# Patient Record
Sex: MALE | Race: WHITE | Employment: OTHER | URBAN - METROPOLITAN AREA
[De-identification: names, ages, dates, MRNs, and addresses within clinical notes are randomized per-mention and may not be internally consistent; named-entity substitution may affect disease eponyms.]

---

## 2017-01-04 ENCOUNTER — TELEPHONE (OUTPATIENT)
Dept: FAMILY MEDICINE | Facility: OTHER | Age: 62
End: 2017-01-04

## 2017-01-04 DIAGNOSIS — I10 HYPERTENSION GOAL BP (BLOOD PRESSURE) < 140/90: Primary | ICD-10-CM

## 2017-01-04 RX ORDER — LOSARTAN POTASSIUM 25 MG/1
12.5 TABLET ORAL DAILY
Qty: 15 TABLET | Refills: 1 | Status: SHIPPED | OUTPATIENT
Start: 2017-01-04 | End: 2017-02-10

## 2017-01-04 NOTE — TELEPHONE ENCOUNTER
Changed to losartan.  Pt will need to come in for BP check and labs within 1-2 months to be sure he's doing well with the change.  Cough should resolve within one month if it's related to the lisinopril.

## 2017-01-04 NOTE — TELEPHONE ENCOUNTER
Pt has been taking lisinopril for about 30 years now and states that just recently he has been coughing a lot. He spoke with the pharmacist and they told him that that is a side effect from the lisinopril.  He wants to know if there is something else he can take that is in the same family as the lisinopril? Does he need to come in and be seen to discuss changing medication?

## 2017-01-04 NOTE — TELEPHONE ENCOUNTER
"Keegan Buchanan is a 61 year old male-     Patient states that he has had this \"annoying\" non-productive cough intermittently for many years.  Just recently it seems to be irritating him more than usual. He spoke with a pharmacist who mentioned that it could be a side effect of his Lisinopril and that he could try a different medication in the same class of drugs that does not have this side effect (he was told the name of the medication, but does not remember).  He denies chest pain, difficulty breathing, fevers, productive cough or congestion.  He feels, other than this cough, better than he has in a long time. He is wondering if he could possibly try a different medication for his Bp.  Will send to provider for recommendations.   Pharmacy-  [facility]  Margie Howell RN        "

## 2017-01-16 ENCOUNTER — OFFICE VISIT (OUTPATIENT)
Dept: FAMILY MEDICINE | Facility: OTHER | Age: 62
End: 2017-01-16
Payer: COMMERCIAL

## 2017-01-16 VITALS
WEIGHT: 194 LBS | RESPIRATION RATE: 16 BRPM | SYSTOLIC BLOOD PRESSURE: 138 MMHG | BODY MASS INDEX: 26.28 KG/M2 | HEART RATE: 88 BPM | DIASTOLIC BLOOD PRESSURE: 76 MMHG | OXYGEN SATURATION: 96 % | HEIGHT: 72 IN | TEMPERATURE: 98.5 F

## 2017-01-16 DIAGNOSIS — J01.90 ACUTE SINUSITIS WITH SYMPTOMS > 10 DAYS: Primary | ICD-10-CM

## 2017-01-16 DIAGNOSIS — R05.9 COUGH: ICD-10-CM

## 2017-01-16 PROCEDURE — 99213 OFFICE O/P EST LOW 20 MIN: CPT | Performed by: PHYSICIAN ASSISTANT

## 2017-01-16 RX ORDER — CODEINE PHOSPHATE AND GUAIFENESIN 10; 100 MG/5ML; MG/5ML
1-2 SOLUTION ORAL EVERY 6 HOURS PRN
Qty: 120 ML | Refills: 0 | Status: SHIPPED | OUTPATIENT
Start: 2017-01-16 | End: 2017-01-25

## 2017-01-16 RX ORDER — CEPHALEXIN 500 MG/1
500 CAPSULE ORAL 2 TIMES DAILY
Qty: 20 CAPSULE | Refills: 0 | Status: SHIPPED | OUTPATIENT
Start: 2017-01-16 | End: 2017-01-26

## 2017-01-16 ASSESSMENT — PAIN SCALES - GENERAL: PAINLEVEL: NO PAIN (0)

## 2017-01-16 NOTE — PATIENT INSTRUCTIONS
Sinusitis (Antibiotic Treatment)    The sinuses are air-filled spaces within the bones of the face. They connect to the inside of the nose. Sinusitis is an inflammation of the tissue lining the sinus cavity. Sinus inflammation can occur during a cold. It can also be due to allergies to pollens and other particles in the air. Sinusitis can cause symptoms of sinus congestion and fullness. A sinus infection causes fever, headache and facial pain. There is often green or yellow drainage from the nose or into the back of the throat (post-nasal drip). You have been given antibiotics to treat this condition.  Home care:    Take the full course of antibiotics as instructed. Do not stop taking them, even if you feel better.    Drink plenty of water, hot tea, and other liquids. This may help thin mucus. It also may promote sinus drainage.    Heat may help soothe painful areas of the face. Use a towel soaked in hot water. Or,  the shower and direct the hot spray onto your face. Using a vaporizer along with a menthol rub at night may also help.     An expectorant containing guaifenesin may help thin the mucus and promote drainage from the sinuses.    Over-the-counter decongestants may be used unless a similar medicine was prescribed. Nasal sprays work the fastest. Use one that contains phenylephrine or oxymetazoline. First blow the nose gently. Then use the spray. Do not use these medicines more often than directed on the label or symptoms may get worse. You may also use tablets containing pseudoephedrine. Avoid products that combine ingredients, because side effects may be increased. Read labels. You can also ask the pharmacist for help. (NOTE: Persons with high blood pressure should not use decongestants. They can raise blood pressure.)    Over-the-counter antihistamines may help if allergies contributed to your sinusitis.      Do not use nasal rinses or irrigation during an acute sinus infection, unless told to by  your health care provider. Rinsing may spread the infection to other sinuses.    Use acetaminophen or ibuprofen to control pain, unless another pain medicine was prescribed. (If you have chronic liver or kidney disease or ever had a stomach ulcer, talk with your doctor before using these medicines. Aspirin should never be used in anyone under 18 years of age who is ill with a fever. It may cause severe liver damage.)    Don't smoke. This can worsen symptoms.  Follow-up care  Follow up with your healthcare provider or our staff if you are not improving within the next week.  When to seek medical advice  Call your healthcare provider if any of these occur:    Facial pain or headache becoming more severe    Stiff neck    Unusual drowsiness or confusion    Swelling of the forehead or eyelids    Vision problems, including blurred or double vision    Fever of 100.4 F (38 C) or higher, or as directed by your healthcare provider    Seizure    Breathing problems    Symptoms not resolving within 10 days    1627-1888 [employer]. [address] [place] [address], [place] [address]. All rights reserved. This information is not intended as a substitute for professional medical care. Always follow your healthcare professional's instructions.

## 2017-01-16 NOTE — MR AVS SNAPSHOT
After Visit Summary   1/16/2017    Keegan Buchanan    MRN: [id number]           Patient Information     Date Of Birth          1955        Visit Information        Provider Department      1/16/2017 9:00 AM Sharri Aguilera PA-C [facility]        Today's Diagnoses     Acute sinusitis with symptoms > 10 days    -  1     Cough           Care Instructions      Sinusitis (Antibiotic Treatment)    The sinuses are air-filled spaces within the bones of the face. They connect to the inside of the nose. Sinusitis is an inflammation of the tissue lining the sinus cavity. Sinus inflammation can occur during a cold. It can also be due to allergies to pollens and other particles in the air. Sinusitis can cause symptoms of sinus congestion and fullness. A sinus infection causes fever, headache and facial pain. There is often green or yellow drainage from the nose or into the back of the throat (post-nasal drip). You have been given antibiotics to treat this condition.  Home care:    Take the full course of antibiotics as instructed. Do not stop taking them, even if you feel better.    Drink plenty of water, hot tea, and other liquids. This may help thin mucus. It also may promote sinus drainage.    Heat may help soothe painful areas of the face. Use a towel soaked in hot water. Or,  the shower and direct the hot spray onto your face. Using a vaporizer along with a menthol rub at night may also help.     An expectorant containing guaifenesin may help thin the mucus and promote drainage from the sinuses.    Over-the-counter decongestants may be used unless a similar medicine was prescribed. Nasal sprays work the fastest. Use one that contains phenylephrine or oxymetazoline. First blow the nose gently. Then use the spray. Do not use these medicines more often than directed on the label or symptoms may get worse. You may also use tablets containing pseudoephedrine. Avoid products that  combine ingredients, because side effects may be increased. Read labels. You can also ask the pharmacist for help. (NOTE: Persons with high blood pressure should not use decongestants. They can raise blood pressure.)    Over-the-counter antihistamines may help if allergies contributed to your sinusitis.      Do not use nasal rinses or irrigation during an acute sinus infection, unless told to by your health care provider. Rinsing may spread the infection to other sinuses.    Use acetaminophen or ibuprofen to control pain, unless another pain medicine was prescribed. (If you have chronic liver or kidney disease or ever had a stomach ulcer, talk with your doctor before using these medicines. Aspirin should never be used in anyone under 18 years of age who is ill with a fever. It may cause severe liver damage.)    Don't smoke. This can worsen symptoms.  Follow-up care  Follow up with your healthcare provider or our staff if you are not improving within the next week.  When to seek medical advice  Call your healthcare provider if any of these occur:    Facial pain or headache becoming more severe    Stiff neck    Unusual drowsiness or confusion    Swelling of the forehead or eyelids    Vision problems, including blurred or double vision    Fever of 100.4 F (38 C) or higher, or as directed by your healthcare provider    Seizure    Breathing problems    Symptoms not resolving within 10 days    1114-5499 [employer]. [address] [place] [address]. All rights reserved. This information is not intended as a substitute for professional medical care. Always follow your healthcare professional's instructions.              Follow-ups after your visit        Follow-up notes from your care team     Return if symptoms worsen or fail to improve.      Your next 10 appointments already scheduled     Mar 10, 2017  8:30 AM   LAB with [facility])    [id number] [place] [id number] [phone]           Patient must bring picture ID.  Patient should be prepared to give a urine specimen  Please do not eat 10-12 hours before your appointment if you are coming in fasting for labs on lipids, cholesterol, or glucose (sugar).  Pregnant women should follow their Care Team instructions. Water with medications is okay. Do not drink coffee or other fluids.   If you have concerns about taking  your medications, please ask at office or if scheduling via LightUp, send a message by clicking on Secure Messaging, Message Your Care Team.              Who to contact     If you have questions or need follow up information about today's clinic visit or your schedule please contact [facility] directly at [phone].  Normal or non-critical lab and imaging results will be communicated to you by Raffstarhart, letter or phone within 4 business days after the clinic has received the results. If you do not hear from us within 7 days, please contact the clinic through [employer] or phone. If you have a critical or abnormal lab result, we will notify you by phone as soon as possible.  Submit refill requests through LightUp or call your pharmacy and they will forward the refill request to us. Please allow 3 business days for your refill to be completed.          Additional Information About Your Visit        LightUp Information     LightUp lets you send messages to your doctor, view your test results, renew your prescriptions, schedule appointments and more. To sign up, go to [email or web address] . Click on \"Log in\" on the left side of the screen, which will take you to the Welcome page. Then click on \"Sign up Now\" on the right side of the page.     You will be asked to enter the access code listed below, as well as some personal information. Please follow the directions to create your username and password.     Your access code is: [email or web address]  Expires: 4/16/2017  " 9:20 AM     Your access code will  in 90 days. If you need help or a new code, please call your [facility] clinic or [phone].        Care EveryWhere ID     This is your Care EveryWhere ID. This could be used by other organizations to access your [facility] medical records  [id number]        Your Vitals Were     Pulse Temperature Respirations Height BMI (Body Mass Index) Pulse Oximetry    88 98.5  F (36.9  C) (Oral) 16 6' (1.829 m) 26.31 kg/m2 96%       Blood Pressure from Last 3 Encounters:   17 138/76   16 132/72   16 128/74    Weight from Last 3 Encounters:   17 194 lb (87.998 kg)   16 196 lb 6.4 oz (89.086 kg)   10/10/16 189 lb 12.8 oz (86.093 kg)              Today, you had the following     No orders found for display         Today's Medication Changes          These changes are accurate as of: 17  9:20 AM.  If you have any questions, ask your nurse or doctor.               Start taking these medicines.        Dose/Directions    cephALEXin 500 MG capsule   Commonly known as:  KEFLEX   Used for:  Acute sinusitis with symptoms > 10 days   Started by:  Sharri Aguilera PA-C        Dose:  500 mg   Take 1 capsule (500 mg) by mouth 2 times daily for 10 days   Quantity:  20 capsule   Refills:  0       guaiFENesin-codeine 100-10 MG/5ML Soln solution   Commonly known as:  ROBITUSSIN AC   Used for:  Cough   Started by:  Sharri Aguilera PA-C        Dose:  1-2 tsp.   Take 5-10 mLs by mouth every 6 hours as needed for cough   Quantity:  120 mL   Refills:  0            Where to get your medicines      These medications were sent to [facility] - [address]  [address] [place] [address] [address]     Phone:  [phone]    - cephALEXin 500 MG capsule      Some of these will need a paper prescription and others can be bought over the counter.  Ask your nurse if you have questions.     Bring a paper prescription for each of these  medications    - guaiFENesin-codeine 100-10 MG/5ML Soln solution             Primary Care Provider Office Phone # Fax #    Fabio Stovall [phone]       [facility] [address] [place] [address]        Thank you!     Thank you for choosing [facility]  for your care. Our goal is always to provide you with excellent care. Hearing back from our patients is one way we can continue to improve our services. Please take a few minutes to complete the written survey that you may receive in the mail after your visit with us. Thank you!             Your Updated Medication List - Protect others around you: Learn how to safely use, store and throw away your medicines at www.disposemymeds.org.          This list is accurate as of: 1/16/17  9:20 AM.  Always use your most recent med list.                   Brand Name Dispense Instructions for use    aspirin 81 MG tablet     90 tablet    Take 1 tablet by mouth daily.       atorvastatin 80 MG tablet    LIPITOR    90 tablet    Take 1 tablet (80 mg) by mouth daily       cephALEXin 500 MG capsule    KEFLEX    20 capsule    Take 1 capsule (500 mg) by mouth 2 times daily for 10 days       fluticasone 50 MCG/ACT spray    FLONASE    16 g    Spray 1-2 sprays into both nostrils daily       guaiFENesin-codeine 100-10 MG/5ML Soln solution    ROBITUSSIN AC    120 mL    Take 5-10 mLs by mouth every 6 hours as needed for cough       losartan 25 MG tablet    COZAAR    15 tablet    Take 0.5 tablets (12.5 mg) by mouth daily       nitroglycerin 0.4 MG sublingual tablet    NITROSTAT    25 tablet    Place 1 tablet (0.4 mg) under the tongue every 5 minutes as needed for chest pain       omeprazole 20 MG CR capsule    priLOSEC    90 capsule    Take 1 capsule (20 mg) by mouth daily       tamsulosin 0.4 MG capsule    FLOMAX    180 capsule    Take 2 capsules (0.8 mg) by mouth daily       tolterodine 2 MG 24 hr capsule    DETROL LA    90 capsule     Take 1 capsule (2 mg) by mouth daily

## 2017-01-16 NOTE — PROGRESS NOTES
"  SUBJECTIVE:                                                    Keegan Buchanan is a 61 year old male who presents to clinic today for the following health issues:      Acute Illness   Acute illness concerns: sinus, cough  Onset: since Gretchen with sinus but cough has been longer and was changed from lisinopril to losartan but still has cough    Fever: no    Chills/Sweats: no    Headache (location?): YES    Sinus Pressure:YES    Conjunctivitis:  no    Ear Pain: no but has history of fluid in ears and would like to check ears    Rhinorrhea: YES    Congestion: YES    Sore Throat: no but has a \"tickling\" sensation     Cough: YES-non-productive, has \"coughing fits\"    Wheeze: YES- notices it at night    Decreased Appetite: no    Nausea: no    Vomiting: no    Diarrhea:  no    Dysuria/Freq.: no    Fatigue/Achiness: YES- some fatigue    Sick/Strep Exposure: YES- people at work     Therapies Tried and outcome: Flonase at night, mucinex, nyquil, dayquil, netti pot      -------------------------------------    Problem list and histories reviewed & adjusted, as indicated.  Additional history: as documented    BP Readings from Last 3 Encounters:   01/16/17 138/76   12/29/16 132/72   12/13/16 128/74    Wt Readings from Last 3 Encounters:   01/16/17 194 lb (87.998 kg)   11/22/16 196 lb 6.4 oz (89.086 kg)   10/10/16 189 lb 12.8 oz (86.093 kg)                  Problem list, Medication list, Allergies, and Medical/Social/Surgical histories reviewed in [facility] and updated as appropriate.    ROS:  Constitutional, HEENT, cardiovascular, pulmonary, gi and gu systems are negative, except as otherwise noted.    OBJECTIVE:                                                    /76 mmHg  Pulse 88  Temp(Src) 98.5  F (36.9  C) (Oral)  Resp 16  Ht 6' (1.829 m)  Wt 194 lb (87.998 kg)  BMI 26.31 kg/m2  SpO2 96%  Body mass index is 26.31 kg/(m^2).  GENERAL: alert and no distress  EYES: Eyes grossly normal to inspection, PERRL and " conjunctivae and sclerae normal  HENT: normal cephalic/atraumatic, both ears: clear effusion, rhinorrhea yellow, oral mucous membranes moist and sinuses: maxillary tenderness on left  NECK: no adenopathy, no asymmetry, masses, or scars and thyroid normal to palpation  RESP: lungs clear to auscultation - no rales, rhonchi or wheezes  CV: regular rates and rhythm, no murmur, click or rub, peripheral pulses strong and no peripheral edema  MS: no gross musculoskeletal defects noted, no edema  SKIN: no suspicious lesions or rashes    Diagnostic Test Results:  none      ASSESSMENT/PLAN:                                                        ICD-10-CM    1. Acute sinusitis with symptoms > 10 days J01.90 cephALEXin (KEFLEX) 500 MG capsule   2. Cough R05 guaiFENesin-codeine (ROBITUSSIN AC) 100-10 MG/5ML SOLN solution     I will treat for sinus infection and if worsening or not resolving would have patient follow up in clinic for a chest xray and recheck.  See Patient Instructions    Sharri Aguilera PA-C  [facility]

## 2017-01-16 NOTE — NURSING NOTE
Chief Complaint   Patient presents with     URI       Initial /76 mmHg  Pulse 88  Temp(Src) 98.5  F (36.9  C) (Oral)  Resp 16  Ht 6' (1.829 m)  Wt 194 lb (87.998 kg)  BMI 26.31 kg/m2  SpO2 96% Estimated body mass index is 26.31 kg/(m^2) as calculated from the following:    Height as of this encounter: 6' (1.829 m).    Weight as of this encounter: 194 lb (87.998 kg).  BP completed using cuff size: regular    Cathy Golden CMA ([facility])

## 2017-01-23 DIAGNOSIS — I10 HYPERTENSION GOAL BP (BLOOD PRESSURE) < 140/90: Primary | ICD-10-CM

## 2017-01-23 NOTE — TELEPHONE ENCOUNTER
Losartan 25 MG      Last Written Prescription Date: 01/04/2017  Last Fill Quantity: 15, # refills: 1  Last Office Visit with G, P or [facility] prescribing provider: 01/16/2017       POTASSIUM   Date Value Ref Range Status   10/10/2016 3.9 3.4 - 5.3 mmol/L Final     CREATININE   Date Value Ref Range Status   10/10/2016 0.85 0.66 - 1.25 mg/dL Final     BP Readings from Last 3 Encounters:   01/16/17 138/76   12/29/16 132/72   12/13/16 128/74

## 2017-01-24 RX ORDER — LOSARTAN POTASSIUM 25 MG/1
TABLET ORAL
Qty: 30 TABLET | Refills: 0 | OUTPATIENT
Start: 2017-01-24

## 2017-01-25 DIAGNOSIS — R05.9 COUGH: Primary | ICD-10-CM

## 2017-01-25 NOTE — TELEPHONE ENCOUNTER
Josh MULLEN      Last Written Prescription Date: 01/16/17  Last Fill Quantity: 120,  # refills: 0   Last Office Visit with [facility], [facility] or [facility] prescribing provider: 01/16/17  Giovana Vargas, [facility]  [phone]

## 2017-01-26 ENCOUNTER — TELEPHONE (OUTPATIENT)
Dept: FAMILY MEDICINE | Facility: OTHER | Age: 62
End: 2017-01-26

## 2017-01-26 RX ORDER — CODEINE PHOSPHATE AND GUAIFENESIN 10; 100 MG/5ML; MG/5ML
1-2 SOLUTION ORAL EVERY 6 HOURS PRN
Qty: 120 ML | Refills: 0 | Status: SHIPPED | OUTPATIENT
Start: 2017-01-26 | End: 2017-02-10

## 2017-01-26 NOTE — TELEPHONE ENCOUNTER
A little before deepak he develop a cough.   Was seen by  thought it was a sinus infection.  Now about 85% percent better.     Worst part is that he finished his antibiotic yesterday.  Now sinus is very congested.     Advised he be seen.  Appointment made for tomorrow.    Dorian Toscano RN

## 2017-01-26 NOTE — TELEPHONE ENCOUNTER
Contacted patient and relayed message below and he stated he would prefer to speak with an RN prior to scheduling any more appointments, please call.

## 2017-01-26 NOTE — TELEPHONE ENCOUNTER
Reason for call:  Symptom  Reason for call:  Patient reporting a symptom    Symptom or request: sinus infection, seens to be getting worse    Duration (how long have symptoms been present): was seen 1/16 for this    Have you been treated for this before? Yes    Additional comments: not feeling any better    Phone Number patient can be reached at:  Home number on file [phone] (home)    Best Time:  any    Can we leave a detailed message on this number:  YES    Call taken on 1/26/2017 at 11:03 AM by Rosalinda Samuel

## 2017-01-26 NOTE — TELEPHONE ENCOUNTER
Per last ov if he is not improving he will need to be seen.  May need a chest xray.  Please call patient and help him schedule an OV.    Dorian Toscano RN

## 2017-01-27 ENCOUNTER — OFFICE VISIT (OUTPATIENT)
Dept: FAMILY MEDICINE | Facility: OTHER | Age: 62
End: 2017-01-27
Payer: COMMERCIAL

## 2017-01-27 ENCOUNTER — RADIANT APPOINTMENT (OUTPATIENT)
Dept: GENERAL RADIOLOGY | Facility: OTHER | Age: 62
End: 2017-01-27
Attending: STUDENT IN AN ORGANIZED HEALTH CARE EDUCATION/TRAINING PROGRAM
Payer: COMMERCIAL

## 2017-01-27 VITALS
RESPIRATION RATE: 18 BRPM | WEIGHT: 195.2 LBS | BODY MASS INDEX: 26.47 KG/M2 | OXYGEN SATURATION: 96 % | DIASTOLIC BLOOD PRESSURE: 74 MMHG | SYSTOLIC BLOOD PRESSURE: 130 MMHG | TEMPERATURE: 98.2 F | HEART RATE: 85 BPM

## 2017-01-27 DIAGNOSIS — J20.9 ACUTE BRONCHITIS WITH SYMPTOMS > 10 DAYS: ICD-10-CM

## 2017-01-27 DIAGNOSIS — R05.9 COUGH: ICD-10-CM

## 2017-01-27 DIAGNOSIS — J01.90 ACUTE SINUSITIS WITH SYMPTOMS > 10 DAYS: Primary | ICD-10-CM

## 2017-01-27 PROCEDURE — 99213 OFFICE O/P EST LOW 20 MIN: CPT | Performed by: STUDENT IN AN ORGANIZED HEALTH CARE EDUCATION/TRAINING PROGRAM

## 2017-01-27 PROCEDURE — 71020 XR CHEST 2 VW: CPT

## 2017-01-27 RX ORDER — ALBUTEROL SULFATE 90 UG/1
2 AEROSOL, METERED RESPIRATORY (INHALATION) EVERY 6 HOURS PRN
Qty: 1 INHALER | Refills: 1 | Status: SHIPPED | OUTPATIENT
Start: 2017-01-27 | End: 2018-01-10

## 2017-01-27 RX ORDER — PREDNISONE 20 MG/1
40 TABLET ORAL DAILY
Qty: 10 TABLET | Refills: 0 | Status: SHIPPED | OUTPATIENT
Start: 2017-01-27 | End: 2017-02-01

## 2017-01-27 RX ORDER — DOXYCYCLINE 100 MG/1
100 CAPSULE ORAL 2 TIMES DAILY
Qty: 20 CAPSULE | Refills: 0 | Status: SHIPPED | OUTPATIENT
Start: 2017-01-27 | End: 2017-02-06

## 2017-01-27 ASSESSMENT — PAIN SCALES - GENERAL: PAINLEVEL: MODERATE PAIN (4)

## 2017-01-27 NOTE — PATIENT INSTRUCTIONS
Antibiotic:  Doxycycline - take 1 tablet daily twice daily for 10 days.      Oral steroid:  Prednisone 40 mg daily for 5 days.  Take in the morning.  This may make you irritable, have insomnia and increased appetite    Cough suppressant:  Robitussin AC - take 5-10 mL as needed at bedtime for cough.  This may make you drowsy.    Inhaler:  Albuterol inhaler - take 2 puffs as needed every 4-6 hours for shortness of breath    Mucinex extended release - 1200 mg twice daily for 7 days.    Return to clinic if your symptoms are not improving or worsening.    Jenelle Arenas, [facility]  [phone]

## 2017-01-27 NOTE — NURSING NOTE
Chief Complaint   Patient presents with     RECHECK     cough     Panel Management     mychart, COPD on problem list/HM?       Initial /74 mmHg  Pulse 85  Temp(Src) 98.2  F (36.8  C) (Temporal)  Resp 18  Ht   Wt 195 lb 3.2 oz (88.542 kg)  SpO2 96% Estimated body mass index is 26.47 kg/(m^2) as calculated from the following:    Height as of 1/16/17: 6' (1.829 m).    Weight as of this encounter: 195 lb 3.2 oz (88.542 kg).  BP completed using cuff size: mehul Montgomery, CMA

## 2017-01-27 NOTE — PROGRESS NOTES
"  SUBJECTIVE:                                                    Keegan Buchanan is a 61 year old male who presents to clinic today for the following health issues:    HPI    Acute Illness   Acute illness concerns: Cough  Onset: Since Gretchen     Fever: no    Chills/Sweats: no    Headache (location?): no    Sinus Pressure:YES- post-nasal drainage, facial pain and teeth pain    Conjunctivitis:  no    Ear Pain: YES- \"fluid in left ear\"    Rhinorrhea: YES    Congestion: YES    Sore Throat: no     Cough: YES-productive of clear sputum, productive of yellow sputum    Wheeze: YES - a lot a week     Decreased Appetite: no    Nausea: no    Vomiting: no    Diarrhea:  no    Dysuria/Freq.: no    Fatigue/Achiness: YES- achey    Sick/Strep Exposure: YES- wife had cold     Therapies Tried and outcome: Robitussin - helps to sleep, but still coughs, Dayquil - doesn't help    2012 - stents, after that started exercise program 5 x per week   Recently did spirometry for chronic cough with Dr. Stovall, diagnosis of mild COPD, has a 14 year history of smoking, no medications started  After antibiotics - Keflex - cough started to get worse, finished antibiotics 3 days ago  Using Flonase    Problem list and histories reviewed & adjusted, as indicated.  Additional history: as documented    Labs reviewed in [facility]  Problem list, Medication list, Allergies, and Medical/Social/Surgical histories reviewed in [facility] and updated as appropriate.    ROS:  Constitutional, HEENT, cardiovascular, pulmonary, gi and gu systems are negative, except as otherwise noted.    OBJECTIVE:                                                    /74 mmHg  Pulse 85  Temp(Src) 98.2  F (36.8  C) (Temporal)  Resp 18  Ht   Wt 195 lb 3.2 oz (88.542 kg)  SpO2 96%  Body mass index is 26.47 kg/(m^2).  GENERAL: healthy, alert and no distress  EYES: Eyes grossly normal to inspection, PERRL and conjunctivae and sclerae normal  HENT: maxillary sinuses tender to palpation, "  TMs with clear effusions, ear canals normal, nose and mouth without ulcers or lesions  NECK: no adenopathy, no asymmetry, masses, or scars and thyroid normal to palpation  RESP: lungs clear to auscultation - no rales, rhonchi or wheezes  CV: regular rate and rhythm, normal S1 S2, no S3 or S4, no murmur, click or rub  MS: no gross musculoskeletal defects noted  SKIN: no suspicious lesions or rashes  PSYCH: mentation appears normal, affect normal/bright    Diagnostic Test Results:  X-ray - pending     ASSESSMENT/PLAN:                                                      Keegan was seen today for recheck and panel management.    Diagnoses and all orders for this visit:    Acute sinusitis with symptoms > 10 days  -     doxycycline (VIBRAMYCIN) 100 MG capsule; Take 1 capsule (100 mg) by mouth 2 times daily for 10 days  -     predniSONE (DELTASONE) 20 MG tablet; Take 2 tablets (40 mg) by mouth daily for 5 days    Acute bronchitis with symptoms > 10 days  -     albuterol (PROAIR HFA/PROVENTIL HFA/VENTOLIN HFA) 108 (90 BASE) MCG/ACT Inhaler; Inhale 2 puffs into the lungs every 6 hours as needed for shortness of breath / dyspnea or wheezing  -     predniSONE (DELTASONE) 20 MG tablet; Take 2 tablets (40 mg) by mouth daily for 5 days    Cough  -     doxycycline (VIBRAMYCIN) 100 MG capsule; Take 1 capsule (100 mg) by mouth 2 times daily for 10 days  -     XR Chest 2 Views      Was treated with cephalexin for 10 days for sinusitis but symptoms have seemed to worsen.  Will try course of doxycycline along with prednisone and albuterol inhaler PRN.  RTC if symptoms persist or fail to improve.      See Patient Instructions  Patient Instructions   Antibiotic:  Doxycycline - take 1 tablet daily twice daily for 10 days.      Oral steroid:  Prednisone 40 mg daily for 5 days.  Take in the morning.  This may make you irritable, have insomnia and increased appetite    Cough suppressant:  Robitussin AC - take 5-10 mL as needed at bedtime for  cough.  This may make you drowsy.    Inhaler:  Albuterol inhaler - take 2 puffs as needed every 4-6 hours for shortness of breath    Mucinex extended release - 1200 mg twice daily for 7 days.    Return to clinic if your symptoms are not improving or worsening.    Jenelle Arenas, YANDEL  [phone]            DAMIAN Mohan [facility]

## 2017-01-27 NOTE — MR AVS SNAPSHOT
After Visit Summary   1/27/2017    Keegan Buchanan    MRN: [id number]           Patient Information     Date Of Birth          1955        Visit Information        Provider Department      1/27/2017 9:00 AM Hetal Arenas APRN [facility]        Today's Diagnoses     Cough    -  1     Acute sinusitis with symptoms > 10 days         Acute bronchitis with symptoms > 10 days           Care Instructions    Antibiotic:  Doxycycline - take 1 tablet daily twice daily for 10 days.      Oral steroid:  Prednisone 40 mg daily for 5 days.  Take in the morning.  This may make you irritable, have insomnia and increased appetite    Cough suppressant:  Robitussin AC - take 5-10 mL as needed at bedtime for cough.  This may make you drowsy.    Inhaler:  Albuterol inhaler - take 2 puffs as needed every 4-6 hours for shortness of breath    Mucinex extended release - 1200 mg twice daily for 7 days.    Return to clinic if your symptoms are not improving or worsening.    Jenelle Arenas CNP  [phone]            Follow-ups after your visit        Your next 10 appointments already scheduled     Mar 10, 2017  8:30 AM   LAB with [facility] LAB [facility] ([facility])    [address] [facility] [address] [phone]           Patient must bring picture ID.  Patient should be prepared to give a urine specimen  Please do not eat 10-12 hours before your appointment if you are coming in fasting for labs on lipids, cholesterol, or glucose (sugar).  Pregnant women should follow their Care Team instructions. Water with medications is okay. Do not drink coffee or other fluids.   If you have concerns about taking  your medications, please ask at office or if scheduling via [facility], send a message by clicking on Secure Messaging, Message Your Care Team.              Who to contact     If you have questions or need follow up information about [facility]  "visit or your schedule please contact [facility] [place] directly at [phone].  Normal or non-critical lab and imaging results will be communicated to you by MyChart, letter or phone within 4 business days after the clinic has received the results. If you do not hear from us within 7 days, please contact the clinic through [facility] or phone. If you have a critical or abnormal lab result, we will notify you by phone as soon as possible.  Submit refill requests through [facility] or call your pharmacy and they will forward the refill request to us. Please allow 3 business days for your refill to be completed.          Additional Information About Your Visit        MyChart Information     [facility] lets you send messages to your doctor, view your test results, renew your prescriptions, schedule appointments and more. To sign up, go to [email or web address] . Click on \"Log in\" on the left side of the screen, which will take you to the Welcome page. Then click on \"Sign up Now\" on the right side of the page.     You will be asked to enter the access code listed below, as well as some personal information. Please follow the directions to create your username and password.     Your access code is: [email or web address]  Expires: 2017  9:20 AM     Your access code will  in 90 days. If you need help or a new code, please call your [place] clinic or [phone].        Care EveryWhere ID     This is your Care EveryWhere ID. This could be used by other organizations to access your [facility] medical records  [id number]        Your Vitals Were     Pulse Temperature Respirations Pulse Oximetry          85 98.2  F (36.8  C) (Temporal) 18 96%         Blood Pressure from Last 3 Encounters:   17 130/74   17 138/76   16 132/72    Weight from Last 3 Encounters:   17 195 lb 3.2 oz (88.542 kg)   17 194 lb (87.998 kg)   16 196 lb 6.4 oz (89.086 kg)              We Performed the Following     " XR Chest 2 Views          Today's Medication Changes          These changes are accurate as of: 1/27/17  9:52 AM.  If you have any questions, ask your nurse or doctor.               Start taking these medicines.        Dose/Directions    albuterol 108 (90 BASE) MCG/ACT Inhaler   Commonly known as:  PROAIR HFA/PROVENTIL HFA/VENTOLIN HFA   Used for:  Acute bronchitis with symptoms > 10 days   Started by:  Hetal Arenas APRN CNP        Dose:  2 puff   Inhale 2 puffs into the lungs every 6 hours as needed for shortness of breath / dyspnea or wheezing   Quantity:  1 Inhaler   Refills:  1       doxycycline 100 MG capsule   Commonly known as:  VIBRAMYCIN   Used for:  Cough, Acute sinusitis with symptoms > 10 days   Started by:  Hetal Arenas APRN CNP        Dose:  100 mg   Take 1 capsule (100 mg) by mouth 2 times daily for 10 days   Quantity:  20 capsule   Refills:  0       predniSONE 20 MG tablet   Commonly known as:  DELTASONE   Used for:  Acute sinusitis with symptoms > 10 days, Acute bronchitis with symptoms > 10 days   Started by:  Hetal Arenas APRN CNP        Dose:  40 mg   Take 2 tablets (40 mg) by mouth daily for 5 days   Quantity:  10 tablet   Refills:  0            Where to get your medicines      These medications were sent to [facility] - [address]  [address] [facility] [address]     Phone:  [phone]    - albuterol 108 (90 BASE) MCG/ACT Inhaler  - doxycycline 100 MG capsule  - predniSONE 20 MG tablet             Primary Care Provider Office Phone # Fax #    Fabio Stovall [phone]       [facility] [address] [place] [address]        Thank you!     Thank you for choosing [facility]  for your care. Our goal is always to provide you with excellent care. Hearing back from our patients is one way we can continue to improve our services. Please take a few minutes to  complete the written survey that you may receive in the mail after your visit with us. Thank you!             Your Updated Medication List - Protect others around you: Learn how to safely use, store and throw away your medicines at www.disposemymeds.org.          This list is accurate as of: 1/27/17  9:52 AM.  Always use your most recent med list.                   Brand Name Dispense Instructions for use    albuterol 108 (90 BASE) MCG/ACT Inhaler    PROAIR HFA/PROVENTIL HFA/VENTOLIN HFA    1 Inhaler    Inhale 2 puffs into the lungs every 6 hours as needed for shortness of breath / dyspnea or wheezing       aspirin 81 MG tablet     90 tablet    Take 1 tablet by mouth daily.       atorvastatin 80 MG tablet    LIPITOR    90 tablet    Take 1 tablet (80 mg) by mouth daily       doxycycline 100 MG capsule    VIBRAMYCIN    20 capsule    Take 1 capsule (100 mg) by mouth 2 times daily for 10 days       fluticasone 50 MCG/ACT spray    FLONASE    16 g    Spray 1-2 sprays into both nostrils daily       guaiFENesin-codeine 100-10 MG/5ML Soln solution    ROBITUSSIN AC    120 mL    Take 5-10 mLs by mouth every 6 hours as needed for cough       losartan 25 MG tablet    COZAAR    15 tablet    Take 0.5 tablets (12.5 mg) by mouth daily       nitroglycerin 0.4 MG sublingual tablet    NITROSTAT    25 tablet    Place 1 tablet (0.4 mg) under the tongue every 5 minutes as needed for chest pain       omeprazole 20 MG CR capsule    priLOSEC    90 capsule    Take 1 capsule (20 mg) by mouth daily       predniSONE 20 MG tablet    DELTASONE    10 tablet    Take 2 tablets (40 mg) by mouth daily for 5 days       tamsulosin 0.4 MG capsule    FLOMAX    180 capsule    Take 2 capsules (0.8 mg) by mouth daily       tolterodine 2 MG 24 hr capsule    DETROL LA    90 capsule    Take 1 capsule (2 mg) by mouth daily

## 2017-02-06 ENCOUNTER — TELEPHONE (OUTPATIENT)
Dept: FAMILY MEDICINE | Facility: OTHER | Age: 62
End: 2017-02-06

## 2017-02-06 NOTE — TELEPHONE ENCOUNTER
Reason for call:  Symptom  Reason for call:  Patient reporting a symptom    Symptom or request: bronchitis sinus issues fu    Duration (how long have symptoms been present): finished antibiotics yesterday    Have you been treated for this before? Yes    Additional comments: is still not better declined appt wanted to speak with triage    Phone Number patient can be reached at:  Home number on file [phone] (home) do not call between 11:30 and 1    Best Time:  any    Can we leave a detailed message on this number:  YES    Call taken on 2/6/2017 at 9:50 AM by Justyna Hartman

## 2017-02-06 NOTE — TELEPHONE ENCOUNTER
Spoke with patient.  Was seen on 1/27/2017  Started getting better on prednisone.  About two days after the prednisone was out improvement was slowed way down.   Will still coughing and worse at night. Feels like he is wheezing at night. Still using inhaler every 4 hours through out the day and night.   Wondering what his next steps are.  Another round of prednisone, wait and see, or OV?    Please advise.    Dorian Toscano RN

## 2017-02-07 NOTE — TELEPHONE ENCOUNTER
Patient has had 2 rounds of antibiotics, Keflex and then doxycycline.  He does have a recent diagnosis of COPD from Dr. Stovall so this may be contributing to the ongoing wheeze.  There was no pneumonia on the chest x-ray completed 1/27/17 which is encouraging, however, if symptoms persist or worsen, I would recommend patient is seen.    Jenelle Arenas, CNP

## 2017-02-07 NOTE — TELEPHONE ENCOUNTER
Spoke with pt gave message, pt stated he will wait a few days than call if he wants an appointment. Annamarie Villa CMA ([facility])

## 2017-02-10 ENCOUNTER — TELEPHONE (OUTPATIENT)
Dept: FAMILY MEDICINE | Facility: OTHER | Age: 62
End: 2017-02-10

## 2017-02-10 ENCOUNTER — OFFICE VISIT (OUTPATIENT)
Dept: FAMILY MEDICINE | Facility: OTHER | Age: 62
End: 2017-02-10
Payer: COMMERCIAL

## 2017-02-10 VITALS
TEMPERATURE: 98.8 F | HEART RATE: 84 BPM | WEIGHT: 191.2 LBS | RESPIRATION RATE: 20 BRPM | DIASTOLIC BLOOD PRESSURE: 80 MMHG | SYSTOLIC BLOOD PRESSURE: 136 MMHG | OXYGEN SATURATION: 97 % | BODY MASS INDEX: 27.37 KG/M2 | HEIGHT: 70 IN

## 2017-02-10 DIAGNOSIS — J44.1 CHRONIC OBSTRUCTIVE PULMONARY DISEASE WITH ACUTE EXACERBATION (H): Primary | ICD-10-CM

## 2017-02-10 DIAGNOSIS — Z23 NEED FOR VACCINATION: ICD-10-CM

## 2017-02-10 DIAGNOSIS — I10 HYPERTENSION GOAL BP (BLOOD PRESSURE) < 140/90: ICD-10-CM

## 2017-02-10 PROCEDURE — 90471 IMMUNIZATION ADMIN: CPT | Performed by: FAMILY MEDICINE

## 2017-02-10 PROCEDURE — 99214 OFFICE O/P EST MOD 30 MIN: CPT | Mod: 25 | Performed by: FAMILY MEDICINE

## 2017-02-10 PROCEDURE — 90732 PPSV23 VACC 2 YRS+ SUBQ/IM: CPT | Performed by: FAMILY MEDICINE

## 2017-02-10 RX ORDER — LOSARTAN POTASSIUM 25 MG/1
12.5 TABLET ORAL DAILY
Qty: 45 TABLET | Refills: 1 | Status: SHIPPED | OUTPATIENT
Start: 2017-02-10 | End: 2017-07-20

## 2017-02-10 RX ORDER — PREDNISONE 20 MG/1
40 TABLET ORAL DAILY
Qty: 10 TABLET | Refills: 0 | Status: SHIPPED | OUTPATIENT
Start: 2017-02-10 | End: 2017-02-15

## 2017-02-10 ASSESSMENT — PAIN SCALES - GENERAL: PAINLEVEL: NO PAIN (0)

## 2017-02-10 NOTE — Clinical Note
My COPD Action Plan   Name: Keegan Buchanan    YOB: 1955    Date: 2/10/2017    My doctor: Fabio Stovall    My clinic: [address] [address] [phone]   My COPD Medicine:       My Controller Medications: Beclomethasone (Qvar)     My Rescue Medication:  Albuterol     Use of Oxygen:  Oxygen Not Prescribed   My COPD Severity: Mild = FeV1 > 80%        GREEN ZONE       Doing well today      Usual level of activity and exercise    Usual amount of cough and mucus    No shortness of breath    Can think clearly    Sleep well at night    Feel like eating Actions:      Take daily medicines    Use oxygen as prescribed    Follow regular exercise and diet plan    Avoid cigarette smoke and other irritants that harm the lungs             YELLOW ZONE          Having a bad day or flare up      Short of breath more than usual    Change in color or amount of mucus    More coughing or wheezing    Fever or chills    Less energy; trouble completing activities    Trouble thinking or focusing    Using quick relief inhaler or nebulizer more often    Poor sleep; symptoms wake me up    Do not feel like eating Actions:      Take daily medicines    Use quick relief inhaler every 4 hours    If you use oxygen, call you doctor to see if you should adjust your oxygen    Do breathing exercises or other things to help you relax    Let a loved one, friend or neighbor know you are feeling worse    If you have one or more symptoms, consider taking steroids or antibiotics (if they were prescribed)    Call your care team if you have 2 or more symptoms or symptoms don't improve           RED ZONE       Need medical care now      Severe shortness of breath (feel you can't breath)    Not enough breath to do any activity    Trouble walking or talking    Trouble coughing up mucus or blood in mucus    Frequent coughing    Rescue medicines are not working    Not able to sleep because  of breathing    Feel confused or drowsy    Chest pain  Actions:      Call 911 or     Have someone take you to the emergency room if you can't reach your care team        Electronically signed by: Fabio Stovall MD, February 10, 2017     Annual Reminders:  Meet with Care Team, Flu Shot every Fall and Pneumonia Shot at least once.       [facility]  [place] - [id number] - [place], [place] - [address] [facility]

## 2017-02-10 NOTE — NURSING NOTE
"Chief Complaint   Patient presents with     Cough     Panel Management     Pneumovax, COPD action plan       Initial /80 mmHg  Pulse 84  Temp(Src) 98.8  F (37.1  C) (Temporal)  Resp 20  Ht 5' 10\" (1.778 m)  Wt 191 lb 3.2 oz (86.728 kg)  BMI 27.43 kg/m2  SpO2 97% Estimated body mass index is 27.43 kg/(m^2) as calculated from the following:    Height as of this encounter: 5' 10\" (1.778 m).    Weight as of this encounter: 191 lb 3.2 oz (86.728 kg).  Medication Reconciliation: complete  Darek Smith, BRY    "

## 2017-02-10 NOTE — TELEPHONE ENCOUNTER
Requested Provider:  Fabio Stovall MD    PCP: Fabio Stovall    Reason for visit: sinus sx/ cough     Duration of symptoms: ongoing     Have you been treated for this in the past? Yes    Additional comments: Has been on 2 rounds of ABX but is only getting worse. Has an appt for next week, but does not fell he can wait that long. Constant cough. Please call.

## 2017-02-10 NOTE — TELEPHONE ENCOUNTER
Patient just wanted to mention that he works at the [employer] in [place], boot, belts, etc.  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  He use to take his dog to work and his dog had this cough as well.  His dog got put on bronchodilator and is now 80% better and he doesn't go to work with him anymore.  Wondering if he could be suffering the same like dog did.  Darek Smith, CMA

## 2017-02-10 NOTE — PROGRESS NOTES
"  SUBJECTIVE:                                                    Keegan Buchanan is a 61 year old male who presents to clinic today for the following health issues:      Acute Illness   Acute illness concerns: cough, voice sounds different  Onset: a week before Christmas 2016     Fever: no    Chills/Sweats: YES- couple times during night would wake and feel hot and clammy    Headache (location?): YES from coughing about every day, pulled muscle too from coughing possibly    Sinus Pressure:no    Conjunctivitis:  no    Ear Pain: no    Rhinorrhea: no    Congestion: no    Sore Throat: no    Discuss sulfa ? For med, co-worker talked about it.   Cough: YES - dry cough, sometimes \"rattily\" and sometimes gets up some phlegm, cough is worse at night, like it a position where he isn't sitting or standing upright, can be triggered by breathing, like getting all the air out, when waking at night, after sleeping and shallow breathing will wake take good breaths and start coughing    Wheeze: YES- devon at night    Decreased Appetite: no    Nausea: no    Vomiting: no    Diarrhea:  no    Dysuria/Freq.: no    Fatigue/Achiness: no    Sick/Strep Exposure: YES- people that he works with and at home that are sick on and off     Therapies Tried and outcome: doxycycline, 7-8 days was getting better and then it stalled out, then gets worse, inhaler he was given helps for about 50% then it wares off and cough is back    Pt has had a chronic cough since about a week before Christmas.  Saw Sharri a few weeks ago.  Was placed on antibiotics.  Started to have some improvement, but about the last couple days, seemed to get worse again.  After another week or two, saw Jenelle in [place].  Was placed on steroids and doxycycline.  Felt good for the 1st 8 days, but has been more problematic since then.  His CXR was suggestive of bronchitis, he states.      He also had a lot of sinus symptoms, but this is gone.  He also tried using his inhaler again, " "which helps for a couple of hours when he uses that.      Worse when he lies down or sits.  Does best when he's active.  Feels great after exercise, but worse upon resting again.      Pt had spirometry in the past suggestive of mild obstruction.    Has some \"rattling\" when he breathes.  Usually after lying down.  Lying down is worst for his cough.  Changing temperatures can also bring on some cough.    Yesterday, hardly coughed at all, but then coughed all night long.  Denies sensation of reflux or heartburn.  Takes PPI.  Has had some coughing fits with this.    No fevers or chills.      Problem list and histories reviewed & adjusted, as indicated.  Additional history: as documented    Current Outpatient Prescriptions   Medication Sig Dispense Refill     predniSONE (DELTASONE) 20 MG tablet Take 2 tablets (40 mg) by mouth daily for 5 days 10 tablet 0     beclomethasone (QVAR) 40 MCG/ACT Inhaler Inhale 2 puffs into the lungs 2 times daily 1 Inhaler 1     losartan (COZAAR) 25 MG tablet Take 0.5 tablets (12.5 mg) by mouth daily 45 tablet 1     albuterol (PROAIR HFA/PROVENTIL HFA/VENTOLIN HFA) 108 (90 BASE) MCG/ACT Inhaler Inhale 2 puffs into the lungs every 6 hours as needed for shortness of breath / dyspnea or wheezing 1 Inhaler 1     fluticasone (FLONASE) 50 MCG/ACT spray Spray 1-2 sprays into both nostrils daily 16 g 11     atorvastatin (LIPITOR) 80 MG tablet Take 1 tablet (80 mg) by mouth daily 90 tablet 3     tamsulosin (FLOMAX) 0.4 MG 24 hr capsule Take 2 capsules (0.8 mg) by mouth daily 180 capsule 3     tolterodine (DETROL LA) 2 MG 24 hr capsule Take 1 capsule (2 mg) by mouth daily 90 capsule 3     omeprazole (PRILOSEC) 20 MG capsule Take 1 capsule (20 mg) by mouth daily 90 capsule 3     nitroglycerin (NITROSTAT) 0.4 MG SL tablet Place 1 tablet (0.4 mg) under the tongue every 5 minutes as needed for chest pain 25 tablet 0     aspirin 81 MG tablet Take 1 tablet by mouth daily. 90 tablet 3     [DISCONTINUED] " "tolterodine (DETROL LA) 2 MG 24 hr capsule Take 2 mg by mouth 2 times daily.       Recent Labs   Lab Test  10/10/16   0828  11/16/15   0803  09/14/15   0835   12/27/12   0838   A1C   --    --    --    --   5.6   LDL  77  57  71   < >  83   HDL  41  33*  40*   < >  37*   TRIG  235*  316*  161*   < >  196*   ALT  38  46  50   < >   --    CR  0.85  0.72  0.88   < >   --    GFRESTIMATED  >90  Non  GFR Calc    >90  Non  GFR Calc    88   < >   --    GFRESTBLACK  >90   GFR Calc    >90   GFR Calc    >90   GFR Calc     < >   --    POTASSIUM  3.9  4.0  3.9   < >   --     < > = values in this interval not displayed.      BP Readings from Last 3 Encounters:   02/10/17 136/80   01/27/17 130/74   01/16/17 138/76    Wt Readings from Last 3 Encounters:   02/10/17 191 lb 3.2 oz (86.728 kg)   01/27/17 195 lb 3.2 oz (88.542 kg)   01/16/17 194 lb (87.998 kg)               ROS:  Constitutional, HEENT, cardiovascular, pulmonary, gi and gu systems are negative, except as otherwise noted.    OBJECTIVE:                                                    /80 mmHg  Pulse 84  Temp(Src) 98.8  F (37.1  C) (Temporal)  Resp 20  Ht 5' 10\" (1.778 m)  Wt 191 lb 3.2 oz (86.728 kg)  BMI 27.43 kg/m2  SpO2 97%  Body mass index is 27.43 kg/(m^2).  GENERAL: healthy, alert and no distress  EYES: Eyes grossly normal to inspection, PERRL and conjunctivae and sclerae normal  HENT: ear canals and TM's normal, nose and mouth without ulcers or lesions  NECK: no adenopathy, no asymmetry, masses, or scars and thyroid normal to palpation  RESP: crackles at the left lung base.  Wheezes on forced expiration.  CV: regular rate and rhythm, normal S1 S2, no S3 or S4, no murmur, click or rub, no peripheral edema and peripheral pulses strong  ABDOMEN: soft, nontender, no hepatosplenomegaly, no masses and bowel sounds normal  MS: no gross musculoskeletal defects noted, no " "edema    Diagnostic Test Results:  Results for orders placed or performed in visit on 01/27/17   XR Chest 2 Views    Narrative    CHEST TWO VIEWS   1/27/2017 10:14 AM     HISTORY: Cough x 1 month.     COMPARISON: Chest x-rays dated 11/14/2012.    FINDINGS: Mild perihilar, peribronchial cuffing is seen on the right.  Lungs are otherwise clear. Heart size and pulmonary vascularity are  within normal limits. No pneumothorax or significant pleural fluid  collection. Spondylotic spurring is stable in the spine.      Impression    IMPRESSION:  1. Question mild bronchitis. No other evidence of acute  cardiopulmonary disease is seen. No pneumonia is seen.    JAZZY UPTON MD        ASSESSMENT/PLAN:                                                      BMI:   Estimated body mass index is 27.43 kg/(m^2) as calculated from the following:    Height as of this encounter: 5' 10\" (1.778 m).    Weight as of this encounter: 191 lb 3.2 oz (86.728 kg).   Weight management plan: Discussed healthy diet and exercise guidelines and patient will follow up in 6 months in clinic to re-evaluate.        ICD-10-CM    1. Chronic obstructive pulmonary disease with acute exacerbation (H) J44.1 COPD ACTION PLAN     predniSONE (DELTASONE) 20 MG tablet     beclomethasone (QVAR) 40 MCG/ACT Inhaler   2. Hypertension goal BP (blood pressure) < 140/90 I10 losartan (COZAAR) 25 MG tablet   3. Need for vaccination Z23 VACCINE ADMINISTRATION, INITIAL     PNEUMOCOCCAL VACCINE,ADULT,SQ OR IM     1.  Reviewed his past visits, CXR, treatments.  Clinically, suspect his symptoms are due to COPD exacerbation.  Will try PO and inhaled steroids.  If not clearly improved by Monday, will add Zithromax.  Recommend continuing with inhaled steroids until he feels normal.  Continue albuterol prn.  Follow up prn.   2.  Currently Controlled.  Continue current regimen.  Call/return if any problems or questions arise.   3.  Immunized.            Patient Instructions   Thank you " for visiting [facility]    Let's see if another round of prednisone and staring QVAR will help with your cough.    If fevers or just not improvement over the weekend, let me know, and we'll add Zithromax.      Continue QVAR until you're back to normal with cough and breathing.  Can resume this if you get another cold.    Can use albuterol with both of these.    Please see me in March  for follow up.       If you had imaging scheduled please refer to your radiology prep sheet.    Appointment    Date_______________     Time_____________    Day:   M TU W TH F    With____________________________    Location_________________________    If you need medication refills, please contact your pharmacy 3 days before your prescriptions runs out. If you are out of refills, your pharmacy will contact contact the clinic.    Contact us or return if questions or concerns.     -Your Care Team:  MD Margie Dewey PA-C Folake Falaki, MD Anoshirvan Mazhari, MD Kelly White, YANDEL    General information about your clinic      Clinic hours:     Lab hours:  Phone [phone]  Monday 7:30 am-7 pm    Monday 8:30 am-6:30 pm  Tuesday-Friday 7:30 am-5 pm   Tuesday-Friday 8:30 am-4:30 pm    Pharmacy hours:  Phone [phone]  Monday 8:30 am-7pm  Tuesday-Friday 8:30am-6 pm                                       Mychart assistance [phone]        We would like to hear from you, how was your visit today?    Aleah Chapin  Patient Information Supervisor   Patient Care Supervisor  [facility], and [facility], [facility]  [phone]           Fabio Stovall MD, MD  [facility]

## 2017-02-10 NOTE — MR AVS SNAPSHOT
After Visit Summary   2/10/2017    Keegan Buchanan    MRN: [id number]           Patient Information     Date Of Birth          1955        Visit Information        Provider Department      2/10/2017 4:00 PM Fabio Stovall MD [facility]        Today's Diagnoses     Need for vaccination    -  1     Chronic obstructive pulmonary disease with acute exacerbation (H)         Hypertension goal BP (blood pressure) < 140/90           Care Instructions    Thank you for visiting [facility]    Let's see if another round of prednisone and staring QVAR will help with your cough.    If fevers or just not improvement over the weekend, let me know, and we'll add Zithromax.      Continue QVAR until you're back to normal with cough and breathing.  Can resume this if you get another cold.    Can use albuterol with both of these.    Please see me in March  for follow up.       If you had imaging scheduled please refer to your radiology prep sheet.    Appointment    Date_______________     Time_____________    Day:   M TU W TH F    With____________________________    Location_________________________    If you need medication refills, please contact your pharmacy 3 days before your prescriptions runs out. If you are out of refills, your pharmacy will contact contact the clinic.    Contact us or return if questions or concerns.     -Your Care Team:  MD Margie Dewey PA-C Folake Falaki, MD Anoshirvan Mazhari, MD Kelly White, YANDEL    General information about your clinic      Clinic hours:     Lab hours:  Phone [phone]  Monday 7:30 am-7 pm    Monday 8:30 am-6:30 pm  Tuesday-Friday 7:30 am-5 pm   Tuesday-Friday 8:30 am-4:30 pm    Pharmacy hours:  Phone [phone]  Monday 8:30 am-7pm  Tuesday-Friday 8:30am-6 pm                                       Mychart assistance [phone]        We would like to hear from you, how was your visit  today?    Aleah Chapin  Patient Information Supervisor   Patient Care Supervisor  [facility], [facility], and [facility]  [phone]           Follow-ups after your visit        Your next 10 appointments already scheduled     Feb 14, 2017 10:40 AM   Office Visit with DAMIAN Carrillo CNP   [facility] ([facility]    [address]  [place] [address] [phone]           Bring a current list of meds and any records pertaining to this visit.  For Physicals, please bring immunization records and any forms needing to be filled out.  Please arrive 10 minutes early to complete paperwork.            Mar 10, 2017  8:30 AM   LAB with [facility]   [facility]    [address]  [facility] [address] [phone]           Patient must bring picture ID.  Patient should be prepared to give a urine specimen  Please do not eat 10-12 hours before your appointment if you are coming in fasting for labs on lipids, cholesterol, or glucose (sugar).  Pregnant women should follow their Care Team instructions. Water with medications is okay. Do not drink coffee or other fluids.   If you have concerns about taking  your medications, please ask at office or if scheduling via [employer], send a message by clicking on Secure Messaging, Message Your Care Team.              Who to contact     If you have questions or need follow up information about today's clinic visit or your schedule please contact [facility] directly at [phone].  Normal or non-critical lab and imaging results will be communicated to you by MyChart, letter or phone within 4 business days after the clinic has received the results. If you do not hear from us within 7 days, please contact the clinic through MyChart or phone. If you have a critical or  "abnormal lab result, we will notify you by phone as soon as possible.  Submit refill requests through [employer] or call your pharmacy and they will forward the refill request to us. Please allow 3 business days for your refill to be completed.          Additional Information About Your Visit        [employer] Information     [employer] lets you send messages to your doctor, view your test results, renew your prescriptions, schedule appointments and more. To sign up, go to [email or web address] . Click on \"Log in\" on the left side of the screen, which will take you to the Welcome page. Then click on \"Sign up Now\" on the right side of the page.     You will be asked to enter the access code listed below, as well as some personal information. Please follow the directions to create your username and password.     Your access code is: [email or web address]  Expires: 2017  9:20 AM     Your access code will  in 90 days. If you need help or a new code, please call your [place] clinic or [phone].        Care EveryWhere ID     This is your Care EveryWhere ID. This could be used by other organizations to access your [place] medical records  [id number]        Your Vitals Were     Pulse Temperature Respirations Height BMI (Body Mass Index) Pulse Oximetry    84 98.8  F (37.1  C) (Temporal) 20 5' 10\" (1.778 m) 27.43 kg/m2 97%       Blood Pressure from Last 3 Encounters:   02/10/17 136/80   17 130/74   17 138/76    Weight from Last 3 Encounters:   02/10/17 191 lb 3.2 oz (86.728 kg)   17 195 lb 3.2 oz (88.542 kg)   17 194 lb (87.998 kg)              We Performed the Following     COPD ACTION PLAN     PNEUMOCOCCAL VACCINE,ADULT,SQ OR IM     VACCINE ADMINISTRATION, INITIAL          Today's Medication Changes          These changes are accurate as of: 2/10/17  4:38 PM.  If you have any questions, ask your nurse or doctor.               Start taking these medicines.        Dose/Directions    beclomethasone " 40 MCG/ACT Inhaler   Commonly known as:  QVAR   Used for:  Chronic obstructive pulmonary disease with acute exacerbation (H)   Started by:  Fabio Stovall MD        Dose:  2 puff   Inhale 2 puffs into the lungs 2 times daily   Quantity:  1 Inhaler   Refills:  1       predniSONE 20 MG tablet   Commonly known as:  DELTASONE   Used for:  Chronic obstructive pulmonary disease with acute exacerbation (H)   Started by:  Fabio Stovall MD        Dose:  40 mg   Take 2 tablets (40 mg) by mouth daily for 5 days   Quantity:  10 tablet   Refills:  0            Where to get your medicines      These medications were sent to [facility] [place] - [address]   [address] [place] [address]     Phone:  [phone]    - beclomethasone 40 MCG/ACT Inhaler  - losartan 25 MG tablet  - predniSONE 20 MG tablet             Primary Care Provider Office Phone # Fax #    Fabio Stovall [phone]       [facility] [address] [place] [address]        Thank you!     Thank you for choosing [facility]  for your care. Our goal is always to provide you with excellent care. Hearing back from our patients is one way we can continue to improve our services. Please take a few minutes to complete the written survey that you may receive in the mail after your visit with us. Thank you!             Your Updated Medication List - Protect others around you: Learn how to safely use, store and throw away your medicines at www.disposemymeds.org.          This list is accurate as of: 2/10/17  4:38 PM.  Always use your most recent med list.                   Brand Name Dispense Instructions for use    albuterol 108 (90 BASE) MCG/ACT Inhaler    PROAIR HFA/PROVENTIL HFA/VENTOLIN HFA    1 Inhaler    Inhale 2 puffs into the lungs every 6 hours as needed for shortness of breath / dyspnea or wheezing       aspirin 81 MG tablet     90 tablet    Take 1  tablet by mouth daily.       atorvastatin 80 MG tablet    LIPITOR    90 tablet    Take 1 tablet (80 mg) by mouth daily       beclomethasone 40 MCG/ACT Inhaler    QVAR    1 Inhaler    Inhale 2 puffs into the lungs 2 times daily       fluticasone 50 MCG/ACT spray    FLONASE    16 g    Spray 1-2 sprays into both nostrils daily       losartan 25 MG tablet    COZAAR    45 tablet    Take 0.5 tablets (12.5 mg) by mouth daily       nitroglycerin 0.4 MG sublingual tablet    NITROSTAT    25 tablet    Place 1 tablet (0.4 mg) under the tongue every 5 minutes as needed for chest pain       omeprazole 20 MG CR capsule    priLOSEC    90 capsule    Take 1 capsule (20 mg) by mouth daily       predniSONE 20 MG tablet    DELTASONE    10 tablet    Take 2 tablets (40 mg) by mouth daily for 5 days       tamsulosin 0.4 MG capsule    FLOMAX    180 capsule    Take 2 capsules (0.8 mg) by mouth daily       tolterodine 2 MG 24 hr capsule    DETROL LA    90 capsule    Take 1 capsule (2 mg) by mouth daily

## 2017-02-10 NOTE — TELEPHONE ENCOUNTER
I spoke with patient and informed him of the message below.  No further questions at this time.  Darek Smith, CMA

## 2017-02-10 NOTE — TELEPHONE ENCOUNTER
That certainly could be playing a role.  I would consider investing in a respirator or figuring out better ventilation if you're going to continue doing this.

## 2017-02-10 NOTE — NURSING NOTE
Prior to injection verified patient identity using patient's name and date of birth.  Screening Questionnaire for Adult Immunization    Are you sick today?   No   Do you have allergies to medications, food, a vaccine component or latex?   No   Have you ever had a serious reaction after receiving a vaccination?   No   Do you have a long-term health problem with heart disease, lung disease, asthma, kidney disease, metabolic disease (e.g. diabetes), anemia, or other blood disorder?   No   Do you have cancer, leukemia, HIV/AIDS, or any other immune system problem?   No   In the past 3 months, have you taken medications that affect  your immune system, such as prednisone, other steroids, or anticancer drugs; drugs for the treatment of rheumatoid arthritis, Crohn s disease, or psoriasis; or have you had radiation treatments?   No   Have you had a seizure, or a brain or other nervous system problem?   No   During the past year, have you received a transfusion of blood or blood     products, or been given immune (gamma) globulin or antiviral drug?   No   For women: Are you pregnant or is there a chance you could become        pregnant during the next month?   No   Have you received any vaccinations in the past 4 weeks?   No     Immunization questionnaire answers were all negative.      MNVFC doesn't apply on this patient    Per orders of Dr. Stovall, injection of Pneumovax 23 given by Darek Smith. Patient instructed to remain in clinic for 20 minutes afterwards, and to report any adverse reaction to me immediately.       Screening performed by Darek Smith on 2/10/2017 at 4:48 PM.  Darek Smith, [facility]

## 2017-02-13 ENCOUNTER — TELEPHONE (OUTPATIENT)
Dept: FAMILY MEDICINE | Facility: OTHER | Age: 62
End: 2017-02-13

## 2017-02-13 DIAGNOSIS — J44.1 COPD EXACERBATION (H): Primary | ICD-10-CM

## 2017-02-13 RX ORDER — AZITHROMYCIN 250 MG/1
TABLET, FILM COATED ORAL
Qty: 6 TABLET | Refills: 0 | Status: SHIPPED | OUTPATIENT
Start: 2017-02-13 | End: 2017-03-16

## 2017-02-13 NOTE — TELEPHONE ENCOUNTER
Reason for call:  Symptom  Reason for call:  Patient reporting a symptom    Symptom or request: Cough    Duration (how long have symptoms been present): last week    Have you been treated for this before? No    Additional comments: Patient would like to speak to you and wanted you to know his cough is not any better    Phone Number patient can be reached at:  Home number on file [phone] (home)    Best Time:  anytime    Can we leave a detailed message on this number:  YES    Call taken on 2/13/2017 at 8:26 AM by Michael Bowen

## 2017-02-14 NOTE — TELEPHONE ENCOUNTER
LM for pt to return call, when call is returned give information below and let him know Rx was sent to [facility].    Cathy Golden CMA ([facility])

## 2017-02-14 NOTE — TELEPHONE ENCOUNTER
Add Zithromax to current regimen.  If not improving with this, then will need one more clinic follow up.

## 2017-02-20 ENCOUNTER — TELEPHONE (OUTPATIENT)
Dept: FAMILY MEDICINE | Facility: OTHER | Age: 62
End: 2017-02-20

## 2017-02-20 NOTE — TELEPHONE ENCOUNTER
Patient informed.  He also wanted you to know that he wakes up at night sometimes coughing but the inhaler is helping states he coughs up phlegm he will check back in after his trip to [place] if he is still coughing

## 2017-02-20 NOTE — TELEPHONE ENCOUNTER
Reason for call:  Patient was put on Qvar for a cough by Dr Stovall and was told to stop taking it when he felt better so he did on Friday.  Then already today is starting to cough again so he is wondering if she can keep taking that inhaler.  He was taking 2 puff twice a day, he is wondering if he can try 1 puff twice a day.  Please call

## 2017-02-20 NOTE — TELEPHONE ENCOUNTER
"Can resume.  Can try lower dose.  I had stated to continue it until he felt \"NORMAL\".  There is little risk to continued use, especially if he's benefiting from it.  "

## 2017-03-03 ENCOUNTER — TELEPHONE (OUTPATIENT)
Dept: FAMILY MEDICINE | Facility: OTHER | Age: 62
End: 2017-03-03

## 2017-03-03 DIAGNOSIS — I10 HYPERTENSION GOAL BP (BLOOD PRESSURE) < 140/90: Primary | ICD-10-CM

## 2017-03-03 DIAGNOSIS — R73.01 IMPAIRED FASTING GLUCOSE: ICD-10-CM

## 2017-03-03 NOTE — TELEPHONE ENCOUNTER
This patient has an appointment for lab work but does not have any orders. Please place some orders.    Thank You,  Beckie Cornejo MLT (ASCP)

## 2017-03-13 ENCOUNTER — TELEPHONE (OUTPATIENT)
Dept: FAMILY MEDICINE | Facility: OTHER | Age: 62
End: 2017-03-13

## 2017-03-13 NOTE — TELEPHONE ENCOUNTER
Keegan Buchanan is a 61 year old male who calls with concerns of ongoing cough.    NURSING ASSESSMENT:  Description:  Cough started prior to [place]. Got better for the most part. Still a lingering cough at night. When lays down for bed, hears wheezing. Constantly clearing his throat, feels chest is congested. Phlegm is clear to yellow in color. Quit inhalers-albuterol and qvar. Also stopped flonase. Feels like he may be better since stopping, but not sure. Denies chest pain, sob, fever, ear pain/congestion, sore throat, dizziness, dehydration  Onset/duration:  12/2016.   Precip. factors:  n/a  Associated symptoms:  See above  Improves/worsens symptoms:  See above  Pain scale (0-10)   0/10  LMP/preg/breast feeding:  n/a  Last exam/Treatment:  2/10/17  Allergies:   Allergies   Allergen Reactions     Augmentin GI Disturbance     Penicillins      GI upset and diarrhea       MEDICATIONS:   Taking medication(s) as prescribed? No  Taking over the counter medication(s?) No  Any medication side effects? No significant side effects    Any barriers to taking medication(s) as prescribed?  No  Medication(s) improving/managing symptoms?  Yes  Medication reconciliation completed: No      NURSING PLAN: Nursing advice to patient use inhalers as prescribed. f/u in clinic with Dr. Stovall    RECOMMENDED DISPOSITION:  See in 72 hours - patient states prefers to see Dr. Stovall. Scheduled on 3/16  Will comply with recommendation: Yes  If further questions/concerns or if symptoms do not improve, worsen or new symptoms develop, call your PCP or [facility] as soon as possible.      Guideline used:  Telephone Triage Protocols for Nurses, Fourth Edition, Purnima Bonilla  Wheezing   Cough     NOTES:  Disposition was determined by the first positive assessment question, therefore all previous assessment questions were negative      Elvie Kearns RN

## 2017-03-13 NOTE — TELEPHONE ENCOUNTER
[facility] phone call message- patient reporting a symptom:    Symptom or request: continued chest congestion/ wheezing    Duration (how long have symptoms been present): ongoing   Have you been treated for this before? Yes, a few times     Additional comments: Pt states he quit using the inhalers a couple days ago and the wheezing got 70% better. The cough is gone. Should he be referred to a specialist? Feels like he constantly has to clear his throat. Please advise.   Call taken on 3/13/2017 at 9:14 AM by Nasrin Elizabeth

## 2017-03-14 NOTE — PROGRESS NOTES
"  SUBJECTIVE:                                                    Keegan Buchanan is a 61 year old male who presents to clinic today for the following health issues:      Acute Illness   Acute illness concerns: cough  Onset: a week before [place]    Fever: no    Chills/Sweats: no    Headache (location?): no    Sinus Pressure:no    Conjunctivitis:  no    Ear Pain: no    Rhinorrhea: no    Congestion: YES- trying to clear his throat a lot, high pitched sound    Sore Throat: no     Cough: YES-almost stopped, very rarely now    Wheeze: YES    Decreased Appetite: no    Nausea: no    Vomiting: no    Diarrhea:  no    Dysuria/Freq.: no    Fatigue/Achiness: no    Sick/Strep Exposure: no  He thinks the ventolin inhaler made him wheeze because when he stopped using it his wheezing wasn't as bad anymore.   Therapies Tried and outcome: inhalers    2 weeks ago he went to [place] and got really sick with the flu, he was nausea, had some diarrhea    His cough is nearly gone, but if he takes several deep breaths in a row, will having some \"gurgling\" and then start to cough.    Will also get some wheezing from time to time, but improves once he's able to clear his throat.      When he was using the ventolin inhaler, was wheezing all the time.  Stopped when he stopped the ventolin for the most part.      He did start the QVAR regularly and has had a lot of improvement in his symptoms.        Problem list and histories reviewed & adjusted, as indicated.  Additional history: as documented    Current Outpatient Prescriptions   Medication Sig Dispense Refill     beclomethasone (QVAR) 40 MCG/ACT Inhaler Inhale 2 puffs into the lungs 2 times daily 1 Inhaler 1     losartan (COZAAR) 25 MG tablet Take 0.5 tablets (12.5 mg) by mouth daily 45 tablet 1     fluticasone (FLONASE) 50 MCG/ACT spray Spray 1-2 sprays into both nostrils daily (Patient taking differently: Spray 1-2 sprays into both nostrils Just using PRN now about every third day.) 16 g " 11     atorvastatin (LIPITOR) 80 MG tablet Take 1 tablet (80 mg) by mouth daily 90 tablet 3     tamsulosin (FLOMAX) 0.4 MG 24 hr capsule Take 2 capsules (0.8 mg) by mouth daily 180 capsule 3     tolterodine (DETROL LA) 2 MG 24 hr capsule Take 1 capsule (2 mg) by mouth daily 90 capsule 3     omeprazole (PRILOSEC) 20 MG capsule Take 1 capsule (20 mg) by mouth daily 90 capsule 3     nitroglycerin (NITROSTAT) 0.4 MG SL tablet Place 1 tablet (0.4 mg) under the tongue every 5 minutes as needed for chest pain 25 tablet 0     aspirin 81 MG tablet Take 1 tablet by mouth daily. 90 tablet 3     albuterol (PROAIR HFA/PROVENTIL HFA/VENTOLIN HFA) 108 (90 BASE) MCG/ACT Inhaler Inhale 2 puffs into the lungs every 6 hours as needed for shortness of breath / dyspnea or wheezing (Patient not taking: Reported on 3/16/2017) 1 Inhaler 1     [DISCONTINUED] tolterodine (DETROL LA) 2 MG 24 hr capsule Take 2 mg by mouth 2 times daily.       Recent Labs   Lab Test  10/10/16   0828  11/16/15   0803  09/14/15   0835   12/27/12   0838   A1C   --    --    --    --   5.6   LDL  77  57  71   < >  83   HDL  41  33*  40*   < >  37*   TRIG  235*  316*  161*   < >  196*   ALT  38  46  50   < >   --    CR  0.85  0.72  0.88   < >   --    GFRESTIMATED  >90  Non  GFR Calc    >90  Non  GFR Calc    88   < >   --    GFRESTBLACK  >90   GFR Calc    >90   GFR Calc    >90   GFR Calc     < >   --    POTASSIUM  3.9  4.0  3.9   < >   --     < > = values in this interval not displayed.      BP Readings from Last 3 Encounters:   03/16/17 122/82   02/10/17 136/80   01/27/17 130/74    Wt Readings from Last 3 Encounters:   03/16/17 194 lb 6.4 oz (88.2 kg)   02/10/17 191 lb 3.2 oz (86.7 kg)   01/27/17 195 lb 3.2 oz (88.5 kg)              Reviewed and updated as needed this visit by clinical staff       Reviewed and updated as needed this visit by Provider         ROS:  TAY Auguste,  "cardiovascular, pulmonary, gi and gu systems are negative, except as otherwise noted.    OBJECTIVE:                                                    /82 (BP Location: Left arm, Patient Position: Chair, Cuff Size: Adult Large)  Pulse 76  Temp 98.5  F (36.9  C) (Temporal)  Resp 16  Ht 5' 10\" (1.778 m)  Wt 194 lb 6.4 oz (88.2 kg)  SpO2 96%  BMI 27.89 kg/m2  Body mass index is 27.89 kg/(m^2).  GENERAL: healthy, alert and no distress  NECK: no adenopathy, no asymmetry, masses, or scars and thyroid normal to palpation  RESP: lungs clear to auscultation - no rales, rhonchi or wheezes  CV: regular rate and rhythm, normal S1 S2, no S3 or S4, no murmur, click or rub, no peripheral edema and peripheral pulses strong  ABDOMEN: soft, nontender, no hepatosplenomegaly, no masses and bowel sounds normal  MS: no gross musculoskeletal defects noted, no edema    Diagnostic Test Results:  none      ASSESSMENT/PLAN:                                                        ICD-10-CM    1. Cough R05      This is improving with current regimen.  Recommended he continue QVAR until he's back to normal, then reduce frequency.  If resolving, then no serious COPD.  If not, will need to consider that more strongly.          Patient Instructions   Thank you for visiting [facility]    Stay off ventolin for now.    Continue QVAR twice daily until you feel \"normal\".  Then cut down to once daily for a couple weeks.  If still doing great, then stop.  If any worsening resume it and let me know you need a refill.    Please see me in 1-6 months for follow up.       If you had imaging scheduled please refer to your radiology prep sheet.    Appointment    Date_______________     Time_____________    Day:   M TU W TH F    With____________________________    Location_________________________    If you need medication refills, please contact your pharmacy 3 days before your prescriptions runs out. If you are out of refills, your " pharmacy will contact contact the clinic.    Contact us or return if questions or concerns.     -Your Care Team:  MD Margie Dewey PA-C Folake Falaki, MD Anoshirvan Mazhari, MD Kelly White, CNP    General information about your clinic      Clinic hours:     Lab hours:  Phone [phone]  Monday 7:30 am-7 pm    Monday 8:30 am-6:30 pm  Tuesday-Friday 7:30 am-5 pm   Tuesday-Friday 8:30 am-4:30 pm    Pharmacy hours:  Phone [phone]  Monday 8:30 am-7pm  Tuesday-Friday 8:30am-6 pm                                       Mychart assistance [phone]        We would like to hear from you, how was your visit today?    Aleah Chapin  Patient Information Supervisor   Patient Care Supervisor  [facility], and hospitals, [facility]  [phone]         Fabio Stovall MD, MD  [facility]

## 2017-03-16 ENCOUNTER — OFFICE VISIT (OUTPATIENT)
Dept: FAMILY MEDICINE | Facility: OTHER | Age: 62
End: 2017-03-16
Payer: COMMERCIAL

## 2017-03-16 VITALS
TEMPERATURE: 98.5 F | RESPIRATION RATE: 16 BRPM | BODY MASS INDEX: 27.83 KG/M2 | WEIGHT: 194.4 LBS | OXYGEN SATURATION: 96 % | HEART RATE: 76 BPM | HEIGHT: 70 IN | DIASTOLIC BLOOD PRESSURE: 82 MMHG | SYSTOLIC BLOOD PRESSURE: 122 MMHG

## 2017-03-16 DIAGNOSIS — R05.9 COUGH: Primary | ICD-10-CM

## 2017-03-16 PROCEDURE — 99213 OFFICE O/P EST LOW 20 MIN: CPT | Performed by: FAMILY MEDICINE

## 2017-03-16 ASSESSMENT — PAIN SCALES - GENERAL: PAINLEVEL: MODERATE PAIN (5)

## 2017-03-16 NOTE — NURSING NOTE
"Chief Complaint   Patient presents with     Cough     Panel Management     up to date       Initial /82 (BP Location: Left arm, Patient Position: Chair, Cuff Size: Adult Large)  Pulse 76  Temp 98.5  F (36.9  C) (Temporal)  Resp 16  Ht 5' 10\" (1.778 m)  Wt 194 lb 6.4 oz (88.2 kg)  SpO2 96%  BMI 27.89 kg/m2 Estimated body mass index is 27.89 kg/(m^2) as calculated from the following:    Height as of this encounter: 5' 10\" (1.778 m).    Weight as of this encounter: 194 lb 6.4 oz (88.2 kg).  Medication Reconciliation: jadiel Smith, BRY    "

## 2017-03-16 NOTE — MR AVS SNAPSHOT
"              After Visit Summary   3/16/2017    Keegan Buchanan    MRN: [id number]           Patient Information     Date Of Birth          1955        Visit Information        Provider Department      3/16/2017 11:45 AM Fabio Stovall MD [facility]        Today's Diagnoses     Cough    -  1      Care Instructions    Thank you for visiting [facility]    Stay off ventolin for now.    Continue QVAR twice daily until you feel \"normal\".  Then cut down to once daily for a couple weeks.  If still doing great, then stop.  If any worsening resume it and let me know you need a refill.    Please see me in 1-6 months for follow up.       If you had imaging scheduled please refer to your radiology prep sheet.    Appointment    Date_______________     Time_____________    Day:   M TU W TH F    With____________________________    Location_________________________    If you need medication refills, please contact your pharmacy 3 days before your prescriptions runs out. If you are out of refills, your pharmacy will contact contact the clinic.    Contact us or return if questions or concerns.     -Your Care Team:  MD Margie Dewey PA-C Folake Falaki, MD Anoshirvan Mazhari, MD Kelly White, YANDEL    General information about your clinic      Clinic hours:     Lab hours:  Phone [phone]  Monday 7:30 am-7 pm    Monday 8:30 am-6:30 pm  Tuesday-Friday 7:30 am-5 pm   Tuesday-Friday 8:30 am-4:30 pm    Pharmacy hours:  Phone [phone]  Monday 8:30 am-7pm  Tuesday-Friday 8:30am-6 pm                                       Mychart assistance [phone]        We would like to hear from you, how was your visit today?    Aleah Chapin  Patient Information Supervisor   Patient Care Supervisor  [facility] [place] [place], and [facility]  [phone]           Follow-ups after your " "visit        Who to contact     If you have questions or need follow up information about today's clinic visit or your schedule please contact [facility] directly at [phone].  Normal or non-critical lab and imaging results will be communicated to you by MyChart, letter or phone within 4 business days after the clinic has received the results. If you do not hear from us within 7 days, please contact the clinic through MyChart or phone. If you have a critical or abnormal lab result, we will notify you by phone as soon as possible.  Submit refill requests through [employer] or call your pharmacy and they will forward the refill request to us. Please allow 3 business days for your refill to be completed.          Additional Information About Your Visit        [facility] Information     [employer] lets you send messages to your doctor, view your test results, renew your prescriptions, schedule appointments and more. To sign up, go to [email or web address] . Click on \"Log in\" on the left side of the screen, which will take you to the Welcome page. Then click on \"Sign up Now\" on the right side of the page.     You will be asked to enter the access code listed below, as well as some personal information. Please follow the directions to create your username and password.     Your access code is: [email or web address]  Expires: 2017 10:20 AM     Your access code will  in 90 days. If you need help or a new code, please call your [place] clinic or [phone].        Care EveryWhere ID     This is your Care EveryWhere ID. This could be used by other organizations to access your [place] medical records  [id number]        Your Vitals Were     Pulse Temperature Respirations Height Pulse Oximetry BMI (Body Mass Index)    76 98.5  F (36.9  C) (Temporal) 16 5' 10\" (1.778 m) 96% 27.89 kg/m2       Blood Pressure from Last 3 Encounters:   17 122/82   02/10/17 136/80   17 130/74    Weight from Last 3 " Encounters:   03/16/17 194 lb 6.4 oz (88.2 kg)   02/10/17 191 lb 3.2 oz (86.7 kg)   01/27/17 195 lb 3.2 oz (88.5 kg)              Today, you had the following     No orders found for display         Today's Medication Changes          These changes are accurate as of: 3/16/17 12:22 PM.  If you have any questions, ask your nurse or doctor.               These medicines have changed or have updated prescriptions.        Dose/Directions    fluticasone 50 MCG/ACT spray   Commonly known as:  FLONASE   This may have changed:    - when to take this  - additional instructions   Used for:  Nasal congestion        Dose:  1-2 spray   Spray 1-2 sprays into both nostrils daily   Quantity:  16 g   Refills:  11                Primary Care Provider Office Phone # Fax #    Fabio Stovall [phone]       [facility] [address] [place] [address]        Thank you!     Thank you for choosing [facility]  for your care. Our goal is always to provide you with excellent care. Hearing back from our patients is one way we can continue to improve our services. Please take a few minutes to complete the written survey that you may receive in the mail after your visit with us. Thank you!             Your Updated Medication List - Protect others around you: Learn how to safely use, store and throw away your medicines at www.disposemymeds.org.          This list is accurate as of: 3/16/17 12:22 PM.  Always use your most recent med list.                   Brand Name Dispense Instructions for use    albuterol 108 (90 BASE) MCG/ACT Inhaler    PROAIR HFA/PROVENTIL HFA/VENTOLIN HFA    1 Inhaler    Inhale 2 puffs into the lungs every 6 hours as needed for shortness of breath / dyspnea or wheezing       aspirin 81 MG tablet     90 tablet    Take 1 tablet by mouth daily.       atorvastatin 80 MG tablet    LIPITOR    90 tablet    Take 1 tablet (80 mg) by mouth daily       beclomethasone 40 MCG/ACT  Inhaler    QVAR    1 Inhaler    Inhale 2 puffs into the lungs 2 times daily       fluticasone 50 MCG/ACT spray    FLONASE    16 g    Spray 1-2 sprays into both nostrils daily       losartan 25 MG tablet    COZAAR    45 tablet    Take 0.5 tablets (12.5 mg) by mouth daily       nitroglycerin 0.4 MG sublingual tablet    NITROSTAT    25 tablet    Place 1 tablet (0.4 mg) under the tongue every 5 minutes as needed for chest pain       omeprazole 20 MG CR capsule    priLOSEC    90 capsule    Take 1 capsule (20 mg) by mouth daily       tamsulosin 0.4 MG capsule    FLOMAX    180 capsule    Take 2 capsules (0.8 mg) by mouth daily       tolterodine 2 MG 24 hr capsule    DETROL LA    90 capsule    Take 1 capsule (2 mg) by mouth daily

## 2017-03-16 NOTE — PATIENT INSTRUCTIONS
"Thank you for visiting [facility]    Stay off ventolin for now.    Continue QVAR twice daily until you feel \"normal\".  Then cut down to once daily for a couple weeks.  If still doing great, then stop.  If any worsening resume it and let me know you need a refill.    Please see me in 1-6 months for follow up.       If you had imaging scheduled please refer to your radiology prep sheet.    Appointment    Date_______________     Time_____________    Day:   M TU W TH F    With____________________________    Location_________________________    If you need medication refills, please contact your pharmacy 3 days before your prescriptions runs out. If you are out of refills, your pharmacy will contact contact the clinic.    Contact us or return if questions or concerns.     -Your Care Team:  MD Margie Dewey PA-C Folake Falaki, MD Anoshirvan Mazhari, MD Kelly White, YANDEL    General information about your clinic      Clinic hours:     Lab hours:  Phone [phone]  Monday 7:30 am-7 pm    Monday 8:30 am-6:30 pm  Tuesday-Friday 7:30 am-5 pm   Tuesday-Friday 8:30 am-4:30 pm    Pharmacy hours:  Phone [phone]  Monday 8:30 am-7pm  Tuesday-Friday 8:30am-6 pm                                       Mychart assistance [phone]        We would like to hear from you, how was your visit today?    Aleah Chapin  Patient Information Supervisor   Patient Care Supervisor  [facility] [place], and [facility], and [facility]  [phone]     "

## 2017-03-23 DIAGNOSIS — R73.01 IMPAIRED FASTING GLUCOSE: ICD-10-CM

## 2017-03-23 DIAGNOSIS — I10 HYPERTENSION GOAL BP (BLOOD PRESSURE) < 140/90: ICD-10-CM

## 2017-03-23 LAB — GLUCOSE SERPL-MCNC: 104 MG/DL (ref 70–99)

## 2017-03-23 PROCEDURE — 36415 COLL VENOUS BLD VENIPUNCTURE: CPT | Performed by: FAMILY MEDICINE

## 2017-03-23 PROCEDURE — 82947 ASSAY GLUCOSE BLOOD QUANT: CPT | Performed by: FAMILY MEDICINE

## 2017-03-23 NOTE — PROGRESS NOTES
Patient's blood sugar is still in the prediabetic range.  Can try to prevent this from getting worse by exercising regularly and controlling weight and diet.     Have a nice day!    Dr. Stovall

## 2017-07-20 DIAGNOSIS — I10 HYPERTENSION GOAL BP (BLOOD PRESSURE) < 140/90: ICD-10-CM

## 2017-07-20 NOTE — TELEPHONE ENCOUNTER
losartan (COZAAR) 25 MG tablet      Last Written Prescription Date: 2/10/17  Last Fill Quantity: 45, # refills: 1  Last Office Visit with G, P or [facility] prescribing provider: 3/16/17       Potassium   Date Value Ref Range Status   10/10/2016 3.9 3.4 - 5.3 mmol/L Final     Creatinine   Date Value Ref Range Status   10/10/2016 0.85 0.66 - 1.25 mg/dL Final     BP Readings from Last 3 Encounters:   03/16/17 122/82   02/10/17 136/80   01/27/17 130/74

## 2017-07-21 RX ORDER — LOSARTAN POTASSIUM 25 MG/1
TABLET ORAL
Qty: 45 TABLET | Refills: 0 | Status: SHIPPED | OUTPATIENT
Start: 2017-07-21 | End: 2017-10-16

## 2017-07-21 NOTE — TELEPHONE ENCOUNTER
Prescription approved per [facility] Refill Protocol.  Due for labs in October.     Joann Vila, RN, BSN

## 2017-09-27 ENCOUNTER — TELEPHONE (OUTPATIENT)
Dept: LAB | Facility: OTHER | Age: 62
End: 2017-09-27

## 2017-09-27 DIAGNOSIS — Z00.00 ROUTINE GENERAL MEDICAL EXAMINATION AT A HEALTH CARE FACILITY: Primary | ICD-10-CM

## 2017-09-27 NOTE — TELEPHONE ENCOUNTER
Please place orders for Keegan, he has a physical scheduled for Oct. 16th.  Thanks. Bridget Al MLT([facility]), [facility] [phone].

## 2017-10-12 ENCOUNTER — TELEPHONE (OUTPATIENT)
Dept: FAMILY MEDICINE | Facility: OTHER | Age: 62
End: 2017-10-12

## 2017-10-12 DIAGNOSIS — N40.1 BENIGN PROSTATIC HYPERPLASIA WITH URINARY OBSTRUCTION: Primary | ICD-10-CM

## 2017-10-12 DIAGNOSIS — Z12.5 SCREENING FOR PROSTATE CANCER: ICD-10-CM

## 2017-10-12 DIAGNOSIS — N13.8 BENIGN PROSTATIC HYPERPLASIA WITH URINARY OBSTRUCTION: Primary | ICD-10-CM

## 2017-10-12 DIAGNOSIS — N40.1 BENIGN PROSTATIC HYPERPLASIA WITH URINARY OBSTRUCTION: ICD-10-CM

## 2017-10-12 DIAGNOSIS — N13.8 BENIGN PROSTATIC HYPERPLASIA WITH URINARY OBSTRUCTION: ICD-10-CM

## 2017-10-12 DIAGNOSIS — Z00.00 ROUTINE GENERAL MEDICAL EXAMINATION AT A HEALTH CARE FACILITY: ICD-10-CM

## 2017-10-12 LAB
ALBUMIN SERPL-MCNC: 4 G/DL (ref 3.4–5)
ALP SERPL-CCNC: 90 U/L (ref 40–150)
ALT SERPL W P-5'-P-CCNC: 39 U/L (ref 0–70)
ANION GAP SERPL CALCULATED.3IONS-SCNC: 15 MMOL/L (ref 3–14)
AST SERPL W P-5'-P-CCNC: 20 U/L (ref 0–45)
BILIRUB SERPL-MCNC: 1 MG/DL (ref 0.2–1.3)
BUN SERPL-MCNC: 14 MG/DL (ref 7–30)
CALCIUM SERPL-MCNC: 9 MG/DL (ref 8.5–10.1)
CHLORIDE SERPL-SCNC: 106 MMOL/L (ref 94–109)
CHOLEST SERPL-MCNC: 141 MG/DL
CO2 SERPL-SCNC: 24 MMOL/L (ref 20–32)
CREAT SERPL-MCNC: 0.82 MG/DL (ref 0.66–1.25)
GFR SERPL CREATININE-BSD FRML MDRD: >90 ML/MIN/1.7M2
GLUCOSE SERPL-MCNC: 99 MG/DL (ref 70–99)
HDLC SERPL-MCNC: 40 MG/DL
LDLC SERPL CALC-MCNC: 58 MG/DL
NONHDLC SERPL-MCNC: 101 MG/DL
POTASSIUM SERPL-SCNC: 4.4 MMOL/L (ref 3.4–5.3)
PROT SERPL-MCNC: 7.3 G/DL (ref 6.8–8.8)
PSA SERPL-MCNC: 0.56 UG/L (ref 0–4)
SODIUM SERPL-SCNC: 145 MMOL/L (ref 133–144)
TRIGL SERPL-MCNC: 213 MG/DL

## 2017-10-12 PROCEDURE — 80053 COMPREHEN METABOLIC PANEL: CPT | Performed by: FAMILY MEDICINE

## 2017-10-12 PROCEDURE — 84153 ASSAY OF PSA TOTAL: CPT | Performed by: FAMILY MEDICINE

## 2017-10-12 PROCEDURE — 36415 COLL VENOUS BLD VENIPUNCTURE: CPT | Performed by: FAMILY MEDICINE

## 2017-10-12 PROCEDURE — 80061 LIPID PANEL: CPT | Performed by: FAMILY MEDICINE

## 2017-10-12 NOTE — LETTER
October 12, 2017      Keegan Buchanan  [address] [address] [place] [phone]        Dear ,    We are writing to inform you of your test results.    All of your labs were normal for you.    Resulted Orders   Lipid panel reflex to direct LDL   Result Value Ref Range    Cholesterol 141 <200 mg/dL    Triglycerides 213 (H) <150 mg/dL      Comment:      Borderline high:  150-199 mg/dl  High:             200-499 mg/dl  Very high:       >499 mg/dl      HDL Cholesterol 40 >39 mg/dL    LDL Cholesterol Calculated 58 <100 mg/dL      Comment:      Desirable:       <100 mg/dl    Non HDL Cholesterol 101 <130 mg/dL   Comprehensive metabolic panel   Result Value Ref Range    Sodium 145 (H) 133 - 144 mmol/L    Potassium 4.4 3.4 - 5.3 mmol/L    Chloride 106 94 - 109 mmol/L    Carbon Dioxide 24 20 - 32 mmol/L    Anion Gap 15 (H) 3 - 14 mmol/L    Glucose 99 70 - 99 mg/dL    Urea Nitrogen 14 7 - 30 mg/dL    Creatinine 0.82 0.66 - 1.25 mg/dL    GFR Estimate >90 >60 mL/min/1.7m2      Comment:      Non  GFR Calc    GFR Estimate If Black >90 >60 mL/min/1.7m2      Comment:       GFR Calc    Calcium 9.0 8.5 - 10.1 mg/dL    Bilirubin Total 1.0 0.2 - 1.3 mg/dL    Albumin 4.0 3.4 - 5.0 g/dL    Protein Total 7.3 6.8 - 8.8 g/dL    Alkaline Phosphatase 90 40 - 150 U/L    ALT 39 0 - 70 U/L    AST 20 0 - 45 U/L   PSA tumor marker   Result Value Ref Range    PSA 0.56 0 - 4 ug/L      Comment:      Assay Method:  Chemiluminescence using Siemens Vista analyzer       If you have any questions or concerns, please call the clinic at the number listed above.       Sincerely,        Fabio Stovall MD

## 2017-10-12 NOTE — PROGRESS NOTES
SUBJECTIVE:   CC: Keegan Buchanan is an 61 year old male who presents for preventative health visit.     Physical   Annual:     Getting at least 3 servings of Calcium per day::  Yes    Bi-annual eye exam::  Yes    Dental care twice a year::  Yes    Sleep apnea or symptoms of sleep apnea::  None    Taking medications regularly::  Yes    Additional concerns today::  No    When he is sitting still he feels like he'll get a pause in his pulse sometimes.      Will feel lightheaded at times when this happens.  Today, at work, was checking his pulse because of this, coworker told him to drink more water.  Only happens when he's relaxed.  Will come in small waves.      Does see his cardiologist in November, wonders if he should see him for this.    Events have been going on for a year or more.          Today's PHQ-2 Score:   PHQ-2 ( 1999 Pf[employer]) 10/16/2017   Q1: Little interest or pleasure in doing things 0   Q2: Feeling down, depressed or hopeless 0   PHQ-2 Score 0   Q1: Little interest or pleasure in doing things Not at all   Q2: Feeling down, depressed or hopeless Not at all   PHQ-2 Score 0       Abuse: Current or Past(Physical, Sexual or Emotional)- No  Do you feel safe in your environment - Yes    Social History   Substance Use Topics     Smoking status: Former Smoker     Packs/day: 1.00     Years: 15.00     Smokeless tobacco: Former User     Types: Chew     Quit date: 1/20/1987      Comment: quit 25 years ago.     Alcohol use Yes      Comment: 1 -2 monthly     The patient does not drink >3 drinks per day nor >7 drinks per week.    Last PSA:   PSA   Date Value Ref Range Status   10/12/2017 0.56 0 - 4 ug/L Final     Comment:     Assay Method:  Chemiluminescence using Siemens Vista analyzer       Reviewed orders with patient. Reviewed health maintenance and updated orders accordingly - Yes  BP Readings from Last 3 Encounters:   10/16/17 134/80   03/16/17 122/82   02/10/17 136/80    Wt Readings from Last 3 Encounters:    10/16/17 190 lb 6.4 oz (86.4 kg)   03/16/17 194 lb 6.4 oz (88.2 kg)   02/10/17 191 lb 3.2 oz (86.7 kg)                  Current Outpatient Prescriptions   Medication Sig Dispense Refill     losartan (COZAAR) 25 MG tablet TAKE ONE-HALF TABLET BY MOUTH EVERY DAY 45 tablet 0     albuterol (PROAIR HFA/PROVENTIL HFA/VENTOLIN HFA) 108 (90 BASE) MCG/ACT Inhaler Inhale 2 puffs into the lungs every 6 hours as needed for shortness of breath / dyspnea or wheezing 1 Inhaler 1     fluticasone (FLONASE) 50 MCG/ACT spray Spray 1-2 sprays into both nostrils daily (Patient taking differently: Spray 1-2 sprays into both nostrils Just using PRN now about every third day.) 16 g 11     atorvastatin (LIPITOR) 80 MG tablet Take 1 tablet (80 mg) by mouth daily 90 tablet 3     tamsulosin (FLOMAX) 0.4 MG 24 hr capsule Take 2 capsules (0.8 mg) by mouth daily 180 capsule 3     tolterodine (DETROL LA) 2 MG 24 hr capsule Take 1 capsule (2 mg) by mouth daily 90 capsule 3     omeprazole (PRILOSEC) 20 MG capsule Take 1 capsule (20 mg) by mouth daily 90 capsule 3     nitroglycerin (NITROSTAT) 0.4 MG SL tablet Place 1 tablet (0.4 mg) under the tongue every 5 minutes as needed for chest pain 25 tablet 0     aspirin 81 MG tablet Take 1 tablet by mouth daily. 90 tablet 3     [DISCONTINUED] tolterodine (DETROL LA) 2 MG 24 hr capsule Take 2 mg by mouth 2 times daily.       Recent Labs   Lab Test  10/12/17   0830  10/10/16   0828  11/16/15   0803   12/27/12   0838   A1C   --    --    --    --   5.6   LDL  58  77  57   < >  83   HDL  40  41  33*   < >  37*   TRIG  213*  235*  316*   < >  196*   ALT  39  38  46   < >   --    CR  0.82  0.85  0.72   < >   --    GFRESTIMATED  >90  >90  Non African American GFR Calc    >90  Non  GFR Calc     < >   --    GFRESTBLACK  >90  >90  African American GFR Calc    >90   GFR Calc     < >   --    POTASSIUM  4.4  3.9  4.0   < >   --     < > = values in this interval not displayed.     "        Reviewed and updated as needed this visit by clinical staff  Tobacco  Allergies  Med Hx  Surg Hx  Fam Hx  Soc Hx        Reviewed and updated as needed this visit by Provider        Past Medical History:   Diagnosis Date     Hypercholesteremia      Pain in joint, shoulder region     right shoulder separation     Unspecified essential hypertension       Past Surgical History:   Procedure Laterality Date     COLONOSCOPY N/A 2015    Procedure: COMBINED COLONOSCOPY, SINGLE OR MULTIPLE BIOPSY/POLYPECTOMY BY BIOPSY;  Surgeon: Herman Rebollar MD;  Location: [facility]     HC REMOVE TONSILS/ADENOIDS,<11 Y/O      T & A <12y.o.     REMOVAL OF SPERM DUCT(S)  88    Vasectomy     Family History   Problem Relation Age of Onset     DIABETES Mother      adult onset diabetes     CANCER Mother      kidney dx age 75     CANCER Father      prostate cancer/ at age 73     Hypertension Brother      CANCER Paternal Grandfather      prostat ca      ROS:  C: NEGATIVE for fever, chills, change in weight  I: NEGATIVE for worrisome rashes, moles or lesions  E: NEGATIVE for vision changes or irritation  ENT: NEGATIVE for ear, mouth and throat problems  R: NEGATIVE for significant cough or SOB  RESP:NEGATIVE for significant cough or SOB and sputum production, especially in the mornings  CV: palpitations  GI: NEGATIVE for nausea, abdominal pain, heartburn, or change in bowel habits   male: negative for dysuria, hematuria, decreased urinary stream, erectile dysfunction, urethral discharge  M: NEGATIVE for significant arthralgias or myalgia  N: NEGATIVE for weakness, dizziness or paresthesias  P: NEGATIVE for changes in mood or affect    OBJECTIVE:   /80 (BP Location: Left arm, Patient Position: Chair, Cuff Size: Adult Large)  Pulse 76  Temp 97.6  F (36.4  C) (Temporal)  Resp 18  Ht 5' 10\" (1.778 m)  Wt 190 lb 6.4 oz (86.4 kg)  SpO2 96%  BMI 27.32 kg/m2    EXAM:  GENERAL: healthy, alert and no " distress  EYES: Eyes grossly normal to inspection, PERRL and conjunctivae and sclerae normal  HENT: ear canals and TM's normal, nose and mouth without ulcers or lesions  NECK: no adenopathy, no asymmetry, masses, or scars and thyroid normal to palpation  RESP: lungs clear to auscultation - no rales, rhonchi or wheezes  CV: occasional premature beats, normal S1 S2, no S3 or S4, no murmur, click or rub, peripheral pulses strong and no peripheral edema  ABDOMEN: soft, nontender, no hepatosplenomegaly, no masses and bowel sounds normal.  Prominent diastasis recti  RECTAL: normal sphincter tone, no rectal masses and prostate of normal size for age, smooth, nontender without masses/nodules  MS: no gross musculoskeletal defects noted, no edema  SKIN: no suspicious lesions or rashes  NEURO: Normal strength and tone, mentation intact and speech normal  PSYCH: mentation appears normal, affect normal/bright    ASSESSMENT/PLAN:   1. Routine general medical examination at a health care facility  Reviewed recommended screenings and ordered appropriate testing for pt's risks and per pt's request(s).     2. Skipped heart beats  Unable to capture on EKG today.  Suspect PVCs.  Discussed nature of this.  Will follow up if persistent, worsening.  Pt declined order for event monitor at this time.  - EKG 12-lead complete w/read - Clinics    3. Hyperlipidemia LDL goal <100  Currently Controlled.  Continue current regimen.  Check labs.  Call/return if any problems or questions arise.   - atorvastatin (LIPITOR) 80 MG tablet; Take 1 tablet (80 mg) by mouth daily  Dispense: 90 tablet; Refill: 3    4. Gastroesophageal reflux disease without esophagitis  Currently Controlled.  Continue current regimen.  Call/return if any problems or questions arise.   - omeprazole (PRILOSEC) 20 MG CR capsule; Take 1 capsule (20 mg) by mouth daily  Dispense: 90 capsule; Refill: 3    5. Hypertension goal BP (blood pressure) < 140/90  Currently Controlled.   Continue current regimen.  Call/return if any problems or questions arise. Check labs regularly.  - losartan (COZAAR) 25 MG tablet; Take 0.5 tablets (12.5 mg) by mouth daily  Dispense: 45 tablet; Refill: 3    6. Nasal congestion  - fluticasone (FLONASE) 50 MCG/ACT spray; Spray 1-2 sprays into both nostrils daily  Dispense: 16 g; Refill: 11    7. Urinary frequency  Currently Controlled.  Continue current regimen.  Call/return if any problems or questions arise.   - tolterodine (DETROL LA) 2 MG 24 hr capsule; Take 1 capsule (2 mg) by mouth daily  Dispense: 90 capsule; Refill: 3    8. Benign prostatic hyperplasia with urinary obstruction  Currently Controlled.  Continue current regimen.  Call/return if any problems or questions arise.   - tamsulosin (FLOMAX) 0.4 MG capsule; Take 1 capsule (0.4 mg) by mouth daily  Dispense: 90 capsule; Refill: 3    9. Diastasis recti  Discussed nature of this, possible PT or surgical evaluation for hernia (not appreciated on exam today).  Pt declined referral at this time.  Will follow for now.      COUNSELING:   Reviewed preventive health counseling, as reflected in patient instructions       Consider AAA screening for ages 65-75 and smoking history       Regular exercise       Immunizations    Vaccinated for: Influenza           Aspirin ProphylaxsisOsteoporosis Prevention/Bone HealthThe 10-year ASCVD risk score ([place] MICHAEL Jr, et al., 2013) is: 10.2%    Values used to calculate the score:      Age: 61 years      Sex: Male      Is Non- : No      Diabetic: No      Tobacco smoker: No      Systolic Blood Pressure: 134 mmHg      Is BP treated: Yes      HDL Cholesterol: 40 mg/dL      Total Cholesterol: 141 mg/dL                Prostate cancer screening                      reports that he has quit smoking. He has a 15.00 pack-year smoking history. He quit smokeless tobacco use about 30 years ago. His smokeless tobacco use included Chew.      Estimated body mass index is  "27.32 kg/(m^2) as calculated from the following:    Height as of this encounter: 5' 10\" (1.778 m).    Weight as of this encounter: 190 lb 6.4 oz (86.4 kg).   Weight management plan: Discussed healthy diet and exercise guidelines and patient will follow up in 6 months in clinic to re-evaluate.    Counseling Resources:  ATP IV Guidelines  Pooled Cohorts Equation Calculator  FRAX Risk Assessment  ICSI Preventive Guidelines  Dietary Guidelines for Americans, 2010  USDA's MyPlate  ASA Prophylaxis  Lung CA Screening    Fabio Stovall MD, MD  [facility]  "

## 2017-10-12 NOTE — TELEPHONE ENCOUNTER
This patient would like his psa checked he says he has an enlarged prostate. Please place an order if you want this done.    Thank You,  Beckie Cornejo MLT(ASCP)

## 2017-10-12 NOTE — PATIENT INSTRUCTIONS
Let me know if you want to see PT or surgery for your diastasis recti/?hernia?    If you have more skipped beats, we can do an event monitor.    Contact us or return if questions or concerns.     Have a nice day!    Dr. Stovall       Preventive Health Recommendations  Male Ages 50   64    Yearly exam:             See your health care provider every year in order to  o   Review health changes.   o   Discuss preventive care.    o   Review your medicines if your doctor has prescribed any.     Have a cholesterol test every 5 years, or more frequently if you are at risk for high cholesterol/heart disease.     Have a diabetes test (fasting glucose) every three years. If you are at risk for diabetes, you should have this test more often.     Have a colonoscopy at age 50, or have a yearly FIT test (stool test). These exams will check for colon cancer.      Talk with your health care provider about whether or not a prostate cancer screening test (PSA) is right for you.    You should be tested each year for STDs (sexually transmitted diseases), if you re at risk.     Shots: Get a flu shot each year. Get a tetanus shot every 10 years.     Nutrition:    Eat at least 5 servings of fruits and vegetables daily.     Eat whole-grain bread, whole-wheat pasta and brown rice instead of white grains and rice.     Talk to your provider about Calcium and Vitamin D.     Lifestyle    Exercise for at least 150 minutes a week (30 minutes a day, 5 days a week). This will help you control your weight and prevent disease.     Limit alcohol to one drink per day.     No smoking.     Wear sunscreen to prevent skin cancer.     See your dentist every six months for an exam and cleaning.     See your eye doctor every 1 to 2 years.

## 2017-10-16 ENCOUNTER — OFFICE VISIT (OUTPATIENT)
Dept: FAMILY MEDICINE | Facility: OTHER | Age: 62
End: 2017-10-16
Payer: COMMERCIAL

## 2017-10-16 VITALS
HEART RATE: 76 BPM | BODY MASS INDEX: 27.26 KG/M2 | RESPIRATION RATE: 18 BRPM | HEIGHT: 70 IN | WEIGHT: 190.4 LBS | TEMPERATURE: 97.6 F | SYSTOLIC BLOOD PRESSURE: 134 MMHG | OXYGEN SATURATION: 96 % | DIASTOLIC BLOOD PRESSURE: 80 MMHG

## 2017-10-16 DIAGNOSIS — R09.81 NASAL CONGESTION: ICD-10-CM

## 2017-10-16 DIAGNOSIS — M62.08 DIASTASIS RECTI: ICD-10-CM

## 2017-10-16 DIAGNOSIS — I10 HYPERTENSION GOAL BP (BLOOD PRESSURE) < 140/90: ICD-10-CM

## 2017-10-16 DIAGNOSIS — N40.1 BENIGN PROSTATIC HYPERPLASIA WITH URINARY OBSTRUCTION: ICD-10-CM

## 2017-10-16 DIAGNOSIS — I45.9 SKIPPED HEART BEATS: ICD-10-CM

## 2017-10-16 DIAGNOSIS — Z00.00 ROUTINE GENERAL MEDICAL EXAMINATION AT A HEALTH CARE FACILITY: Primary | ICD-10-CM

## 2017-10-16 DIAGNOSIS — N13.8 BENIGN PROSTATIC HYPERPLASIA WITH URINARY OBSTRUCTION: ICD-10-CM

## 2017-10-16 DIAGNOSIS — K21.9 GASTROESOPHAGEAL REFLUX DISEASE WITHOUT ESOPHAGITIS: ICD-10-CM

## 2017-10-16 DIAGNOSIS — E78.5 HYPERLIPIDEMIA LDL GOAL <100: ICD-10-CM

## 2017-10-16 DIAGNOSIS — R35.0 URINARY FREQUENCY: ICD-10-CM

## 2017-10-16 PROCEDURE — 99396 PREV VISIT EST AGE 40-64: CPT | Performed by: FAMILY MEDICINE

## 2017-10-16 PROCEDURE — 93000 ELECTROCARDIOGRAM COMPLETE: CPT | Performed by: FAMILY MEDICINE

## 2017-10-16 PROCEDURE — 99213 OFFICE O/P EST LOW 20 MIN: CPT | Mod: 25 | Performed by: FAMILY MEDICINE

## 2017-10-16 RX ORDER — TOLTERODINE 2 MG/1
2 CAPSULE, EXTENDED RELEASE ORAL DAILY
Qty: 90 CAPSULE | Refills: 3 | Status: SHIPPED | OUTPATIENT
Start: 2017-10-16 | End: 2018-10-17

## 2017-10-16 RX ORDER — FLUTICASONE PROPIONATE 50 MCG
1-2 SPRAY, SUSPENSION (ML) NASAL DAILY
Qty: 16 G | Refills: 11 | Status: SHIPPED | OUTPATIENT
Start: 2017-10-16 | End: 2018-10-31

## 2017-10-16 RX ORDER — ATORVASTATIN CALCIUM 80 MG/1
80 TABLET, FILM COATED ORAL DAILY
Qty: 90 TABLET | Refills: 3 | Status: SHIPPED | OUTPATIENT
Start: 2017-10-16 | End: 2018-10-17

## 2017-10-16 RX ORDER — LOSARTAN POTASSIUM 25 MG/1
12.5 TABLET ORAL DAILY
Qty: 45 TABLET | Refills: 3 | Status: SHIPPED | OUTPATIENT
Start: 2017-10-16 | End: 2017-12-27

## 2017-10-16 RX ORDER — TAMSULOSIN HYDROCHLORIDE 0.4 MG/1
0.4 CAPSULE ORAL DAILY
Qty: 90 CAPSULE | Refills: 3 | Status: SHIPPED | OUTPATIENT
Start: 2017-10-16 | End: 2018-10-17

## 2017-10-16 ASSESSMENT — PAIN SCALES - GENERAL: PAINLEVEL: NO PAIN (0)

## 2017-10-16 NOTE — NURSING NOTE
"Chief Complaint   Patient presents with     Physical     Panel Management     MyChart, Flu shot, Lipids, CMP       Initial /80 (BP Location: Left arm, Patient Position: Chair, Cuff Size: Adult Large)  Pulse 76  Temp 97.6  F (36.4  C) (Temporal)  Resp 18  Ht 5' 10\" (1.778 m)  Wt 190 lb 6.4 oz (86.4 kg)  SpO2 96%  BMI 27.32 kg/m2 Estimated body mass index is 27.32 kg/(m^2) as calculated from the following:    Height as of this encounter: 5' 10\" (1.778 m).    Weight as of this encounter: 190 lb 6.4 oz (86.4 kg).  Medication Reconciliation: complete  Darek Newman, BRY    "

## 2017-10-16 NOTE — MR AVS SNAPSHOT
After Visit Summary   10/16/2017    Keegan Buchanan    MRN: [id number]           Patient Information     Date Of Birth          1955        Visit Information        Provider Department      10/16/2017 3:30 PM Fabio Stovall MD [facility]        Today's Diagnoses     Urinary frequency    -  1    Hyperlipidemia LDL goal <100        Gastroesophageal reflux disease without esophagitis        Hypertension goal BP (blood pressure) < 140/90        Nasal congestion        Skipped heart beats        Benign prostatic hyperplasia with urinary obstruction        Routine general medical examination at a health care facility        Diastasis recti          Care Instructions    Let me know if you want to see PT or surgery for your diastasis recti/?hernia?    If you have more skipped beats, we can do an event monitor.    Contact us or return if questions or concerns.     Have a nice day!    Dr. Stovall       Preventive Health Recommendations  Male Ages 50 - 64    Yearly exam:             See your health care provider every year in order to  o   Review health changes.   o   Discuss preventive care.    o   Review your medicines if your doctor has prescribed any.     Have a cholesterol test every 5 years, or more frequently if you are at risk for high cholesterol/heart disease.     Have a diabetes test (fasting glucose) every three years. If you are at risk for diabetes, you should have this test more often.     Have a colonoscopy at age 50, or have a yearly FIT test (stool test). These exams will check for colon cancer.      Talk with your health care provider about whether or not a prostate cancer screening test (PSA) is right for you.    You should be tested each year for STDs (sexually transmitted diseases), if you re at risk.     Shots: Get a flu shot each year. Get a tetanus shot every 10 years.     Nutrition:    Eat at least 5 servings of fruits and vegetables daily.     Eat whole-grain  "bread, whole-wheat pasta and brown rice instead of white grains and rice.     Talk to your provider about Calcium and Vitamin D.     Lifestyle    Exercise for at least 150 minutes a week (30 minutes a day, 5 days a week). This will help you control your weight and prevent disease.     Limit alcohol to one drink per day.     No smoking.     Wear sunscreen to prevent skin cancer.     See your dentist every six months for an exam and cleaning.     See your eye doctor every 1 to 2 years.            Follow-ups after your visit        Your next 10 appointments already scheduled     Oct 23, 2017  5:00 PM CDT   [facility]    [address] [place] [id number] [phone]              Who to contact     If you have questions or need follow up information about today's clinic visit or your schedule please contact [facility] directly at [phone].  Normal or non-critical lab and imaging results will be communicated to you by AccelOpshart, letter or phone within 4 business days after the clinic has received the results. If you do not hear from us within 7 days, please contact the clinic through Demand[employer]et or phone. If you have a critical or abnormal lab result, we will notify you by phone as soon as possible.  Submit refill requests through [employer] or call your pharmacy and they will forward the refill request to us. Please allow 3 business days for your refill to be completed.          Additional Information About Your Visit        Equidate Information     Equ[employer]ate lets you send messages to your doctor, view your test results, renew your prescriptions, schedule appointments and more. To sign up, go to [email or web address] . Click on \"Log in\" on the left side of the screen, which will take you to the Welcome page. Then click on \"Sign up Now\" on the right side of the page.     You will be asked to enter the access " "code listed below, as well as some personal information. Please follow the directions to create your username and password.     Your access code is: [email or web address]  Expires: 2018  4:48 PM     Your access code will  in 90 days. If you need help or a new code, please call your [facility] clinic or [phone].        Care EveryWhere ID     This is your Care EveryWhere ID. This could be used by other organizations to access your [facility] medical records  [id number]        Your Vitals Were     Pulse Temperature Respirations Height Pulse Oximetry BMI (Body Mass Index)    76 97.6  F (36.4  C) (Temporal) 18 5' 10\" (1.778 m) 96% 27.32 kg/m2       Blood Pressure from Last 3 Encounters:   10/16/17 134/80   17 122/82   02/10/17 136/80    Weight from Last 3 Encounters:   10/16/17 190 lb 6.4 oz (86.4 kg)   17 194 lb 6.4 oz (88.2 kg)   02/10/17 191 lb 3.2 oz (86.7 kg)              We Performed the Following     EKG 12-lead complete w/read - Clinics          Today's Medication Changes          These changes are accurate as of: 10/16/17  4:48 PM.  If you have any questions, ask your nurse or doctor.               These medicines have changed or have updated prescriptions.        Dose/Directions    fluticasone 50 MCG/ACT spray   Commonly known as:  FLONASE   This may have changed:    - when to take this  - additional instructions   Used for:  Nasal congestion        Dose:  1-2 spray   Spray 1-2 sprays into both nostrils daily   Quantity:  16 g   Refills:  11       losartan 25 MG tablet   Commonly known as:  COZAAR   This may have changed:  See the new instructions.   Used for:  Hypertension goal BP (blood pressure) < 140/90   Changed by:  Fabio Stovall MD        Dose:  12.5 mg   Take 0.5 tablets (12.5 mg) by mouth daily   Quantity:  45 tablet   Refills:  3       tamsulosin 0.4 MG capsule   Commonly known as:  FLOMAX   This may have changed:  how much to take   Used for:  Benign prostatic hyperplasia " with urinary obstruction   Changed by:  Fabio Stovall MD        Dose:  0.4 mg   Take 1 capsule (0.4 mg) by mouth daily   Quantity:  90 capsule   Refills:  3            Where to get your medicines      These medications were sent to [facility] - [place] - [address]   [address] [place] [address]     Phone:  [phone]     atorvastatin 80 MG tablet    fluticasone 50 MCG/ACT spray    losartan 25 MG tablet    omeprazole 20 MG CR capsule    tamsulosin 0.4 MG capsule    tolterodine 2 MG 24 hr capsule                Primary Care Provider Office Phone # Fax #    Fabio Stovall [phone] [address] [place] [address]        Equal Access to Services     [facility]: Hadii aad ku hadasho Soomaali, waaxda luqadaha, qaybta kaalmada adeegyada, waxay idiin hayaan adeeg billy key . So [facility] [phone].    ATENCIÓN: Si habla español, tiene a black disposición servicios gratuitos de asistencia lingüística. [facility] [phone].    We comply with applicable federal civil rights laws and [place] laws. We do not discriminate on the basis of race, color, national origin, age, disability, sex, sexual orientation, or gender identity.            Thank you!     Thank you for choosing [facility]  for your care. Our goal is always to provide you with excellent care. Hearing back from our patients is one way we can continue to improve our services. Please take a few minutes to complete the written survey that you may receive in the mail after your visit with us. Thank you!             Your Updated Medication List - Protect others around you: Learn how to safely use, store and throw away your medicines at www.disposemymeds.org.          This list is accurate as of: 10/16/17  4:48 PM.  Always use your most recent med list.                   Brand Name Dispense Instructions for use Diagnosis    albuterol 108 (90 BASE) MCG/ACT  Inhaler    PROAIR HFA/PROVENTIL HFA/VENTOLIN HFA    1 Inhaler    Inhale 2 puffs into the lungs every 6 hours as needed for shortness of breath / dyspnea or wheezing    Acute bronchitis with symptoms > 10 days       aspirin 81 MG tablet     90 tablet    Take 1 tablet by mouth daily.    ACS (acute coronary syndrome) (H)       atorvastatin 80 MG tablet    LIPITOR    90 tablet    Take 1 tablet (80 mg) by mouth daily    Hyperlipidemia LDL goal <100       fluticasone 50 MCG/ACT spray    FLONASE    16 g    Spray 1-2 sprays into both nostrils daily    Nasal congestion       losartan 25 MG tablet    COZAAR    45 tablet    Take 0.5 tablets (12.5 mg) by mouth daily    Hypertension goal BP (blood pressure) < 140/90       nitroGLYcerin 0.4 MG sublingual tablet    NITROSTAT    25 tablet    Place 1 tablet (0.4 mg) under the tongue every 5 minutes as needed for chest pain    ACS (acute coronary syndrome) (H)       omeprazole 20 MG CR capsule    priLOSEC    90 capsule    Take 1 capsule (20 mg) by mouth daily    Gastroesophageal reflux disease without esophagitis       tamsulosin 0.4 MG capsule    FLOMAX    90 capsule    Take 1 capsule (0.4 mg) by mouth daily    Benign prostatic hyperplasia with urinary obstruction       tolterodine 2 MG 24 hr capsule    DETROL LA    90 capsule    Take 1 capsule (2 mg) by mouth daily    Urinary frequency

## 2017-10-18 ENCOUNTER — TELEPHONE (OUTPATIENT)
Dept: FAMILY MEDICINE | Facility: OTHER | Age: 62
End: 2017-10-18

## 2017-10-18 NOTE — TELEPHONE ENCOUNTER
I don't see a clear indication that your supplement is contributing to your heart symptoms.  But supplements sometimes do contain substances not on the label.  If you'd like to stop it for a week or two to see if that would help, that would be OK.    None of your medications is likely to be the cause of your symptoms.    If you'd like, I'm happy to get the event monitor set up to evaluate this.

## 2017-10-23 ENCOUNTER — OFFICE VISIT (OUTPATIENT)
Dept: AUDIOLOGY | Facility: CLINIC | Age: 62
End: 2017-10-23
Payer: COMMERCIAL

## 2017-10-23 DIAGNOSIS — H90.3 SENSORINEURAL HEARING LOSS, BILATERAL: Primary | ICD-10-CM

## 2017-10-23 PROCEDURE — 92557 COMPREHENSIVE HEARING TEST: CPT | Performed by: AUDIOLOGIST

## 2017-10-23 PROCEDURE — 92550 TYMPANOMETRY & REFLEX THRESH: CPT | Performed by: AUDIOLOGIST

## 2017-10-23 PROCEDURE — 99207 ZZC NO CHARGE LOS: CPT | Performed by: AUDIOLOGIST

## 2017-10-23 NOTE — MR AVS SNAPSHOT
"              After Visit Summary   10/23/2017    Keegan Buchanan    MRN: [id number]           Patient Information     Date Of Birth          1955        Visit Information        Provider Department      10/23/2017 5:00 PM Ivory Lawler, Francisco Javier [facility]        Today's Diagnoses     Sensorineural hearing loss, bilateral    -  1       Follow-ups after your visit        Your next 10 appointments already scheduled     Oct 25, 2017  9:30 AM CDT   Return Visit with Rasta Millan MD   [facility] ([address] [phone] [phone]            Nov 02, 2017  3:00 PM CDT   Hearing Aid Consult with Francisco Javier Leach   [facility] ([address] [phone]              Who to contact     If you have questions or need follow up information about today's clinic visit or your schedule please contact [facility] directly at [phone].  Normal or non-critical lab and imaging results will be communicated to you by Clipboardhart, letter or phone within 4 business days after the clinic has received the results. If you do not hear from us within 7 days, please contact the clinic through [facility] or phone. If you have a critical or abnormal lab result, we will notify you by phone as soon as possible.  Submit refill requests through [employer] or call your pharmacy and they will forward the refill request to us. Please allow 3 business days for your refill to be completed.          Additional Information About Your Visit        Clipboardhart Information     [employer] lets you send messages to your doctor, view your test results, renew your prescriptions, schedule appointments and more. To sign up, go to [email or web address] . Click on \"Log in\" on the left side of the screen, which will take you to the Welcome page. Then click on \"Sign up " "Now\" on the right side of the page.     You will be asked to enter the access code listed below, as well as some personal information. Please follow the directions to create your username and password.     Your access code is: [email or web address]  Expires: 2018  4:48 PM     Your access code will  in 90 days. If you need help or a new code, please call your [facility] clinic or [phone].        Care EveryWhere ID     This is your Care EveryWhere ID. This could be used by other organizations to access your [facility] medical records  [id number]         Blood Pressure from Last 3 Encounters:   10/16/17 134/80   17 122/82   02/10/17 136/80    Weight from Last 3 Encounters:   10/16/17 190 lb 6.4 oz (86.4 kg)   17 194 lb 6.4 oz (88.2 kg)   02/10/17 191 lb 3.2 oz (86.7 kg)              We Performed the Following     AUDIOGRAM/TYMPANOGRAM - INTERFACE     COMPREHENSIVE HEARING TEST     TYMPANOMETRY AND REFLEX THRESHOLD MEASUREMENTS        Primary Care Provider Office Phone # Fax #    Fabio Stovall [phone] [address] [place] [address]        Equal Access to Services     [facility] : Hadii aad ku hadasho Soomaali, waaxda luqadaha, qaybta kaalmada adeegyada, waxay idiin hayalvaradon javier cervantes lamadison . So [facility] [phone].    ATENCIÓN: Si habla español, tiene a black disposición servicios gratuitos de asistencia lingüística. Llame al [phone].    We comply with applicable federal civil rights laws and [place] laws. We do not discriminate on the basis of race, color, national origin, age, disability, sex, sexual orientation, or gender identity.            Thank you!     Thank you for choosing [facility]  for your care. Our goal is always to provide you with excellent care. Hearing back from our patients is one way we can continue to improve our services. Please take a few minutes to complete the written survey that you may receive in the mail after " your visit with us. Thank you!             Your Updated Medication List - Protect others around you: Learn how to safely use, store and throw away your medicines at www.disposemymeds.org.          This list is accurate as of: 10/23/17  6:32 PM.  Always use your most recent med list.                   Brand Name Dispense Instructions for use Diagnosis    albuterol 108 (90 BASE) MCG/ACT Inhaler    PROAIR HFA/PROVENTIL HFA/VENTOLIN HFA    1 Inhaler    Inhale 2 puffs into the lungs every 6 hours as needed for shortness of breath / dyspnea or wheezing    Acute bronchitis with symptoms > 10 days       aspirin 81 MG tablet     90 tablet    Take 1 tablet by mouth daily.    ACS (acute coronary syndrome) (H)       atorvastatin 80 MG tablet    LIPITOR    90 tablet    Take 1 tablet (80 mg) by mouth daily    Hyperlipidemia LDL goal <100       fluticasone 50 MCG/ACT spray    FLONASE    16 g    Spray 1-2 sprays into both nostrils daily    Nasal congestion       losartan 25 MG tablet    COZAAR    45 tablet    Take 0.5 tablets (12.5 mg) by mouth daily    Hypertension goal BP (blood pressure) < 140/90       nitroGLYcerin 0.4 MG sublingual tablet    NITROSTAT    25 tablet    Place 1 tablet (0.4 mg) under the tongue every 5 minutes as needed for chest pain    ACS (acute coronary syndrome) (H)       omeprazole 20 MG CR capsule    priLOSEC    90 capsule    Take 1 capsule (20 mg) by mouth daily    Gastroesophageal reflux disease without esophagitis       tamsulosin 0.4 MG capsule    FLOMAX    90 capsule    Take 1 capsule (0.4 mg) by mouth daily    Benign prostatic hyperplasia with urinary obstruction       tolterodine 2 MG 24 hr capsule    DETROL LA    90 capsule    Take 1 capsule (2 mg) by mouth daily    Urinary frequency

## 2017-10-23 NOTE — PROGRESS NOTES
AUDIOLOGY REPORT    SUBJECTIVE:  Keegan Buchanan is a 61 year old male who was seen in the Audiology Clinic at the [facility] for audiologic evaluation, self-referred.  The patient reports a gradual decrease in hearing most noticeable in group/restaurant situations and needs frequent repetitions when talking with his wife in the car. The patient reports constant bilateral tinnitus and occassional brief episodes of dizziness/lightheadedness in the morning.  OBJECTIVE:  Otoscopic exam indicates ears are clear of cerumen bilaterally     Pure Tone Thresholds assessed using conventional audiometry with good  reliability from 250-8000 Hz bilaterally using insert earphones     RIGHT:  moderate and moderate-severe sensorineural hearing loss    LEFT:    moderate and moderate-severe sensorineural hearing loss    Tympanogram:    RIGHT: normal eardrum mobility    LEFT:   normal eardrum mobility    Reflexes (reported by stimulus ear):  RIGHT: Ipsilateral is present at normal levels  RIGHT: Contralateral is present at normal levels  LEFT:   Ipsilateral is present at normal levels  LEFT:   Contralateral is present at normal levels      Speech Reception Threshold:    RIGHT: 25 dB HL    LEFT:   20 dB HL  Word Recognition Score:     RIGHT: 96% at 70 dB HL using NU-6 recorded word list.    LEFT:   84% at 80 dB HL using NU-6 recorded word list.      ASSESSMENT:   Bilateral sensorineural hearing loss.    Today s results were discussed with the patient in detail.     PLAN:  Patient was counseled regarding hearing loss and impact on communication.  Patient is a good candidate for amplification at this time. Handout on good communication strategies, and hearing aid use was given to patient. It is recommended that the patient schedule ENT consultation and HA consultation.  Please call this clinic with questions regarding these results or recommendations.        Ivory Lawler,  Elsie LOUIE Licensed Audiologist #[id number]

## 2017-10-25 ENCOUNTER — OFFICE VISIT (OUTPATIENT)
Dept: CARDIOLOGY | Facility: CLINIC | Age: 62
End: 2017-10-25
Attending: INTERNAL MEDICINE
Payer: COMMERCIAL

## 2017-10-25 VITALS
BODY MASS INDEX: 27.97 KG/M2 | WEIGHT: 195.4 LBS | HEART RATE: 78 BPM | SYSTOLIC BLOOD PRESSURE: 122 MMHG | HEIGHT: 70 IN | DIASTOLIC BLOOD PRESSURE: 86 MMHG | OXYGEN SATURATION: 97 %

## 2017-10-25 DIAGNOSIS — I25.10 CORONARY ARTERY DISEASE INVOLVING NATIVE CORONARY ARTERY OF NATIVE HEART WITHOUT ANGINA PECTORIS: ICD-10-CM

## 2017-10-25 DIAGNOSIS — I10 ESSENTIAL HYPERTENSION: ICD-10-CM

## 2017-10-25 PROCEDURE — 99214 OFFICE O/P EST MOD 30 MIN: CPT | Performed by: INTERNAL MEDICINE

## 2017-10-25 NOTE — PROGRESS NOTES
HISTORY OF PRESENT ILLNESS:  Keegan Buchanan, a 61-year-old man with coronary artery disease, hypertension, dyslipidemia, and benign prostatic hypertrophy was seen today at your request for followup.      Mr. Buchanan underwent implantation of overlapping 3.0 x 12 and 2.75 x 16 mm length everolimus-eluting Promus premiere stents in the mid-LAD in 2012 for management of unstable angina.  The other coronary vessels had no significant narrowing.  A nuclear stress test in 2013 after his procedure showed an ejection fraction of 60% with no ischemia.  The patient was able to complete 15.2 mets on a Davey protocol demonstrating exercise tolerance.      Since I last saw the patient 1 year ago, he has remained free of angina.  He exercises on a regular basis and follows a careful diet.      The patient has been under a great deal of emotional stress over the last several weeks.  His wife had undergone a Pap smear and there was concern regarding possible malignancy.  Fortunately, the results turned out normal.  His brother underwent colonoscopy and was hospitalized in the ICU after he had a post-procedural complication.  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  During this stressful time, he was prone to occasional episodes of skipped heartbeats.  At no point has he had syncope, sustained tachycardia or chest discomfort.  Over the last several days, these symptoms have resolved.      PAST MEDICAL HISTORY:   1.  Dyslipidemia - on atorvastatin.   2.  Hypertension.   3.  Coronary artery disease - status post stent implantation in the mid LAD.  No other critical narrowing to the major vessels.  Negative nuclear stress test in 2013.  Normal ejection fraction.   4.  Prostatic hypertrophy - on tamsulosin.   5.  History of gastroesophageal reflux disease.      PHYSICAL EXAMINATION:   GENERAL:  Exam today demonstrates a very pleasant, cooperative and intelligent 61-year-old man.   VITAL  SIGNS:  His blood pressure is 122/86, his heart rate is 78.  His height is 1.8 meters, his weight is 88.6 kg, his BMI is 28.1.   LUNGS:  Clear to percussion and auscultation.   CARDIOVASCULAR:  Shows a normal S1 with a normal S2.  There is no S3.  There is no murmur, rub or click.      LABORATORY STUDIES:  His most recent lipid profile in 10/17 showed an LDL cholesterol of 58 with an HDL of 40.  The patient's most recent creatinine is 0.82.      ASSESSMENT:  Mr. Buchanan is free of angina with optimal systolic blood pressure and LDL cholesterol levels on guideline-directed medical therapy.  He has noted rare ectopic beats in the setting of anxiety which have now resolved.  I do not believe that this symptom requires further investigation at this time given the infrequent nature and the lack of accompanying severe symptoms.  If the patient develops chest discomfort, sustained tachycardia, or lightheadedness, we will  re-evaluate.      RECOMMENDATIONS:   1.  Continue present medical therapy.   2.  Followup visit in about 1 year.      We greatly appreciate the opportunity to see your patient, Mr. Jazzy Buchanan.      cc:   [place] [address]         MERLIN ESTRADA MD             D: 10/25/2017 09:48   T: 10/25/2017 12:21   MT: MARKUS      Name:     JAZZY BUCHANAN   MRN:      [id number]        Account:      [id number]   :      1955           Service Date: 10/25/2017      Document: [id number]

## 2017-10-25 NOTE — LETTER
10/25/2017      RE: Keegan Buchanan  [address] [address] [place] [id number]       Dear Colleague,    Thank you for the opportunity to participate in the care of your patient, Keegan Buchanan, at the [facility]. Please see a copy of my visit note below.    HISTORY OF PRESENT ILLNESS:  Occasional skipped beats. No syncope. Observing heart rate with exercise    Orders this Visit:  No orders of the defined types were placed in this encounter.    No orders of the defined types were placed in this encounter.    There are no discontinued medications.    Encounter Diagnoses   Name Primary?     Coronary artery disease involving native coronary artery of native heart without angina pectoris      Essential hypertension        CURRENT MEDICATIONS:  Current Outpatient Prescriptions   Medication Sig Dispense Refill     atorvastatin (LIPITOR) 80 MG tablet Take 1 tablet (80 mg) by mouth daily 90 tablet 3     omeprazole (PRILOSEC) 20 MG CR capsule Take 1 capsule (20 mg) by mouth daily 90 capsule 3     tolterodine (DETROL LA) 2 MG 24 hr capsule Take 1 capsule (2 mg) by mouth daily 90 capsule 3     losartan (COZAAR) 25 MG tablet Take 0.5 tablets (12.5 mg) by mouth daily 45 tablet 3     fluticasone (FLONASE) 50 MCG/ACT spray Spray 1-2 sprays into both nostrils daily 16 g 11     tamsulosin (FLOMAX) 0.4 MG capsule Take 1 capsule (0.4 mg) by mouth daily 90 capsule 3     albuterol (PROAIR HFA/PROVENTIL HFA/VENTOLIN HFA) 108 (90 BASE) MCG/ACT Inhaler Inhale 2 puffs into the lungs every 6 hours as needed for shortness of breath / dyspnea or wheezing 1 Inhaler 1     aspirin 81 MG tablet Take 1 tablet by mouth daily. 90 tablet 3     nitroglycerin (NITROSTAT) 0.4 MG SL tablet Place 1 tablet (0.4 mg) under the tongue every 5 minutes as needed for chest pain (Patient not taking: Reported on 10/25/2017) 25 tablet 0     [DISCONTINUED] tolterodine (DETROL LA) 2 MG 24 hr capsule Take  "2 mg by mouth 2 times daily.         ALLERGIES     Allergies   Allergen Reactions     Augmentin GI Disturbance     Penicillins      GI upset and diarrhea       PAST MEDICAL, SURGICAL, FAMILY, SOCIAL HISTORY:  History was reviewed and updated as needed, see medical record.    Review of Systems:  A 12-point review of systems was completed, see medical record for detailed review of systems information.    Physical Exam:  Vitals: /86 (BP Location: Left arm, Patient Position: Fowlers, Cuff Size: Adult Large)  Pulse 78  Ht 1.778 m (5' 10\")  Wt 88.6 kg (195 lb 6.4 oz)  SpO2 97%  BMI 28.04 kg/m2    Constitutional:  cooperative, alert and oriented, well developed, well nourished, in no acute distress        Skin:  warm and dry to the touch, no apparent skin lesions or masses noted        Head:  normocephalic, no masses or lesions        Eyes:  pupils equal and round, conjunctivae and lids unremarkable, sclera white, no xanthalasma, EOMS intact, no nystagmus        ENT:  no pallor or cyanosis, dentition good        Neck:  carotid pulses are full and equal bilaterally, JVP normal, no carotid bruit, no thyromegaly        Chest:  normal breath sounds, clear to auscultation, normal A-P diameter, normal symmetry, normal respiratory excursion, no use of accessory muscles        Cardiac: regular rhythm, normal S1/S2, no S3 or S4, apical impulse not displaced, no murmurs, gallops or rubs                  Abdomen:  abdomen soft, non-tender, BS normoactive, no mass, no HSM, no bruits        Vascular: pulses full and equal, no bruits auscultated                                      Extremities and Back:  no deformities, clubbing, cyanosis, erythema observed        Neurological:  affect appropriate, oriented to time, person and place        ASSESSMENT: Occasional ectopic beats. Likely benign       RECOMMENDATIONS: No further evaluation at this time      Recent Lab Results:  LIPID RESULTS:  Lab Results   Component Value Date    " CHOL 141 10/12/2017    HDL 40 10/12/2017    LDL 58 10/12/2017    TRIG 213 (H) 10/12/2017    CHOLHDLRATIO 4.6 11/16/2015       LIVER ENZYME RESULTS:  Lab Results   Component Value Date    AST 20 10/12/2017    ALT 39 10/12/2017       CBC RESULTS:  Lab Results   Component Value Date    WBC 9.0 11/16/2012    RBC 5.52 11/16/2012    HGB 16.2 11/16/2012    HCT 47.2 11/16/2012    MCV 86 11/16/2012    MCH 29.3 11/16/2012    MCHC 34.3 11/16/2012    RDW 13.7 11/16/2012     11/16/2012       BMP RESULTS:  Lab Results   Component Value Date     (H) 10/12/2017    POTASSIUM 4.4 10/12/2017    CHLORIDE 106 10/12/2017    CO2 24 10/12/2017    ANIONGAP 15 (H) 10/12/2017    GLC 99 10/12/2017    BUN 14 10/12/2017    CR 0.82 10/12/2017    GFRESTIMATED >90 10/12/2017    GFRESTBLACK >90 10/12/2017    ALFREDA 9.0 10/12/2017        A1C RESULTS:  Lab Results   Component Value Date    A1C 5.6 12/27/2012       INR RESULTS:  Lab Results   Component Value Date    INR 0.97 11/14/2012       We greatly appreciate the opportunity to be involved in the care of your patient, Keegan Buchanan.    Sincerely,  Rasta Millan MD        Rasta Millan MD  [address]  [place] [address]                                                                       Please do not hesitate to contact me if you have any questions/concerns.     Sincerely,     Rasta Millan MD

## 2017-10-25 NOTE — LETTER
10/25/2017    Fabio Stovall MD  [address] [place] [address]    RE: Keegan Buchanan       Dear Colleague,    I had the pleasure of seeing Keegan Buchanan in the [facility].    Keegan Buchanan, a 61-year-old man with coronary artery disease, hypertension, dyslipidemia, and benign prostatic hypertrophy was seen today at your request for followup.      Mr. Buchanan underwent implantation of overlapping 3.0 x 12 and 2.75 x 16 mm length everolimus-eluting Promus premiere stents in the mid-LAD in 2012 for management of unstable angina.  The other coronary vessels had no significant narrowing.  A nuclear stress test in 2013 after his procedure showed an ejection fraction of 60% with no ischemia.  The patient was able to complete 15.2 mets on a Davey protocol demonstrating exercise tolerance.      Since I last saw the patient 1 year ago, he has remained free of angina.  He exercises on a regular basis and follows a careful diet.      The patient has been under a great deal of emotional stress over the last several weeks.  His wife had undergone a Pap smear and there was concern regarding possible malignancy.  Fortunately, the results turned out normal.  His brother underwent colonoscopy and was hospitalized in the ICU after he had a post-procedural complication.  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  During this stressful time, he was prone to occasional episodes of skipped heartbeats.  At no point has he had syncope, sustained tachycardia or chest discomfort.  Over the last several days, these symptoms have resolved.      PAST MEDICAL HISTORY:   1.  Dyslipidemia - on atorvastatin.   2.  Hypertension.   3.  Coronary artery disease - status post stent implantation in the mid LAD.  No other critical narrowing to the major vessels.  Negative nuclear stress test in 2013.  Normal ejection fraction.   4.  Prostatic hypertrophy - on  tamsulosin.   5.  History of gastroesophageal reflux disease.      PHYSICAL EXAMINATION:   GENERAL:  Exam today demonstrates a very pleasant, cooperative and intelligent 61-year-old man.   VITAL SIGNS:  His blood pressure is 122/86, his heart rate is 78.  His height is 1.8 meters, his weight is 88.6 kg, his BMI is 28.1.   LUNGS:  Clear to percussion and auscultation.   CARDIOVASCULAR:  Shows a normal S1 with a normal S2.  There is no S3.  There is no murmur, rub or click.      LABORATORY STUDIES:  His most recent lipid profile in 10/17 showed an LDL cholesterol of 58 with an HDL of 40.  The patient's most recent creatinine is 0.82.     Outpatient Encounter Prescriptions as of 10/25/2017   Medication Sig Dispense Refill     atorvastatin (LIPITOR) 80 MG tablet Take 1 tablet (80 mg) by mouth daily 90 tablet 3     omeprazole (PRILOSEC) 20 MG CR capsule Take 1 capsule (20 mg) by mouth daily 90 capsule 3     tolterodine (DETROL LA) 2 MG 24 hr capsule Take 1 capsule (2 mg) by mouth daily 90 capsule 3     losartan (COZAAR) 25 MG tablet Take 0.5 tablets (12.5 mg) by mouth daily 45 tablet 3     fluticasone (FLONASE) 50 MCG/ACT spray Spray 1-2 sprays into both nostrils daily 16 g 11     tamsulosin (FLOMAX) 0.4 MG capsule Take 1 capsule (0.4 mg) by mouth daily 90 capsule 3     albuterol (PROAIR HFA/PROVENTIL HFA/VENTOLIN HFA) 108 (90 BASE) MCG/ACT Inhaler Inhale 2 puffs into the lungs every 6 hours as needed for shortness of breath / dyspnea or wheezing 1 Inhaler 1     aspirin 81 MG tablet Take 1 tablet by mouth daily. 90 tablet 3     nitroglycerin (NITROSTAT) 0.4 MG SL tablet Place 1 tablet (0.4 mg) under the tongue every 5 minutes as needed for chest pain (Patient not taking: Reported on 10/25/2017) 25 tablet 0     No facility-administered encounter medications on file as of 10/25/2017.       ASSESSMENT:  Mr. Buchanan is free of angina with optimal systolic blood pressure and LDL cholesterol levels on guideline-directed  medical therapy.  He has noted rare ectopic beats in the setting of anxiety which have now resolved.  I do not believe that this symptom requires further investigation at this time given the infrequent nature and the lack of accompanying severe symptoms.  If the patient develops chest discomfort, sustained tachycardia, or lightheadedness, we will  re-evaluate.      RECOMMENDATIONS:   1.  Continue present medical therapy.   2.  Followup visit in about 1 year.      We greatly appreciate the opportunity to see your patient, Mr. Keegan Buchanan.      Sincerely,    Rasta Millan MD     [facility]

## 2017-10-25 NOTE — PROGRESS NOTES
HISTORY OF PRESENT ILLNESS:  Occasional skipped beats. No syncope. Observing heart rate with exercise    Orders this Visit:  No orders of the defined types were placed in this encounter.    No orders of the defined types were placed in this encounter.    There are no discontinued medications.    Encounter Diagnoses   Name Primary?     Coronary artery disease involving native coronary artery of native heart without angina pectoris      Essential hypertension        CURRENT MEDICATIONS:  Current Outpatient Prescriptions   Medication Sig Dispense Refill     atorvastatin (LIPITOR) 80 MG tablet Take 1 tablet (80 mg) by mouth daily 90 tablet 3     omeprazole (PRILOSEC) 20 MG CR capsule Take 1 capsule (20 mg) by mouth daily 90 capsule 3     tolterodine (DETROL LA) 2 MG 24 hr capsule Take 1 capsule (2 mg) by mouth daily 90 capsule 3     losartan (COZAAR) 25 MG tablet Take 0.5 tablets (12.5 mg) by mouth daily 45 tablet 3     fluticasone (FLONASE) 50 MCG/ACT spray Spray 1-2 sprays into both nostrils daily 16 g 11     tamsulosin (FLOMAX) 0.4 MG capsule Take 1 capsule (0.4 mg) by mouth daily 90 capsule 3     albuterol (PROAIR HFA/PROVENTIL HFA/VENTOLIN HFA) 108 (90 BASE) MCG/ACT Inhaler Inhale 2 puffs into the lungs every 6 hours as needed for shortness of breath / dyspnea or wheezing 1 Inhaler 1     aspirin 81 MG tablet Take 1 tablet by mouth daily. 90 tablet 3     nitroglycerin (NITROSTAT) 0.4 MG SL tablet Place 1 tablet (0.4 mg) under the tongue every 5 minutes as needed for chest pain (Patient not taking: Reported on 10/25/2017) 25 tablet 0     [DISCONTINUED] tolterodine (DETROL LA) 2 MG 24 hr capsule Take 2 mg by mouth 2 times daily.         ALLERGIES     Allergies   Allergen Reactions     Augmentin GI Disturbance     Penicillins      GI upset and diarrhea       PAST MEDICAL, SURGICAL, FAMILY, SOCIAL HISTORY:  History was reviewed and updated as needed, see medical record.    Review of Systems:  A 12-point review of  "systems was completed, see medical record for detailed review of systems information.    Physical Exam:  Vitals: /86 (BP Location: Left arm, Patient Position: Fowlers, Cuff Size: Adult Large)  Pulse 78  Ht 1.778 m (5' 10\")  Wt 88.6 kg (195 lb 6.4 oz)  SpO2 97%  BMI 28.04 kg/m2    Constitutional:  cooperative, alert and oriented, well developed, well nourished, in no acute distress        Skin:  warm and dry to the touch, no apparent skin lesions or masses noted        Head:  normocephalic, no masses or lesions        Eyes:  pupils equal and round, conjunctivae and lids unremarkable, sclera white, no xanthalasma, EOMS intact, no nystagmus        ENT:  no pallor or cyanosis, dentition good        Neck:  carotid pulses are full and equal bilaterally, JVP normal, no carotid bruit, no thyromegaly        Chest:  normal breath sounds, clear to auscultation, normal A-P diameter, normal symmetry, normal respiratory excursion, no use of accessory muscles        Cardiac: regular rhythm, normal S1/S2, no S3 or S4, apical impulse not displaced, no murmurs, gallops or rubs                  Abdomen:  abdomen soft, non-tender, BS normoactive, no mass, no HSM, no bruits        Vascular: pulses full and equal, no bruits auscultated                                      Extremities and Back:  no deformities, clubbing, cyanosis, erythema observed        Neurological:  affect appropriate, oriented to time, person and place        ASSESSMENT: Occasional ectopic beats. Likely benign       RECOMMENDATIONS: No further evaluation at this time      Recent Lab Results:  LIPID RESULTS:  Lab Results   Component Value Date    CHOL 141 10/12/2017    HDL 40 10/12/2017    LDL 58 10/12/2017    TRIG 213 (H) 10/12/2017    CHOLHDLRATIO 4.6 11/16/2015       LIVER ENZYME RESULTS:  Lab Results   Component Value Date    AST 20 10/12/2017    ALT 39 10/12/2017       CBC RESULTS:  Lab Results   Component Value Date    WBC 9.0 11/16/2012    RBC 5.52 " 11/16/2012    HGB 16.2 11/16/2012    HCT 47.2 11/16/2012    MCV 86 11/16/2012    MCH 29.3 11/16/2012    MCHC 34.3 11/16/2012    RDW 13.7 11/16/2012     11/16/2012       BMP RESULTS:  Lab Results   Component Value Date     (H) 10/12/2017    POTASSIUM 4.4 10/12/2017    CHLORIDE 106 10/12/2017    CO2 24 10/12/2017    ANIONGAP 15 (H) 10/12/2017    GLC 99 10/12/2017    BUN 14 10/12/2017    CR 0.82 10/12/2017    GFRESTIMATED >90 10/12/2017    GFRESTBLACK >90 10/12/2017    ALFREDA 9.0 10/12/2017        A1C RESULTS:  Lab Results   Component Value Date    A1C 5.6 12/27/2012       INR RESULTS:  Lab Results   Component Value Date    INR 0.97 11/14/2012       We greatly appreciate the opportunity to be involved in the care of your patient, Keegan Buchanan.    Sincerely,  Rasta Millan MD        Rasta Millan MD  [address]  [place] [address]

## 2017-10-25 NOTE — MR AVS SNAPSHOT
After Visit Summary   10/25/2017    Keegan Buchanan    MRN: [id number]           Patient Information     Date Of Birth          1955        Visit Information        Provider Department      10/25/2017 9:30 AM Rasta Millan MD [facility]        Today's Diagnoses     Coronary artery disease involving native coronary artery of native heart without angina pectoris        Essential hypertension           Follow-ups after your visit        Additional Services     Follow-Up with Cardiologist                 Your next 10 appointments already scheduled     Nov 02, 2017  3:00 PM CDT   Hearing Aid Consult with Francisco Javier Leach   [facility] ([facility])    [address] [place] [id number]   [phone]              Future tests that were ordered for you today     Open Future Orders        Priority Expected Expires Ordered    Follow-Up with Cardiologist Routine 10/25/2018 10/26/2018 10/25/2017            Who to contact     If you have questions or need follow up information about today's clinic visit or your schedule please contact [facility] directly at [phone].  Normal or non-critical lab and imaging results will be communicated to you by Trion Worldshart, letter or phone within 4 business days after the clinic has received the results. If you do not hear from us within 7 days, please contact the clinic through Trion Worldshart or phone. If you have a critical or abnormal lab result, we will notify you by phone as soon as possible.  Submit refill requests through K[employer] or call your pharmacy and they will forward the refill request to us. Please allow 3 business days for your refill to be completed.          Additional Information About Your Visit        Trion Worldshart Information     Kneebone lets you send messages to your doctor, view your test results, renew your prescriptions, schedule appointments and more. To sign up, go to  [email or web address] . Click on \"Log in\" on the left side of the screen, which will take you to the Welcome page. Then click on \"Sign up Now\" on the right side of the page.     You will be asked to enter the access code listed below, as well as some personal information. Please follow the directions to create your username and password.     Your access code is: [email or web address]  Expires: 2018  4:48 PM     Your access code will  in 90 days. If you need help or a new code, please call your [facility] clinic or [phone].        Care EveryWhere ID     This is your Care EveryWhere ID. This could be used by other organizations to access your [facility] medical records  [id number]        Your Vitals Were     Pulse Height Pulse Oximetry BMI (Body Mass Index)          78 1.778 m (5' 10\") 97% 28.04 kg/m2         Blood Pressure from Last 3 Encounters:   10/25/17 122/86   10/16/17 134/80   17 122/82    Weight from Last 3 Encounters:   10/25/17 88.6 kg (195 lb 6.4 oz)   10/16/17 86.4 kg (190 lb 6.4 oz)   17 88.2 kg (194 lb 6.4 oz)              We Performed the Following     Follow-Up with Cardiologist        Primary Care Provider Office Phone # Fax #    Fabio Stovall [phone] [address] [place] [address]        Equal Access to Services     [facility]: Hadii aad ku hadasho Soomaali, waaxda luqadaha, qaybta kaalmada adeegyada, norma calvillo. So [facility] [phone].    ATENCIÓN: Si habla español, tiene a black disposición servicios gratuitos de asistencia lingüística. Llame al [phone].    We comply with applicable federal civil rights laws and [place] laws. We do not discriminate on the basis of race, color, national origin, age, disability, sex, sexual orientation, or gender identity.            Thank you!     Thank you for choosing [facility]  for your care. Our goal is always to provide you with excellent care. " Hearing back from our patients is one way we can continue to improve our services. Please take a few minutes to complete the written survey that you may receive in the mail after your visit with us. Thank you!             Your Updated Medication List - Protect others around you: Learn how to safely use, store and throw away your medicines at www.disposemymeds.org.          This list is accurate as of: 10/25/17  9:42 AM.  Always use your most recent med list.                   Brand Name Dispense Instructions for use Diagnosis    albuterol 108 (90 BASE) MCG/ACT Inhaler    PROAIR HFA/PROVENTIL HFA/VENTOLIN HFA    1 Inhaler    Inhale 2 puffs into the lungs every 6 hours as needed for shortness of breath / dyspnea or wheezing    Acute bronchitis with symptoms > 10 days       aspirin 81 MG tablet     90 tablet    Take 1 tablet by mouth daily.    ACS (acute coronary syndrome) (H)       atorvastatin 80 MG tablet    LIPITOR    90 tablet    Take 1 tablet (80 mg) by mouth daily    Hyperlipidemia LDL goal <100       fluticasone 50 MCG/ACT spray    FLONASE    16 g    Spray 1-2 sprays into both nostrils daily    Nasal congestion       losartan 25 MG tablet    COZAAR    45 tablet    Take 0.5 tablets (12.5 mg) by mouth daily    Hypertension goal BP (blood pressure) < 140/90       nitroGLYcerin 0.4 MG sublingual tablet    NITROSTAT    25 tablet    Place 1 tablet (0.4 mg) under the tongue every 5 minutes as needed for chest pain    ACS (acute coronary syndrome) (H)       omeprazole 20 MG CR capsule    priLOSEC    90 capsule    Take 1 capsule (20 mg) by mouth daily    Gastroesophageal reflux disease without esophagitis       tamsulosin 0.4 MG capsule    FLOMAX    90 capsule    Take 1 capsule (0.4 mg) by mouth daily    Benign prostatic hyperplasia with urinary obstruction       tolterodine 2 MG 24 hr capsule    DETROL LA    90 capsule    Take 1 capsule (2 mg) by mouth daily    Urinary frequency

## 2017-11-02 ENCOUNTER — OFFICE VISIT (OUTPATIENT)
Dept: AUDIOLOGY | Facility: CLINIC | Age: 62
End: 2017-11-02
Payer: COMMERCIAL

## 2017-11-02 DIAGNOSIS — H93.13 TINNITUS, BILATERAL: ICD-10-CM

## 2017-11-02 DIAGNOSIS — H90.3 SENSORY HEARING LOSS, BILATERAL: Primary | ICD-10-CM

## 2017-11-02 PROCEDURE — 92591 HC HEARING AID EXAM BINAURAL: CPT | Performed by: AUDIOLOGIST

## 2017-11-02 PROCEDURE — 99207 ZZC NO CHARGE LOS: CPT | Performed by: AUDIOLOGIST

## 2017-11-02 NOTE — MR AVS SNAPSHOT
"              After Visit Summary   2017    Keegan Buchanan    MRN: [id number]           Patient Information     Date Of Birth          1955        Visit Information        Provider Department      2017 3:00 PM Ivory Lawler [facility]        Today's Diagnoses     Sensory hearing loss, bilateral    -  1    Tinnitus, bilateral           Follow-ups after your visit        Who to contact     If you have questions or need follow up information about today's clinic visit or your schedule please contact [facility] directly at [phone].  Normal or non-critical lab and imaging results will be communicated to you by Atira Systemshart, letter or phone within 4 business days after the clinic has received the results. If you do not hear from us within 7 days, please contact the clinic through tenfarmst or phone. If you have a critical or abnormal lab result, we will notify you by phone as soon as possible.  Submit refill requests through Is That Odd or call your pharmacy and they will forward the refill request to us. Please allow 3 business days for your refill to be completed.          Additional Information About Your Visit        MyChart Information     Is That Odd lets you send messages to your doctor, view your test results, renew your prescriptions, schedule appointments and more. To sign up, go to [email or web address] . Click on \"Log in\" on the left side of the screen, which will take you to the Welcome page. Then click on \"Sign up Now\" on the right side of the page.     You will be asked to enter the access code listed below, as well as some personal information. Please follow the directions to create your username and password.     Your access code is: [email or web address]  Expires: 2018  4:48 PM     Your access code will  in 90 days. If you need help or a new code, please call your [facility] or [phone].        Care EveryWhere ID     This is your Care " EveryWhere ID. This could be used by other organizations to access your [facility] medical records  [id number]         Blood Pressure from Last 3 Encounters:   10/25/17 122/86   10/16/17 134/80   03/16/17 122/82    Weight from Last 3 Encounters:   10/25/17 195 lb 6.4 oz (88.6 kg)   10/16/17 190 lb 6.4 oz (86.4 kg)   03/16/17 194 lb 6.4 oz (88.2 kg)              We Performed the Following     HEARING AID EXAM BINAURAL        Primary Care Provider Office Phone # Fax #    Fabio [place] Stovall [phone] [address] [place] [address]        Equal Access to Services     [facility]: Hadii aad ku hadasho Soomaali, waaxda luqadaha, qaybta kaalmada adeegyada, waxay saniain hayalvaradon javier key . So [facility] [phone].    ATENCIÓN: Si habla español, tiene a black disposición servicios gratuitos de asistencia lingüística. [facility] [phone].    We comply with applicable federal civil rights laws and [place] laws. We do not discriminate on the basis of race, color, national origin, age, disability, sex, sexual orientation, or gender identity.            Thank you!     Thank you for choosing [facility]  for your care. Our goal is always to provide you with excellent care. Hearing back from our patients is one way we can continue to improve our services. Please take a few minutes to complete the written survey that you may receive in the mail after your visit with us. Thank you!             Your Updated Medication List - Protect others around you: Learn how to safely use, store and throw away your medicines at www.disposemymeds.org.          This list is accurate as of: 11/2/17 11:59 PM.  Always use your most recent med list.                   Brand Name Dispense Instructions for use Diagnosis    albuterol 108 (90 BASE) MCG/ACT Inhaler    PROAIR HFA/PROVENTIL HFA/VENTOLIN HFA    1 Inhaler    Inhale 2 puffs into the lungs every 6 hours as needed for shortness of breath /  dyspnea or wheezing    Acute bronchitis with symptoms > 10 days       aspirin 81 MG tablet     90 tablet    Take 1 tablet by mouth daily.    ACS (acute coronary syndrome) (H)       atorvastatin 80 MG tablet    LIPITOR    90 tablet    Take 1 tablet (80 mg) by mouth daily    Hyperlipidemia LDL goal <100       fluticasone 50 MCG/ACT spray    FLONASE    16 g    Spray 1-2 sprays into both nostrils daily    Nasal congestion       losartan 25 MG tablet    COZAAR    45 tablet    Take 0.5 tablets (12.5 mg) by mouth daily    Hypertension goal BP (blood pressure) < 140/90       nitroGLYcerin 0.4 MG sublingual tablet    NITROSTAT    25 tablet    Place 1 tablet (0.4 mg) under the tongue every 5 minutes as needed for chest pain    ACS (acute coronary syndrome) (H)       omeprazole 20 MG CR capsule    priLOSEC    90 capsule    Take 1 capsule (20 mg) by mouth daily    Gastroesophageal reflux disease without esophagitis       tamsulosin 0.4 MG capsule    FLOMAX    90 capsule    Take 1 capsule (0.4 mg) by mouth daily    Benign prostatic hyperplasia with urinary obstruction       tolterodine 2 MG 24 hr capsule    DETROL LA    90 capsule    Take 1 capsule (2 mg) by mouth daily    Urinary frequency

## 2017-11-03 NOTE — PROGRESS NOTES
AUDIOLOGY REPORT    SUBJECTIVE: Keegan Buchanan is a 61 year old male was seen in the Audiology Clinic at  [facility] on 11/02/17 to discuss concerns with hearing and functional communication difficulties. The patient was accompanied by their self. Keegan has been seen previously on 10/23/2017, and results revealed mild to moderately severe sensorineural hearing loss bilaterally. Keegan notes difficulty with communication in a variety of listening situations, including group/restaurant situations, frequent repetitions when talking with his wife in the car, and increased volume on the TV.  He has previous experience with hearing aids RICs from [employer] that were returned (no benefit in groups, too loud in wind, and uncomfortable silicone domes moved in ear and were bothersome, custom devices were tried and provided too much occlusion).    OBJECTIVE:  Patient is a hearing aid candidate. Patient would like to move forward with a hearing aid evaluation today. Therefore, the patient was presented with different options for amplification to help aid in communication. Discussed styles, levels of technology and monaural vs. binaural fitting.     The hearing aid(s) mutually chosen were:  Binaural: Re[employer] Linx 3D 7 TANNER   COLOR: undecided  BATTERY SIZE: 312/13 depending on style selected  EARMOLD/TIPS: canal  CANAL/ LENGTH: need to measure    ASSESSMENT:   Sensorineural hearing loss with tinnitus bilaterally.    Reviewed purchase information and warranty information with patient. The 45 day trial period was explained to patient. The patient was given a copy of the [place] Department of Health consumer brochure on purchasing hearing instruments. Patient risk factors have been provided to the patient in writing prior to the sale of the hearing aid per FDA regulation. The risk factors are also available in the User Instructional Booklet to be presented on the day of the hearing aid fitting.  Hearing aid(s) ordered. Hearing aid evaluation completed.    PLAN: Keegan was advised to schedule an appointment to select the final style and take earmold impressions when he is ready to proceed with amplification (he wanted to compare available products and prices at [employer]). Purchase agreement will be completed on that date. Please contact this clinic with any questions or concerns.      Sha Sandoval.  [place] Licensed Audiologist #[id number]

## 2017-12-13 ENCOUNTER — TELEPHONE (OUTPATIENT)
Dept: FAMILY MEDICINE | Facility: OTHER | Age: 62
End: 2017-12-13

## 2017-12-13 NOTE — TELEPHONE ENCOUNTER
Keegan Buchanan is a 62 year old male who calls with [facility] employees who are sick with a grandchild who has scarlet fever.    NURSING ASSESSMENT:  Description:  Two co-workers have a grandchild who has scarlet fever and was around the child before he was diagnosed. Discussed Scarlet fever per the CDC resources. Denies cough, fever, sore throat, rash.   Onset/duration:  Exposed 1 week ago  Pain scale (0-10)   0/10  Last exam/Treatment:  10/16/2017  Allergies:   Allergies   Allergen Reactions     Augmentin GI Disturbance     Penicillins      GI upset and diarrhea     NURSING PLAN: Nursing advice to patient home care measures such as hand hygeine     RECOMMENDED DISPOSITION:  Home care advice - for scarlet fevers  Will comply with recommendation: Yes  If further questions/concerns or if symptoms do not improve, worsen or new symptoms develop, call your PCP or [facility] as soon as possible.    NOTES:  Disposition was determined by the first positive assessment question, therefore all previous assessment questions were negative    Guideline used:  CDC.gov Clinical References  Nursing Judgment    Shy Whittington RN, BSN

## 2017-12-19 ENCOUNTER — OFFICE VISIT (OUTPATIENT)
Dept: URGENT CARE | Facility: RETAIL CLINIC | Age: 62
End: 2017-12-19
Payer: COMMERCIAL

## 2017-12-19 VITALS
DIASTOLIC BLOOD PRESSURE: 88 MMHG | HEART RATE: 75 BPM | TEMPERATURE: 97.6 F | SYSTOLIC BLOOD PRESSURE: 147 MMHG | OXYGEN SATURATION: 96 %

## 2017-12-19 DIAGNOSIS — J01.90 ACUTE SINUSITIS WITH COEXISTING CONDITION REQUIRING PROPHYLACTIC TREATMENT: Primary | ICD-10-CM

## 2017-12-19 DIAGNOSIS — R05.9 COUGH: ICD-10-CM

## 2017-12-19 PROCEDURE — 99203 OFFICE O/P NEW LOW 30 MIN: CPT | Performed by: NURSE PRACTITIONER

## 2017-12-19 RX ORDER — CEFDINIR 300 MG/1
300 CAPSULE ORAL 2 TIMES DAILY
Qty: 20 CAPSULE | Refills: 0 | Status: SHIPPED | OUTPATIENT
Start: 2017-12-19 | End: 2017-12-29

## 2017-12-19 NOTE — PROGRESS NOTES
[facility] clinic note    SUBJECTIVE:    Keegan Buchanan is a 62 year old male who presents to [facility] with the following symptoms:  Facial pain for seven days or more  Purulent nasal discharge  Failure of over-the-counter nasal decongestants or other medications  Headache  History of sinusitis in the past  Mild to moderate facial swelling  Left eye & ear pressure feeling  History of issues not resolving  Cough mild, horse voice.  Felt unsteady or loss of balance this morning.    The patient denies a history of:  Fever >102.5  Orbital pain  Periorbital swelling or erythema  Severe facial swelling or erythema  Severe neck pain  Vision changes  (gets nose bleeds from Flonase)    PAST MEDICAL HISTORY:   Past Medical History:   Diagnosis Date     Hypercholesteremia      Pain in joint, shoulder region     right shoulder separation     Unspecified essential hypertension        PAST SURGICAL HISTORY:   Past Surgical History:   Procedure Laterality Date     COLONOSCOPY N/A 2015    Procedure: COMBINED COLONOSCOPY, SINGLE OR MULTIPLE BIOPSY/POLYPECTOMY BY BIOPSY;  Surgeon: Herman Rebollar MD;  Location:  GI     HC REMOVE TONSILS/ADENOIDS,<13 Y/O      T & A <12y.o.     REMOVAL OF SPERM DUCT(S)  88    Vasectomy       FAMILY HISTORY:   Family History   Problem Relation Age of Onset     DIABETES Mother      adult onset diabetes     CANCER Mother      kidney dx age 75     CANCER Father      prostate cancer/ at age 73     Hypertension Brother      CANCER Paternal Grandfather      prostat ca       SOCIAL HISTORY:   Social History   Substance Use Topics     Smoking status: Former Smoker     Packs/day: 1.00     Years: 15.00     Smokeless tobacco: Former User     Types: Chew     Quit date: 1987      Comment: quit 25 years ago.     Alcohol use Yes      Comment: 1 -2 monthly       Current Outpatient Prescriptions   Medication     cefdinir (OMNICEF) 300  MG capsule     omeprazole (PRILOSEC) 20 MG CR capsule     tolterodine (DETROL LA) 2 MG 24 hr capsule     losartan (COZAAR) 25 MG tablet     fluticasone (FLONASE) 50 MCG/ACT spray     tamsulosin (FLOMAX) 0.4 MG capsule     albuterol (PROAIR HFA/PROVENTIL HFA/VENTOLIN HFA) 108 (90 BASE) MCG/ACT Inhaler     nitroglycerin (NITROSTAT) 0.4 MG SL tablet     aspirin 81 MG tablet     atorvastatin (LIPITOR) 80 MG tablet     [DISCONTINUED] tolterodine (DETROL LA) 2 MG 24 hr capsule     No current facility-administered medications for this visit.        OBJECTIVE:  This patient appears today in in moderate distress.  Vitals:    12/19/17 1458   BP: 147/88   Pulse: 75   Temp: 97.6  F (36.4  C)   TempSrc: Oral   SpO2: 96%     HEENT:  Facial tenderness located over the maxillary sinus region       Tenderness is worse on the left.  Purulent nasal discharge present from both sides.  Tympanic membranes fluid and mild pink  Extraoccular movements are free and intact  Sclera, cornea and conjunctiva are clear  No proptosis  No significant periorbital edema or erythema  Throat is clear without significant erythema, tonsillar swelling or exudates  Post nasal drainage is present  NECK:  Soft and supple without significant tenderness or adenopathy  RESP:  Clear to auscultation without wheezing, rales or ronchi    ASSESSMENT:   Acute sinusitis with coexisting condition requiring prophylactic treatment  Cough      PLAN:  Current Outpatient Prescriptions   Medication     cefdinir (OMNICEF) 300 MG capsule     omeprazole (PRILOSEC) 20 MG CR capsule     tolterodine (DETROL LA) 2 MG 24 hr capsule     losartan (COZAAR) 25 MG tablet     fluticasone (FLONASE) 50 MCG/ACT spray     tamsulosin (FLOMAX) 0.4 MG capsule     albuterol (PROAIR HFA/PROVENTIL HFA/VENTOLIN HFA) 108 (90 BASE) MCG/ACT Inhaler     nitroglycerin (NITROSTAT) 0.4 MG SL tablet     aspirin 81 MG tablet     atorvastatin (LIPITOR) 80 MG tablet     [DISCONTINUED] tolterodine (DETROL LA) 2  MG 24 hr capsule     No current facility-administered medications for this visit.        Afrin nasal spray as needed for up to 3 days.  Apply warm facial packs for 5-10 minutes three times daily.  Drink plenty of fluids- 6 to 10 glasses of liquids each day.  Elevate head of the bed.  Increase the humidity level in the home.  Rest.  Saline drops or nasal sprays as needed.  Take warm, steamy showers to reduce congestion.  Tylenol or ibuprofen as needed for discomfort.  Contact primary care clinic for increasing pain, high fever, vision changes, worsening symptoms, or no relief from symptoms after 7-10 days.  May drink tea /c honey to sooth throat.    Symptomatic treatment or other home remedies as discussed & reviewed.   Use of a nasal flush discussed with Keegan Buchanan as a good treatment option.   OTC Mucinex (or generic) may help to loosen congestion as well.  Rest as needed.  Avoid items which you are or maybe allergic.  Add moisture to the air with a humidifier or a vaporizer &/or inhale steam from a basin of hot water or shower.  Follow up at:  [facility] [phone]    Judd Tubbs MSN, APRN, Family NP-C  [facility]

## 2017-12-19 NOTE — NURSING NOTE
"Chief Complaint   Patient presents with     Sinus Problem     x 2 days       Initial /88  Pulse 75  Temp 97.6  F (36.4  C) (Oral)  SpO2 96% Estimated body mass index is 28.04 kg/(m^2) as calculated from the following:    Height as of 10/25/17: 5' 10\" (1.778 m).    Weight as of 10/25/17: 195 lb 6.4 oz (88.6 kg).  Medication Reconciliation: complete   Margie Jones      "

## 2017-12-19 NOTE — MR AVS SNAPSHOT
"              After Visit Summary   2017    Keegan Buchanan    MRN: [id number]           Patient Information     Date Of Birth          1955        Visit Information        Provider Department      2017 2:50 PM Judd Tubbs APRN [facility]        Today's Diagnoses     Acute sinusitis with coexisting condition requiring prophylactic treatment    -  1    Cough           Follow-ups after your visit        Who to contact     You can reach your care team any time of the day by calling [phone].  Notification of test results:  If you have an abnormal lab result, we will notify you by phone as soon as possible.         Additional Information About Your Visit        MyChart Information     ProZymehart lets you send messages to your doctor, view your test results, renew your prescriptions, schedule appointments and more. To sign up, go to [email or web address] . Click on \"Log in\" on the left side of the screen, which will take you to the Welcome page. Then click on \"Sign up Now\" on the right side of the page.     You will be asked to enter the access code listed below, as well as some personal information. Please follow the directions to create your username and password.     Your access code is: [email or web address]  Expires: 2018  3:48 PM     Your access code will  in 90 days. If you need help or a new code, please call your [place] clinic or [phone].        Care EveryWhere ID     This is your [facility] EveryWhere ID. This could be used by other organizations to access your [place] medical records  [id number]        Your Vitals Were     Pulse Temperature Pulse Oximetry             75 97.6  F (36.4  C) (Oral) 96%          Blood Pressure from Last 3 Encounters:   17 147/88   10/25/17 122/86   10/16/17 134/80    Weight from Last 3 Encounters:   10/25/17 195 lb 6.4 oz (88.6 kg)   10/16/17 190 lb 6.4 oz (86.4 kg)   17 194 lb 6.4 oz (88.2 kg)            "   Today, you had the following     No orders found for display         Today's Medication Changes          These changes are accurate as of: 12/19/17  3:21 PM.  If you have any questions, ask your nurse or doctor.               Start taking these medicines.        Dose/Directions    cefdinir 300 MG capsule   Commonly known as:  OMNICEF   Used for:  Acute sinusitis with coexisting condition requiring prophylactic treatment        Dose:  300 mg   Take 1 capsule (300 mg) by mouth 2 times daily for 10 days   Quantity:  20 capsule   Refills:  0            Where to get your medicines      These medications were sent to [facility] - [address]   [address] [place] [address]     Phone:  [phone]     cefdinir 300 MG capsule                Primary Care Provider Office Phone # Fax #    Fabio Isidro Stovall [phone] [address] [place] [address]        Equal Access to Services     [facility]: Hadii sandeep castillo hadasho Soomaali, waaxda luqadaha, qaybta kaalmada adeegyada, norma key . So [facility] [phone].    ATENCIÓN: Si habla español, tiene a black disposición servicios gratuitos de asistencia lingüística. Llame al [phone].    We comply with applicable federal civil rights laws and [place] laws. We do not discriminate on the basis of race, color, national origin, age, disability, sex, sexual orientation, or gender identity.            Thank you!     Thank you for choosing [facility]  for your care. Our goal is always to provide you with excellent care. Hearing back from our patients is one way we can continue to improve our services. Please take a few minutes to complete the written survey that you may receive in the mail after your visit with us. Thank you!             Your Updated Medication List - Protect others around you: Learn how to safely use, store and throw away your medicines  at www.disposemymeds.org.          This list is accurate as of: 12/19/17  3:21 PM.  Always use your most recent med list.                   Brand Name Dispense Instructions for use Diagnosis    albuterol 108 (90 BASE) MCG/ACT Inhaler    PROAIR HFA/PROVENTIL HFA/VENTOLIN HFA    1 Inhaler    Inhale 2 puffs into the lungs every 6 hours as needed for shortness of breath / dyspnea or wheezing    Acute bronchitis with symptoms > 10 days       aspirin 81 MG tablet     90 tablet    Take 1 tablet by mouth daily.    ACS (acute coronary syndrome) (H)       atorvastatin 80 MG tablet    LIPITOR    90 tablet    Take 1 tablet (80 mg) by mouth daily    Hyperlipidemia LDL goal <100       cefdinir 300 MG capsule    OMNICEF    20 capsule    Take 1 capsule (300 mg) by mouth 2 times daily for 10 days    Acute sinusitis with coexisting condition requiring prophylactic treatment       fluticasone 50 MCG/ACT spray    FLONASE    16 g    Spray 1-2 sprays into both nostrils daily    Nasal congestion       losartan 25 MG tablet    COZAAR    45 tablet    Take 0.5 tablets (12.5 mg) by mouth daily    Hypertension goal BP (blood pressure) < 140/90       nitroGLYcerin 0.4 MG sublingual tablet    NITROSTAT    25 tablet    Place 1 tablet (0.4 mg) under the tongue every 5 minutes as needed for chest pain    ACS (acute coronary syndrome) (H)       omeprazole 20 MG CR capsule    priLOSEC    90 capsule    Take 1 capsule (20 mg) by mouth daily    Gastroesophageal reflux disease without esophagitis       tamsulosin 0.4 MG capsule    FLOMAX    90 capsule    Take 1 capsule (0.4 mg) by mouth daily    Benign prostatic hyperplasia with urinary obstruction       tolterodine 2 MG 24 hr capsule    DETROL LA    90 capsule    Take 1 capsule (2 mg) by mouth daily    Urinary frequency

## 2017-12-20 ENCOUNTER — TELEPHONE (OUTPATIENT)
Dept: FAMILY MEDICINE | Facility: OTHER | Age: 62
End: 2017-12-20

## 2017-12-20 NOTE — TELEPHONE ENCOUNTER
Pt calling. He was seen at [facility] yesterday for sinus sx. His BP was a little elevated at that time and they told him to keep an eye on it. Today it was 162/96. He is wondering if it could be elevated since he is sick? He does say his BP medication was changed not too long ago as well. He would like a call to discuss.  Thank you,  Nasrin [place]- Pt Rep.

## 2017-12-20 NOTE — TELEPHONE ENCOUNTER
Spoke with patient.  States that he has been having a little higher BP since having this sinus infection.  Has been using oct Vit C and zinc and inhaler.  On losartan 12.5 mg daily.  No SOB, no chest pains.  Advised to schedule a BP check with float and if not improving will need to see provider. Future appointment made.    Plan to increase fluids and rest when able.  Return call to clinic with any questions or concerns.      If BP increases further or symptoms develop to call for plan.    RECOMMENDED DISPOSITION:  Home care advice -   Will comply with recommendation: yes   If further questions/concerns or if Sx do not improve, worsen or new Sx develop, call your PCP or [facility] as soon as possible.    NOTES:  Disposition was determined by the first positive assessment question, therefore all previous assessment questions were negative.     Guideline used:hypertension, sinus problem  Telephone Triage Protocols for Nurses, Fifth Edition, Purnima Toscano RN, BSN

## 2017-12-22 ENCOUNTER — ALLIED HEALTH/NURSE VISIT (OUTPATIENT)
Dept: FAMILY MEDICINE | Facility: OTHER | Age: 62
End: 2017-12-22
Payer: COMMERCIAL

## 2017-12-22 VITALS — DIASTOLIC BLOOD PRESSURE: 89 MMHG | HEART RATE: 93 BPM | SYSTOLIC BLOOD PRESSURE: 149 MMHG

## 2017-12-22 DIAGNOSIS — I10 HTN (HYPERTENSION): Primary | ICD-10-CM

## 2017-12-22 PROCEDURE — 99207 ZZC NO CHARGE NURSE ONLY: CPT

## 2017-12-22 NOTE — MR AVS SNAPSHOT
"              After Visit Summary   2017    Keegan Buchanan    MRN: [id number]           Patient Information     Date Of Birth          1955        Visit Information        Provider Department      2017 4:00 PM CURTIS YODER NURSE [facility]        Today's Diagnoses     HTN (hypertension)    -  1       Follow-ups after your visit        Who to contact     If you have questions or need follow up information about today's clinic visit or your schedule please contact [facility] directly at [phone].  Normal or non-critical lab and imaging results will be communicated to you by Gliphohart, letter or phone within 4 business days after the clinic has received the results. If you do not hear from us within 7 days, please contact the clinic through Mobile Cardt or phone. If you have a critical or abnormal lab result, we will notify you by phone as soon as possible.  Submit refill requests through Click Qu[employer] Save or call your pharmacy and they will forward the refill request to us. Please allow 3 business days for your refill to be completed.          Additional Information About Your Visit        MyChart Information     Click Qu[employer] Save lets you send messages to your doctor, view your test results, renew your prescriptions, schedule appointments and more. To sign up, go to [email or web address] . Click on \"Log in\" on the left side of the screen, which will take you to the Welcome page. Then click on \"Sign up Now\" on the right side of the page.     You will be asked to enter the access code listed below, as well as some personal information. Please follow the directions to create your username and password.     Your access code is: [email or web address]  Expires: 2018  3:48 PM     Your access code will  in 90 days. If you need help or a new code, please call your [facility] or [phone].        Care EveryWhere ID     This is your Care EveryWhere ID. This could be used by other " organizations to access your [place] medical records  [id number]        Your Vitals Were     Pulse                   93            Blood Pressure from Last 3 Encounters:   12/27/17 (P) 144/89   12/22/17 149/89   12/19/17 147/88    Weight from Last 3 Encounters:   10/25/17 195 lb 6.4 oz (88.6 kg)   10/16/17 190 lb 6.4 oz (86.4 kg)   03/16/17 194 lb 6.4 oz (88.2 kg)              Today, you had the following     No orders found for display       Primary Care Provider Office Phone # Fax #    Fabio Isidro Stovall [phone] [address] [place] [address]        Equal Access to Services     [facility] : Hadii sandeep castillo hadasho Soomaali, waaxda luqadaha, qaybta kaalmada adeegyada, norma key . So [facility] [phone].    ATENCIÓN: Si habla español, tiene a black disposición servicios gratuitos de asistencia lingüística. [facility] [phone].    We comply with applicable federal civil rights laws and [place] laws. We do not discriminate on the basis of race, color, national origin, age, disability, sex, sexual orientation, or gender identity.            Thank you!     Thank you for choosing [facility]  for your care. Our goal is always to provide you with excellent care. Hearing back from our patients is one way we can continue to improve our services. Please take a few minutes to complete the written survey that you may receive in the mail after your visit with us. Thank you!             Your Updated Medication List - Protect others around you: Learn how to safely use, store and throw away your medicines at www.disposemymeds.org.          This list is accurate as of: 12/22/17 11:59 PM.  Always use your most recent med list.                   Brand Name Dispense Instructions for use Diagnosis    albuterol 108 (90 BASE) MCG/ACT Inhaler    PROAIR HFA/PROVENTIL HFA/VENTOLIN HFA    1 Inhaler    Inhale 2 puffs into the lungs every 6 hours as needed for  shortness of breath / dyspnea or wheezing    Acute bronchitis with symptoms > 10 days       aspirin 81 MG tablet     90 tablet    Take 1 tablet by mouth daily.    ACS (acute coronary syndrome) (H)       atorvastatin 80 MG tablet    LIPITOR    90 tablet    Take 1 tablet (80 mg) by mouth daily    Hyperlipidemia LDL goal <100       cefdinir 300 MG capsule    OMNICEF    20 capsule    Take 1 capsule (300 mg) by mouth 2 times daily for 10 days    Acute sinusitis with coexisting condition requiring prophylactic treatment       fluticasone 50 MCG/ACT spray    FLONASE    16 g    Spray 1-2 sprays into both nostrils daily    Nasal congestion       nitroGLYcerin 0.4 MG sublingual tablet    NITROSTAT    25 tablet    Place 1 tablet (0.4 mg) under the tongue every 5 minutes as needed for chest pain    ACS (acute coronary syndrome) (H)       omeprazole 20 MG CR capsule    priLOSEC    90 capsule    Take 1 capsule (20 mg) by mouth daily    Gastroesophageal reflux disease without esophagitis       tamsulosin 0.4 MG capsule    FLOMAX    90 capsule    Take 1 capsule (0.4 mg) by mouth daily    Benign prostatic hyperplasia with urinary obstruction       tolterodine 2 MG 24 hr capsule    DETROL LA    90 capsule    Take 1 capsule (2 mg) by mouth daily    Urinary frequency

## 2017-12-22 NOTE — PROGRESS NOTES
Spoke with Keegan.  States that he has new hearing aids and they are driving him crazy.  He feels the all day long.  Advised this could be part of the increased BP.  Will take a trial period from not wearing them and recheck BP.  If still elevated need to discuss with provider about increasing BP medications.    Dorian Toscano, RN, BSN      .

## 2017-12-22 NOTE — NURSING NOTE
Prior to injection verified patient identity using patient's name and date of birth.  Keegan Buchanan is a 62 year old male who comes in today for a Blood Pressure check because of high blood pressure this whole week.    *Document pulse and BP  *Use new set of vitals button for multiple readings.  *Use extended vitals for orthostatic    Vitals as recorded, a large cuff was used.    Patient is taking medication as prescribed  Patient is tolerating medications well.  Patient is monitoring Blood Pressure at stores.  Average readings if yes: have been high, and has stopped at pharmacy as well.    Current complaints: rubber leg feeling, feels like he'd rather be sleeping he's really tired, thick headed like a headache could come on and has been happening all week.    Disposition: results routed to MD/HERNESTO  Cutting losartan in half and taking twice a day, had physical with [facility] and cardiologist, was seen Tuesday at [facility], had some inflammation in sinus and put on antibiotic.  Was given inhaler QVAR 40 mcg when seeing [facility] last time so he started using that and his BP was super high when he was seen at [facility].  BP was high when he checked at [employer]'s and also [employer].  Barnhart rubbery legged on Monday morning and kind of feel it again today on and off.  Did stop taking the inhaler yesterday morning.

## 2017-12-27 ENCOUNTER — ALLIED HEALTH/NURSE VISIT (OUTPATIENT)
Dept: FAMILY MEDICINE | Facility: OTHER | Age: 62
End: 2017-12-27
Payer: COMMERCIAL

## 2017-12-27 VITALS — HEART RATE: 94 BPM | SYSTOLIC BLOOD PRESSURE: 148 MMHG | DIASTOLIC BLOOD PRESSURE: 89 MMHG

## 2017-12-27 DIAGNOSIS — I10 HYPERTENSION GOAL BP (BLOOD PRESSURE) < 140/90: ICD-10-CM

## 2017-12-27 PROCEDURE — 99207 ZZC NO CHARGE NURSE ONLY: CPT

## 2017-12-27 RX ORDER — LOSARTAN POTASSIUM 25 MG/1
25 TABLET ORAL DAILY
Qty: 90 TABLET | Refills: 2 | Status: SHIPPED | OUTPATIENT
Start: 2017-12-27 | End: 2018-01-10

## 2017-12-27 NOTE — MR AVS SNAPSHOT
"              After Visit Summary   2017    Keegan Buchanan    MRN: [id number]           Patient Information     Date Of Birth          1955        Visit Information        Provider Department      2017 9:30 AM CURTIS YODER NURSE [facility]        Today's Diagnoses     Hypertension goal BP (blood pressure) < 140/90           Follow-ups after your visit        Who to contact     If you have questions or need follow up information about today's clinic visit or your schedule please contact [facility] directly at [phone].  Normal or non-critical lab and imaging results will be communicated to you by Naldohart, letter or phone within 4 business days after the clinic has received the results. If you do not hear from us within 7 days, please contact the clinic through [employer] or phone. If you have a critical or abnormal lab result, we will notify you by phone as soon as possible.  Submit refill requests through [employer] or call your pharmacy and they will forward the refill request to us. Please allow 3 business days for your refill to be completed.          Additional Information About Your Visit        MyChart Information     [employer] lets you send messages to your doctor, view your test results, renew your prescriptions, schedule appointments and more. To sign up, go to [email or web address] . Click on \"Log in\" on the left side of the screen, which will take you to the Welcome page. Then click on \"Sign up Now\" on the right side of the page.     You will be asked to enter the access code listed below, as well as some personal information. Please follow the directions to create your username and password.     Your access code is: [email or web address]  Expires: 2018  3:48 PM     Your access code will  in 90 days. If you need help or a new code, please call your [facility] or [phone].        Care EveryWhere ID     This is your Care EveryWhere ID. This could " be used by other organizations to access your [facility] medical records  [id number]        Your Vitals Were     Pulse                   94            Blood Pressure from Last 3 Encounters:   12/27/17 (P) 144/89   12/22/17 149/89   12/19/17 147/88    Weight from Last 3 Encounters:   10/25/17 195 lb 6.4 oz (88.6 kg)   10/16/17 190 lb 6.4 oz (86.4 kg)   03/16/17 194 lb 6.4 oz (88.2 kg)              Today, you had the following     No orders found for display         Today's Medication Changes          These changes are accurate as of: 12/27/17 10:28 AM.  If you have any questions, ask your nurse or doctor.               These medicines have changed or have updated prescriptions.        Dose/Directions    losartan 25 MG tablet   Commonly known as:  COZAAR   This may have changed:  how much to take   Used for:  Hypertension goal BP (blood pressure) < 140/90        Dose:  25 mg   Take 1 tablet (25 mg) by mouth daily   Quantity:  90 tablet   Refills:  2            Where to get your medicines      These medications were sent to [facility] - [address]   [address] [place] [address]     Phone:  [phone]     losartan 25 MG tablet                Primary Care Provider Office Phone # Fax #    Fabio Isidro Stovall [phone] [address] [place] [address]        Equal Access to Services     [facility] AH: Hadii sandeep castillo haddavido Soreese, waaxda luqadaha, qaybta kaalmada beatris, norma key . So [facility] [phone].    ATENCIÓN: Si habla español, tiene a black disposición servicios gratuitos de asistencia lingüística. Llame al [phone].    We comply with applicable federal civil rights laws and [place] laws. We do not discriminate on the basis of race, color, national origin, age, disability, sex, sexual orientation, or gender identity.            Thank you!     Thank you for choosing [facility]   for your care. Our goal is always to provide you with excellent care. Hearing back from our patients is one way we can continue to improve our services. Please take a few minutes to complete the written survey that you may receive in the mail after your visit with us. Thank you!             Your Updated Medication List - Protect others around you: Learn how to safely use, store and throw away your medicines at www.disposemymeds.org.          This list is accurate as of: 12/27/17 10:28 AM.  Always use your most recent med list.                   Brand Name Dispense Instructions for use Diagnosis    albuterol 108 (90 BASE) MCG/ACT Inhaler    PROAIR HFA/PROVENTIL HFA/VENTOLIN HFA    1 Inhaler    Inhale 2 puffs into the lungs every 6 hours as needed for shortness of breath / dyspnea or wheezing    Acute bronchitis with symptoms > 10 days       aspirin 81 MG tablet     90 tablet    Take 1 tablet by mouth daily.    ACS (acute coronary syndrome) (H)       atorvastatin 80 MG tablet    LIPITOR    90 tablet    Take 1 tablet (80 mg) by mouth daily    Hyperlipidemia LDL goal <100       cefdinir 300 MG capsule    OMNICEF    20 capsule    Take 1 capsule (300 mg) by mouth 2 times daily for 10 days    Acute sinusitis with coexisting condition requiring prophylactic treatment       fluticasone 50 MCG/ACT spray    FLONASE    16 g    Spray 1-2 sprays into both nostrils daily    Nasal congestion       losartan 25 MG tablet    COZAAR    90 tablet    Take 1 tablet (25 mg) by mouth daily    Hypertension goal BP (blood pressure) < 140/90       nitroGLYcerin 0.4 MG sublingual tablet    NITROSTAT    25 tablet    Place 1 tablet (0.4 mg) under the tongue every 5 minutes as needed for chest pain    ACS (acute coronary syndrome) (H)       omeprazole 20 MG CR capsule    priLOSEC    90 capsule    Take 1 capsule (20 mg) by mouth daily    Gastroesophageal reflux disease without esophagitis       tamsulosin 0.4 MG capsule    FLOMAX    90 capsule     Take 1 capsule (0.4 mg) by mouth daily    Benign prostatic hyperplasia with urinary obstruction       tolterodine 2 MG 24 hr capsule    DETROL LA    90 capsule    Take 1 capsule (2 mg) by mouth daily    Urinary frequency

## 2017-12-27 NOTE — PROGRESS NOTES
BP is still slightly elevated.  Huddled with DJ will have him take a full tablet of cozaar.  Update sent to pharmacy.  Recheck BP with float in 2 weeks.    Dorian Toscano, RN, BSN

## 2017-12-27 NOTE — NURSING NOTE
Prior to injection verified patient identity using patient's name and date of birth.  Keegan Buchanan is a 62 year old male who comes in today for a Blood Pressure check because of recheck bp last time it was high, RN and pt talked about it possibly being because of his hearing aids.    *Document pulse and BP  *Use new set of vitals button for multiple readings.  *Use extended vitals for orthostatic    Vitals as recorded, a large cuff was used.    Patient is taking medication as prescribed  Patient is tolerating medications well.  Patient is not monitoring Blood Pressure at home.      Current complaints: waking up with back of head headache more often, going on most of his life though he says.    Disposition: results routed to MD/HERNESTO

## 2017-12-29 ENCOUNTER — TELEPHONE (OUTPATIENT)
Dept: FAMILY MEDICINE | Facility: OTHER | Age: 62
End: 2017-12-29

## 2017-12-29 NOTE — TELEPHONE ENCOUNTER
Huddled with DJ. Unlikely the cause from increase in current BP medication.      If he wants to go back to original dose he can to see.      Spoke with patient.  Will continue current dose for a few days and then cut back if not improving.  No new soaps, lotions, food etc.  Has not been outside for long periods of time.    Dorian Toscano, RN, BSN

## 2017-12-29 NOTE — TELEPHONE ENCOUNTER
Reason for call:  Patient reporting a symptom    Symptom or request: face is red and feels windburnt  No other sx    Duration (how long have symptoms been present): today    Have you been treated for this before? No    Additional comments: call to advise today please. Has been in for bp checks recently and wondering if it has to do with that?     Phone Number patient can be reached at:  Home number on file [phone] (home)    Best Time:  any    Can we leave a detailed message on this number:  YES    Call taken on 12/29/2017 at 2:51 PM by Justyna Hartman

## 2018-01-09 ENCOUNTER — TELEPHONE (OUTPATIENT)
Dept: FAMILY MEDICINE | Facility: OTHER | Age: 63
End: 2018-01-09

## 2018-01-09 NOTE — TELEPHONE ENCOUNTER
Summary:    Patient is due/failing the following:   BP CHECK    Action needed:   Patient needs nurse only appointment.    Type of outreach:    Phone, spoke to patient.  patient scheduled    Questions for provider review:    None                                                                                                                                    Destiny Wilson       Chart routed to Care Team .      Panel Management Review      Patient has the following on his problem list:       IVD   ASA: Passed    Last LDL:    Lab Results   Component Value Date    CHOL 141 10/12/2017     Lab Results   Component Value Date    HDL 40 10/12/2017     Lab Results   Component Value Date    LDL 58 10/12/2017     Lab Results   Component Value Date    TRIG 213 10/12/2017        Lab Results   Component Value Date    CHOLHDLRATIO 4.6 11/16/2015        Is the patient on a Statin? YES   Is the patient on Aspirin? YES                  Medications     HMG CoA Reductase Inhibitors    atorvastatin (LIPITOR) 80 MG tablet    Salicylates    aspirin 81 MG tablet          Last three blood pressure readings:  BP Readings from Last 3 Encounters:   12/27/17 (P) 144/89   12/22/17 149/89   12/19/17 147/88        Tobacco History:     History   Smoking Status     Former Smoker     Packs/day: 1.00     Years: 15.00   Smokeless Tobacco     Former User     Types: Chew     Quit date: 1/20/1987     Comment: quit 25 years ago.           Composite cancer screening  Chart review shows that this patient is due/due soon for the following None

## 2018-01-10 ENCOUNTER — TELEPHONE (OUTPATIENT)
Dept: FAMILY MEDICINE | Facility: OTHER | Age: 63
End: 2018-01-10

## 2018-01-10 ENCOUNTER — ALLIED HEALTH/NURSE VISIT (OUTPATIENT)
Dept: FAMILY MEDICINE | Facility: OTHER | Age: 63
End: 2018-01-10
Payer: COMMERCIAL

## 2018-01-10 VITALS — DIASTOLIC BLOOD PRESSURE: 80 MMHG | HEART RATE: 70 BPM | SYSTOLIC BLOOD PRESSURE: 140 MMHG

## 2018-01-10 DIAGNOSIS — J20.9 ACUTE BRONCHITIS WITH SYMPTOMS > 10 DAYS: ICD-10-CM

## 2018-01-10 DIAGNOSIS — I10 HYPERTENSION GOAL BP (BLOOD PRESSURE) < 140/90: Primary | ICD-10-CM

## 2018-01-10 DIAGNOSIS — I10 HYPERTENSION GOAL BP (BLOOD PRESSURE) < 140/90: ICD-10-CM

## 2018-01-10 PROCEDURE — 99207 ZZC NO CHARGE NURSE ONLY: CPT

## 2018-01-10 RX ORDER — LOSARTAN POTASSIUM 50 MG/1
50 TABLET ORAL DAILY
Qty: 30 TABLET | Refills: 1 | Status: SHIPPED | OUTPATIENT
Start: 2018-01-10 | End: 2018-04-19

## 2018-01-10 NOTE — TELEPHONE ENCOUNTER
Would recommend pt double his dose of losartan to 50 mg daily.  Repeat BP in a few weeks.  If not improving, then would recommend office visit to go over next options.

## 2018-01-10 NOTE — TELEPHONE ENCOUNTER
Patient notified. He has additional questions regarding why his BP is high. Per Dr. Stovall recommendation, patient to schedule a f/u visit with Dr. Ace. Patient notified and appt scheduled.  Mallory Weir CMA

## 2018-01-10 NOTE — MR AVS SNAPSHOT
"              After Visit Summary   1/10/2018    Keegan Buchanan    MRN: [id number]           Patient Information     Date Of Birth          1955        Visit Information        Provider Department      1/10/2018 9:30 AM NL FLOAT NURSE [facility]        Today's Diagnoses     Hypertension goal BP (blood pressure) < 140/90    -  1       Follow-ups after your visit        Your next 10 appointments already scheduled     Michael 10, 2018  9:30 AM CST   Nurse Only with NL FLOAT NURSE [facility] ([facility])    [address], [place] [address] [phone]              Who to contact     If you have questions or need follow up information about today's clinic visit or your schedule please contact [facility] directly at [phone].  Normal or non-critical lab and imaging results will be communicated to you by [employer], letter or phone within 4 business days after the clinic has received the results. If you do not hear from us within 7 days, please contact the clinic through [employer] or phone. If you have a critical or abnormal lab result, we will notify you by phone as soon as possible.  Submit refill requests through YouDo or call your pharmacy and they will forward the refill request to us. Please allow 3 business days for your refill to be completed.          Additional Information About Your Visit        MyChart Information     You[employer] lets you send messages to your doctor, view your test results, renew your prescriptions, schedule appointments and more. To sign up, go to [email or web address] . Click on \"Log in\" on the left side of the screen, which will take you to the Welcome page. Then click on \"Sign up Now\" on the right side of the page.     You will be asked to enter the access code listed below, as well as some personal information. Please follow the directions to create your username and password.     Your access code " is: [email or web address]  Expires: 2018  3:48 PM     Your access code will  in 90 days. If you need help or a new code, please call your [facility] clinic or [phone].        Care EveryWhere ID     This is your Care EveryWhere ID. This could be used by other organizations to access your [facility] medical records  [id number]        Your Vitals Were     Pulse                   70            Blood Pressure from Last 3 Encounters:   01/10/18 140/80   17 (P) 144/89   17 149/89    Weight from Last 3 Encounters:   10/25/17 195 lb 6.4 oz (88.6 kg)   10/16/17 190 lb 6.4 oz (86.4 kg)   17 194 lb 6.4 oz (88.2 kg)              Today, you had the following     No orders found for display       Primary Care Provider Office Phone # Fax #    Fabio Isidro Stovall [phone] [address] [place] [address]        Equal Access to Services     [facility]: Hadii aad ku hadasho Soomaali, waaxda luqadaha, qaybta kaalmada adeegyada, waxbhargav martinez haynayan key . So [facility] [phone].    ATENCIÓN: Si habla español, tiene a black disposición servicios gratuitos de asistencia lingüística. Llame al [phone].    We comply with applicable federal civil rights laws and [place] laws. We do not discriminate on the basis of race, color, national origin, age, disability, sex, sexual orientation, or gender identity.            Thank you!     Thank you for choosing [facility]  for your care. Our goal is always to provide you with excellent care. Hearing back from our patients is one way we can continue to improve our services. Please take a few minutes to complete the written survey that you may receive in the mail after your visit with us. Thank you!             Your Updated Medication List - Protect others around you: Learn how to safely use, store and throw away your medicines at www.disposemymeds.org.          This list is accurate as of: 1/10/18  9:13 AM.   Always use your most recent med list.                   Brand Name Dispense Instructions for use Diagnosis    albuterol 108 (90 BASE) MCG/ACT Inhaler    PROAIR HFA/PROVENTIL HFA/VENTOLIN HFA    1 Inhaler    Inhale 2 puffs into the lungs every 6 hours as needed for shortness of breath / dyspnea or wheezing    Acute bronchitis with symptoms > 10 days       aspirin 81 MG tablet     90 tablet    Take 1 tablet by mouth daily.    ACS (acute coronary syndrome) (H)       atorvastatin 80 MG tablet    LIPITOR    90 tablet    Take 1 tablet (80 mg) by mouth daily    Hyperlipidemia LDL goal <100       fluticasone 50 MCG/ACT spray    FLONASE    16 g    Spray 1-2 sprays into both nostrils daily    Nasal congestion       losartan 25 MG tablet    COZAAR    90 tablet    Take 1 tablet (25 mg) by mouth daily    Hypertension goal BP (blood pressure) < 140/90       nitroGLYcerin 0.4 MG sublingual tablet    NITROSTAT    25 tablet    Place 1 tablet (0.4 mg) under the tongue every 5 minutes as needed for chest pain    ACS (acute coronary syndrome) (H)       omeprazole 20 MG CR capsule    priLOSEC    90 capsule    Take 1 capsule (20 mg) by mouth daily    Gastroesophageal reflux disease without esophagitis       tamsulosin 0.4 MG capsule    FLOMAX    90 capsule    Take 1 capsule (0.4 mg) by mouth daily    Benign prostatic hyperplasia with urinary obstruction       tolterodine 2 MG 24 hr capsule    DETROL LA    90 capsule    Take 1 capsule (2 mg) by mouth daily    Urinary frequency

## 2018-01-10 NOTE — NURSING NOTE
Patient is here for a blood pressure check. He would like a call back with what his next steps should be in monitoring his blood pressure.  Mallory Weir, CMA

## 2018-01-10 NOTE — TELEPHONE ENCOUNTER
Patient notified. BP nurse visit scheduled. He has concerns about taking this high of a dose of Losartan and is wondering how this compares to taking Lisinopril 10 mg daily. Please advise.  Mallory Weir, CMA

## 2018-01-10 NOTE — TELEPHONE ENCOUNTER
This is roughly equivalent to taking 20 mg of lisinopril.      We just need to get his BP controlled.  I don't have a specific medication that we must use to do so.

## 2018-01-11 RX ORDER — ALBUTEROL SULFATE 90 UG/1
2 AEROSOL, METERED RESPIRATORY (INHALATION) EVERY 6 HOURS PRN
Qty: 1 INHALER | Refills: 1 | Status: SHIPPED | OUTPATIENT
Start: 2018-01-11 | End: 2019-04-05

## 2018-01-12 ENCOUNTER — ALLIED HEALTH/NURSE VISIT (OUTPATIENT)
Dept: FAMILY MEDICINE | Facility: OTHER | Age: 63
End: 2018-01-12
Payer: COMMERCIAL

## 2018-01-12 VITALS — SYSTOLIC BLOOD PRESSURE: 142 MMHG | DIASTOLIC BLOOD PRESSURE: 82 MMHG

## 2018-01-12 DIAGNOSIS — I10 HYPERTENSION GOAL BP (BLOOD PRESSURE) < 140/90: Primary | ICD-10-CM

## 2018-01-12 PROCEDURE — 99207 ZZC NO CHARGE NURSE ONLY: CPT | Performed by: FAMILY MEDICINE

## 2018-01-12 NOTE — TELEPHONE ENCOUNTER
"Requested Prescriptions   Pending Prescriptions Disp Refills     albuterol (PROAIR HFA/PROVENTIL HFA/VENTOLIN HFA) 108 (90 BASE) MCG/ACT Inhaler 1 Inhaler 1     Sig: Inhale 2 puffs into the lungs every 6 hours as needed for shortness of breath / dyspnea or wheezing    Asthma Maintenance Inhalers - Anticholinergics Passed    1/11/2018  2:44 PM       Passed - Patient is age 12 years or older       Passed - Recent or future visit with authorizing provider's specialty    Patient had office visit in the last year or has a visit in the next 30 days with authorizing provider.  See \"Patient Info\" tab in inbasket, or \"Choose Columns\" in Meds & Orders section of the refill encounter.               No flowsheet data found.   albuterol (PROAIR HFA/PROVENTIL HFA/VENTOLIN HFA) 108 (90 BASE) MCG/ACT Inhaler  Routing refill request to provider for review/approval because:  Drug not on the [facility] refill protocol for acute diagnosis, last prescribed 01/27/2017    Shy Whittington RN, BSN           "

## 2018-01-12 NOTE — PROGRESS NOTES
"Keegan Buchanan is enrolled/participating in the retail pharmacy Blood Pressure Goals Achievement Program (BPGAP).  Keegan Buchanan was evaluated at [facility] on January 12, 2018 at which time his blood pressure was:    BP Readings from Last 3 Encounters:   01/12/18 142/82   01/10/18 140/80   12/27/17 (P) 144/89     Reviewed lifestyle modifications for blood pressure control and reduction: including making healthy food choices, managing weight, getting regular exercise, smoking cessation, reducing alcohol consumption, monitoring blood pressure regularly.     Keegan Buchanan is experiencing symtoms: (\"fogginess\")    Follow-Up: BP is not at goal of < 140/90mmHg (patient 18+ years of age with or without diabetes), Recommended follow-up with PCP.  Routing to PCP for further review.    Recommendation to Provider: return for bp check early next week     Keegan Buchanan was evaluated for enrollment into the PGEN study today.    Patient eligible for enrollment:  Unknown  Patient interested in enrollment:  Yes    Completed by: Giovana Vargas RPh  [facility]  [phone]    "

## 2018-01-12 NOTE — PROGRESS NOTES
SUBJECTIVE:                                                    Keegan Buchanan is a 62 year old male who presents to clinic today for the following health issues:      HPI    Hyperlipidemia Follow-Up      Rate your low fat/cholesterol diet?: fair    Taking statin?  Yes, no muscle aches from statin    Other lipid medications/supplements?:  none    Hypertension Follow-up      Outpatient blood pressures are being checked at the clinic or pharmacy, machines at [email or web address].  Results are 137/80 that was the last one, they've been changing a lot.  He's had high bp's. Has had a medication change twice now.    Low Salt Diet: no added salt    He had a lot of difficulty with his BP for a while.  Was cutting his losartan in half with a pill cutter, wonders if his blood pressure was going up and down.  Still was having trouble when he increased his dose to 50 mg (and taking the half tablets).  BP has improved since taking the 50 mg dose without cut tablets.      Vascular Disease Follow-up:  Coronary Artery Disease (CAD)      Chest pain or pressure, left side neck or arm pain: No    Shortness of breath/increased sweats/nausea with exertion: No    Pain in calves walking 1-2 blocks: No    Worsened or new symptoms since last visit: No    Nitroglycerin use: no but carries it.    Daily aspirin use: Yes    COPD Follow-Up    Symptoms are currently: ever since bronchitis last winter ever since then he needs to clear throat all the time and voice is different. If he exhales hard to the bottom of his breath he'll wheeze.   Does work out every morning-walking or elliptical.     Current fatigue or dyspnea with ambulation: none    Shortness of breath: stable    Increased or change in Cough/Sputum: Yes-  Little increase in his cough and hearing wheezing a lot more lately.    Fever(s): No    Baseline ambulation without stopping to rest:  He goes 30 minutes on elliptical on high resistance and could go longer.. Able to walk up indefinite  "flights of stairs without stopping to rest.    Any ER/UC or hospital admissions since your last visit? No     History   Smoking Status     Former Smoker     Packs/day: 1.00     Years: 15.00   Smokeless Tobacco     Former User     Types: Chew     Quit date: 1/20/1987     Comment: quit 25 years ago.     No results found for: FEV1, [id number]    Notes that his voice has sounded a little \"froggy\" since on steroids last year for COPD exacerbation.  Has noted a slight wheeze the last few nights.  Has been using the albuterol regularly.  Hasn't been using the QVAR regularly, but has the last few nights.  He doesn't really like how the QVAR tastes after he uses it.            Problem list and histories reviewed & adjusted, as indicated.  Additional history: as documented      Current Outpatient Prescriptions   Medication Sig Dispense Refill     albuterol (PROAIR HFA/PROVENTIL HFA/VENTOLIN HFA) 108 (90 BASE) MCG/ACT Inhaler Inhale 2 puffs into the lungs every 6 hours as needed for shortness of breath / dyspnea or wheezing 1 Inhaler 1     losartan (COZAAR) 50 MG tablet Take 1 tablet (50 mg) by mouth daily 30 tablet 1     atorvastatin (LIPITOR) 80 MG tablet Take 1 tablet (80 mg) by mouth daily 90 tablet 3     omeprazole (PRILOSEC) 20 MG CR capsule Take 1 capsule (20 mg) by mouth daily 90 capsule 3     tolterodine (DETROL LA) 2 MG 24 hr capsule Take 1 capsule (2 mg) by mouth daily 90 capsule 3     fluticasone (FLONASE) 50 MCG/ACT spray Spray 1-2 sprays into both nostrils daily 16 g 11     tamsulosin (FLOMAX) 0.4 MG capsule Take 1 capsule (0.4 mg) by mouth daily 90 capsule 3     nitroglycerin (NITROSTAT) 0.4 MG SL tablet Place 1 tablet (0.4 mg) under the tongue every 5 minutes as needed for chest pain 25 tablet 0     aspirin 81 MG tablet Take 1 tablet by mouth daily. 90 tablet 3     [DISCONTINUED] tolterodine (DETROL LA) 2 MG 24 hr capsule Take 2 mg by mouth 2 times daily.       Recent Labs   Lab Test  10/12/17   0830  10/10/16   " "0828  11/16/15   0803   12/27/12   0838   A1C   --    --    --    --   5.6   LDL  58  77  57   < >  83   HDL  40  41  33*   < >  37*   TRIG  213*  235*  316*   < >  196*   ALT  39  38  46   < >   --    CR  0.82  0.85  0.72   < >   --    GFRESTIMATED  >90  >90  Non African American GFR Calc    >90  Non  GFR Calc     < >   --    GFRESTBLACK  >90  >90  African American GFR Calc    >90   GFR Calc     < >   --    POTASSIUM  4.4  3.9  4.0   < >   --     < > = values in this interval not displayed.      BP Readings from Last 3 Encounters:   01/18/18 130/86   01/16/18 136/86   01/12/18 142/82    Wt Readings from Last 3 Encounters:   01/18/18 190 lb 12.8 oz (86.5 kg)   10/25/17 195 lb 6.4 oz (88.6 kg)   10/16/17 190 lb 6.4 oz (86.4 kg)                  ROS:  Constitutional, HEENT, cardiovascular, pulmonary, gi and gu systems are negative, except as otherwise noted.      OBJECTIVE:   /86 (BP Location: Left arm, Patient Position: Chair, Cuff Size: Adult Large)  Pulse 77  Temp 97.6  F (36.4  C) (Temporal)  Resp 20  Ht 5' 10\" (1.778 m)  Wt 190 lb 12.8 oz (86.5 kg)  SpO2 96%  BMI 27.38 kg/m2  Body mass index is 27.38 kg/(m^2).  GENERAL: healthy, alert and no distress  NECK: no adenopathy, no asymmetry, masses, or scars and thyroid normal to palpation  RESP: lungs clear to auscultation - no rales, rhonchi or wheezes  CV: regular rate and rhythm, normal S1 S2, no S3 or S4, no murmur, click or rub, no peripheral edema and peripheral pulses strong  ABDOMEN: soft, nontender, no hepatosplenomegaly, no masses and bowel sounds normal  Does have left inguinal hernia, higher up, not in lower inguinal canal near scrotum.  MS: no gross musculoskeletal defects noted, no edema    Diagnostic Test Results:  Results for orders placed or performed in visit on 10/12/17   Lipid panel reflex to direct LDL   Result Value Ref Range    Cholesterol 141 <200 mg/dL    Triglycerides 213 (H) <150 mg/dL    HDL " "Cholesterol 40 >39 mg/dL    LDL Cholesterol Calculated 58 <100 mg/dL    Non HDL Cholesterol 101 <130 mg/dL   Comprehensive metabolic panel   Result Value Ref Range    Sodium 145 (H) 133 - 144 mmol/L    Potassium 4.4 3.4 - 5.3 mmol/L    Chloride 106 94 - 109 mmol/L    Carbon Dioxide 24 20 - 32 mmol/L    Anion Gap 15 (H) 3 - 14 mmol/L    Glucose 99 70 - 99 mg/dL    Urea Nitrogen 14 7 - 30 mg/dL    Creatinine 0.82 0.66 - 1.25 mg/dL    GFR Estimate >90 >60 mL/min/1.7m2    GFR Estimate If Black >90 >60 mL/min/1.7m2    Calcium 9.0 8.5 - 10.1 mg/dL    Bilirubin Total 1.0 0.2 - 1.3 mg/dL    Albumin 4.0 3.4 - 5.0 g/dL    Protein Total 7.3 6.8 - 8.8 g/dL    Alkaline Phosphatase 90 40 - 150 U/L    ALT 39 0 - 70 U/L    AST 20 0 - 45 U/L   PSA tumor marker   Result Value Ref Range    PSA 0.56 0 - 4 ug/L        ASSESSMENT/PLAN:     BMI:   Estimated body mass index is 27.38 kg/(m^2) as calculated from the following:    Height as of this encounter: 5' 10\" (1.778 m).    Weight as of this encounter: 190 lb 12.8 oz (86.5 kg).   Weight management plan: Discussed healthy diet and exercise guidelines and patient will follow up in 6 months in clinic to re-evaluate.          ICD-10-CM    1. Hypertension goal BP (blood pressure) < 140/90 I10    2. Chronic obstructive pulmonary disease, unspecified COPD type (H) J44.9    3. Hyperlipidemia LDL goal <100 E78.5    4. Coronary artery disease involving native heart without angina pectoris, unspecified vessel or lesion type I25.10    5. Left inguinal hernia K40.90      1.  Currently Controlled.  Continue current regimen.  Call/return if any problems or questions arise.   2.  Currently Controlled.  Continue current regimen.  Call/return if any problems or questions arise.   3.  Currently Controlled.  Continue current regimen.  Call/return if any problems or questions arise.   4.  Currently Controlled.  Continue risk factor reduction.  Continue current regimen.  Call/return if any problems or " questions arise.   5.  Discussed nature of this, potential complications.  As patient is currently asymptomatic, he would prefer to wait on surgical consultation.  Will arrange for this in the future if he desires.            Patient Instructions   Thank you for visiting [facility]    Continue on your 50 mg dose of losartan.    If your hernia bothers you, we should get you in for surgery.    Continue QVAR for your breathing.    Contact us or return if questions or concerns.     Please see me in the spring.      If you had imaging scheduled please refer to your radiology prep sheet.    Appointment    Date_______________     Time_____________    Day:   M TU W TH F    With____________________________    Location_________________________    If you need medication refills, please contact your pharmacy 3 days before your prescriptions runs out. If you are out of refills, your pharmacy will contact contact the clinic.    Contact us or return if questions or concerns.     -Your Care Team:  MD Margie Dewey PA-C Joel De Haan, PA-C Elizabeth McLean, APRN CNP    General information about your clinic      Clinic hours:     Lab hours:  Phone [phone]  Monday 7:30 am-7 pm    Monday 8:30 am-6:30 pm  Tuesday-Friday 7:30 am-5 pm   Tuesday-Friday 8:30 am-4:30 pm    Pharmacy hours:  Phone [phone]  Monday 8:30 am-7pm  Tuesday-Friday 8:30am-6 pm                                       Mychart assistance [phone]        We would like to hear from you, how was your visit today?    Aleah Chapin  Patient Information Supervisor   Patient Care Supervisor  [facility] [place], and [facility], and [facility]  [phone]         Fabio Stovall MD, MD  [facility]

## 2018-01-12 NOTE — MR AVS SNAPSHOT
"              After Visit Summary   1/12/2018    Keegan Buchanan    MRN: [id number]           Patient Information     Date Of Birth          1955        Visit Information        Provider Department      1/12/2018 1:54 PM Fabio Stovall MD [facility]        Today's Diagnoses     Hypertension goal BP (blood pressure) < 140/90    -  1       Follow-ups after your visit        Your next 10 appointments already scheduled     Jan 18, 2018  9:45 AM CST   [facility]    [address] [address] [phone]           Bring a current list of meds and any records pertaining to this visit. For Physicals, please bring immunization records and any forms needing to be filled out. Please arrive 10 minutes early to complete paperwork.              Who to contact     If you have questions or need follow up information about today's clinic visit or your schedule please contact [facility] directly at [phone].  Normal or non-critical lab and imaging results will be communicated to you by [employer], letter or phone within 4 business days after the clinic has received the results. If you do not hear from us within 7 days, please contact the clinic through [employer] or phone. If you have a critical or abnormal lab result, we will notify you by phone as soon as possible.  Submit refill requests through [employer] or call your pharmacy and they will forward the refill request to us. Please allow 3 business days for your refill to be completed.          Additional Information About Your Visit        [employer] Information     [employer] lets you send messages to your doctor, view your test results, renew your prescriptions, schedule appointments and more. To sign up, go to [email or web address] . Click on \"Log in\" on the left side of the screen, which will take you to the Welcome page. Then " "click on \"Sign up Now\" on the right side of the page.     You will be asked to enter the access code listed below, as well as some personal information. Please follow the directions to create your username and password.     Your access code is: [email or web address]  Expires: 2018 11:17 AM     Your access code will  in 90 days. If you need help or a new code, please call your [place] clinic or [phone].        Care EveryWhere ID     This is your Care EveryWhere ID. This could be used by other organizations to access your [place] medical records  [id number]         Blood Pressure from Last 3 Encounters:   18 136/86   18 142/82   01/10/18 140/80    Weight from Last 3 Encounters:   10/25/17 195 lb 6.4 oz (88.6 kg)   10/16/17 190 lb 6.4 oz (86.4 kg)   17 194 lb 6.4 oz (88.2 kg)              Today, you had the following     No orders found for display       Primary Care Provider Office Phone # Fax #    Fabio Isidro Stovall [phone] [address] [place] [address]        Equal Access to Services     [facility] AH: Hadii aad ku hadasho Soomaali, waaxda luqadaha, qaybta kaalmada adeegyada, waxay juan hayalvaradon javier key . So [facility] [phone].    ATENCIÓN: Si habla español, tiene a black disposición servicios gratuitos de asistencia lingüística. Llame al [phone].    We comply with applicable federal civil rights laws and [place] laws. We do not discriminate on the basis of race, color, national origin, age, disability, sex, sexual orientation, or gender identity.            Thank you!     Thank you for choosing [facility] [place]  for your care. Our goal is always to provide you with excellent care. Hearing back from our patients is one way we can continue to improve our services. Please take a few minutes to complete the written survey that you may receive in the mail after your visit with us. Thank you!             Your Updated Medication " List - Protect others around you: Learn how to safely use, store and throw away your medicines at www.disposemymeds.org.          This list is accurate as of: 1/12/18 11:59 PM.  Always use your most recent med list.                   Brand Name Dispense Instructions for use Diagnosis    albuterol 108 (90 BASE) MCG/ACT Inhaler    PROAIR HFA/PROVENTIL HFA/VENTOLIN HFA    1 Inhaler    Inhale 2 puffs into the lungs every 6 hours as needed for shortness of breath / dyspnea or wheezing    Acute bronchitis with symptoms > 10 days       aspirin 81 MG tablet     90 tablet    Take 1 tablet by mouth daily.    ACS (acute coronary syndrome) (H)       atorvastatin 80 MG tablet    LIPITOR    90 tablet    Take 1 tablet (80 mg) by mouth daily    Hyperlipidemia LDL goal <100       fluticasone 50 MCG/ACT spray    FLONASE    16 g    Spray 1-2 sprays into both nostrils daily    Nasal congestion       losartan 50 MG tablet    COZAAR    30 tablet    Take 1 tablet (50 mg) by mouth daily    Hypertension goal BP (blood pressure) < 140/90       nitroGLYcerin 0.4 MG sublingual tablet    NITROSTAT    25 tablet    Place 1 tablet (0.4 mg) under the tongue every 5 minutes as needed for chest pain    ACS (acute coronary syndrome) (H)       omeprazole 20 MG CR capsule    priLOSEC    90 capsule    Take 1 capsule (20 mg) by mouth daily    Gastroesophageal reflux disease without esophagitis       tamsulosin 0.4 MG capsule    FLOMAX    90 capsule    Take 1 capsule (0.4 mg) by mouth daily    Benign prostatic hyperplasia with urinary obstruction       tolterodine 2 MG 24 hr capsule    DETROL LA    90 capsule    Take 1 capsule (2 mg) by mouth daily    Urinary frequency

## 2018-01-16 ENCOUNTER — ALLIED HEALTH/NURSE VISIT (OUTPATIENT)
Dept: FAMILY MEDICINE | Facility: OTHER | Age: 63
End: 2018-01-16
Payer: COMMERCIAL

## 2018-01-16 VITALS — DIASTOLIC BLOOD PRESSURE: 86 MMHG | SYSTOLIC BLOOD PRESSURE: 136 MMHG

## 2018-01-16 DIAGNOSIS — I10 HYPERTENSION GOAL BP (BLOOD PRESSURE) < 140/90: Primary | ICD-10-CM

## 2018-01-16 PROCEDURE — 99207 ZZC NO CHARGE NURSE ONLY: CPT | Performed by: FAMILY MEDICINE

## 2018-01-16 NOTE — MR AVS SNAPSHOT
"              After Visit Summary   1/16/2018    Keegan Buchanan    MRN: [id number]           Patient Information     Date Of Birth          1955        Visit Information        Provider Department      1/16/2018 11:10 AM Fabio Stovall MD [facility]        Today's Diagnoses     Hypertension goal BP (blood pressure) < 140/90    -  1       Follow-ups after your visit        Your next 10 appointments already scheduled     Jan 18, 2018  9:45 AM CST   [facility]    [address] [address] [phone]           Bring a current list of meds and any records pertaining to this visit. For Physicals, please bring immunization records and any forms needing to be filled out. Please arrive 10 minutes early to complete paperwork.              Who to contact     If you have questions or need follow up information about today's clinic visit or your schedule please contact [facility] directly at [phone].  Normal or non-critical lab and imaging results will be communicated to you by [employer], letter or phone within 4 business days after the clinic has received the results. If you do not hear from us within 7 days, please contact the clinic through [employer] or phone. If you have a critical or abnormal lab result, we will notify you by phone as soon as possible.  Submit refill requests through [employer] or call your pharmacy and they will forward the refill request to us. Please allow 3 business days for your refill to be completed.          Additional Information About Your Visit        [employer] Information     [employer] lets you send messages to your doctor, view your test results, renew your prescriptions, schedule appointments and more. To sign up, go to [email or web address] . Click on \"Log in\" on the left side of the screen, which will take you to the Welcome page. Then " "click on \"Sign up Now\" on the right side of the page.     You will be asked to enter the access code listed below, as well as some personal information. Please follow the directions to create your username and password.     Your access code is: [email or web address]  Expires: 2018 11:17 AM     Your access code will  in 90 days. If you need help or a new code, please call your [place] clinic or [phone].        Care EveryWhere ID     This is your Care EveryWhere ID. This could be used by other organizations to access your [place] medical records  [id number]         Blood Pressure from Last 3 Encounters:   18 136/86   18 142/82   01/10/18 140/80    Weight from Last 3 Encounters:   10/25/17 195 lb 6.4 oz (88.6 kg)   10/16/17 190 lb 6.4 oz (86.4 kg)   17 194 lb 6.4 oz (88.2 kg)              Today, you had the following     No orders found for display       Primary Care Provider Office Phone # Fax #    Fabio Isidro Stovall [phone] [address] [place] [address]        Equal Access to Services     [facility] AH: Hadii aad ku hadasho Soomaali, waaxda luqadaha, qaybta kaalmada adeegyada, waxay juan hayalvaradon javier key . So [facility] [phone].    ATENCIÓN: Si habla español, tiene a black disposición servicios gratuitos de asistencia lingüística. Llame al [phone].    We comply with applicable federal civil rights laws and [place] laws. We do not discriminate on the basis of race, color, national origin, age, disability, sex, sexual orientation, or gender identity.            Thank you!     Thank you for choosing [facility] [place]  for your care. Our goal is always to provide you with excellent care. Hearing back from our patients is one way we can continue to improve our services. Please take a few minutes to complete the written survey that you may receive in the mail after your visit with us. Thank you!             Your Updated Medication " List - Protect others around you: Learn how to safely use, store and throw away your medicines at www.disposemymeds.org.          This list is accurate as of: 1/16/18 11:17 AM.  Always use your most recent med list.                   Brand Name Dispense Instructions for use Diagnosis    albuterol 108 (90 BASE) MCG/ACT Inhaler    PROAIR HFA/PROVENTIL HFA/VENTOLIN HFA    1 Inhaler    Inhale 2 puffs into the lungs every 6 hours as needed for shortness of breath / dyspnea or wheezing    Acute bronchitis with symptoms > 10 days       aspirin 81 MG tablet     90 tablet    Take 1 tablet by mouth daily.    ACS (acute coronary syndrome) (H)       atorvastatin 80 MG tablet    LIPITOR    90 tablet    Take 1 tablet (80 mg) by mouth daily    Hyperlipidemia LDL goal <100       fluticasone 50 MCG/ACT spray    FLONASE    16 g    Spray 1-2 sprays into both nostrils daily    Nasal congestion       losartan 50 MG tablet    COZAAR    30 tablet    Take 1 tablet (50 mg) by mouth daily    Hypertension goal BP (blood pressure) < 140/90       nitroGLYcerin 0.4 MG sublingual tablet    NITROSTAT    25 tablet    Place 1 tablet (0.4 mg) under the tongue every 5 minutes as needed for chest pain    ACS (acute coronary syndrome) (H)       omeprazole 20 MG CR capsule    priLOSEC    90 capsule    Take 1 capsule (20 mg) by mouth daily    Gastroesophageal reflux disease without esophagitis       tamsulosin 0.4 MG capsule    FLOMAX    90 capsule    Take 1 capsule (0.4 mg) by mouth daily    Benign prostatic hyperplasia with urinary obstruction       tolterodine 2 MG 24 hr capsule    DETROL LA    90 capsule    Take 1 capsule (2 mg) by mouth daily    Urinary frequency

## 2018-01-16 NOTE — PROGRESS NOTES
Keegan Buchanan is enrolled/participating in the retail pharmacy Blood Pressure Goals Achievement Program (BPGAP).  Keegan Buchanan was evaluated at [facility] on January 16, 2018 at which time his blood pressure was:    BP Readings from Last 3 Encounters:   01/16/18 136/86   01/12/18 142/82   01/10/18 140/80     Reviewed lifestyle modifications for blood pressure control and reduction: including making healthy food choices, managing weight, getting regular exercise, smoking cessation, reducing alcohol consumption, monitoring blood pressure regularly.     Keegan Buchanan is not experiencing symptoms.    Follow-Up: BP is at goal of < 140/90mmHg (patient 18+ years of age with or without diabetes).  Recommended follow-up at pharmacy in 6 months.     Recommendation to Provider: Patient says his BP had been running high while he was taking 4 half tablets of 25mg to get up to his dose of 50mg; he is now taking 1 50mg tablet instead and BP seems to have come down.  I have advised patient to discard old half tablets.  Patient plans to keep his follow-up appointment with MD this week before he leaves on vacation.    Keegan Buchanan was evaluated for enrollment into the PGEN study today.    Patient eligible for enrollment:  Unknown  Patient interested in enrollment:  Yes    Completed by: Rodrigue Goins [facility]  [phone]

## 2018-01-18 ENCOUNTER — OFFICE VISIT (OUTPATIENT)
Dept: FAMILY MEDICINE | Facility: OTHER | Age: 63
End: 2018-01-18
Payer: COMMERCIAL

## 2018-01-18 VITALS
SYSTOLIC BLOOD PRESSURE: 130 MMHG | TEMPERATURE: 97.6 F | OXYGEN SATURATION: 96 % | HEIGHT: 70 IN | WEIGHT: 190.8 LBS | BODY MASS INDEX: 27.32 KG/M2 | RESPIRATION RATE: 20 BRPM | HEART RATE: 77 BPM | DIASTOLIC BLOOD PRESSURE: 86 MMHG

## 2018-01-18 DIAGNOSIS — I25.10 CORONARY ARTERY DISEASE INVOLVING NATIVE HEART WITHOUT ANGINA PECTORIS, UNSPECIFIED VESSEL OR LESION TYPE: ICD-10-CM

## 2018-01-18 DIAGNOSIS — E78.5 HYPERLIPIDEMIA LDL GOAL <100: ICD-10-CM

## 2018-01-18 DIAGNOSIS — I10 HYPERTENSION GOAL BP (BLOOD PRESSURE) < 140/90: Primary | ICD-10-CM

## 2018-01-18 DIAGNOSIS — K40.90 LEFT INGUINAL HERNIA: ICD-10-CM

## 2018-01-18 DIAGNOSIS — J44.9 CHRONIC OBSTRUCTIVE PULMONARY DISEASE, UNSPECIFIED COPD TYPE (H): ICD-10-CM

## 2018-01-18 PROCEDURE — 99214 OFFICE O/P EST MOD 30 MIN: CPT | Performed by: FAMILY MEDICINE

## 2018-01-18 ASSESSMENT — PAIN SCALES - GENERAL: PAINLEVEL: NO PAIN (0)

## 2018-01-18 NOTE — NURSING NOTE
"Chief Complaint   Patient presents with     Hypertension     Panel Management     Flu, Mychart       Initial /86 (BP Location: Left arm, Patient Position: Chair, Cuff Size: Adult Large)  Pulse 77  Temp 97.6  F (36.4  C) (Temporal)  Resp 20  Ht 5' 10\" (1.778 m)  Wt 190 lb 12.8 oz (86.5 kg)  SpO2 96%  BMI 27.38 kg/m2 Estimated body mass index is 27.38 kg/(m^2) as calculated from the following:    Height as of this encounter: 5' 10\" (1.778 m).    Weight as of this encounter: 190 lb 12.8 oz (86.5 kg).  Medication Reconciliation: complete  Darek Newman, BRY    "

## 2018-01-18 NOTE — MR AVS SNAPSHOT
After Visit Summary   1/18/2018    Keegan Buchanan    MRN: [id number]           Patient Information     Date Of Birth          1955        Visit Information        Provider Department      1/18/2018 9:45 AM Fabio Stovall MD [facility]        Today's Diagnoses     Hypertension goal BP (blood pressure) < 140/90    -  1    Chronic obstructive pulmonary disease, unspecified COPD type (H)        Hyperlipidemia LDL goal <100        Coronary artery disease involving native heart without angina pectoris, unspecified vessel or lesion type          Care Instructions    Thank you for visiting [facility]    Continue on your 50 mg dose of losartan.    If your hernia bothers you, we should get you in for surgery.    Continue QVAR for your breathing.    Contact us or return if questions or concerns.     Please see me in the spring.      If you had imaging scheduled please refer to your radiology prep sheet.    Appointment    Date_______________     Time_____________    Day:   M TU W TH F    With____________________________    Location_________________________    If you need medication refills, please contact your pharmacy 3 days before your prescriptions runs out. If you are out of refills, your pharmacy will contact contact the clinic.    Contact us or return if questions or concerns.     -Your Care Team:  MD Margie Dewey PA-C Joel De Haan, PA-C Elizabeth McLean, DAMIAN HUMPHRIES    General information about your clinic      Clinic hours:     Lab hours:  Phone [phone]  Monday 7:30 am-7 pm    Monday 8:30 am-6:30 pm  Tuesday-Friday 7:30 am-5 pm   Tuesday-Friday 8:30 am-4:30 pm    Pharmacy hours:  Phone [phone]  Monday 8:30 am-7pm  Tuesday-Friday 8:30am-6 pm                                       Mychart assistance [phone]        We would like to hear from you, how was your visit today?    Aleah Chapin  Patient  "Information Supervisor   Patient Care Supervisor  [facility], [facility], and [facility]  [phone]             Follow-ups after your visit        Who to contact     If you have questions or need follow up information about today's clinic visit or your schedule please contact [facility] directly at [phone].  Normal or non-critical lab and imaging results will be communicated to you by Nodeablehart, letter or phone within 4 business days after the clinic has received the results. If you do not hear from us within 7 days, please contact the clinic through [employer] or phone. If you have a critical or abnormal lab result, we will notify you by phone as soon as possible.  Submit refill requests through Magic [employer]els or call your pharmacy and they will forward the refill request to us. Please allow 3 business days for your refill to be completed.          Additional Information About Your Visit        [facility] Medical Information     Magic Wheels lets you send messages to your doctor, view your test results, renew your prescriptions, schedule appointments and more. To sign up, go to [email or web address] . Click on \"Log in\" on the left side of the screen, which will take you to the Welcome page. Then click on \"Sign up Now\" on the right side of the page.     You will be asked to enter the access code listed below, as well as some personal information. Please follow the directions to create your username and password.     Your access code is: [email or web address]  Expires: 2018 11:17 AM     Your access code will  in 90 days. If you need help or a new code, please call your [facility] or [phone].        Care EveryWhere ID     This is your Care EveryWhere ID. This could be used by other organizations to access your [facility] medical records  [id number]        Your Vitals Were     Pulse Temperature Respirations Height Pulse Oximetry BMI (Body Mass " "Index)    77 97.6  F (36.4  C) (Temporal) 20 5' 10\" (1.778 m) 96% 27.38 kg/m2       Blood Pressure from Last 3 Encounters:   01/18/18 130/86   01/16/18 136/86   01/12/18 142/82    Weight from Last 3 Encounters:   01/18/18 190 lb 12.8 oz (86.5 kg)   10/25/17 195 lb 6.4 oz (88.6 kg)   10/16/17 190 lb 6.4 oz (86.4 kg)              Today, you had the following     No orders found for display       Primary Care Provider Office Phone # Fax #    Fabio Stovall [phone] [address] [place] [address]        Equal Access to Services     IMTIAZ CHAVES : Hadii sandeep mcintosho Soomaali, waaxda luqadaha, qaybta kaalmada adeegyada, norma key . So [facility] [phone].    ATENCIÓN: Si habla español, tiene a black disposición servicios gratuitos de asistencia lingüística. Llame al [phone].    We comply with applicable federal civil rights laws and [place] laws. We do not discriminate on the basis of race, color, national origin, age, disability, sex, sexual orientation, or gender identity.            Thank you!     Thank you for choosing [facility]  for your care. Our goal is always to provide you with excellent care. Hearing back from our patients is one way we can continue to improve our services. Please take a few minutes to complete the written survey that you may receive in the mail after your visit with us. Thank you!             Your Updated Medication List - Protect others around you: Learn how to safely use, store and throw away your medicines at www.disposemymeds.org.          This list is accurate as of: 1/18/18 10:21 AM.  Always use your most recent med list.                   Brand Name Dispense Instructions for use Diagnosis    albuterol 108 (90 BASE) MCG/ACT Inhaler    PROAIR HFA/PROVENTIL HFA/VENTOLIN HFA    1 Inhaler    Inhale 2 puffs into the lungs every 6 hours as needed for shortness of breath / dyspnea or wheezing    Acute " bronchitis with symptoms > 10 days       aspirin 81 MG tablet     90 tablet    Take 1 tablet by mouth daily.    ACS (acute coronary syndrome) (H)       atorvastatin 80 MG tablet    LIPITOR    90 tablet    Take 1 tablet (80 mg) by mouth daily    Hyperlipidemia LDL goal <100       fluticasone 50 MCG/ACT spray    FLONASE    16 g    Spray 1-2 sprays into both nostrils daily    Nasal congestion       losartan 50 MG tablet    COZAAR    30 tablet    Take 1 tablet (50 mg) by mouth daily    Hypertension goal BP (blood pressure) < 140/90       nitroGLYcerin 0.4 MG sublingual tablet    NITROSTAT    25 tablet    Place 1 tablet (0.4 mg) under the tongue every 5 minutes as needed for chest pain    ACS (acute coronary syndrome) (H)       omeprazole 20 MG CR capsule    priLOSEC    90 capsule    Take 1 capsule (20 mg) by mouth daily    Gastroesophageal reflux disease without esophagitis       QVAR 40 MCG/ACT Inhaler   Generic drug:  beclomethasone     1 Inhaler    Inhale 2 puffs into the lungs 2 times daily        tamsulosin 0.4 MG capsule    FLOMAX    90 capsule    Take 1 capsule (0.4 mg) by mouth daily    Benign prostatic hyperplasia with urinary obstruction       tolterodine 2 MG 24 hr capsule    DETROL LA    90 capsule    Take 1 capsule (2 mg) by mouth daily    Urinary frequency

## 2018-01-18 NOTE — PATIENT INSTRUCTIONS
Thank you for visiting [facility]    Continue on your 50 mg dose of losartan.    If your hernia bothers you, we should get you in for surgery.    Continue QVAR for your breathing.    Contact us or return if questions or concerns.     Please see me in the spring.      If you had imaging scheduled please refer to your radiology prep sheet.    Appointment    Date_______________     Time_____________    Day:   M TU W TH F    With____________________________    Location_________________________    If you need medication refills, please contact your pharmacy 3 days before your prescriptions runs out. If you are out of refills, your pharmacy will contact contact the clinic.    Contact us or return if questions or concerns.     -Your Care Team:  MD Margie Dewey PA-C Joel De Haan, PA-C Elizabeth McLean, APRN CNP    General information about your clinic      Clinic hours:     Lab hours:  Phone [phone]  Monday 7:30 am-7 pm    Monday 8:30 am-6:30 pm  Tuesday-Friday 7:30 am-5 pm   Tuesday-Friday 8:30 am-4:30 pm    Pharmacy hours:  Phone [phone]  Monday 8:30 am-7pm  Tuesday-Friday 8:30am-6 pm                                       Mychart assistance [phone]        We would like to hear from you, how was your visit today?    Aleah Chapin  Patient Information Supervisor   Patient Care Supervisor  [facility] [place], and [facility] [place], and [facility]  [phone]

## 2018-04-17 ENCOUNTER — TELEPHONE (OUTPATIENT)
Dept: FAMILY MEDICINE | Facility: OTHER | Age: 63
End: 2018-04-17

## 2018-04-17 DIAGNOSIS — K40.90 LEFT INGUINAL HERNIA: Primary | ICD-10-CM

## 2018-04-17 NOTE — TELEPHONE ENCOUNTER
Spoke to patient and he agreed to that plan. He is scheduled for a consult with Dr. Ireland 4/23/2018.     Ban Bowen MA

## 2018-04-17 NOTE — TELEPHONE ENCOUNTER
Reason for call:  Patient reporting a symptom    Symptom or request: hernia     Duration (how long have symptoms been present): ongoing     Have you been treated for this before? Yes    Additional comments: Pt states it is becoming more bothersome. Bulging out more and more. He would like to know what the next step is.     Phone Number patient can be reached at:  Home number on file [phone] (home)    Best Time:  Any     Can we leave a detailed message on this number:  YES    Call taken on 4/17/2018 at 9:43 AM by Nasrin Elizabeth

## 2018-04-17 NOTE — TELEPHONE ENCOUNTER
Per last ov 1/18/2018 patient would need surgical consultation.    Please call and help him schedule.  General surgery referral placed.    Dorian Toscano, MONEN, RN

## 2018-04-19 DIAGNOSIS — I10 HYPERTENSION GOAL BP (BLOOD PRESSURE) < 140/90: ICD-10-CM

## 2018-04-19 RX ORDER — LOSARTAN POTASSIUM 50 MG/1
TABLET ORAL
Qty: 30 TABLET | Refills: 2 | Status: SHIPPED | OUTPATIENT
Start: 2018-04-19 | End: 2018-07-19

## 2018-04-19 NOTE — TELEPHONE ENCOUNTER
losartan (COZAAR) 50 MG tablet  BP Readings from Last 3 Encounters:   01/18/18 130/86   01/16/18 136/86   01/12/18 142/82   Prescription approved per [facility] Refill Protocol.    Per LOV 01/18/2018 to follow up in 6 months. Shy Whittington, RN, BSN

## 2018-04-20 ENCOUNTER — ALLIED HEALTH/NURSE VISIT (OUTPATIENT)
Dept: FAMILY MEDICINE | Facility: OTHER | Age: 63
End: 2018-04-20
Payer: COMMERCIAL

## 2018-04-20 VITALS — DIASTOLIC BLOOD PRESSURE: 76 MMHG | SYSTOLIC BLOOD PRESSURE: 130 MMHG

## 2018-04-20 DIAGNOSIS — I10 HYPERTENSION GOAL BP (BLOOD PRESSURE) < 140/90: Primary | ICD-10-CM

## 2018-04-20 PROCEDURE — 99207 ZZC NO CHARGE NURSE ONLY: CPT | Performed by: FAMILY MEDICINE

## 2018-04-20 NOTE — PROGRESS NOTES
Keegan Buchanan is enrolled/participating in the retail pharmacy Blood Pressure Goals Achievement Program (BPGAP).  Keegan Buchanan was evaluated at [facility] on April 20, 2018 at which time his blood pressure was:    BP Readings from Last 3 Encounters:   04/20/18 130/76   01/18/18 130/86   01/16/18 136/86     Reviewed lifestyle modifications for blood pressure control and reduction: including making healthy food choices, managing weight, getting regular exercise, smoking cessation, reducing alcohol consumption, monitoring blood pressure regularly.     Keegan Buchanan is not experiencing symptoms.    Follow-Up: BP is at goal of < 140/90mmHg (patient 18+ years of age with or without diabetes).  Recommended follow-up at pharmacy in 6 months.     Recommendation to Provider: none    Keegan Buchanan was evaluated for enrollment into the PGEN study today.    Patient eligible for enrollment:  Unknown  Patient interested in enrollment:  Unknown    BP completed by: Giovana Vargas [facility]  Epic Completed by: Rodrigue Goins [facility]  [phone]

## 2018-04-20 NOTE — MR AVS SNAPSHOT
"              After Visit Summary   4/20/2018    Keegan Buchanan    MRN: [id number]           Patient Information     Date Of Birth          1955        Visit Information        Provider Department      4/20/2018 3:35 PM Fabio Stovall MD [facility]        Today's Diagnoses     Hypertension goal BP (blood pressure) < 140/90    -  1       Follow-ups after your visit        Your next 10 appointments already scheduled     Apr 23, 2018  9:00 AM CDT   New Visit with Huber Ireland MD   [facility]    [address] [address] [phone]              Who to contact     If you have questions or need follow up information about today's clinic visit or your schedule please contact [facility] directly at [phone].  Normal or non-critical lab and imaging results will be communicated to you by MyChart, letter or phone within 4 business days after the clinic has received the results. If you do not hear from us within 7 days, please contact the clinic through MyChart or phone. If you have a critical or abnormal lab result, we will notify you by phone as soon as possible.  Submit refill requests through [employer] or call your pharmacy and they will forward the refill request to us. Please allow 3 business days for your refill to be completed.          Additional Information About Your Visit        MyChart Information     [employer] lets you send messages to your doctor, view your test results, renew your prescriptions, schedule appointments and more. To sign up, go to [email or web address] . Click on \"Log in\" on the left side of the screen, which will take you to the Welcome page. Then click on \"Sign up Now\" on the right side of the page.     You will be asked to enter the access code listed below, as well as some personal information. Please follow the directions to create your username and password.     Your " access code is: [email or web address]  Expires: 2018  3:39 PM     Your access code will  in 90 days. If you need help or a new code, please call your [facility] clinic or [phone].        Care EveryWhere ID     This is your Care EveryWhere ID. This could be used by other organizations to access your [facility] medical records  [id number]         Blood Pressure from Last 3 Encounters:   18 130/76   18 130/86   18 136/86    Weight from Last 3 Encounters:   18 190 lb 12.8 oz (86.5 kg)   10/25/17 195 lb 6.4 oz (88.6 kg)   10/16/17 190 lb 6.4 oz (86.4 kg)              Today, you had the following     No orders found for display       Primary Care Provider Office Phone # Fax #    Fabio Stovall [phone] [address] [place] [address]        Equal Access to Services     [facility]: Hadii aad ku hadasho Soomaali, waaxda luqadaha, qaybta kaalmada adeegyada, waxay idiin hayalvaradon javier key . So [facility] [phone].    ATENCIÓN: Si habla español, tiene a black disposición servicios gratuitos de asistencia lingüística. Llame al [phone].    We comply with applicable federal civil rights laws and [place] laws. We do not discriminate on the basis of race, color, national origin, age, disability, sex, sexual orientation, or gender identity.            Thank you!     Thank you for choosing [facility]  for your care. Our goal is always to provide you with excellent care. Hearing back from our patients is one way we can continue to improve our services. Please take a few minutes to complete the written survey that you may receive in the mail after your visit with us. Thank you!             Your Updated Medication List - Protect others around you: Learn how to safely use, store and throw away your medicines at www.disposemymeds.org.          This list is accurate as of 18  3:39 PM.  Always use your most recent med list.                    Brand Name Dispense Instructions for use Diagnosis    albuterol 108 (90 Base) MCG/ACT Inhaler    PROAIR HFA/PROVENTIL HFA/VENTOLIN HFA    1 Inhaler    Inhale 2 puffs into the lungs every 6 hours as needed for shortness of breath / dyspnea or wheezing    Acute bronchitis with symptoms > 10 days       aspirin 81 MG tablet     90 tablet    Take 1 tablet by mouth daily.    ACS (acute coronary syndrome) (H)       atorvastatin 80 MG tablet    LIPITOR    90 tablet    Take 1 tablet (80 mg) by mouth daily    Hyperlipidemia LDL goal <100       fluticasone 50 MCG/ACT spray    FLONASE    16 g    Spray 1-2 sprays into both nostrils daily    Nasal congestion       losartan 50 MG tablet    COZAAR    30 tablet    TAKE ONE TABLET BY MOUTH EVERY DAY    Hypertension goal BP (blood pressure) < 140/90       nitroGLYcerin 0.4 MG sublingual tablet    NITROSTAT    25 tablet    Place 1 tablet (0.4 mg) under the tongue every 5 minutes as needed for chest pain    ACS (acute coronary syndrome) (H)       omeprazole 20 MG CR capsule    priLOSEC    90 capsule    Take 1 capsule (20 mg) by mouth daily    Gastroesophageal reflux disease without esophagitis       QVAR 40 MCG/ACT Inhaler   Generic drug:  beclomethasone     1 Inhaler    Inhale 2 puffs into the lungs 2 times daily        tamsulosin 0.4 MG capsule    FLOMAX    90 capsule    Take 1 capsule (0.4 mg) by mouth daily    Benign prostatic hyperplasia with urinary obstruction       tolterodine 2 MG 24 hr capsule    DETROL LA    90 capsule    Take 1 capsule (2 mg) by mouth daily    Urinary frequency

## 2018-04-23 ENCOUNTER — OFFICE VISIT (OUTPATIENT)
Dept: SURGERY | Facility: OTHER | Age: 63
End: 2018-04-23
Payer: COMMERCIAL

## 2018-04-23 ENCOUNTER — TELEPHONE (OUTPATIENT)
Dept: SURGERY | Facility: CLINIC | Age: 63
End: 2018-04-23

## 2018-04-23 VITALS
BODY MASS INDEX: 28.09 KG/M2 | DIASTOLIC BLOOD PRESSURE: 78 MMHG | TEMPERATURE: 98.2 F | SYSTOLIC BLOOD PRESSURE: 122 MMHG | WEIGHT: 195.8 LBS

## 2018-04-23 DIAGNOSIS — M62.08 DIASTASIS RECTI: ICD-10-CM

## 2018-04-23 DIAGNOSIS — K40.90 LEFT INGUINAL HERNIA: Primary | ICD-10-CM

## 2018-04-23 PROCEDURE — 99244 OFF/OP CNSLTJ NEW/EST MOD 40: CPT | Performed by: SPECIALIST

## 2018-04-23 NOTE — TELEPHONE ENCOUNTER
Type of surgery: Laparoscopic inguinal hernia repair  Location of surgery: [facility]   Date of surgery: 5/9/18  Surgeon: Dr. Ireland  Pre-Op Appt Date: message sent to PCP  Post-Op Appt Date: 5/21/18   Packet sent out: Surgery packet mailed to patient's home address.   Pre-cert/Authorization completed: NA  Date: 4/23/2018    Shelley Pina  Surgery Scheduler

## 2018-04-23 NOTE — NURSING NOTE
"Chief Complaint   Patient presents with     Hernia     possible right ing. hernia     Consult     referring Dr. Stovall       Initial /78 (BP Location: Right arm, Patient Position: Sitting, Cuff Size: Adult Large)  Temp 98.2  F (36.8  C) (Temporal)  Wt 88.8 kg (195 lb 12.8 oz)  BMI 28.09 kg/m2 Estimated body mass index is 28.09 kg/(m^2) as calculated from the following:    Height as of 1/18/18: 1.778 m (5' 10\").    Weight as of this encounter: 88.8 kg (195 lb 12.8 oz).  Medication Reconciliation: complete    "

## 2018-04-23 NOTE — PROGRESS NOTES
Consult requested by Dr. Stovall    Reason for consultation - left groin bulge      HPI:  Patient is a 62-year-old white male who for the past several months has noticed intermittent left groin pain. When he presses on the area he feels a gushing sensation and something going back in. The pain resolves after that. He saw his PCP who diagnosed him with a hernia for which she is now referred. Denies any nausea vomiting fevers chills or obstructive symptoms.    Past Medical History:   Diagnosis Date     Hypercholesteremia      Pain in joint, shoulder region     right shoulder separation     Unspecified essential hypertension      Past Surgical History:   Procedure Laterality Date     COLONOSCOPY N/A 6/12/2015    Procedure: COMBINED COLONOSCOPY, SINGLE OR MULTIPLE BIOPSY/POLYPECTOMY BY BIOPSY;  Surgeon: Herman Rebollar MD;  Location: [facility]     HC REMOVE TONSILS/ADENOIDS,<11 Y/O      T & A <12y.o.     REMOVAL OF SPERM DUCT(S)  05/06/88    Vasectomy     Current Outpatient Prescriptions   Medication     albuterol (PROAIR HFA/PROVENTIL HFA/VENTOLIN HFA) 108 (90 BASE) MCG/ACT Inhaler     aspirin 81 MG tablet     atorvastatin (LIPITOR) 80 MG tablet     beclomethasone (QVAR) 40 MCG/ACT Inhaler     fluticasone (FLONASE) 50 MCG/ACT spray     losartan (COZAAR) 50 MG tablet     nitroglycerin (NITROSTAT) 0.4 MG SL tablet     omeprazole (PRILOSEC) 20 MG CR capsule     tamsulosin (FLOMAX) 0.4 MG capsule     tolterodine (DETROL LA) 2 MG 24 hr capsule     [DISCONTINUED] tolterodine (DETROL LA) 2 MG 24 hr capsule     No current facility-administered medications for this visit.         Allergies   Allergen Reactions     Augmentin GI Disturbance     Penicillins      GI upset and diarrhea     Social History   Substance Use Topics     Smoking status: Former Smoker     Packs/day: 1.00     Years: 15.00     Smokeless tobacco: Former User     Types: Chew     Quit date: 1/20/1987      Comment: quit 25 years ago.     Alcohol use Yes       Comment: 1 -2 monthly     Family History   Problem Relation Age of Onset     DIABETES Mother      adult onset diabetes     CANCER Mother      kidney dx age 75     CANCER Father      prostate cancer/ at age 73     Hypertension Brother      CANCER Paternal Grandfather      prostat ca      ROS: 10 point ROS neg other than the symptoms noted above in the HPI.    PE:  B/P: 122/78, T: 98.2, P: Data Unavailable, R: Data Unavailable  General: well developed, well nourished WM who appears his stated age  HEENT: NC/AT, EOMI, (-)icterus, (-)injection  Neck: Supple, No JVD  Chest: CTA  Heart: S1, S2, (-)m/r/g  Abd: Soft, non tender, non distended, Diastasis recti, left inguinal hernia  Ext; Warm, no edema  Psych: AAOx3  Neuro: No focal deficits    Impression/plan:  This is a 62-year-old gentleman with a reducible left inguinal hernia. I discussed the options of open versus laparoscopic repair. After discussion with the patient planned this time is for laparoscopic repair. The procedure, risks, benefits and alternatives were discussed and he agrees to proceed. He knows to go to the ER should he not be able to reduce the hernia.    Huber Ireland MD, FACS

## 2018-04-23 NOTE — MR AVS SNAPSHOT
"              After Visit Summary   2018    Keegan Buchanan    MRN: [id number]           Patient Information     Date Of Birth          1955        Visit Information        Provider Department      2018 9:00 AM Huber Ireland MD [facility]        Today's Diagnoses     Left inguinal hernia    -  1    Diastasis recti           Follow-ups after your visit        Who to contact     If you have questions or need follow up information about today's clinic visit or your schedule please contact [facility] directly at [phone].  Normal or non-critical lab and imaging results will be communicated to you by hi5hart, letter or phone within 4 business days after the clinic has received the results. If you do not hear from us within 7 days, please contact the clinic through hi5hart or phone. If you have a critical or abnormal lab result, we will notify you by phone as soon as possible.  Submit refill requests through Nanushka or call your pharmacy and they will forward the refill request to us. Please allow 3 business days for your refill to be completed.          Additional Information About Your Visit        MyChart Information     Nanushka lets you send messages to your doctor, view your test results, renew your prescriptions, schedule appointments and more. To sign up, go to [email or web address] . Click on \"Log in\" on the left side of the screen, which will take you to the Welcome page. Then click on \"Sign up Now\" on the right side of the page.     You will be asked to enter the access code listed below, as well as some personal information. Please follow the directions to create your username and password.     Your access code is: [email or web address]  Expires: 2018  3:39 PM     Your access code will  in 90 days. If you need help or a new code, please call your [facility] or [phone].        Care EveryWhere ID     This is your Care EveryWhere ID. This could " be used by other organizations to access your [facility] medical records  [id number]        Your Vitals Were     Temperature BMI (Body Mass Index)                98.2  F (36.8  C) (Temporal) 28.09 kg/m2           Blood Pressure from Last 3 Encounters:   04/23/18 122/78   04/20/18 130/76   01/18/18 130/86    Weight from Last 3 Encounters:   04/23/18 88.8 kg (195 lb 12.8 oz)   01/18/18 86.5 kg (190 lb 12.8 oz)   10/25/17 88.6 kg (195 lb 6.4 oz)              We Performed the Following     Janki-Operative Worksheet - Laparoscopic Inguinal Hernia Repair        Primary Care Provider Office Phone # Fax #    Fabio Stovall [phone] [address] [place] [address]        Equal Access to Services     : Hadii sandeep castillo hadasho Soomaali, waaxda luqadaha, qaybta kaalmada adeegyada, norma key . So [facility] [phone].    ATENCIÓN: Si habla español, tiene a black disposición servicios gratuitos de asistencia lingüística. Carmename al [phone].    We comply with applicable federal civil rights laws and [place] laws. We do not discriminate on the basis of race, color, national origin, age, disability, sex, sexual orientation, or gender identity.            Thank you!     Thank you for choosing [facility]  for your care. Our goal is always to provide you with excellent care. Hearing back from our patients is one way we can continue to improve our services. Please take a few minutes to complete the written survey that you may receive in the mail after your visit with us. Thank you!             Your Updated Medication List - Protect others around you: Learn how to safely use, store and throw away your medicines at www.disposemymeds.org.          This list is accurate as of 4/23/18  9:20 AM.  Always use your most recent med list.                   Brand Name Dispense Instructions for use Diagnosis    albuterol 108 (90 Base) MCG/ACT Inhaler     PROAIR HFA/PROVENTIL HFA/VENTOLIN HFA    1 Inhaler    Inhale 2 puffs into the lungs every 6 hours as needed for shortness of breath / dyspnea or wheezing    Acute bronchitis with symptoms > 10 days       aspirin 81 MG tablet     90 tablet    Take 1 tablet by mouth daily.    ACS (acute coronary syndrome) (H)       atorvastatin 80 MG tablet    LIPITOR    90 tablet    Take 1 tablet (80 mg) by mouth daily    Hyperlipidemia LDL goal <100       fluticasone 50 MCG/ACT spray    FLONASE    16 g    Spray 1-2 sprays into both nostrils daily    Nasal congestion       losartan 50 MG tablet    COZAAR    30 tablet    TAKE ONE TABLET BY MOUTH EVERY DAY    Hypertension goal BP (blood pressure) < 140/90       nitroGLYcerin 0.4 MG sublingual tablet    NITROSTAT    25 tablet    Place 1 tablet (0.4 mg) under the tongue every 5 minutes as needed for chest pain    ACS (acute coronary syndrome) (H)       omeprazole 20 MG CR capsule    priLOSEC    90 capsule    Take 1 capsule (20 mg) by mouth daily    Gastroesophageal reflux disease without esophagitis       QVAR 40 MCG/ACT Inhaler   Generic drug:  beclomethasone     1 Inhaler    Inhale 2 puffs into the lungs 2 times daily        tamsulosin 0.4 MG capsule    FLOMAX    90 capsule    Take 1 capsule (0.4 mg) by mouth daily    Benign prostatic hyperplasia with urinary obstruction       tolterodine 2 MG 24 hr capsule    DETROL LA    90 capsule    Take 1 capsule (2 mg) by mouth daily    Urinary frequency

## 2018-04-23 NOTE — LETTER
4/23/2018         RE: Keegan Buchanan  [address] [place] [id number]        Dear Colleague,    Thank you for referring your patient, Keegan Buchanan, to the [facility]. Please see a copy of my visit note below.    Consult requested by Dr. Stovall    Reason for consultation - left groin bulge      HPI:  Patient is a 62-year-old white male who for the past several months has noticed intermittent left groin pain. When he presses on the area he feels a gushing sensation and something going back in. The pain resolves after that. He saw his PCP who diagnosed him with a hernia for which she is now referred. Denies any nausea vomiting fevers chills or obstructive symptoms.    Past Medical History:   Diagnosis Date     Hypercholesteremia      Pain in joint, shoulder region     right shoulder separation     Unspecified essential hypertension      Past Surgical History:   Procedure Laterality Date     COLONOSCOPY N/A 6/12/2015    Procedure: COMBINED COLONOSCOPY, SINGLE OR MULTIPLE BIOPSY/POLYPECTOMY BY BIOPSY;  Surgeon: Herman Rebollar MD;  Location:  GI     HC REMOVE TONSILS/ADENOIDS,<13 Y/O      T & A <12y.o.     REMOVAL OF SPERM DUCT(S)  05/06/88    Vasectomy     Current Outpatient Prescriptions   Medication     albuterol (PROAIR HFA/PROVENTIL HFA/VENTOLIN HFA) 108 (90 BASE) MCG/ACT Inhaler     aspirin 81 MG tablet     atorvastatin (LIPITOR) 80 MG tablet     beclomethasone (QVAR) 40 MCG/ACT Inhaler     fluticasone (FLONASE) 50 MCG/ACT spray     losartan (COZAAR) 50 MG tablet     nitroglycerin (NITROSTAT) 0.4 MG SL tablet     omeprazole (PRILOSEC) 20 MG CR capsule     tamsulosin (FLOMAX) 0.4 MG capsule     tolterodine (DETROL LA) 2 MG 24 hr capsule     [DISCONTINUED] tolterodine (DETROL LA) 2 MG 24 hr capsule     No current facility-administered medications for this visit.         Allergies   Allergen Reactions     Augmentin GI Disturbance     Penicillins      GI upset and diarrhea      Social History   Substance Use Topics     Smoking status: Former Smoker     Packs/day: 1.00     Years: 15.00     Smokeless tobacco: Former User     Types: Chew     Quit date: 1987      Comment: quit 25 years ago.     Alcohol use Yes      Comment: 1 -2 monthly     Family History   Problem Relation Age of Onset     DIABETES Mother      adult onset diabetes     CANCER Mother      kidney dx age 75     CANCER Father      prostate cancer/ at age 73     Hypertension Brother      CANCER Paternal Grandfather      prostat ca      ROS: 10 point ROS neg other than the symptoms noted above in the HPI.    PE:  B/P: 122/78, T: 98.2, P: Data Unavailable, R: Data Unavailable  General: well developed, well nourished WM who appears his stated age  HEENT: NC/AT, EOMI, (-)icterus, (-)injection  Neck: Supple, No JVD  Chest: CTA  Heart: S1, S2, (-)m/r/g  Abd: Soft, non tender, non distended, Diastasis recti, left inguinal hernia  Ext; Warm, no edema  Psych: AAOx3  Neuro: No focal deficits    Impression/plan:  This is a 62-year-old gentleman with a reducible left inguinal hernia. I discussed the options of open versus laparoscopic repair. After discussion with the patient planned this time is for laparoscopic repair. The procedure, risks, benefits and alternatives were discussed and he agrees to proceed. He knows to go to the ER should he not be able to reduce the hernia.    Huber Ireland MD, FACS    Again, thank you for allowing me to participate in the care of your patient.        Sincerely,        Huber Ireland MD

## 2018-04-23 NOTE — NURSING NOTE
[facility]    Keegan Buchanan has been given the following teaching information:  Before Your Surgery booklet  Pasha: Laproscopic Hernia Repair  Instructions for Showering or Bathing before Surgery

## 2018-04-23 NOTE — TELEPHONE ENCOUNTER
Spoke with pt and informed of below. Pt is scheduled.    Cathy Golden CMA ([facility])

## 2018-04-23 NOTE — TELEPHONE ENCOUNTER
Can you please work patient in for a preop? Patient is not able to do Wed-Friday this week otherwise is open.

## 2018-04-26 NOTE — PATIENT INSTRUCTIONS
Thank you for visiting [facility]    Re-schedule your surgery for when you won't need to do some lifting.    Please Follow Up when indicated on the section below this one on your After-Visit Summary.      If you had imaging scheduled please refer to your radiology prep sheet.    Appointment    Date_______________     Time_____________    Day:   M TU W TH F    With____________________________    Location_________________________    If you need medication refills, please contact your pharmacy 3 days before your prescriptions runs out. If you are out of refills, your pharmacy will contact contact the clinic.    Contact us or return if questions or concerns.     -Your Care Team:  MD aMrgie Dewey PA-C Joel De Haan, PA-C Elizabeth McLean, APRN CNP    General information about your clinic      Clinic hours:     Lab hours:  Phone [phone]  Monday 7:30 am-7 pm    Monday 8:30 am-6:30 pm  Tuesday-Friday 7:30 am-5 pm   Tuesday-Friday 8:30 am-4:30 pm    Pharmacy hours:  Phone [phone]  Monday 8:30 am-7pm  Tuesday-Friday 8:30am-6 pm                                       Mychart assistance [phone]        We would like to hear from you, how was your visit today?    Aleah Chapin  Patient Information Supervisor   Patient Care Supervisor  [facility], and [facility], and [facility]  [phone]

## 2018-04-26 NOTE — PROGRESS NOTES
The 10-year ASCVD risk score ([place] MICHAEL Jr, et al., 2013) is: 10.3%    Values used to calculate the score:      Age: 62 years      Sex: Male      Is Non- : No      Diabetic: No      Tobacco smoker: No      Systolic Blood Pressure: 128 mmHg      Is BP treated: Yes      HDL Cholesterol: 40 mg/dL      Total Cholesterol: 141 mg/dL  Patient is eligible for use of low-dose aspirin for primary prevention of heart attack and stroke.  Provider has discussed aspirin with patient and our decision was:     Prescribe:  Daily low-dose aspirin recommended for primary prevention, patient agrees with plan.        [facility]  [address] [address] [phone]  Dept: [phone]    PRE-OP EVALUATION:  Today's date: 5/3/2018    Keegan Buchanan (: 1955) presents for pre-operative evaluation assessment as requested by Dr. Ireland.  He requires evaluation and anesthesia risk assessment prior to undergoing surgery/procedure for treatment of hernia .    Fax number for surgical facility:   Primary Physician: Fabio Stovall  Type of Anesthesia Anticipated: to be determined    Patient has a Health Care Directive or Living Will:  Unsure - info was given today.    Preop Questions 5/3/2018   1.  Do you have a history of Heart attack, stroke, stent, coronary bypass surgery, or other heart surgery? YES  -    2.  Do you ever have any pain or discomfort in your chest? No   3.  Do you have a history of  Heart Failure? No   4.   Are you troubled by shortness of breath when:  walking on a level surface, or up a slight hill, or at night? No   5.  Do you currently have a cold, bronchitis or other respiratory infection? No   6.  Do you have a cough, shortness of breath, or wheezing? Yes -    7.  Do you sometimes get pains in the calves of your legs when you walk? No   8. Do you or anyone in your family have previous history of blood clots? No   9.  Do you or does anyone in your  family have a serious bleeding problem such as prolonged bleeding following surgeries or cuts? No   10. Have you ever had problems with anemia or been told to take iron pills? No   11. Have you had any abnormal blood loss such as black, tarry or bloody stools? No   12. Have you ever had a blood transfusion? No   13. Have you or any of your relatives ever had problems with anesthesia? No   14. Do you have sleep apnea, excessive snoring or daytime drowsiness? No   15. Do you have any prosthetic heart valves? No   16. Do you have prosthetic joints? No         HPI:     HPI related to upcoming procedure:   He has had a left inguinal hernia for quite a few months, but has noticed that is been popping out more lately.  He has been able to reduce it.  In discussing things, he does mention that his wife is planning on having a knee replacement approximately 1 week after his surgery.  I asked if she is going to need help getting around after surgery, and he thinks that she probably is.  He does not have definitive help to assist him with this.  I discussed that he may need to consider splinting his surgery until after his wife is recovering adequately since there is a requirement to avoid heavy lifting for 6 weeks following his surgery.          MEDICAL HISTORY:     Patient Active Problem List    Diagnosis Date Noted     Left inguinal hernia 01/18/2018     Priority: Medium     Piriformis syndrome of left side 02/10/2016     Priority: Medium     Nonallopathic lesion of sacral region 02/10/2016     Priority: Medium     Nonallopathic lesion of thoracic region 02/10/2016     Priority: Medium     Nonallopathic lesion of cervical region 02/10/2016     Priority: Medium     Cervicalgia 02/10/2016     Priority: Medium     Chronic airway obstruction (H) 08/20/2014     Priority: Medium     Problem list name updated by automated process. Provider to review       Advanced directives, counseling/discussion 02/03/2014     Priority: Medium      GERD (gastroesophageal reflux disease) 02/05/2013     Priority: Medium     Benign prostatic hyperplasia with urinary obstruction 12/27/2012     Priority: Medium     Acute coronary syndrome (H) 11/14/2012     Priority: Medium     CAD (coronary artery disease) 11/14/2012     Priority: Medium     Arthritis of elbow, right 04/09/2012     Priority: Medium     Cubital tunnel syndrome - right 04/09/2012     Priority: Medium     Hyperlipidemia LDL goal <100 10/31/2010     Priority: Medium     Impotence of organic origin 03/19/2008     Priority: Medium     Trigger finger, acquired 06/02/2005     Priority: Medium     Problem list name updated by automated process. Provider to review       Hypertension goal BP (blood pressure) < 140/90 04/23/2003     Priority: Medium      Past Medical History:   Diagnosis Date     Hypercholesteremia      Pain in joint, shoulder region     right shoulder separation     Unspecified essential hypertension      Past Surgical History:   Procedure Laterality Date     COLONOSCOPY N/A 6/12/2015    Procedure: COMBINED COLONOSCOPY, SINGLE OR MULTIPLE BIOPSY/POLYPECTOMY BY BIOPSY;  Surgeon: Herman Rebollar MD;  Location:  GI     HC REMOVE TONSILS/ADENOIDS,<11 Y/O      T & A <12y.o.     REMOVAL OF SPERM DUCT(S)  05/06/88    Vasectomy     Current Outpatient Prescriptions   Medication Sig Dispense Refill     albuterol (PROAIR HFA/PROVENTIL HFA/VENTOLIN HFA) 108 (90 BASE) MCG/ACT Inhaler Inhale 2 puffs into the lungs every 6 hours as needed for shortness of breath / dyspnea or wheezing 1 Inhaler 1     aspirin 81 MG tablet Take 1 tablet by mouth daily. 90 tablet 3     atorvastatin (LIPITOR) 80 MG tablet Take 1 tablet (80 mg) by mouth daily 90 tablet 3     losartan (COZAAR) 50 MG tablet TAKE ONE TABLET BY MOUTH EVERY DAY 30 tablet 2     nitroglycerin (NITROSTAT) 0.4 MG SL tablet Place 1 tablet (0.4 mg) under the tongue every 5 minutes as needed for chest pain 25 tablet 0     tamsulosin (FLOMAX) 0.4  "MG capsule Take 1 capsule (0.4 mg) by mouth daily 90 capsule 3     tolterodine (DETROL LA) 2 MG 24 hr capsule Take 1 capsule (2 mg) by mouth daily 90 capsule 3     beclomethasone (QVAR) 40 MCG/ACT Inhaler Inhale 2 puffs into the lungs 2 times daily 1 Inhaler 0     fluticasone (FLONASE) 50 MCG/ACT spray Spray 1-2 sprays into both nostrils daily (Patient not taking: Reported on 5/3/2018) 16 g 11     omeprazole (PRILOSEC) 20 MG CR capsule Take 1 capsule (20 mg) by mouth daily 90 capsule 3     [DISCONTINUED] tolterodine (DETROL LA) 2 MG 24 hr capsule Take 2 mg by mouth 2 times daily.       OTC products: None, except as noted above    Allergies   Allergen Reactions     Augmentin GI Disturbance     Penicillins      GI upset and diarrhea      Latex Allergy: NO    Social History   Substance Use Topics     Smoking status: Former Smoker     Packs/day: 1.00     Years: 15.00     Smokeless tobacco: Former User     Types: Chew     Quit date: 1/20/1987      Comment: quit 25 years ago.     Alcohol use Yes      Comment: 1 -2 monthly     History   Drug Use No       REVIEW OF SYSTEMS:   Not done.    EXAM:   /80 (BP Location: Left arm, Patient Position: Chair, Cuff Size: Adult Large)  Pulse 72  Temp 97.3  F (36.3  C) (Temporal)  Resp 24  Ht 5' 10\" (1.778 m)  Wt 195 lb 11.2 oz (88.8 kg)  SpO2 95%  BMI 28.08 kg/m2  No exam performed.    DIAGNOSTICS:   Not done per patient request.    Recent Labs   Lab Test  10/12/17   0830  10/10/16   0828   12/27/12   0838  11/16/12   0518  11/15/12   0450   11/14/12 2015   HGB   --    --    --    --   16.2  15.8   < >  16.1   PLT   --    --    --    --   228  230   < >  245   INR   --    --    --    --    --    --    --   0.97   NA  145*  139   < >   --   141   --    --   138   POTASSIUM  4.4  3.9   < >   --   4.5   --    --   4.2   CR  0.82  0.85   < >   --   0.98   --    --   0.83   A1C   --    --    --   5.6   --    --    --    --     < > = values in this interval not displayed.    "     IMPRESSION:   Reason for surgery/procedure: Inguinal hernia  Diagnosis/reason for consult: Perioperative risk evaluation for inguinal hernia repair.    The proposed surgical procedure is considered INTERMEDIATE risk.        ICD-10-CM    1. Preop general physical exam Z01.818    2. Left inguinal hernia K40.90        RECOMMENDATIONS:     After some discussion with patient, he elected to postpone his procedure until his wife is recovered sufficiently from her surgery that there is no risk of him damaging his hernia repair.  He requested that I forward this information to his surgeon Dr. Ireland.  He will return for formal preoperative evaluation later this year when it will be a better time for him to safely proceed with surgery and recovery.       Signed Electronically by: Fabio Stovall MD, MD    Copy of this evaluation report is provided to requesting physician.    [facility] Preop Guidelines    Revised Cardiac Risk Index

## 2018-05-03 ENCOUNTER — OFFICE VISIT (OUTPATIENT)
Dept: FAMILY MEDICINE | Facility: OTHER | Age: 63
End: 2018-05-03
Payer: COMMERCIAL

## 2018-05-03 VITALS
HEART RATE: 72 BPM | BODY MASS INDEX: 28.02 KG/M2 | WEIGHT: 195.7 LBS | RESPIRATION RATE: 24 BRPM | OXYGEN SATURATION: 95 % | DIASTOLIC BLOOD PRESSURE: 80 MMHG | SYSTOLIC BLOOD PRESSURE: 128 MMHG | HEIGHT: 70 IN | TEMPERATURE: 97.3 F

## 2018-05-03 DIAGNOSIS — K40.90 LEFT INGUINAL HERNIA: ICD-10-CM

## 2018-05-03 DIAGNOSIS — Z01.818 PREOP GENERAL PHYSICAL EXAM: Primary | ICD-10-CM

## 2018-05-03 PROCEDURE — 99213 OFFICE O/P EST LOW 20 MIN: CPT | Performed by: FAMILY MEDICINE

## 2018-05-03 ASSESSMENT — PAIN SCALES - GENERAL: PAINLEVEL: NO PAIN (0)

## 2018-05-03 NOTE — MR AVS SNAPSHOT
After Visit Summary   5/3/2018    Keegan Buchanan    MRN: [id number]           Patient Information     Date Of Birth          1955        Visit Information        Provider Department      5/3/2018 7:50 AM Fabio Stovall MD [facility]        Today's Diagnoses     Preop general physical exam    -  1    Left inguinal hernia          Care Instructions    Thank you for visiting [facility]    Re-schedule your surgery for when you won't need to do some lifting.    Please Follow Up when indicated on the section below this one on your After-Visit Summary.      If you had imaging scheduled please refer to your radiology prep sheet.    Appointment    Date_______________     Time_____________    Day:   M TU W TH F    With____________________________    Location_________________________    If you need medication refills, please contact your pharmacy 3 days before your prescriptions runs out. If you are out of refills, your pharmacy will contact contact the clinic.    Contact us or return if questions or concerns.     -Your Care Team:  MD Margie Dewey PA-C Joel De Haan, PA-C Elizabeth McLean, APRN CNP    General information about your clinic      Clinic hours:     Lab hours:  Phone [phone]  Monday 7:30 am-7 pm    Monday 8:30 am-6:30 pm  Tuesday-Friday 7:30 am-5 pm   Tuesday-Friday 8:30 am-4:30 pm    Pharmacy hours:  Phone [phone]  Monday 8:30 am-7pm  Tuesday-Friday 8:30am-6 pm                                       Mychart assistance [phone]        We would like to hear from you, how was your visit today?    Aleah Chapin  Patient Information Supervisor   Patient Care Supervisor  [facility] [place], and [facility], and [facility]  [phone]             Follow-ups after your visit        Your next 10 appointments already scheduled     May 09, 2018  "  Procedure with Huber Ireland MD   [facility] ([facility])    [address] [place] [id number] [phone]           From [address]: Exit at [address] on south side of [place]. Turn right on [address]. Turn left at stoplight on [address]. [facility] will be in view two blocks ahead            May 21, 2018  8:15 AM CDT   Return Visit with Huber Ireland MD   [facility] ([facility]    [address]  [place] [address] [phone]              Who to contact     If you have questions or need follow up information about today's clinic visit or your schedule please contact [facility] directly at [phone].  Normal or non-critical lab and imaging results will be communicated to you by MyChart, letter or phone within 4 business days after the clinic has received the results. If you do not hear from us within 7 days, please contact the clinic through TalkSessionhart or phone. If you have a critical or abnormal lab result, we will notify you by phone as soon as possible.  Submit refill requests through [employer] or call your pharmacy and they will forward the refill request to us. Please allow 3 business days for your refill to be completed.          Additional Information About Your Visit        MyChart Information     Blinkit lets you send messages to your doctor, view your test results, renew your prescriptions, schedule appointments and more. To sign up, go to [email or web address] . Click on \"Log in\" on the left side of the screen, which will take you to the Welcome page. Then click on \"Sign up Now\" on the right side of the page.     You will be asked to enter the access code listed below, as well as some personal information. Please follow the directions to create your username and password.     Your access code is: [email or web address]  Expires: 7/19/2018  3:39 PM     Your access code " "will  in 90 days. If you need help or a new code, please call your [place] clinic or [phone].        Care EveryWhere ID     This is your Care EveryWhere ID. This could be used by other organizations to access your [place] medical records  [id number]        Your Vitals Were     Pulse Temperature Respirations Height Pulse Oximetry BMI (Body Mass Index)    72 97.3  F (36.3  C) (Temporal) 24 5' 10\" (1.778 m) 95% 28.08 kg/m2       Blood Pressure from Last 3 Encounters:   18 128/80   18 122/78   18 130/76    Weight from Last 3 Encounters:   18 195 lb 11.2 oz (88.8 kg)   18 195 lb 12.8 oz (88.8 kg)   18 190 lb 12.8 oz (86.5 kg)              Today, you had the following     No orders found for display       Primary Care Provider Office Phone # Fax #    Fabio Isidor Stovall [phone] [address] [place] [address]        Equal Access to Services     [facility]: Hadii sandeep ku hadasho Soreese, waaxda luqadaha, qaybta kaalmada beatris, norma key . So [facility] [phone].    ATENCIÓN: Si habla español, tiene a black disposición servicios gratuitos de asistencia lingüística. Llame al [phone].    We comply with applicable federal civil rights laws and [place] laws. We do not discriminate on the basis of race, color, national origin, age, disability, sex, sexual orientation, or gender identity.            Thank you!     Thank you for choosing [facility] [place]  for your care. Our goal is always to provide you with excellent care. Hearing back from our patients is one way we can continue to improve our services. Please take a few minutes to complete the written survey that you may receive in the mail after your visit with us. Thank you!             Your Updated Medication List - Protect others around you: Learn how to safely use, store and throw away your medicines at www.disposemymeds.org.          This " list is accurate as of 5/3/18  8:18 AM.  Always use your most recent med list.                   Brand Name Dispense Instructions for use Diagnosis    albuterol 108 (90 Base) MCG/ACT Inhaler    PROAIR HFA/PROVENTIL HFA/VENTOLIN HFA    1 Inhaler    Inhale 2 puffs into the lungs every 6 hours as needed for shortness of breath / dyspnea or wheezing    Acute bronchitis with symptoms > 10 days       aspirin 81 MG tablet     90 tablet    Take 1 tablet by mouth daily.    ACS (acute coronary syndrome) (H)       atorvastatin 80 MG tablet    LIPITOR    90 tablet    Take 1 tablet (80 mg) by mouth daily    Hyperlipidemia LDL goal <100       fluticasone 50 MCG/ACT spray    FLONASE    16 g    Spray 1-2 sprays into both nostrils daily    Nasal congestion       losartan 50 MG tablet    COZAAR    30 tablet    TAKE ONE TABLET BY MOUTH EVERY DAY    Hypertension goal BP (blood pressure) < 140/90       nitroGLYcerin 0.4 MG sublingual tablet    NITROSTAT    25 tablet    Place 1 tablet (0.4 mg) under the tongue every 5 minutes as needed for chest pain    ACS (acute coronary syndrome) (H)       omeprazole 20 MG CR capsule    priLOSEC    90 capsule    Take 1 capsule (20 mg) by mouth daily    Gastroesophageal reflux disease without esophagitis       QVAR 40 MCG/ACT Inhaler   Generic drug:  beclomethasone     1 Inhaler    Inhale 2 puffs into the lungs 2 times daily        tamsulosin 0.4 MG capsule    FLOMAX    90 capsule    Take 1 capsule (0.4 mg) by mouth daily    Benign prostatic hyperplasia with urinary obstruction       tolterodine 2 MG 24 hr capsule    DETROL LA    90 capsule    Take 1 capsule (2 mg) by mouth daily    Urinary frequency

## 2018-05-03 NOTE — NURSING NOTE
"Chief Complaint   Patient presents with     Pre-Op Exam     Panel Management     mychart, hiv, copd action plan       Initial /80 (BP Location: Left arm, Patient Position: Chair, Cuff Size: Adult Large)  Pulse 72  Temp 97.3  F (36.3  C) (Temporal)  Resp 24  Ht 5' 10\" (1.778 m)  Wt 195 lb 11.2 oz (88.8 kg)  SpO2 95%  BMI 28.08 kg/m2 Estimated body mass index is 28.08 kg/(m^2) as calculated from the following:    Height as of this encounter: 5' 10\" (1.778 m).    Weight as of this encounter: 195 lb 11.2 oz (88.8 kg).  Medication Reconciliation: complete  Darek Newman, BRY    "

## 2018-05-08 NOTE — TELEPHONE ENCOUNTER
Per Ammy in the OR this patient would like to cancel surgery because his wife is having a procedure done and he will need to take care of her.

## 2018-06-19 ENCOUNTER — ALLIED HEALTH/NURSE VISIT (OUTPATIENT)
Dept: FAMILY MEDICINE | Facility: OTHER | Age: 63
End: 2018-06-19
Payer: COMMERCIAL

## 2018-06-19 VITALS — DIASTOLIC BLOOD PRESSURE: 74 MMHG | SYSTOLIC BLOOD PRESSURE: 138 MMHG | HEART RATE: 70 BPM

## 2018-06-19 DIAGNOSIS — I10 HYPERTENSION GOAL BP (BLOOD PRESSURE) < 140/90: Primary | ICD-10-CM

## 2018-06-19 PROCEDURE — 99207 ZZC NO CHARGE NURSE ONLY: CPT

## 2018-06-19 NOTE — NURSING NOTE
Chief Complaint   Patient presents with     Hypertension     Keegan Buchanan is a 62 year old patient who comes in today for a Blood Pressure check.  Initial BP:  /74  Pulse 70     70  Disposition: follow-up as previously indicated by provider    Cathy Golden CMA (AAMA)

## 2018-06-19 NOTE — MR AVS SNAPSHOT
"              After Visit Summary   2018    Keegan Buchanan    MRN: [id number]           Patient Information     Date Of Birth          1955        Visit Information        Provider Department      2018 9:00 AM CURTIS YODER NURSE [facility]        Today's Diagnoses     Hypertension goal BP (blood pressure) < 140/90    -  1       Follow-ups after your visit        Who to contact     If you have questions or need follow up information about today's clinic visit or your schedule please contact [facility] directly at [phone].  Normal or non-critical lab and imaging results will be communicated to you by SkyTechhart, letter or phone within 4 business days after the clinic has received the results. If you do not hear from us within 7 days, please contact the clinic through SkyTechhart or phone. If you have a critical or abnormal lab result, we will notify you by phone as soon as possible.  Submit refill requests through [employer] or call your pharmacy and they will forward the refill request to us. Please allow 3 business days for your refill to be completed.          Additional Information About Your Visit        MyChart Information     [employer] lets you send messages to your doctor, view your test results, renew your prescriptions, schedule appointments and more. To sign up, go to [email or web address] . Click on \"Log in\" on the left side of the screen, which will take you to the Welcome page. Then click on \"Sign up Now\" on the right side of the page.     You will be asked to enter the access code listed below, as well as some personal information. Please follow the directions to create your username and password.     Your access code is: [email or web address]  Expires: 2018  3:39 PM     Your access code will  in 90 days. If you need help or a new code, please call your [facility] or [phone].        Care EveryWhere ID     This is your Care EveryWhere ID. This could " be used by other organizations to access your [place] medical records  [id number]        Your Vitals Were     Pulse                   70            Blood Pressure from Last 3 Encounters:   06/19/18 138/74   05/03/18 128/80   04/23/18 122/78    Weight from Last 3 Encounters:   05/03/18 195 lb 11.2 oz (88.8 kg)   04/23/18 195 lb 12.8 oz (88.8 kg)   01/18/18 190 lb 12.8 oz (86.5 kg)              Today, you had the following     No orders found for display       Primary Care Provider Office Phone # Fax #    Fabio Isidro Stovall [phone] [address] [place] [address]        Equal Access to Services     [facility]: Hadii sandeep castillo hadasho Soomaali, waaxda luqadaha, qaybta kaalmada adeegyada, waxbhargav key . So [facility] [phone].    ATENCIÓN: Si habla español, tiene a black disposición servicios gratuitos de asistencia lingüística. [facility] [phone].    We comply with applicable federal civil rights laws and [place] laws. We do not discriminate on the basis of race, color, national origin, age, disability, sex, sexual orientation, or gender identity.            Thank you!     Thank you for choosing [facility]  for your care. Our goal is always to provide you with excellent care. Hearing back from our patients is one way we can continue to improve our services. Please take a few minutes to complete the written survey that you may receive in the mail after your visit with us. Thank you!             Your Updated Medication List - Protect others around you: Learn how to safely use, store and throw away your medicines at www.disposemymeds.org.          This list is accurate as of 6/19/18  9:15 AM.  Always use your most recent med list.                   Brand Name Dispense Instructions for use Diagnosis    albuterol 108 (90 Base) MCG/ACT Inhaler    PROAIR HFA/PROVENTIL HFA/VENTOLIN HFA    1 Inhaler    Inhale 2 puffs into the lungs every 6 hours  as needed for shortness of breath / dyspnea or wheezing    Acute bronchitis with symptoms > 10 days       aspirin 81 MG tablet     90 tablet    Take 1 tablet by mouth daily.    ACS (acute coronary syndrome) (H)       atorvastatin 80 MG tablet    LIPITOR    90 tablet    Take 1 tablet (80 mg) by mouth daily    Hyperlipidemia LDL goal <100       fluticasone 50 MCG/ACT spray    FLONASE    16 g    Spray 1-2 sprays into both nostrils daily    Nasal congestion       losartan 50 MG tablet    COZAAR    30 tablet    TAKE ONE TABLET BY MOUTH EVERY DAY    Hypertension goal BP (blood pressure) < 140/90       nitroGLYcerin 0.4 MG sublingual tablet    NITROSTAT    25 tablet    Place 1 tablet (0.4 mg) under the tongue every 5 minutes as needed for chest pain    ACS (acute coronary syndrome) (H)       omeprazole 20 MG CR capsule    priLOSEC    90 capsule    Take 1 capsule (20 mg) by mouth daily    Gastroesophageal reflux disease without esophagitis       QVAR 40 MCG/ACT Inhaler   Generic drug:  beclomethasone     1 Inhaler    Inhale 2 puffs into the lungs 2 times daily        tamsulosin 0.4 MG capsule    FLOMAX    90 capsule    Take 1 capsule (0.4 mg) by mouth daily    Benign prostatic hyperplasia with urinary obstruction       tolterodine 2 MG 24 hr capsule    DETROL LA    90 capsule    Take 1 capsule (2 mg) by mouth daily    Urinary frequency

## 2018-06-25 ENCOUNTER — ALLIED HEALTH/NURSE VISIT (OUTPATIENT)
Dept: FAMILY MEDICINE | Facility: OTHER | Age: 63
End: 2018-06-25
Payer: COMMERCIAL

## 2018-06-25 DIAGNOSIS — Z01.30 BP CHECK: Primary | ICD-10-CM

## 2018-06-25 PROCEDURE — 99207 ZZC NO CHARGE NURSE ONLY: CPT

## 2018-06-25 NOTE — MR AVS SNAPSHOT
After Visit Summary   6/25/2018    Keegan Buchanan    MRN: [id number]           Patient Information     Date Of Birth          1955        Visit Information        Provider Department      6/25/2018 10:30 AM CURTIS YODER NURSE [facility]        Today's Diagnoses     BP check    -  1       Follow-ups after your visit        Follow-up notes from your care team     Return for BP Recheck.      Your next 10 appointments already scheduled     Jun 25, 2018 10:30 AM CDT   Nurse Only with NL PARESH NURSE [facility] ([facility])    [address] [id number] [phone]            Jul 02, 2018 11:30 AM CDT   Return Visit with Rasta Millan MD   [facility] ([facility])    [address] [place] [id number] [phone]              Who to contact     If you have questions or need follow up information about today's clinic visit or your schedule please contact [facility] directly at [phone].  Normal or non-critical lab and imaging results will be communicated to you by MyChart, letter or phone within 4 business days after the clinic has received the results. If you do not hear from us within 7 days, please contact the clinic through MyChart or phone. If you have a critical or abnormal lab result, we will notify you by phone as soon as possible.  Submit refill requests through [employer] or call your pharmacy and they will forward the refill request to us. Please allow 3 business days for your refill to be completed.          Additional Information About Your Visit        Care EveryWhere ID     This is your Care EveryWhere ID. This could be used by other organizations to access your [place] medical records  [id number]         Blood Pressure from Last 3 Encounters:   06/25/18 (P) 138/82   06/19/18 138/74   05/03/18 128/80    Weight from  Last 3 Encounters:   05/03/18 195 lb 11.2 oz (88.8 kg)   04/23/18 195 lb 12.8 oz (88.8 kg)   01/18/18 190 lb 12.8 oz (86.5 kg)              Today, you had the following     No orders found for display       Primary Care Provider Office Phone # Fax #    Fabio Stovall [phone] [address] [place] [address]        Equal Access to Services     [facility]: Hadii aad ku hadasho Soomaali, waaxda luqadaha, qaybta kaalmada adeegyada, waxay idiin hayaan adeeg kharash la'alvaradon . So [facility] [phone].    ATENCIÓN: Si habla español, tiene a black disposición servicios gratuitos de asistencia lingüística. [facility] [phone].    We comply with applicable federal civil rights laws and [place] laws. We do not discriminate on the basis of race, color, national origin, age, disability, sex, sexual orientation, or gender identity.            Thank you!     Thank you for choosing [facility]  for your care. Our goal is always to provide you with excellent care. Hearing back from our patients is one way we can continue to improve our services. Please take a few minutes to complete the written survey that you may receive in the mail after your visit with us. Thank you!             Your Updated Medication List - Protect others around you: Learn how to safely use, store and throw away your medicines at www.disposemymeds.org.          This list is accurate as of 6/25/18  9:47 AM.  Always use your most recent med list.                   Brand Name Dispense Instructions for use Diagnosis    albuterol 108 (90 Base) MCG/ACT Inhaler    PROAIR HFA/PROVENTIL HFA/VENTOLIN HFA    1 Inhaler    Inhale 2 puffs into the lungs every 6 hours as needed for shortness of breath / dyspnea or wheezing    Acute bronchitis with symptoms > 10 days       aspirin 81 MG tablet     90 tablet    Take 1 tablet by mouth daily.    ACS (acute coronary syndrome) (H)       atorvastatin 80 MG tablet    LIPITOR    90  tablet    Take 1 tablet (80 mg) by mouth daily    Hyperlipidemia LDL goal <100       fluticasone 50 MCG/ACT spray    FLONASE    16 g    Spray 1-2 sprays into both nostrils daily    Nasal congestion       losartan 50 MG tablet    COZAAR    30 tablet    TAKE ONE TABLET BY MOUTH EVERY DAY    Hypertension goal BP (blood pressure) < 140/90       nitroGLYcerin 0.4 MG sublingual tablet    NITROSTAT    25 tablet    Place 1 tablet (0.4 mg) under the tongue every 5 minutes as needed for chest pain    ACS (acute coronary syndrome) (H)       omeprazole 20 MG CR capsule    priLOSEC    90 capsule    Take 1 capsule (20 mg) by mouth daily    Gastroesophageal reflux disease without esophagitis       QVAR 40 MCG/ACT Inhaler   Generic drug:  beclomethasone     1 Inhaler    Inhale 2 puffs into the lungs 2 times daily        tamsulosin 0.4 MG capsule    FLOMAX    90 capsule    Take 1 capsule (0.4 mg) by mouth daily    Benign prostatic hyperplasia with urinary obstruction       tolterodine 2 MG 24 hr capsule    DETROL LA    90 capsule    Take 1 capsule (2 mg) by mouth daily    Urinary frequency

## 2018-06-25 NOTE — PROGRESS NOTES
Keegan TIRADO Chanel is a 62 year old patient who comes in today for a Blood Pressure check.  Initial BP:  BP (P) 138/82  Pulse (P) 74     Data Unavailable  Disposition: follow-up as previously indicated by provider

## 2018-07-02 ENCOUNTER — OFFICE VISIT (OUTPATIENT)
Dept: CARDIOLOGY | Facility: CLINIC | Age: 63
End: 2018-07-02
Payer: COMMERCIAL

## 2018-07-02 VITALS
BODY MASS INDEX: 27.92 KG/M2 | HEART RATE: 88 BPM | WEIGHT: 195 LBS | SYSTOLIC BLOOD PRESSURE: 138 MMHG | DIASTOLIC BLOOD PRESSURE: 80 MMHG | OXYGEN SATURATION: 94 % | HEIGHT: 70 IN

## 2018-07-02 DIAGNOSIS — R07.89 ATYPICAL CHEST PAIN: ICD-10-CM

## 2018-07-02 DIAGNOSIS — R00.2 PALPITATIONS: Primary | ICD-10-CM

## 2018-07-02 DIAGNOSIS — I25.10 CORONARY ARTERY DISEASE INVOLVING NATIVE CORONARY ARTERY OF NATIVE HEART, ANGINA PRESENCE UNSPECIFIED: ICD-10-CM

## 2018-07-02 DIAGNOSIS — I10 BENIGN ESSENTIAL HYPERTENSION: ICD-10-CM

## 2018-07-02 PROCEDURE — 99214 OFFICE O/P EST MOD 30 MIN: CPT | Performed by: INTERNAL MEDICINE

## 2018-07-02 NOTE — LETTER
7/2/2018      Fabio Stovall MD  [address] [place] [address]      RE: Keegan Buchanan       Dear Colleague,    I had the pleasure of seeing Keegan Buchanan in the [facility].    Service Date: 07/02/2018      HISTORY OF PRESENT ILLNESS:  Keegan Buchanan, a 62-year-old man with coronary artery disease, hypertension, dyslipidemia, and benign prostatic hypertrophy was seen today at your request for followup.      Mr. Buchanan underwent implantation of overlapping 3.0 x 12 and 2.75 x 16 mm length everolimus-eluting PROMUS Premier stents in the mid-LAD in 2012 for management of unstable angina.  The left main, circumflex and dominant right coronary had atheromatous change with no significant focal narrowing.  A subsequent nuclear stress test in 2013 showed a normal ejection fraction with no evidence of ischemia, up to 15.2 METS on a Davey protocol.      Since I saw the patient last year, he has noted occasional episodes of skipped heartbeats.  These episodes occur primarily when at rest in the early morning hours.  There has been no sustained palpitations or syncope.      The patient also notes occasional vague discomfort in the throat during skipped heartbeats and is concerned about progression of coronary disease.  He does not describe typical angina as he had prior to his previous intervention.      Mr. Buchanan is scheduled for elective inguinal herniorrhaphy at sometime in the future.      PAST MEDICAL HISTORY:   1.  Coronary artery disease.   a.  History of unstable angina with everolimus-eluting stent implantation of 2 drug-eluting stents in the mid-LAD in 2012.  Negative nuclear stress test 2013.   2.  Hypertension.   3.  Dyslipidemia.   4.  Osteoarthritis.   5.  Benign prostatic hypertrophy.      PHYSICAL EXAMINATION:   GENERAL:  Exam today demonstrates a pleasant and cooperative 62-year-old man.   VITAL SIGNS:  His blood pressure was 138/80, his heart rate is 88,  his height is 1.8 meters, his weight is 80.5 kg.  His BMI is 28.0.   LUNGS:  Clear to percussion and auscultation.   CARDIOVASCULAR:  Shows a normal S1 with a normal S2.  There is no S3.  There is no murmur, rub or click.      LABORATORY STUDIES:  His most recent LDL cholesterol was 58.  His most recent ECG in 2017 showed a normal sinus rhythm with normal axis and normal intervals.      ASSESSMENT:  Mr. Buchanan has occasional episodes of palpitations, but no sustained tachycardia or syncope.  He also describes occasional vague chest discomfort.  Although the patient's symptoms do not sound typical for angina, objective testing with stress echo would be reasonable in view of the patient's upcoming herniorrhaphy.  In addition, screening for atrial fibrillation would be reasonable.      RECOMMENDATIONS:   1.  Continue Mediterranean style weight loss diet.   2.  Regular exercise.   3.  Close followup of hypertension.  If the patient's systolic pressure remains greater than 130 longterm, I would consider increasing the patient's losartan from 50 to 100 mg daily.   4.  ZIO Patch monitor to exclude atrial fibrillation.  In the absence of sustained atrial arrhythmia or VT, I would not advise any further intervention.   5.  Stress echocardiogram.  Would reserve invasive evaluation only for evidence of significant ischemia.      We greatly appreciate the opportunity to see your patient, Mr. Jazzy Buchanan.      cc:   [place] [address]         MERLIN ESTRADA MD             D: 2018   T: 2018   MT: MARKUS      Name:     JAZZY BUCHANAN   MRN:      [id number]        Account:      [id number]   :      1955           Service Date: 2018      Document: [id number]           Outpatient Encounter Prescriptions as of 2018   Medication Sig Dispense Refill     albuterol (PROAIR HFA/PROVENTIL HFA/VENTOLIN HFA) 108 (90 BASE) MCG/ACT Inhaler  Inhale 2 puffs into the lungs every 6 hours as needed for shortness of breath / dyspnea or wheezing 1 Inhaler 1     aspirin 81 MG tablet Take 1 tablet by mouth daily. 90 tablet 3     atorvastatin (LIPITOR) 80 MG tablet Take 1 tablet (80 mg) by mouth daily 90 tablet 3     beclomethasone (QVAR) 40 MCG/ACT Inhaler Inhale 2 puffs into the lungs 2 times daily 1 Inhaler 0     losartan (COZAAR) 50 MG tablet TAKE ONE TABLET BY MOUTH EVERY DAY 30 tablet 2     omeprazole (PRILOSEC) 20 MG CR capsule Take 1 capsule (20 mg) by mouth daily 90 capsule 3     tamsulosin (FLOMAX) 0.4 MG capsule Take 1 capsule (0.4 mg) by mouth daily 90 capsule 3     tolterodine (DETROL LA) 2 MG 24 hr capsule Take 1 capsule (2 mg) by mouth daily 90 capsule 3     fluticasone (FLONASE) 50 MCG/ACT spray Spray 1-2 sprays into both nostrils daily (Patient not taking: Reported on 5/3/2018) 16 g 11     nitroglycerin (NITROSTAT) 0.4 MG SL tablet Place 1 tablet (0.4 mg) under the tongue every 5 minutes as needed for chest pain (Patient not taking: Reported on 7/2/2018) 25 tablet 0     No facility-administered encounter medications on file as of 7/2/2018.        Again, thank you for allowing me to participate in the care of your patient.      Sincerely,    Rasta Millan MD     [facility]

## 2018-07-02 NOTE — LETTER
7/2/2018    [place]  [address] [place] [address]    RE: Keegan Buchanan       Dear Colleague,    I had the pleasure of seeing Keegan Buchanan in the [facility].    HISTORY OF PRESENT ILLNESS:  Occasional palpitations. Atypical chest pain, old CABG.    Orders this Visit:  Orders Placed This Encounter   Procedures     Zio Patch Monitor     Exercise Stress Echocardiogram     No orders of the defined types were placed in this encounter.    There are no discontinued medications.    Encounter Diagnoses   Name Primary?     Palpitations Yes     Coronary artery disease involving native coronary artery of native heart, angina presence unspecified      Benign essential hypertension      Atypical chest pain        CURRENT MEDICATIONS:  Current Outpatient Prescriptions   Medication Sig Dispense Refill     albuterol (PROAIR HFA/PROVENTIL HFA/VENTOLIN HFA) 108 (90 BASE) MCG/ACT Inhaler Inhale 2 puffs into the lungs every 6 hours as needed for shortness of breath / dyspnea or wheezing 1 Inhaler 1     aspirin 81 MG tablet Take 1 tablet by mouth daily. 90 tablet 3     atorvastatin (LIPITOR) 80 MG tablet Take 1 tablet (80 mg) by mouth daily 90 tablet 3     beclomethasone (QVAR) 40 MCG/ACT Inhaler Inhale 2 puffs into the lungs 2 times daily 1 Inhaler 0     losartan (COZAAR) 50 MG tablet TAKE ONE TABLET BY MOUTH EVERY DAY 30 tablet 2     omeprazole (PRILOSEC) 20 MG CR capsule Take 1 capsule (20 mg) by mouth daily 90 capsule 3     tamsulosin (FLOMAX) 0.4 MG capsule Take 1 capsule (0.4 mg) by mouth daily 90 capsule 3     tolterodine (DETROL LA) 2 MG 24 hr capsule Take 1 capsule (2 mg) by mouth daily 90 capsule 3     fluticasone (FLONASE) 50 MCG/ACT spray Spray 1-2 sprays into both nostrils daily (Patient not taking: Reported on 5/3/2018) 16 g 11     nitroglycerin (NITROSTAT) 0.4 MG SL tablet Place 1 tablet (0.4 mg) under the tongue every 5 minutes as needed for chest pain  "(Patient not taking: Reported on 7/2/2018) 25 tablet 0     [DISCONTINUED] tolterodine (DETROL LA) 2 MG 24 hr capsule Take 2 mg by mouth 2 times daily.         ALLERGIES     Allergies   Allergen Reactions     Augmentin GI Disturbance     Penicillins      GI upset and diarrhea       PAST MEDICAL, SURGICAL, FAMILY, SOCIAL HISTORY:  History was reviewed and updated as needed, see medical record.    Review of Systems:  A 12-point review of systems was completed, see medical record for detailed review of systems information.    Physical Exam:  Vitals: /86 (BP Location: Left arm, Patient Position: Fowlers, Cuff Size: Adult Large)  Pulse 88  Ht 1.778 m (5' 10\")  Wt 88.5 kg (195 lb)  SpO2 94%  BMI 27.98 kg/m2    Constitutional:  cooperative, alert and oriented, well developed, well nourished, in no acute distress        Skin:           Head:  normocephalic, no masses or lesions        Eyes:  pupils equal and round, conjunctivae and lids unremarkable, sclera white, no xanthalasma, EOMS intact, no nystagmus        ENT:  no pallor or cyanosis, dentition good        Neck:  carotid pulses are full and equal bilaterally, JVP normal, no carotid bruit        Chest:           Cardiac: regular rhythm, normal S1/S2, no S3 or S4, apical impulse not displaced, no murmurs, gallops or rubs                  Abdomen:  abdomen soft, non-tender, BS normoactive, no mass, no HSM, no bruits        Vascular: pulses full and equal, no bruits auscultated                                      Extremities and Back:  no deformities, clubbing, cyanosis, erythema observed        Neurological:  no gross motor deficits        ASSESSMENT:  Wants formal evaluation of palpitations and atypical chest pain       RECOMMENDATIONS:  Ziopatch monitor   Stress echocardiogram      Recent Lab Results:  LIPID RESULTS:  Lab Results   Component Value Date    CHOL 141 10/12/2017    HDL 40 10/12/2017    LDL 58 10/12/2017    TRIG 213 (H) 10/12/2017    CHOLHDLRATIO " 4.6 11/16/2015       LIVER ENZYME RESULTS:  Lab Results   Component Value Date    AST 20 10/12/2017    ALT 39 10/12/2017       CBC RESULTS:  Lab Results   Component Value Date    WBC 9.0 11/16/2012    RBC 5.52 11/16/2012    HGB 16.2 11/16/2012    HCT 47.2 11/16/2012    MCV 86 11/16/2012    MCH 29.3 11/16/2012    MCHC 34.3 11/16/2012    RDW 13.7 11/16/2012     11/16/2012       BMP RESULTS:  Lab Results   Component Value Date     (H) 10/12/2017    POTASSIUM 4.4 10/12/2017    CHLORIDE 106 10/12/2017    CO2 24 10/12/2017    ANIONGAP 15 (H) 10/12/2017    GLC 99 10/12/2017    BUN 14 10/12/2017    CR 0.82 10/12/2017    GFRESTIMATED >90 10/12/2017    GFRESTBLACK >90 10/12/2017    ALFREDA 9.0 10/12/2017        A1C RESULTS:  Lab Results   Component Value Date    A1C 5.6 12/27/2012       INR RESULTS:  Lab Results   Component Value Date    INR 0.97 11/14/2012       We greatly appreciate the opportunity to be involved in the care of your patient, Keegan Buchanan.    Sincerely,  Rasta Millan MD      CC  MD [id number]  [place] [address]                                                                       Thank you for allowing me to participate in the care of your patient.      Sincerely,     Rasta Millan MD     [facility]    cc:   Rasta Millan MD  [id number]  [place] [address]

## 2018-07-02 NOTE — PROGRESS NOTES
HISTORY OF PRESENT ILLNESS:  Occasional palpitations. Atypical chest pain, old CABG.    Orders this Visit:  Orders Placed This Encounter   Procedures     Zio Patch Monitor     Exercise Stress Echocardiogram     No orders of the defined types were placed in this encounter.    There are no discontinued medications.    Encounter Diagnoses   Name Primary?     Palpitations Yes     Coronary artery disease involving native coronary artery of native heart, angina presence unspecified      Benign essential hypertension      Atypical chest pain        CURRENT MEDICATIONS:  Current Outpatient Prescriptions   Medication Sig Dispense Refill     albuterol (PROAIR HFA/PROVENTIL HFA/VENTOLIN HFA) 108 (90 BASE) MCG/ACT Inhaler Inhale 2 puffs into the lungs every 6 hours as needed for shortness of breath / dyspnea or wheezing 1 Inhaler 1     aspirin 81 MG tablet Take 1 tablet by mouth daily. 90 tablet 3     atorvastatin (LIPITOR) 80 MG tablet Take 1 tablet (80 mg) by mouth daily 90 tablet 3     beclomethasone (QVAR) 40 MCG/ACT Inhaler Inhale 2 puffs into the lungs 2 times daily 1 Inhaler 0     losartan (COZAAR) 50 MG tablet TAKE ONE TABLET BY MOUTH EVERY DAY 30 tablet 2     omeprazole (PRILOSEC) 20 MG CR capsule Take 1 capsule (20 mg) by mouth daily 90 capsule 3     tamsulosin (FLOMAX) 0.4 MG capsule Take 1 capsule (0.4 mg) by mouth daily 90 capsule 3     tolterodine (DETROL LA) 2 MG 24 hr capsule Take 1 capsule (2 mg) by mouth daily 90 capsule 3     fluticasone (FLONASE) 50 MCG/ACT spray Spray 1-2 sprays into both nostrils daily (Patient not taking: Reported on 5/3/2018) 16 g 11     nitroglycerin (NITROSTAT) 0.4 MG SL tablet Place 1 tablet (0.4 mg) under the tongue every 5 minutes as needed for chest pain (Patient not taking: Reported on 7/2/2018) 25 tablet 0     [DISCONTINUED] tolterodine (DETROL LA) 2 MG 24 hr capsule Take 2 mg by mouth 2 times daily.         ALLERGIES     Allergies   Allergen Reactions     Augmentin GI  "Disturbance     Penicillins      GI upset and diarrhea       PAST MEDICAL, SURGICAL, FAMILY, SOCIAL HISTORY:  History was reviewed and updated as needed, see medical record.    Review of Systems:  A 12-point review of systems was completed, see medical record for detailed review of systems information.    Physical Exam:  Vitals: /86 (BP Location: Left arm, Patient Position: Fowlers, Cuff Size: Adult Large)  Pulse 88  Ht 1.778 m (5' 10\")  Wt 88.5 kg (195 lb)  SpO2 94%  BMI 27.98 kg/m2    Constitutional:  cooperative, alert and oriented, well developed, well nourished, in no acute distress        Skin:           Head:  normocephalic, no masses or lesions        Eyes:  pupils equal and round, conjunctivae and lids unremarkable, sclera white, no xanthalasma, EOMS intact, no nystagmus        ENT:  no pallor or cyanosis, dentition good        Neck:  carotid pulses are full and equal bilaterally, JVP normal, no carotid bruit        Chest:           Cardiac: regular rhythm, normal S1/S2, no S3 or S4, apical impulse not displaced, no murmurs, gallops or rubs                  Abdomen:  abdomen soft, non-tender, BS normoactive, no mass, no HSM, no bruits        Vascular: pulses full and equal, no bruits auscultated                                      Extremities and Back:  no deformities, clubbing, cyanosis, erythema observed        Neurological:  no gross motor deficits        ASSESSMENT:  Wants formal evaluation of palpitations and atypical chest pain       RECOMMENDATIONS:  Ziopatch monitor   Stress echocardiogram      Recent Lab Results:  LIPID RESULTS:  Lab Results   Component Value Date    CHOL 141 10/12/2017    HDL 40 10/12/2017    LDL 58 10/12/2017    TRIG 213 (H) 10/12/2017    CHOLHDLRATIO 4.6 11/16/2015       LIVER ENZYME RESULTS:  Lab Results   Component Value Date    AST 20 10/12/2017    ALT 39 10/12/2017       CBC RESULTS:  Lab Results   Component Value Date    WBC 9.0 11/16/2012    RBC 5.52 11/16/2012 "    HGB 16.2 11/16/2012    HCT 47.2 11/16/2012    MCV 86 11/16/2012    MCH 29.3 11/16/2012    MCHC 34.3 11/16/2012    RDW 13.7 11/16/2012     11/16/2012       BMP RESULTS:  Lab Results   Component Value Date     (H) 10/12/2017    POTASSIUM 4.4 10/12/2017    CHLORIDE 106 10/12/2017    CO2 24 10/12/2017    ANIONGAP 15 (H) 10/12/2017    GLC 99 10/12/2017    BUN 14 10/12/2017    CR 0.82 10/12/2017    GFRESTIMATED >90 10/12/2017    GFRESTBLACK >90 10/12/2017    ALFREDA 9.0 10/12/2017        A1C RESULTS:  Lab Results   Component Value Date    A1C 5.6 12/27/2012       INR RESULTS:  Lab Results   Component Value Date    INR 0.97 11/14/2012       We greatly appreciate the opportunity to be involved in the care of your patient, Keegan TIRADO [place]el.    Sincerely,  Rasta Millan MD        Rasta Millan MD  [address]  [place] [address]

## 2018-07-02 NOTE — PROGRESS NOTES
Service Date: 07/02/2018      HISTORY OF PRESENT ILLNESS:  Keegan Buchanan, a 62-year-old man with coronary artery disease, hypertension, dyslipidemia, and benign prostatic hypertrophy was seen today at your request for followup.      Mr. Buchanan underwent implantation of overlapping 3.0 x 12 and 2.75 x 16 mm length everolimus-eluting PROMUS Premier stents in the mid-LAD in 2012 for management of unstable angina.  The left main, circumflex and dominant right coronary had atheromatous change with no significant focal narrowing.  A subsequent nuclear stress test in 2013 showed a normal ejection fraction with no evidence of ischemia, up to 15.2 METS on a Davey protocol.      Since I saw the patient last year, he has noted occasional episodes of skipped heartbeats.  These episodes occur primarily when at rest in the early morning hours.  There has been no sustained palpitations or syncope.      The patient also notes occasional vague discomfort in the throat during skipped heartbeats and is concerned about progression of coronary disease.  He does not describe typical angina as he had prior to his previous intervention.      Mr. Buchanan is scheduled for elective inguinal herniorrhaphy at sometime in the future.      PAST MEDICAL HISTORY:   1.  Coronary artery disease.   a.  History of unstable angina with everolimus-eluting stent implantation of 2 drug-eluting stents in the mid-LAD in 2012.  Negative nuclear stress test 2013.   2.  Hypertension.   3.  Dyslipidemia.   4.  Osteoarthritis.   5.  Benign prostatic hypertrophy.      PHYSICAL EXAMINATION:   GENERAL:  Exam today demonstrates a pleasant and cooperative 62-year-old man.   VITAL SIGNS:  His blood pressure was 138/80, his heart rate is 88, his height is 1.8 meters, his weight is 80.5 kg.  His BMI is 28.0.   LUNGS:  Clear to percussion and auscultation.   CARDIOVASCULAR:  Shows a normal S1 with a normal S2.  There is no S3.  There is no murmur, rub or click.       LABORATORY STUDIES:  His most recent LDL cholesterol was 58.  His most recent ECG in 2017 showed a normal sinus rhythm with normal axis and normal intervals.      ASSESSMENT:  Mr. Buchanan has occasional episodes of palpitations, but no sustained tachycardia or syncope.  He also describes occasional vague chest discomfort.  Although the patient's symptoms do not sound typical for angina, objective testing with stress echo would be reasonable in view of the patient's upcoming herniorrhaphy.  In addition, screening for atrial fibrillation would be reasonable.      RECOMMENDATIONS:   1.  Continue Mediterranean style weight loss diet.   2.  Regular exercise.   3.  Close followup of hypertension.  If the patient's systolic pressure remains greater than 130 longterm, I would consider increasing the patient's losartan from 50 to 100 mg daily.   4.  ZIO Patch monitor to exclude atrial fibrillation.  In the absence of sustained atrial arrhythmia or VT, I would not advise any further intervention.   5.  Stress echocardiogram.  Would reserve invasive evaluation only for evidence of significant ischemia.      We greatly appreciate the opportunity to see your patient, Mr. Jazzy Buchanan.      cc:   [place] [address]         MERLIN ESTRADA MD             D: 2018   T: 2018   MT: MARKUS      Name:     JAZZY BUCHANAN   MRN:      [id number]        Account:      [id number]   :      1955           Service Date: 2018      Document: [id number]

## 2018-07-02 NOTE — MR AVS SNAPSHOT
"              After Visit Summary   7/2/2018    Keegan Buchanan    MRN: [id number]           Patient Information     Date Of Birth          1955        Visit Information        Provider Department      7/2/2018 11:30 AM Rasta Millan MD [facility]        Today's Diagnoses     Palpitations    -  1    Coronary artery disease involving native coronary artery of native heart, angina presence unspecified        Benign essential hypertension        Atypical chest pain           Follow-ups after your visit        Future tests that were ordered for you today     Open Future Orders        Priority Expected Expires Ordered    Exercise Stress Echocardiogram Routine 7/9/2018 7/2/2019 7/2/2018    Zio Patch Monitor Routine 7/9/2018 7/2/2019 7/2/2018            Who to contact     If you have questions or need follow up information about today's clinic visit or your schedule please contact [facility] directly at [phone].  Normal or non-critical lab and imaging results will be communicated to you by MyChart, letter or phone within 4 business days after the clinic has received the results. If you do not hear from us within 7 days, please contact the clinic through MyChart or phone. If you have a critical or abnormal lab result, we will notify you by phone as soon as possible.  Submit refill requests through [employer] or call your pharmacy and they will forward the refill request to us. Please allow 3 business days for your refill to be completed.          Additional Information About Your Visit        Care EveryWhere ID     This is your Care EveryWhere ID. This could be used by other organizations to access your [facility] medical records  [id number]        Your Vitals Were     Pulse Height Pulse Oximetry BMI (Body Mass Index)          88 1.778 m (5' 10\") 94% 27.98 kg/m2         Blood Pressure from Last 3 Encounters:   07/02/18 " 138/80   06/25/18 (P) 138/82   06/19/18 138/74    Weight from Last 3 Encounters:   07/02/18 88.5 kg (195 lb)   05/03/18 88.8 kg (195 lb 11.2 oz)   04/23/18 88.8 kg (195 lb 12.8 oz)               Primary Care Provider Office Phone # Fax #    Fabio Isidro Stovall [phone] [address] [place] [address]        Equal Access to Services     [facility] : Hadii aad ku hadasho Soomaali, waaxda luqadaha, qaybta kaalmada adeegyada, waxay idiin hayaan adeeg kharaeron key . So [facility] [phone].    ATENCIÓN: Si habla español, tiene a black disposición servicios gratuitos de asistencia lingüística. LlAccess [facility] [phone].    We comply with applicable federal civil rights laws and [place] laws. We do not discriminate on the basis of race, color, national origin, age, disability, sex, sexual orientation, or gender identity.            Thank you!     Thank you for choosing [facility]  for your care. Our goal is always to provide you with excellent care. Hearing back from our patients is one way we can continue to improve our services. Please take a few minutes to complete the written survey that you may receive in the mail after your visit with us. Thank you!             Your Updated Medication List - Protect others around you: Learn how to safely use, store and throw away your medicines at www.disposemymeds.org.          This list is accurate as of 7/2/18 12:02 PM.  Always use your most recent med list.                   Brand Name Dispense Instructions for use Diagnosis    albuterol 108 (90 Base) MCG/ACT Inhaler    PROAIR HFA/PROVENTIL HFA/VENTOLIN HFA    1 Inhaler    Inhale 2 puffs into the lungs every 6 hours as needed for shortness of breath / dyspnea or wheezing    Acute bronchitis with symptoms > 10 days       aspirin 81 MG tablet     90 tablet    Take 1 tablet by mouth daily.    ACS (acute coronary syndrome) (H)       atorvastatin 80 MG tablet     LIPITOR    90 tablet    Take 1 tablet (80 mg) by mouth daily    Hyperlipidemia LDL goal <100       fluticasone 50 MCG/ACT spray    FLONASE    16 g    Spray 1-2 sprays into both nostrils daily    Nasal congestion       losartan 50 MG tablet    COZAAR    30 tablet    TAKE ONE TABLET BY MOUTH EVERY DAY    Hypertension goal BP (blood pressure) < 140/90       nitroGLYcerin 0.4 MG sublingual tablet    NITROSTAT    25 tablet    Place 1 tablet (0.4 mg) under the tongue every 5 minutes as needed for chest pain    ACS (acute coronary syndrome) (H)       omeprazole 20 MG CR capsule    priLOSEC    90 capsule    Take 1 capsule (20 mg) by mouth daily    Gastroesophageal reflux disease without esophagitis       QVAR 40 MCG/ACT Inhaler   Generic drug:  beclomethasone     1 Inhaler    Inhale 2 puffs into the lungs 2 times daily        tamsulosin 0.4 MG capsule    FLOMAX    90 capsule    Take 1 capsule (0.4 mg) by mouth daily    Benign prostatic hyperplasia with urinary obstruction       tolterodine 2 MG 24 hr capsule    DETROL LA    90 capsule    Take 1 capsule (2 mg) by mouth daily    Urinary frequency

## 2018-07-11 ENCOUNTER — HOSPITAL ENCOUNTER (OUTPATIENT)
Dept: CARDIOLOGY | Facility: CLINIC | Age: 63
End: 2018-07-11
Attending: INTERNAL MEDICINE
Payer: COMMERCIAL

## 2018-07-11 ENCOUNTER — HOSPITAL ENCOUNTER (OUTPATIENT)
Dept: CARDIOLOGY | Facility: CLINIC | Age: 63
Discharge: HOME | End: 2018-07-11
Attending: INTERNAL MEDICINE | Admitting: INTERNAL MEDICINE
Payer: COMMERCIAL

## 2018-07-11 DIAGNOSIS — R07.89 ATYPICAL CHEST PAIN: ICD-10-CM

## 2018-07-11 DIAGNOSIS — R00.2 PALPITATIONS: ICD-10-CM

## 2018-07-11 DIAGNOSIS — I25.10 CORONARY ARTERY DISEASE INVOLVING NATIVE CORONARY ARTERY OF NATIVE HEART, ANGINA PRESENCE UNSPECIFIED: ICD-10-CM

## 2018-07-11 PROCEDURE — 93018 CV STRESS TEST I&R ONLY: CPT | Performed by: INTERNAL MEDICINE

## 2018-07-11 PROCEDURE — 93350 STRESS TTE ONLY: CPT | Mod: 26 | Performed by: INTERNAL MEDICINE

## 2018-07-11 PROCEDURE — 93016 CV STRESS TEST SUPVJ ONLY: CPT | Performed by: INTERNAL MEDICINE

## 2018-07-11 PROCEDURE — 93017 CV STRESS TEST TRACING ONLY: CPT | Performed by: INTERNAL MEDICINE

## 2018-07-11 PROCEDURE — 0298T ZZC EXT ECG > 48HR TO 21 DAY REVIEW AND INTERPRETATN: CPT | Performed by: INTERNAL MEDICINE

## 2018-07-11 PROCEDURE — 93321 DOPPLER ECHO F-UP/LMTD STD: CPT | Mod: 26 | Performed by: INTERNAL MEDICINE

## 2018-07-11 PROCEDURE — 25500064 ZZH RX 255 OP 636: Performed by: INTERNAL MEDICINE

## 2018-07-11 PROCEDURE — 0296T ZIO PATCH HOLTER: CPT

## 2018-07-11 PROCEDURE — 93325 DOPPLER ECHO COLOR FLOW MAPG: CPT | Mod: 26 | Performed by: INTERNAL MEDICINE

## 2018-07-11 RX ADMIN — HUMAN ALBUMIN MICROSPHERES AND PERFLUTREN 5 ML: 10; .22 INJECTION, SOLUTION INTRAVENOUS at 10:39

## 2018-07-12 ENCOUNTER — TELEPHONE (OUTPATIENT)
Dept: CARDIOLOGY | Facility: CLINIC | Age: 63
End: 2018-07-12

## 2018-07-12 NOTE — TELEPHONE ENCOUNTER
----- Message from Rasta Millan MD sent at 7/11/2018  4:36 PM CDT -----  Regarding: stress echo  The stress echo looks good. No further cardiac testing needed prior to his hernia surgery. We noted his aortic root and ascending aorta are moderately dilated, but there has been no significant change since the last TTE 4 years ago. We will simply keep an eye on the aorta with occasional TTEs. No need for intervention at this time as the risk of aortic rupture is less than the risk of surgery.

## 2018-07-17 ENCOUNTER — TELEPHONE (OUTPATIENT)
Dept: SURGERY | Facility: CLINIC | Age: 63
End: 2018-07-17

## 2018-07-17 DIAGNOSIS — K40.90 LEFT INGUINAL HERNIA: Primary | ICD-10-CM

## 2018-07-19 DIAGNOSIS — I10 HYPERTENSION GOAL BP (BLOOD PRESSURE) < 140/90: ICD-10-CM

## 2018-07-19 NOTE — PROGRESS NOTES
[facility]  [address] [address]  [phone]  Dept: [phone]    PRE-OP EVALUATION:  Today's date: 2018    Keegan Buchanan (: 1955) presents for pre-operative evaluation assessment as requested by Dr. Ireland.  He requires evaluation and anesthesia risk assessment prior to undergoing surgery/procedure for treatment of  left inguinal hernia.    Fax number for surgical facility:   Primary Physician: Fabio Stovall  Type of Anesthesia Anticipated: to be determined    Patient has a Health Care Directive or Living Will:  NO but has at home needs to be signed, informed patient he can bring to surgery appointment.  The 10-year ASCVD risk score (Victor MICHAEL Jr, et al., 2013) is: 9.7%    Values used to calculate the score:      Age: 62 years      Sex: Male      Is Non- : No      Diabetic: No      Tobacco smoker: No      Systolic Blood Pressure: 124 mmHg      Is BP treated: Yes      HDL Cholesterol: 40 mg/dL      Total Cholesterol: 141 mg/dL  Patient is eligible for use of low-dose aspirin for primary prevention of heart attack and stroke.  Provider has discussed aspirin with patient and our decision was:     Prescribe:  Daily low-dose aspirin recommended for primary prevention, patient agrees with plan.        Preop Questions 2018   Who is doing your surgery? dr Ireland   What are you having done? hernia surgery   Date of Surgery/Procedure: 2018   Facility or Hospital where procedure/surgery will be performed: [facility]   1.  Do you have a history of Heart attack, stroke, stent, coronary bypass surgery, or other heart surgery? YES  - had stent placement in .  Sees cardiology regularly.   2.  Do you ever have any pain or discomfort in your chest? YES - with irregular heartbeats, known to be PVCs   3.  Do you have a history of  Heart Failure? No   4.   Are you troubled by shortness of breath when:  walking on a level  surface, or up a slight hill, or at night? No   5.  Do you currently have a cold, bronchitis or other respiratory infection? No   6.  Do you have a cough, shortness of breath, or wheezing? No   7.  Do you sometimes get pains in the calves of your legs when you walk? No   8. Do you or anyone in your family have previous history of blood clots? No   9.  Do you or does anyone in your family have a serious bleeding problem such as prolonged bleeding following surgeries or cuts? No   10. Have you ever had problems with anemia or been told to take iron pills? No   11. Have you had any abnormal blood loss such as black, tarry or bloody stools? No   12. Have you ever had a blood transfusion? No   13. Have you or any of your relatives ever had problems with anesthesia? No   14. Do you have sleep apnea, excessive snoring or daytime drowsiness? YES - will occasionally have daytime drowsiness, especially if he dreams a lot.   15. Do you have any prosthetic heart valves? No   16. Do you have prosthetic joints? No         HPI:     HPI related to upcoming procedure: noted to have a hernia for a while.  Formally diagnosed earlier this year.  Has been using a truss, but ready to have it repaired.  It's bothering him more of late.        See problem list for active medical problems.  Problems all longstanding and stable, except as noted/documented.  See ROS for pertinent symptoms related to these conditions.                                                                                                                                                          .  CAD - Patient has a longstanding history of moderate-severe CAD. Patient denies recent chest pain or NTG use, denies exercise induced dyspnea or PND. Last Stress test 7/2018, EKG NSR with occ PVCs.                                                                                                                                                    .  HYPERLIPIDEMIA - Patient has a  long history of significant Hyperlipidemia requiring medication for treatment with recent good control. Patient reports no problems or side effects with the medication.                                                                                                                                                       .  HYPERTENSION - Patient has longstanding history of HTN , currently denies any symptoms referable to elevated blood pressure. Specifically denies chest pain, palpitations, dyspnea, orthopnea, PND or peripheral edema. Blood pressure readings have been in normal range. Current medication regimen is as listed below. Patient denies any side effects of medication.                                                                                                                                                                                          .    MEDICAL HISTORY:     Patient Active Problem List    Diagnosis Date Noted     Left inguinal hernia 01/18/2018     Priority: Medium     Piriformis syndrome of left side 02/10/2016     Priority: Medium     Nonallopathic lesion of sacral region 02/10/2016     Priority: Medium     Nonallopathic lesion of thoracic region 02/10/2016     Priority: Medium     Nonallopathic lesion of cervical region 02/10/2016     Priority: Medium     Cervicalgia 02/10/2016     Priority: Medium     Chronic airway obstruction (H) 08/20/2014     Priority: Medium     Problem list name updated by automated process. Provider to review       Advanced directives, counseling/discussion 02/03/2014     Priority: Medium     GERD (gastroesophageal reflux disease) 02/05/2013     Priority: Medium     Benign prostatic hyperplasia with urinary obstruction 12/27/2012     Priority: Medium     Acute coronary syndrome (H) 11/14/2012     Priority: Medium     CAD (coronary artery disease) 11/14/2012     Priority: Medium     Arthritis of elbow, right 04/09/2012     Priority: Medium     Cubital tunnel syndrome  - right 04/09/2012     Priority: Medium     Hyperlipidemia LDL goal <100 10/31/2010     Priority: Medium     Impotence of organic origin 03/19/2008     Priority: Medium     Trigger finger, acquired 06/02/2005     Priority: Medium     Problem list name updated by automated process. Provider to review       Hypertension goal BP (blood pressure) < 140/90 04/23/2003     Priority: Medium      Past Medical History:   Diagnosis Date     Hypercholesteremia      Pain in joint, shoulder region     right shoulder separation     Unspecified essential hypertension      Past Surgical History:   Procedure Laterality Date     COLONOSCOPY N/A 6/12/2015    Procedure: COMBINED COLONOSCOPY, SINGLE OR MULTIPLE BIOPSY/POLYPECTOMY BY BIOPSY;  Surgeon: Herman Rebollar MD;  Location: [facility]     H STENT PROCED  2012     HC REMOVE TONSILS/ADENOIDS,<11 Y/O      T & A <12y.o.     REMOVAL OF SPERM DUCT(S)  05/06/88    Vasectomy     Current Outpatient Prescriptions   Medication Sig Dispense Refill     albuterol (PROAIR HFA/PROVENTIL HFA/VENTOLIN HFA) 108 (90 BASE) MCG/ACT Inhaler Inhale 2 puffs into the lungs every 6 hours as needed for shortness of breath / dyspnea or wheezing 1 Inhaler 1     aspirin 81 MG tablet Take 1 tablet by mouth daily. 90 tablet 3     atorvastatin (LIPITOR) 80 MG tablet Take 1 tablet (80 mg) by mouth daily 90 tablet 3     fluticasone (FLONASE) 50 MCG/ACT spray Spray 1-2 sprays into both nostrils daily 16 g 11     losartan (COZAAR) 50 MG tablet TAKE ONE TABLET BY MOUTH EVERY DAY 90 tablet 2     nitroglycerin (NITROSTAT) 0.4 MG SL tablet Place 1 tablet (0.4 mg) under the tongue every 5 minutes as needed for chest pain 25 tablet 0     omeprazole (PRILOSEC) 20 MG CR capsule Take 1 capsule (20 mg) by mouth daily 90 capsule 3     tamsulosin (FLOMAX) 0.4 MG capsule Take 1 capsule (0.4 mg) by mouth daily 90 capsule 3     tolterodine (DETROL LA) 2 MG 24 hr capsule Take 1 capsule (2 mg) by mouth daily 90 capsule 3      beclomethasone (QVAR) 40 MCG/ACT Inhaler Inhale 2 puffs into the lungs as needed Like in real humid weather he'll try using it he said. 1 Inhaler 0     [DISCONTINUED] tolterodine (DETROL LA) 2 MG 24 hr capsule Take 2 mg by mouth 2 times daily.       OTC products: None, except as noted above    Allergies   Allergen Reactions     Augmentin GI Disturbance     Penicillins      GI upset and diarrhea      Latex Allergy: NO    Social History   Substance Use Topics     Smoking status: Former Smoker     Packs/day: 1.00     Years: 15.00     Smokeless tobacco: Former User     Types: Chew     Quit date: 1/20/1987      Comment: quit 25 years ago.     Alcohol use Yes      Comment: 1 -2 monthly     History   Drug Use No       REVIEW OF SYSTEMS:   CONSTITUTIONAL: NEGATIVE for fever, chills, change in weight  INTEGUMENTARY/SKIN: NEGATIVE for worrisome rashes, moles or lesions  EYES: NEGATIVE for vision changes or irritation  ENT/MOUTH: NEGATIVE for ear, mouth and throat problems  RESP: NEGATIVE for significant cough or SOB  CV: notes palpitations with his PVCs  GI: NEGATIVE for nausea, abdominal pain, heartburn, or change in bowel habits  : NEGATIVE for frequency, dysuria, or hematuria  MUSCULOSKELETAL: NEGATIVE for significant arthralgias or myalgia  NEURO: NEGATIVE for weakness, dizziness or paresthesias  ENDOCRINE: NEGATIVE for temperature intolerance, skin/hair changes  HEME: NEGATIVE for bleeding problems  PSYCHIATRIC: NEGATIVE for changes in mood or affect    EXAM:   /78 (BP Location: Right arm, Patient Position: Chair, Cuff Size: Adult Large)  Pulse 84  Temp 97.8  F (36.6  C) (Temporal)  Resp 16  Wt 196 lb 14.4 oz (89.3 kg)  SpO2 96%  BMI 28.25 kg/m2    GENERAL APPEARANCE: healthy, alert and no distress     EYES: EOMI,  PERRL     HENT: ear canals and TM's normal and nose and mouth without ulcers or lesions     NECK: no adenopathy, no asymmetry, masses, or scars and thyroid normal to palpation     RESP: lungs  clear to auscultation - no rales, rhonchi or wheezes     CV: regular rates and rhythm, normal S1 S2, no S3 or S4 and no murmur, click or rub     ABDOMEN:  soft, nontender, no HSM or masses and bowel sounds normal     MS: extremities normal- no gross deformities noted, no evidence of inflammation in joints, FROM in all extremities.     SKIN: no suspicious lesions or rashes     NEURO: Normal strength and tone, sensory exam grossly normal, mentation intact and speech normal     PSYCH: mentation appears normal. and affect normal/bright     LYMPHATICS: No cervical adenopathy    DIAGNOSTICS:   EKG: appears normal, NSR, normal axis, normal intervals, no acute ST/T changes c/w ischemia, no LVH by voltage criteria, occasional PVC noted, unifocal    Recent Labs   Lab Test  10/12/17   0830  10/10/16   0828   12/27/12   0838  11/16/12   0518  11/15/12   0450   11/14/12 2015   HGB   --    --    --    --   16.2  15.8   < >  16.1   PLT   --    --    --    --   228  230   < >  245   INR   --    --    --    --    --    --    --   0.97   NA  145*  139   < >   --   141   --    --   138   POTASSIUM  4.4  3.9   < >   --   4.5   --    --   4.2   CR  0.82  0.85   < >   --   0.98   --    --   0.83   A1C   --    --    --   5.6   --    --    --    --     < > = values in this interval not displayed.        IMPRESSION:   Reason for surgery/procedure: left inguinal hernia  Diagnosis/reason for consult: perioperative risk evaluation for left inguinal herniorraphy    The proposed surgical procedure is considered INTERMEDIATE risk.    REVISED CARDIAC RISK INDEX  The patient has the following serious cardiovascular risks for perioperative complications such as (MI, PE, VFib and 3  AV Block):  Coronary Artery Disease (MI, positive stress test, angina, Qs on EKG)  INTERPRETATION: 1 risks: Class II (low risk - 0.9% complication rate)    The patient has the following additional risks for perioperative complications:  No identified additional  risks      ICD-10-CM    1. Preop general physical exam Z01.818 Basic metabolic panel   2. Left inguinal hernia K40.90 Basic metabolic panel   3. Coronary artery disease involving native heart without angina pectoris, unspecified vessel or lesion type I25.10    4. Chronic obstructive pulmonary disease, unspecified COPD type (H) J44.9    5. Hypertension goal BP (blood pressure) < 140/90 I10 Basic metabolic panel   6. Gastroesophageal reflux disease, esophagitis presence not specified K21.9    7. Benign prostatic hyperplasia with urinary obstruction N40.1     N13.8    8. Hyperlipidemia LDL goal <100 E78.5        RECOMMENDATIONS:       Cardiovascular Risk  Performs 4 METs exercise without symptoms (Climb a flight of stairs) .       --Patient is to take all scheduled medications on the day of surgery EXCEPT for modifications listed below.    Anticoagulant or Antiplatelet Medication Use  ASPIRIN: Patient is at increased risk of thrombosis (e.g. MI, CVA) and aspirin 81 mg daily should be continued in the perioperative period        ACE Inhibitor or Angiotensin Receptor Blocker (ARB) Use  Ace inhibitor or Angiotensin Receptor Blocker (ARB) and should HOLD this medication for the 24 hours prior to surgery.             Signed Electronically by: Fabio Stovall MD, MD    Copy of this evaluation report is provided to requesting physician.    [facility] Preop Guidelines    Revised Cardiac Risk Index

## 2018-07-19 NOTE — PATIENT INSTRUCTIONS
Before Your Surgery      Call your surgeon if there is any change in your health. This includes signs of a cold or flu (such as a sore throat, runny nose, cough, rash or fever).    Do not smoke, drink alcohol or take over the counter medicine (unless your surgeon or primary care doctor tells you to) for the 24 hours before and after surgery.    If you take prescribed drugs: Follow your doctor s orders about which medicines to take and which to stop until after surgery.    Eating and drinking prior to surgery: follow the instructions from your surgeon    Take a shower or bath the night before surgery. Use the soap your surgeon gave you to gently clean your skin. If you do not have soap from your surgeon, use your regular soap. Do not shave or scrub the surgery site.  Wear clean pajamas and have clean sheets on your bed.     Skip your aspirin for 5 days before surgery.  Resume after surgery if no serious bleeding.    Skip your losartan the morning of surgery.    Be sure to take your inhalers and your omeprazole the morning of surgery.  Other medications are up to you.    Contact us or return if questions or concerns.     Have a nice day!        Dr. Stovall

## 2018-07-20 RX ORDER — LOSARTAN POTASSIUM 50 MG/1
TABLET ORAL
Qty: 90 TABLET | Refills: 2 | Status: SHIPPED | OUTPATIENT
Start: 2018-07-20 | End: 2019-04-05 | Stop reason: ALTCHOICE

## 2018-07-20 NOTE — TELEPHONE ENCOUNTER
"Requested Prescriptions   Pending Prescriptions Disp Refills     losartan (COZAAR) 50 MG tablet [Pharmacy Med Name: LOSARTAN POTASSIUM 50MG TABS] 30 tablet 2     Sig: TAKE ONE TABLET BY MOUTH EVERY DAY    Angiotensin-II Receptors Passed    7/19/2018 10:24 AM       Passed - Blood pressure under 140/90 in past 12 months    BP Readings from Last 3 Encounters:   07/02/18 138/80   06/25/18 (P) 138/82   06/19/18 138/74          Passed - Recent (12 mo) or future (30 days) visit within the authorizing provider's specialty    Patient had office visit in the last 12 months or has a visit in the next 30 days with authorizing provider or within the authorizing provider's specialty.  See \"Patient Info\" tab in inbasket, or \"Choose Columns\" in Meds & Orders section of the refill encounter.           Passed - Patient is age 18 or older       Passed - Normal serum creatinine on file in past 12 months    Recent Labs   Lab Test  10/12/17   0830   CR  0.82          Passed - Normal serum potassium on file in past 12 months    Recent Labs   Lab Test  10/12/17   0830   POTASSIUM  4.4           Last ov 05/03/2018  Last filled 04/19/2018    Prescription approved per [facility] Refill Protocol.  Dorian Toscano, RN, BSN        "

## 2018-07-23 ENCOUNTER — OFFICE VISIT (OUTPATIENT)
Dept: FAMILY MEDICINE | Facility: OTHER | Age: 63
End: 2018-07-23
Payer: COMMERCIAL

## 2018-07-23 VITALS
BODY MASS INDEX: 28.25 KG/M2 | SYSTOLIC BLOOD PRESSURE: 124 MMHG | HEART RATE: 84 BPM | WEIGHT: 196.9 LBS | DIASTOLIC BLOOD PRESSURE: 78 MMHG | TEMPERATURE: 97.8 F | RESPIRATION RATE: 16 BRPM | OXYGEN SATURATION: 96 %

## 2018-07-23 DIAGNOSIS — Z01.818 PREOP GENERAL PHYSICAL EXAM: Primary | ICD-10-CM

## 2018-07-23 DIAGNOSIS — E78.5 HYPERLIPIDEMIA LDL GOAL <100: ICD-10-CM

## 2018-07-23 DIAGNOSIS — N40.1 BENIGN PROSTATIC HYPERPLASIA WITH URINARY OBSTRUCTION: ICD-10-CM

## 2018-07-23 DIAGNOSIS — J44.9 CHRONIC OBSTRUCTIVE PULMONARY DISEASE, UNSPECIFIED COPD TYPE (H): ICD-10-CM

## 2018-07-23 DIAGNOSIS — K21.9 GASTROESOPHAGEAL REFLUX DISEASE, ESOPHAGITIS PRESENCE NOT SPECIFIED: ICD-10-CM

## 2018-07-23 DIAGNOSIS — K40.90 LEFT INGUINAL HERNIA: ICD-10-CM

## 2018-07-23 DIAGNOSIS — I10 HYPERTENSION GOAL BP (BLOOD PRESSURE) < 140/90: ICD-10-CM

## 2018-07-23 DIAGNOSIS — N13.8 BENIGN PROSTATIC HYPERPLASIA WITH URINARY OBSTRUCTION: ICD-10-CM

## 2018-07-23 DIAGNOSIS — I25.10 CORONARY ARTERY DISEASE INVOLVING NATIVE HEART WITHOUT ANGINA PECTORIS, UNSPECIFIED VESSEL OR LESION TYPE: ICD-10-CM

## 2018-07-23 LAB
ANION GAP SERPL CALCULATED.3IONS-SCNC: 10 MMOL/L (ref 3–14)
BUN SERPL-MCNC: 14 MG/DL (ref 7–30)
CALCIUM SERPL-MCNC: 8.6 MG/DL (ref 8.5–10.1)
CHLORIDE SERPL-SCNC: 106 MMOL/L (ref 94–109)
CO2 SERPL-SCNC: 25 MMOL/L (ref 20–32)
CREAT SERPL-MCNC: 0.96 MG/DL (ref 0.66–1.25)
GFR SERPL CREATININE-BSD FRML MDRD: 79 ML/MIN/1.7M2
GLUCOSE SERPL-MCNC: 81 MG/DL (ref 70–99)
POTASSIUM SERPL-SCNC: 3.6 MMOL/L (ref 3.4–5.3)
SODIUM SERPL-SCNC: 141 MMOL/L (ref 133–144)

## 2018-07-23 PROCEDURE — 36415 COLL VENOUS BLD VENIPUNCTURE: CPT | Performed by: FAMILY MEDICINE

## 2018-07-23 PROCEDURE — 99215 OFFICE O/P EST HI 40 MIN: CPT | Performed by: FAMILY MEDICINE

## 2018-07-23 PROCEDURE — 80048 BASIC METABOLIC PNL TOTAL CA: CPT | Performed by: FAMILY MEDICINE

## 2018-07-23 ASSESSMENT — PAIN SCALES - GENERAL: PAINLEVEL: NO PAIN (0)

## 2018-07-23 NOTE — MR AVS SNAPSHOT
After Visit Summary   7/23/2018    Keegan Buchanan    MRN: [id number]           Patient Information     Date Of Birth          1955        Visit Information        Provider Department      7/23/2018 2:45 PM Fabio Stovall MD [facility]'s Diagnoses     Preop general physical exam    -  1    Left inguinal hernia        Coronary artery disease involving native heart without angina pectoris, unspecified vessel or lesion type        Chronic obstructive pulmonary disease, unspecified COPD type (H)        Hypertension goal BP (blood pressure) < 140/90        Gastroesophageal reflux disease, esophagitis presence not specified        Benign prostatic hyperplasia with urinary obstruction        Hyperlipidemia LDL goal <100          Care Instructions      Before Your Surgery      Call your surgeon if there is any change in your health. This includes signs of a cold or flu (such as a sore throat, runny nose, cough, rash or fever).    Do not smoke, drink alcohol or take over the counter medicine (unless your surgeon or primary care doctor tells you to) for the 24 hours before and after surgery.    If you take prescribed drugs: Follow your doctor s orders about which medicines to take and which to stop until after surgery.    Eating and drinking prior to surgery: follow the instructions from your surgeon    Take a shower or bath the night before surgery. Use the soap your surgeon gave you to gently clean your skin. If you do not have soap from your surgeon, use your regular soap. Do not shave or scrub the surgery site.  Wear clean pajamas and have clean sheets on your bed.     Skip your aspirin for 5 days before surgery.  Resume after surgery if no serious bleeding.    Skip your losartan the morning of surgery.    Be sure to take your inhalers and your omeprazole the morning of surgery.  Other medications are up to you.    Contact us or return if questions or concerns.      Have a nice day!        Dr. Stovall           Follow-ups after your visit        Your next 10 appointments already scheduled     Aug 03, 2018   Procedure with Huber Ireland MD   [facility] ([facility])    [address] [place] [id number] [phone]           From [address]: Exit at [address] on south side of [place]. Turn right on [address]. Turn left at stoplight on [address]. [facility] will be in view two blocks ahead            Aug 13, 2018  7:30 AM CDT   [facility] Visit with Huber Ireland MD   [facility]    [address]  [facility] [address] [phone]              Who to contact     If you have questions or need follow up information about today's clinic visit or your schedule please contact [facility] directly at [phone].  Normal or non-critical lab and imaging results will be communicated to you by MyChart, letter or phone within 4 business days after the clinic has received the results. If you do not hear from us within 7 days, please contact the clinic through [employer] or phone. If you have a critical or abnormal lab result, we will notify you by phone as soon as possible.  Submit refill requests through [employer] or call your pharmacy and they will forward the refill request to us. Please allow 3 business days for your refill to be completed.          Additional Information About Your Visit        Care EveryWhere ID     This is your Care EveryWhere ID. This could be used by other organizations to access your [facility] medical records  [id number]        Your Vitals Were     Pulse Temperature Respirations Pulse Oximetry BMI (Body Mass Index)       84 97.8  F (36.6  C) (Temporal) 16 96% 28.25 kg/m2        Blood Pressure from Last 3 Encounters:   07/23/18 124/78   07/02/18 138/80   06/25/18 (P) 138/82    Weight from Last 3 Encounters:   07/23/18 196  lb 14.4 oz (89.3 kg)   07/02/18 195 lb (88.5 kg)   05/03/18 195 lb 11.2 oz (88.8 kg)              Today, you had the following     No orders found for display       Primary Care Provider Office Phone # Fax #    Fabio Stovall [phone] [address] [place] [address]        Equal Access to Services     [facility]: Hadii aad ku hadasho Soomaali, waaxda luqadaha, qaybta kaalmada adeegyada, waxay idiin hayaan adeeg kharash la'aan ah. So [facility] [phone].    ATENCIÓN: Si sylviela espcesar, tiene a black disposición servicios gratuitos de asistencia lingüística. Llame al [phone].    We comply with applicable federal civil rights laws and [place] laws. We do not discriminate on the basis of race, color, national origin, age, disability, sex, sexual orientation, or gender identity.            Thank you!     Thank you for choosing [facility]  for your care. Our goal is always to provide you with excellent care. Hearing back from our patients is one way we can continue to improve our services. Please take a few minutes to complete the written survey that you may receive in the mail after your visit with us. Thank you!             Your Updated Medication List - Protect others around you: Learn how to safely use, store and throw away your medicines at www.disposemymeds.org.          This list is accurate as of 7/23/18  3:23 PM.  Always use your most recent med list.                   Brand Name Dispense Instructions for use Diagnosis    albuterol 108 (90 Base) MCG/ACT Inhaler    PROAIR HFA/PROVENTIL HFA/VENTOLIN HFA    1 Inhaler    Inhale 2 puffs into the lungs every 6 hours as needed for shortness of breath / dyspnea or wheezing    Acute bronchitis with symptoms > 10 days       aspirin 81 MG tablet     90 tablet    Take 1 tablet by mouth daily.    ACS (acute coronary syndrome) (H)       atorvastatin 80 MG tablet    LIPITOR    90 tablet    Take 1 tablet (80 mg) by mouth  daily    Hyperlipidemia LDL goal <100       fluticasone 50 MCG/ACT spray    FLONASE    16 g    Spray 1-2 sprays into both nostrils daily    Nasal congestion       losartan 50 MG tablet    COZAAR    90 tablet    TAKE ONE TABLET BY MOUTH EVERY DAY    Hypertension goal BP (blood pressure) < 140/90       nitroGLYcerin 0.4 MG sublingual tablet    NITROSTAT    25 tablet    Place 1 tablet (0.4 mg) under the tongue every 5 minutes as needed for chest pain    ACS (acute coronary syndrome) (H)       omeprazole 20 MG CR capsule    priLOSEC    90 capsule    Take 1 capsule (20 mg) by mouth daily    Gastroesophageal reflux disease without esophagitis       QVAR 40 MCG/ACT Inhaler   Generic drug:  beclomethasone     1 Inhaler    Inhale 2 puffs into the lungs as needed Like in real humid weather he'll try using it he said.        tamsulosin 0.4 MG capsule    FLOMAX    90 capsule    Take 1 capsule (0.4 mg) by mouth daily    Benign prostatic hyperplasia with urinary obstruction       tolterodine 2 MG 24 hr capsule    DETROL LA    90 capsule    Take 1 capsule (2 mg) by mouth daily    Urinary frequency

## 2018-07-31 ENCOUNTER — TELEPHONE (OUTPATIENT)
Dept: CARDIOLOGY | Facility: CLINIC | Age: 63
End: 2018-07-31

## 2018-07-31 NOTE — TELEPHONE ENCOUNTER
----- Message from Rasta Millan MD sent at 7/31/2018 12:56 AM CDT -----  Regarding: ZIO Patch  The zio patch shows no atrial fibrillation or VT. Looks fine.

## 2018-08-01 ENCOUNTER — TELEPHONE (OUTPATIENT)
Dept: FAMILY MEDICINE | Facility: OTHER | Age: 63
End: 2018-08-01

## 2018-08-01 NOTE — TELEPHONE ENCOUNTER
Reason for call:  Patient has some questions regarding taking medication before his surgery.  He was wondering if Advil is different than aspirin and could he take that. He also states that it is because he gets bad headaches.

## 2018-08-01 NOTE — TELEPHONE ENCOUNTER
Spoke with patient. Having hernia surgery. Has been having headaches for 3 days.Wondering if he can take anything for the headache.  Advised that he should be ok today, also to increase fluid and rest.  Let surgeon know anything that he has taken.    Dorian Toscano, RN, BSN

## 2018-08-02 ENCOUNTER — ANESTHESIA EVENT (OUTPATIENT)
Dept: SURGERY | Facility: CLINIC | Age: 63
End: 2018-08-02
Payer: COMMERCIAL

## 2018-08-02 ASSESSMENT — LIFESTYLE VARIABLES: TOBACCO_USE: 1

## 2018-08-02 ASSESSMENT — COPD QUESTIONNAIRES
COPD: 1
CAT_SEVERITY: MILD

## 2018-08-02 NOTE — H&P (VIEW-ONLY)
[facility]  [address] [address]  [phone]  Dept: [phone]    PRE-OP EVALUATION:  Today's date: 2018    Keegan Buchanan (: 1955) presents for pre-operative evaluation assessment as requested by Dr. Ireland.  He requires evaluation and anesthesia risk assessment prior to undergoing surgery/procedure for treatment of  left inguinal hernia.    Fax number for surgical facility:   Primary Physician: Fabio Stovall  Type of Anesthesia Anticipated: to be determined    Patient has a Health Care Directive or Living Will:  NO but has at home needs to be signed, informed patient he can bring to surgery appointment.  The 10-year ASCVD risk score ([place] MICHAEL Jr, et al., 2013) is: 9.7%    Values used to calculate the score:      Age: 62 years      Sex: Male      Is Non- : No      Diabetic: No      Tobacco smoker: No      Systolic Blood Pressure: 124 mmHg      Is BP treated: Yes      HDL Cholesterol: 40 mg/dL      Total Cholesterol: 141 mg/dL  Patient is eligible for use of low-dose aspirin for primary prevention of heart attack and stroke.  Provider has discussed aspirin with patient and our decision was:     Prescribe:  Daily low-dose aspirin recommended for primary prevention, patient agrees with plan.        Preop Questions 2018   Who is doing your surgery? dr Ireland   What are you having done? hernia surgery   Date of Surgery/Procedure: 2018   Facility or Hospital where procedure/surgery will be performed: [facility]   1.  Do you have a history of Heart attack, stroke, stent, coronary bypass surgery, or other heart surgery? YES  - had stent placement in .  Sees cardiology regularly.   2.  Do you ever have any pain or discomfort in your chest? YES - with irregular heartbeats, known to be PVCs   3.  Do you have a history of  Heart Failure? No   4.   Are you troubled by shortness of breath when:  walking on a level  surface, or up a slight hill, or at night? No   5.  Do you currently have a cold, bronchitis or other respiratory infection? No   6.  Do you have a cough, shortness of breath, or wheezing? No   7.  Do you sometimes get pains in the calves of your legs when you walk? No   8. Do you or anyone in your family have previous history of blood clots? No   9.  Do you or does anyone in your family have a serious bleeding problem such as prolonged bleeding following surgeries or cuts? No   10. Have you ever had problems with anemia or been told to take iron pills? No   11. Have you had any abnormal blood loss such as black, tarry or bloody stools? No   12. Have you ever had a blood transfusion? No   13. Have you or any of your relatives ever had problems with anesthesia? No   14. Do you have sleep apnea, excessive snoring or daytime drowsiness? YES - will occasionally have daytime drowsiness, especially if he dreams a lot.   15. Do you have any prosthetic heart valves? No   16. Do you have prosthetic joints? No         HPI:     HPI related to upcoming procedure: noted to have a hernia for a while.  Formally diagnosed earlier this year.  Has been using a truss, but ready to have it repaired.  It's bothering him more of late.        See problem list for active medical problems.  Problems all longstanding and stable, except as noted/documented.  See ROS for pertinent symptoms related to these conditions.                                                                                                                                                          .  CAD - Patient has a longstanding history of moderate-severe CAD. Patient denies recent chest pain or NTG use, denies exercise induced dyspnea or PND. Last Stress test 7/2018, EKG NSR with occ PVCs.                                                                                                                                                    .  HYPERLIPIDEMIA - Patient has a  long history of significant Hyperlipidemia requiring medication for treatment with recent good control. Patient reports no problems or side effects with the medication.                                                                                                                                                       .  HYPERTENSION - Patient has longstanding history of HTN , currently denies any symptoms referable to elevated blood pressure. Specifically denies chest pain, palpitations, dyspnea, orthopnea, PND or peripheral edema. Blood pressure readings have been in normal range. Current medication regimen is as listed below. Patient denies any side effects of medication.                                                                                                                                                                                          .    MEDICAL HISTORY:     Patient Active Problem List    Diagnosis Date Noted     Left inguinal hernia 01/18/2018     Priority: Medium     Piriformis syndrome of left side 02/10/2016     Priority: Medium     Nonallopathic lesion of sacral region 02/10/2016     Priority: Medium     Nonallopathic lesion of thoracic region 02/10/2016     Priority: Medium     Nonallopathic lesion of cervical region 02/10/2016     Priority: Medium     Cervicalgia 02/10/2016     Priority: Medium     Chronic airway obstruction (H) 08/20/2014     Priority: Medium     Problem list name updated by automated process. Provider to review       Advanced directives, counseling/discussion 02/03/2014     Priority: Medium     GERD (gastroesophageal reflux disease) 02/05/2013     Priority: Medium     Benign prostatic hyperplasia with urinary obstruction 12/27/2012     Priority: Medium     Acute coronary syndrome (H) 11/14/2012     Priority: Medium     CAD (coronary artery disease) 11/14/2012     Priority: Medium     Arthritis of elbow, right 04/09/2012     Priority: Medium     Cubital tunnel syndrome  - right 04/09/2012     Priority: Medium     Hyperlipidemia LDL goal <100 10/31/2010     Priority: Medium     Impotence of organic origin 03/19/2008     Priority: Medium     Trigger finger, acquired 06/02/2005     Priority: Medium     Problem list name updated by automated process. Provider to review       Hypertension goal BP (blood pressure) < 140/90 04/23/2003     Priority: Medium      Past Medical History:   Diagnosis Date     Hypercholesteremia      Pain in joint, shoulder region     right shoulder separation     Unspecified essential hypertension      Past Surgical History:   Procedure Laterality Date     COLONOSCOPY N/A 6/12/2015    Procedure: COMBINED COLONOSCOPY, SINGLE OR MULTIPLE BIOPSY/POLYPECTOMY BY BIOPSY;  Surgeon: Herman Rebollar MD;  Location: [facility]     H STENT PROCED  2012     HC REMOVE TONSILS/ADENOIDS,<13 Y/O      T & A <12y.o.     REMOVAL OF SPERM DUCT(S)  05/06/88    Vasectomy     Current Outpatient Prescriptions   Medication Sig Dispense Refill     albuterol (PROAIR HFA/PROVENTIL HFA/VENTOLIN HFA) 108 (90 BASE) MCG/ACT Inhaler Inhale 2 puffs into the lungs every 6 hours as needed for shortness of breath / dyspnea or wheezing 1 Inhaler 1     aspirin 81 MG tablet Take 1 tablet by mouth daily. 90 tablet 3     atorvastatin (LIPITOR) 80 MG tablet Take 1 tablet (80 mg) by mouth daily 90 tablet 3     fluticasone (FLONASE) 50 MCG/ACT spray Spray 1-2 sprays into both nostrils daily 16 g 11     losartan (COZAAR) 50 MG tablet TAKE ONE TABLET BY MOUTH EVERY DAY 90 tablet 2     nitroglycerin (NITROSTAT) 0.4 MG SL tablet Place 1 tablet (0.4 mg) under the tongue every 5 minutes as needed for chest pain 25 tablet 0     omeprazole (PRILOSEC) 20 MG CR capsule Take 1 capsule (20 mg) by mouth daily 90 capsule 3     tamsulosin (FLOMAX) 0.4 MG capsule Take 1 capsule (0.4 mg) by mouth daily 90 capsule 3     tolterodine (DETROL LA) 2 MG 24 hr capsule Take 1 capsule (2 mg) by mouth daily 90 capsule 3      beclomethasone (QVAR) 40 MCG/ACT Inhaler Inhale 2 puffs into the lungs as needed Like in real humid weather he'll try using it he said. 1 Inhaler 0     [DISCONTINUED] tolterodine (DETROL LA) 2 MG 24 hr capsule Take 2 mg by mouth 2 times daily.       OTC products: None, except as noted above    Allergies   Allergen Reactions     Augmentin GI Disturbance     Penicillins      GI upset and diarrhea      Latex Allergy: NO    Social History   Substance Use Topics     Smoking status: Former Smoker     Packs/day: 1.00     Years: 15.00     Smokeless tobacco: Former User     Types: Chew     Quit date: 1/20/1987      Comment: quit 25 years ago.     Alcohol use Yes      Comment: 1 -2 monthly     History   Drug Use No       REVIEW OF SYSTEMS:   CONSTITUTIONAL: NEGATIVE for fever, chills, change in weight  INTEGUMENTARY/SKIN: NEGATIVE for worrisome rashes, moles or lesions  EYES: NEGATIVE for vision changes or irritation  ENT/MOUTH: NEGATIVE for ear, mouth and throat problems  RESP: NEGATIVE for significant cough or SOB  CV: notes palpitations with his PVCs  GI: NEGATIVE for nausea, abdominal pain, heartburn, or change in bowel habits  : NEGATIVE for frequency, dysuria, or hematuria  MUSCULOSKELETAL: NEGATIVE for significant arthralgias or myalgia  NEURO: NEGATIVE for weakness, dizziness or paresthesias  ENDOCRINE: NEGATIVE for temperature intolerance, skin/hair changes  HEME: NEGATIVE for bleeding problems  PSYCHIATRIC: NEGATIVE for changes in mood or affect    EXAM:   /78 (BP Location: Right arm, Patient Position: Chair, Cuff Size: Adult Large)  Pulse 84  Temp 97.8  F (36.6  C) (Temporal)  Resp 16  Wt 196 lb 14.4 oz (89.3 kg)  SpO2 96%  BMI 28.25 kg/m2    GENERAL APPEARANCE: healthy, alert and no distress     EYES: EOMI,  PERRL     HENT: ear canals and TM's normal and nose and mouth without ulcers or lesions     NECK: no adenopathy, no asymmetry, masses, or scars and thyroid normal to palpation     RESP: lungs  clear to auscultation - no rales, rhonchi or wheezes     CV: regular rates and rhythm, normal S1 S2, no S3 or S4 and no murmur, click or rub     ABDOMEN:  soft, nontender, no HSM or masses and bowel sounds normal     MS: extremities normal- no gross deformities noted, no evidence of inflammation in joints, FROM in all extremities.     SKIN: no suspicious lesions or rashes     NEURO: Normal strength and tone, sensory exam grossly normal, mentation intact and speech normal     PSYCH: mentation appears normal. and affect normal/bright     LYMPHATICS: No cervical adenopathy    DIAGNOSTICS:   EKG: appears normal, NSR, normal axis, normal intervals, no acute ST/T changes c/w ischemia, no LVH by voltage criteria, occasional PVC noted, unifocal    Recent Labs   Lab Test  10/12/17   0830  10/10/16   0828   12/27/12   0838  11/16/12   0518  11/15/12   0450   11/14/12 2015   HGB   --    --    --    --   16.2  15.8   < >  16.1   PLT   --    --    --    --   228  230   < >  245   INR   --    --    --    --    --    --    --   0.97   NA  145*  139   < >   --   141   --    --   138   POTASSIUM  4.4  3.9   < >   --   4.5   --    --   4.2   CR  0.82  0.85   < >   --   0.98   --    --   0.83   A1C   --    --    --   5.6   --    --    --    --     < > = values in this interval not displayed.        IMPRESSION:   Reason for surgery/procedure: left inguinal hernia  Diagnosis/reason for consult: perioperative risk evaluation for left inguinal herniorraphy    The proposed surgical procedure is considered INTERMEDIATE risk.    REVISED CARDIAC RISK INDEX  The patient has the following serious cardiovascular risks for perioperative complications such as (MI, PE, VFib and 3  AV Block):  Coronary Artery Disease (MI, positive stress test, angina, Qs on EKG)  INTERPRETATION: 1 risks: Class II (low risk - 0.9% complication rate)    The patient has the following additional risks for perioperative complications:  No identified additional  risks      ICD-10-CM    1. Preop general physical exam Z01.818 Basic metabolic panel   2. Left inguinal hernia K40.90 Basic metabolic panel   3. Coronary artery disease involving native heart without angina pectoris, unspecified vessel or lesion type I25.10    4. Chronic obstructive pulmonary disease, unspecified COPD type (H) J44.9    5. Hypertension goal BP (blood pressure) < 140/90 I10 Basic metabolic panel   6. Gastroesophageal reflux disease, esophagitis presence not specified K21.9    7. Benign prostatic hyperplasia with urinary obstruction N40.1     N13.8    8. Hyperlipidemia LDL goal <100 E78.5        RECOMMENDATIONS:       Cardiovascular Risk  Performs 4 METs exercise without symptoms (Climb a flight of stairs) .       --Patient is to take all scheduled medications on the day of surgery EXCEPT for modifications listed below.    Anticoagulant or Antiplatelet Medication Use  ASPIRIN: Patient is at increased risk of thrombosis (e.g. MI, CVA) and aspirin 81 mg daily should be continued in the perioperative period        ACE Inhibitor or Angiotensin Receptor Blocker (ARB) Use  Ace inhibitor or Angiotensin Receptor Blocker (ARB) and should HOLD this medication for the 24 hours prior to surgery.             Signed Electronically by: Fabio Stovall MD, MD    Copy of this evaluation report is provided to requesting physician.    [place] Preop Guidelines    Revised Cardiac Risk Index

## 2018-08-03 ENCOUNTER — HOSPITAL ENCOUNTER (OUTPATIENT)
Facility: CLINIC | Age: 63
Discharge: HOME | End: 2018-08-03
Attending: SPECIALIST | Admitting: SPECIALIST
Payer: COMMERCIAL

## 2018-08-03 ENCOUNTER — SURGERY (OUTPATIENT)
Age: 63
End: 2018-08-03

## 2018-08-03 ENCOUNTER — ANESTHESIA (OUTPATIENT)
Dept: SURGERY | Facility: CLINIC | Age: 63
End: 2018-08-03
Payer: COMMERCIAL

## 2018-08-03 VITALS
RESPIRATION RATE: 16 BRPM | OXYGEN SATURATION: 95 % | HEART RATE: 65 BPM | TEMPERATURE: 98 F | SYSTOLIC BLOOD PRESSURE: 128 MMHG | DIASTOLIC BLOOD PRESSURE: 85 MMHG

## 2018-08-03 DIAGNOSIS — G89.18 POST-OP PAIN: Primary | ICD-10-CM

## 2018-08-03 PROCEDURE — 25000125 ZZHC RX 250: Performed by: NURSE ANESTHETIST, CERTIFIED REGISTERED

## 2018-08-03 PROCEDURE — 25000128 H RX IP 250 OP 636: Performed by: NURSE ANESTHETIST, CERTIFIED REGISTERED

## 2018-08-03 PROCEDURE — C1781 MESH (IMPLANTABLE): HCPCS | Performed by: SPECIALIST

## 2018-08-03 PROCEDURE — 36000056 ZZH SURGERY LEVEL 3 1ST 30 MIN: Performed by: SPECIALIST

## 2018-08-03 PROCEDURE — 25000125 ZZHC RX 250: Performed by: SPECIALIST

## 2018-08-03 PROCEDURE — 49650 LAP ING HERNIA REPAIR INIT: CPT | Mod: LT | Performed by: SPECIALIST

## 2018-08-03 PROCEDURE — 27110028 ZZH OR GENERAL SUPPLY NON-STERILE: Performed by: SPECIALIST

## 2018-08-03 PROCEDURE — 25000132 ZZH RX MED GY IP 250 OP 250 PS 637: Performed by: SPECIALIST

## 2018-08-03 PROCEDURE — 71000014 ZZH RECOVERY PHASE 1 LEVEL 2 FIRST HR: Performed by: SPECIALIST

## 2018-08-03 PROCEDURE — 40000306 ZZH STATISTIC PRE PROC ASSESS II: Performed by: SPECIALIST

## 2018-08-03 PROCEDURE — 71000027 ZZH RECOVERY PHASE 2 EACH 15 MINS: Performed by: SPECIALIST

## 2018-08-03 PROCEDURE — C1727 CATH, BAL TIS DIS, NON-VAS: HCPCS | Performed by: SPECIALIST

## 2018-08-03 PROCEDURE — 37000009 ZZH ANESTHESIA TECHNICAL FEE, EACH ADDTL 15 MIN: Performed by: SPECIALIST

## 2018-08-03 PROCEDURE — 37000008 ZZH ANESTHESIA TECHNICAL FEE, 1ST 30 MIN: Performed by: SPECIALIST

## 2018-08-03 PROCEDURE — C9399 UNCLASSIFIED DRUGS OR BIOLOG: HCPCS | Performed by: NURSE ANESTHETIST, CERTIFIED REGISTERED

## 2018-08-03 PROCEDURE — 25000566 ZZH SEVOFLURANE, EA 15 MIN: Performed by: SPECIALIST

## 2018-08-03 PROCEDURE — 36000058 ZZH SURGERY LEVEL 3 EA 15 ADDTL MIN: Performed by: SPECIALIST

## 2018-08-03 PROCEDURE — 27210794 ZZH OR GENERAL SUPPLY STERILE: Performed by: SPECIALIST

## 2018-08-03 DEVICE — MESH 3DMAX LEFT LG 0115311: Type: IMPLANTABLE DEVICE | Site: INGUINAL | Status: FUNCTIONAL

## 2018-08-03 RX ORDER — HYDROMORPHONE HYDROCHLORIDE 1 MG/ML
.3-.5 INJECTION, SOLUTION INTRAMUSCULAR; INTRAVENOUS; SUBCUTANEOUS EVERY 10 MIN PRN
Status: DISCONTINUED | OUTPATIENT
Start: 2018-08-03 | End: 2018-08-03 | Stop reason: HOSPADM

## 2018-08-03 RX ORDER — CLINDAMYCIN PHOSPHATE 900 MG/50ML
900 INJECTION, SOLUTION INTRAVENOUS
Status: COMPLETED | OUTPATIENT
Start: 2018-08-03 | End: 2018-08-03

## 2018-08-03 RX ORDER — ONDANSETRON 4 MG/1
4 TABLET, ORALLY DISINTEGRATING ORAL EVERY 30 MIN PRN
Status: DISCONTINUED | OUTPATIENT
Start: 2018-08-03 | End: 2018-08-03 | Stop reason: HOSPADM

## 2018-08-03 RX ORDER — LIDOCAINE HYDROCHLORIDE 20 MG/ML
INJECTION, SOLUTION INFILTRATION; PERINEURAL PRN
Status: DISCONTINUED | OUTPATIENT
Start: 2018-08-03 | End: 2018-08-03

## 2018-08-03 RX ORDER — CLINDAMYCIN PHOSPHATE 900 MG/50ML
900 INJECTION, SOLUTION INTRAVENOUS SEE ADMIN INSTRUCTIONS
Status: DISCONTINUED | OUTPATIENT
Start: 2018-08-03 | End: 2018-08-03 | Stop reason: HOSPADM

## 2018-08-03 RX ORDER — FENTANYL CITRATE 50 UG/ML
25-50 INJECTION, SOLUTION INTRAMUSCULAR; INTRAVENOUS
Status: DISCONTINUED | OUTPATIENT
Start: 2018-08-03 | End: 2018-08-03 | Stop reason: HOSPADM

## 2018-08-03 RX ORDER — PROPOFOL 10 MG/ML
INJECTION, EMULSION INTRAVENOUS PRN
Status: DISCONTINUED | OUTPATIENT
Start: 2018-08-03 | End: 2018-08-03

## 2018-08-03 RX ORDER — OXYCODONE AND ACETAMINOPHEN 5; 325 MG/1; MG/1
1-2 TABLET ORAL EVERY 4 HOURS PRN
Qty: 20 TABLET | Refills: 0 | Status: SHIPPED | OUTPATIENT
Start: 2018-08-03 | End: 2018-11-05

## 2018-08-03 RX ORDER — MEPERIDINE HYDROCHLORIDE 25 MG/ML
12.5 INJECTION INTRAMUSCULAR; INTRAVENOUS; SUBCUTANEOUS
Status: DISCONTINUED | OUTPATIENT
Start: 2018-08-03 | End: 2018-08-03 | Stop reason: HOSPADM

## 2018-08-03 RX ORDER — BUPIVACAINE HYDROCHLORIDE AND EPINEPHRINE 2.5; 5 MG/ML; UG/ML
INJECTION, SOLUTION INFILTRATION; PERINEURAL PRN
Status: DISCONTINUED | OUTPATIENT
Start: 2018-08-03 | End: 2018-08-03 | Stop reason: HOSPADM

## 2018-08-03 RX ORDER — SODIUM CHLORIDE, SODIUM LACTATE, POTASSIUM CHLORIDE, CALCIUM CHLORIDE 600; 310; 30; 20 MG/100ML; MG/100ML; MG/100ML; MG/100ML
INJECTION, SOLUTION INTRAVENOUS CONTINUOUS
Status: DISCONTINUED | OUTPATIENT
Start: 2018-08-03 | End: 2018-08-03 | Stop reason: HOSPADM

## 2018-08-03 RX ORDER — DIMENHYDRINATE 50 MG/ML
25 INJECTION, SOLUTION INTRAMUSCULAR; INTRAVENOUS
Status: DISCONTINUED | OUTPATIENT
Start: 2018-08-03 | End: 2018-08-03 | Stop reason: HOSPADM

## 2018-08-03 RX ORDER — ONDANSETRON 2 MG/ML
INJECTION INTRAMUSCULAR; INTRAVENOUS PRN
Status: DISCONTINUED | OUTPATIENT
Start: 2018-08-03 | End: 2018-08-03

## 2018-08-03 RX ORDER — FENTANYL CITRATE 50 UG/ML
INJECTION, SOLUTION INTRAMUSCULAR; INTRAVENOUS PRN
Status: DISCONTINUED | OUTPATIENT
Start: 2018-08-03 | End: 2018-08-03

## 2018-08-03 RX ORDER — OXYCODONE AND ACETAMINOPHEN 5; 325 MG/1; MG/1
2 TABLET ORAL
Status: COMPLETED | OUTPATIENT
Start: 2018-08-03 | End: 2018-08-03

## 2018-08-03 RX ORDER — LIDOCAINE 40 MG/G
CREAM TOPICAL
Status: DISCONTINUED | OUTPATIENT
Start: 2018-08-03 | End: 2018-08-03 | Stop reason: HOSPADM

## 2018-08-03 RX ORDER — DEXAMETHASONE SODIUM PHOSPHATE 10 MG/ML
INJECTION, SOLUTION INTRAMUSCULAR; INTRAVENOUS PRN
Status: DISCONTINUED | OUTPATIENT
Start: 2018-08-03 | End: 2018-08-03

## 2018-08-03 RX ORDER — ONDANSETRON 2 MG/ML
4 INJECTION INTRAMUSCULAR; INTRAVENOUS EVERY 30 MIN PRN
Status: DISCONTINUED | OUTPATIENT
Start: 2018-08-03 | End: 2018-08-03 | Stop reason: HOSPADM

## 2018-08-03 RX ORDER — NALOXONE HYDROCHLORIDE 0.4 MG/ML
.1-.4 INJECTION, SOLUTION INTRAMUSCULAR; INTRAVENOUS; SUBCUTANEOUS
Status: DISCONTINUED | OUTPATIENT
Start: 2018-08-03 | End: 2018-08-03 | Stop reason: HOSPADM

## 2018-08-03 RX ADMIN — LIDOCAINE HYDROCHLORIDE 0.1 ML: 10 INJECTION, SOLUTION EPIDURAL; INFILTRATION; INTRACAUDAL; PERINEURAL at 12:42

## 2018-08-03 RX ADMIN — SUGAMMADEX 170 MG: 100 INJECTION, SOLUTION INTRAVENOUS at 13:45

## 2018-08-03 RX ADMIN — ONDANSETRON 4 MG: 2 INJECTION INTRAMUSCULAR; INTRAVENOUS at 13:27

## 2018-08-03 RX ADMIN — PROPOFOL 200 MG: 10 INJECTION, EMULSION INTRAVENOUS at 12:49

## 2018-08-03 RX ADMIN — ROCURONIUM BROMIDE 50 MG: 10 INJECTION INTRAVENOUS at 12:49

## 2018-08-03 RX ADMIN — FENTANYL CITRATE 50 MCG: 50 INJECTION, SOLUTION INTRAMUSCULAR; INTRAVENOUS at 13:17

## 2018-08-03 RX ADMIN — CLINDAMYCIN PHOSPHATE 900 MG: 18 INJECTION, SOLUTION INTRAVENOUS at 12:53

## 2018-08-03 RX ADMIN — MIDAZOLAM 2 MG: 1 INJECTION INTRAMUSCULAR; INTRAVENOUS at 12:43

## 2018-08-03 RX ADMIN — DEXAMETHASONE SODIUM PHOSPHATE 10 MG: 10 INJECTION, SOLUTION INTRAMUSCULAR; INTRAVENOUS at 13:27

## 2018-08-03 RX ADMIN — LIDOCAINE HYDROCHLORIDE 100 MG: 20 INJECTION, SOLUTION INFILTRATION; PERINEURAL at 12:49

## 2018-08-03 RX ADMIN — FENTANYL CITRATE 100 MCG: 50 INJECTION, SOLUTION INTRAMUSCULAR; INTRAVENOUS at 12:49

## 2018-08-03 RX ADMIN — OXYCODONE HYDROCHLORIDE AND ACETAMINOPHEN 1 TABLET: 5; 325 TABLET ORAL at 15:23

## 2018-08-03 RX ADMIN — BUPIVACAINE HYDROCHLORIDE AND EPINEPHRINE BITARTRATE 18 ML: 2.5; .005 INJECTION, SOLUTION INFILTRATION; PERINEURAL at 13:41

## 2018-08-03 RX ADMIN — ONDANSETRON 4 MG: 2 INJECTION INTRAMUSCULAR; INTRAVENOUS at 16:03

## 2018-08-03 RX ADMIN — SODIUM CHLORIDE, POTASSIUM CHLORIDE, SODIUM LACTATE AND CALCIUM CHLORIDE: 600; 310; 30; 20 INJECTION, SOLUTION INTRAVENOUS at 12:42

## 2018-08-03 NOTE — BRIEF OP NOTE
[facility] Brief Operative Note    Pre-operative diagnosis: Left Inguinal hernia   Post-operative diagnosis Indirect LIH   Procedure: Procedure(s):  Laparoscopic left inguinal hernia repair - Wound Class: I-Clean with bard 3d max mesh   Surgeon: Huber Ireland MD, FACS   Assistants(s): Jame CURRAN   Estimated blood loss: Less than 10 ml    Specimens: None   Findings: Indirect LIH with a large lipoma of the cord     Huber Ireland MD, FACS    #[id number]

## 2018-08-03 NOTE — ANESTHESIA POSTPROCEDURE EVALUATION
Patient: Keegan Buchanan    Procedure(s):  Laparoscopic left inguinal hernia repair - Wound Class: I-Clean    Diagnosis:Inguinal hernia  Diagnosis Additional Information: No value filed.    Anesthesia Type:  General, ETT    Note:  Anesthesia Post Evaluation    Patient location during evaluation: Phase 2 and Bedside  Patient participation: Able to fully participate in evaluation  Level of consciousness: awake and alert  Pain management: adequate  Airway patency: patent  Cardiovascular status: acceptable  Respiratory status: acceptable  Hydration status: acceptable  PONV: none     Anesthetic complications: None    Comments: Patient was pleased with his anesthesia care today. He has no post op anesthesia concerns. Vital signs stable. No anesthesia complications noted. Meets DC criteria.        Last vitals:  Vitals:    08/03/18 1441 08/03/18 1450 08/03/18 1455   BP: (P) 108/80     Pulse:      Resp: (P) 12     Temp:      SpO2:  94% 94%         Electronically Signed By: DAMIAN Stark CRNA  August 3, 2018  2:59 PM

## 2018-08-03 NOTE — OR NURSING
Verbal report received from Charmaine KOHLER. Phase 1 recovery assumed by Madeline KOHLER. Pt post-op general anesthesia for lap left inguinal hernia repair per .

## 2018-08-03 NOTE — DISCHARGE INSTRUCTIONS
[facility] Same-Day Surgery   Adult Discharge Orders & Instructions     For 24 hours after surgery    1. Get plenty of rest.  A responsible adult must stay with you for at least 24 hours after you leave the hospital.   2. Do not drive or use heavy equipment.  If you have weakness or tingling, don't drive or use heavy equipment until this feeling goes away.  3. Do not drink alcohol.  4. Avoid strenuous or risky activities.  Ask for help when climbing stairs.   5. You may feel lightheaded.  If so, sit for a few minutes before standing.  Have someone help you get up.   6. You may have a slight fever. Call the doctor if your fever is over 100 F (37.7 C) (taken under the tongue) or lasts longer than 24 hours.  7. You may have a dry mouth, a sore throat, muscle aches or trouble sleeping.  These should go away after 24 hours.  8. Do not make important or legal decisions.  Based on the surgery/procedure that you had today, we do not expect that you will have any problems.  However, we want you to know what to do if you have pain, nausea, bleeding,or infection:  To control pain:  Take medicines your physician has prescribed or or over-the counter medicine he or she advises.  Ice packs and periods of rest are often helpful.  For surgery on an arm or leg, raise it on a pillow to ease swelling.  If your pain is not managed with the above methods, contact your physician.  To control nausea:  Take anti-nausea medicine approved by your physician.  Drink clear liquids such as apple juice, ginger ale, broth or 7-Up. Be sure to drink enough fluids.  Move to a regular diet as you feel able.  Rest may also help.  Bleeding:  You may see a little blood on your dressing, about the size of a quarter in the first 24 hours.  If you see this, there is no reason to be alarmed.  However, if this continues to increase in size, apply pressure if able, and notify your physician.  Infection: Please contact your physician if you have any of  the following signs:  redness, swelling, heat, increasing pain or foul-smelling drainage at your surgery site, fever or chills,     Call your doctor for any of the followin.  It has been over 8 to 10 hours since surgery and you are still not able to urinate (pass water).    2.  Headache for over 24 hours.       [facility] Nurse advice line: [phone]              Home Care Following Laparoscopic Surgery  Dr. Ireland    TYPE OF SURGERY: Left Inguinal Hernia with mesh    Wound Healing and Care of the Incision    Your recovery will be much quicker since you don t have a large abdominal incision.  During laparoscopic surgery, gas is put into your abdomen to lift the abdominal wall up and away from the operative site.  Before the end of your surgery, the doctor rinses the area with a sterile liquid.  Most, but not all of this gas is removed before your surgery ends.  Your body will absorb the rest.  You may experience pain in the shoulder areas or a bloated abdomen because of the gas.    Remove dressings/bandages after 48 hours (Sun. after 12 noon).  You have small strips of tape over your incisions, leave them in place.  They will gradually curl up and fall off.  If any of them are still in place in two weeks, remove them.    You may shower on  afternoon.  Pat your incisions dry after showering.  If there is any drainage from the incisions or if it is more comfortable for you, put a new band-aid/gauze over each incision.    Activity    For the first week, avoid heavy lifting (no more than 10 pounds) and strenuous activities, otherwise you may return to your usual activity.    Listen to what your body is telling you.  If any activity hurts: STOP and try it again in a few days.    Many patients report feeling more tired the second week after surgery.  This is most likely related to the healing process.    Returning to work will depend on how you feel and the type of work that you do and  should be discussed with your doctor.  Most people return to work within 1-2 weeks of surgery.    Drainage    You may experience drainage from one or more of your incisions.  If drainage is present, wash the area with mild soap and water twice a day.  If drainage is staining your clothing, cover with a light bandage (gauze or band-aid).  If you are concerned about the amount or the color of drainage, call your doctor.    Diet and Appetite    You may return to the diet you were on before surgery.    Remember - most prescription pain pills can and do cause constipation.  Walking, extra fluids and increased fiber (fresh fruits and vegetables, etc.) are natural remedies for constipation.  Ask your doctor about a stool softener such as Colace or Metamucil.    Driving    Most people can drive within 2-3 days of surgery.  You should have stopped taking prescription pain medication and be pain free enough to make an emergency stop before beginning to drive.    Call you physician if you have: During business hours: [phone]    Redness, or concerns about drainage from your incisions.    A temperature of more than 100.5 degrees F.    Pain not relieved by your prescription pain pills and or a short rest.    Any questions and concerns about your recovery.    Follow-up Care    Your follow up appointment with  is on Mon. 8/13 at 7:30 am in [place]. Call [phone] if you need to make any changes.

## 2018-08-03 NOTE — IP AVS SNAPSHOT
[facility]    [address]     [place] [address]    Phone:  [phone]                                       After Visit Summary   8/3/2018    Keegan Buchanan    MRN: [id number]           After Visit Summary Signature Page     I have received my discharge instructions, and my questions have been answered. I have discussed any challenges I see with this plan with the nurse or doctor.    ..........................................................................................................................................  Patient/Patient Representative Signature      ..........................................................................................................................................  Patient Representative Print Name and Relationship to Patient    ..................................................               ................................................  Date                                            Time    ..........................................................................................................................................  Reviewed by Signature/Title    ...................................................              ..............................................  Date                                                            Time

## 2018-08-03 NOTE — OP NOTE
Procedure Date: 08/03/2018      PREOPERATIVE DIAGNOSIS:  Left inguinal hernia.      POSTOPERATIVE DIAGNOSIS:  Indirect left inguinal hernia.      PROCEDURE:  Laparoscopic preperitoneal left inguinal hernia repair with Bard 3DMax mesh.      SURGEON:  Huber Ireland MD, [facility]      ANESTHESIA:  General via endotracheal tube.      INDICATIONS FOR PROCEDURE:  A 62-year-old gentleman with a left groin bulge and discomfort with lifting.  On exam, he was found to have a left inguinal hernia.  Because of the pain, he opted to have it repaired.      OPERATIVE FINDINGS:  Indirect left inguinal hernia with a large lipoma of the cord.      DETAILS OF PROCEDURE:  With the cooperation of the patient in the preoperative holding area, the left groin was marked.  He was then taken to the operating room and after induction of general anesthesia, the abdomen was prepped and draped in sterile fashion.  A timeout was performed confirming the identity of the patient as well as the procedure to be performed.  A small infraumbilical incision was then made.  Subcutaneous tissue was opened using cautery.  A right rectus sheath was opened using 11 blade.  The right rectus muscle was retracted laterally.  A balloon dissector was inserted into the preperitoneal space and inflated 25 times by direct count.  The dissection was noted to be only marginal on the left despite direct pressure.  The balloon was left inflated for 2 minutes by the clock.  The balloon dissector was removed and the preperitoneal space was insufflated.  A 5 mm trocar was placed below this.  A blunt dissection was used to free up the loose areolar tissue off the pubic tubercle.        We were able to dissect laterally onto Jeff's ligament.  We eventually identified the posterior aspect of the cord, dissected this free and then dissected the anterior aspect of the cord with a blunt dissector.  We were able to see a sac.  The sac along with a large lipoma was reduced.  Then  we freed up all the loose areolar area over tissue out laterally.  After adequately skeletonizing the cord and inspecting it, a large left Bard 3DMax mesh was inserted in the preperitoneal space.  An AbsorbaTack was then used to tack it medially to the pubic tubercle and anterior to the abdominal wall.  A large lipoma of the cord and sac were then pulled over the top of the mesh and the preperitoneal space was deflated under direct visualization.  All trocars were then removed.  The anterior rectus sheath was then closed using a figure-of-eight 0 Vicryl.  All skin incisions were closed using 3-0 Vicryl and 4-0 Monocryl subcuticular stitches.  Steri-Strips, sterile dressings were applied.  The patient was taken from the operating room to the recovery room in stable condition to be sent home.         HANNAH PIPER MD, FACS             D: 2018   T: 2018   MT: MASON      Name:     JAZZY KELLEY   MRN:      [id number]        Account:        [id number]   :      1955           Procedure Date: 2018      Document: [id number]

## 2018-08-03 NOTE — ANESTHESIA CARE TRANSFER NOTE
Patient: Keegan Morfinuvjenn    Procedure(s):  Laparoscopic left inguinal hernia repair - Wound Class: I-Clean    Diagnosis: Inguinal hernia  Diagnosis Additional Information: No value filed.    Anesthesia Type:   General, ETT     Note:  Airway :Face Mask  Patient transferred to:PACU  Handoff Report: Identifed the Patient, Identified the Reponsible Provider, Reviewed the pertinent medical history, Discussed the surgical course, Reviewed Intra-OP anesthesia mangement and issues during anesthesia, Set expectations for post-procedure period and Allowed opportunity for questions and acknowledgement of understanding      Vitals: (Last set prior to Anesthesia Care Transfer)    CRNA VITALS  8/3/2018 1326 - 8/3/2018 1406      8/3/2018             Pulse: 96    SpO2: 98 %                Electronically Signed By: DAMIAN Stark CRNA  August 3, 2018  2:06 PM

## 2018-08-03 NOTE — ANESTHESIA PREPROCEDURE EVALUATION
Anesthesia Evaluation     . Pt has had prior anesthetic. Type: MAC    No history of anesthetic complications          ROS/MED HX    ENT/Pulmonary:     (+)tobacco use, Past use mild COPD, , . .    Neurologic:  - neg neurologic ROS     Cardiovascular:     (+) Dyslipidemia, hypertension--CAD, -past MI,-stent,. : . . . :. . Previous cardiac testing Echodate:[id number]:ECHO STRESS WITH OPTISON   Order: [id number]   Status: Edited Result - FINAL   Visible to patient: No (Not Released) Next appt: 2018 at 07:30 AM in Surgery (Huber Ireland MD) Dx: Atypical chest pain; Coronary artery ...   Details     Reading Physician Reading Date Result Priority  Alberto Leung MD 2018   Narrative        [id number]  [id number]  [id number]  [id number]^SEAN^MERLIN^IDRIS           [facility]  Echocardiography Laboratory  [address] [place] [address]        Name: JAZZY KELLEY  MRN: [id number]  : 1955  Study Date: 2018 09:57 AM  Age: 62 yrs  Gender: Male  Patient Location: [facility]  Reason For Study: , Atypical chest pain, Coronary artery disease involving  native  History: Increased Cholesterol, HTN, Ex Smoker  Ordering Physician: MERLIN ESTRADA  Referring Physician: Fabio Stovall  Performed By: Romeo Thorpe     BSA: 2.1 m2  Height: 70 in  Weight: 195 lb  HR: 77  BP: 164/80 mmHg  _____________________________________________________________________________  __        Procedure  Stress Echo Complete. Contrast Optison.  _____________________________________________________________________________  __        Interpretation Summary  The patient exercised 11 minutes.  The patient exhibited no chest pain during exercise.  The EKG portion of this stress test was negative for inducible ischemia (see  echo results below).  Normal resting wall motion and no stress-induced wall motion abnormality.  This was a normal stress echocardiogram with no evidence of  stress-induced  ischemia.  Moderately dilated aortic root 4.5 cm (although not reported but on personal  review of stress echo images dated 11/24/2014 aortic root measured 4.3 cm in  that study)  _____________________________________________________________________________  __     Stress  There was a normal BP response to exercise.  Exercise was stopped due to fatigue.  The patient exercised 11 minutes.  The patient exhibited no chest pain during exercise.  Target Heart Rate was achieved.  The EKG portion of this stress test was negative for inducible ischemia (see  echo results below).  Arrhythmia induced during stress: occasional PVC's.  The visual ejection fraction is estimated at >70%.  Left ventricular cavity size decreases with exercise.  Global LV systolic function augments with exercise.  Normal resting wall motion and no stress-induced wall motion abnormality.  This was a normal stress echocardiogram with no evidence of stress-induced  ischemia.     Baseline  sinus rhythm, right axis deviation.  No regional wall motion abnormalities noted.  The visual ejection fraction is estimated at 55-60%.  moderately dilated aortic root- 4.5 cm.     Stress Results            Protocol:  MN JOSE        Maximum Predicted HR:   158 bpm            Target HR: 134 bpm         % Maximum Predicted HR: 97 %            Stage  DurationHeart Rate  BP                Comment                (mm:ss)   (bpm)        Stage 1   3:00     108    154/80        Stage 2   3:00     121    160/80        Stage 3   3:00     136    158/72        Stage 4   2:00     153      /   RPP [id number], FAC Above, Tatum Score 12       RECOVERYR  6:00      94    136/76               Stress Duration:   11:00 mm:ss *        Recovery Time: 6:00 mm:ss         Maximum Stress HR: 153 bpm              METS:          13     Left Ventricle  Left ventricular systolic function is normal. The visual ejection fraction is  estimated at 55-60%. No regional wall motion abnormalities  noted.        Tricuspid Valve  There is trace tricuspid regurgitation.     Aortic Valve  The aortic valve is trileaflet. No aortic stenosis is present.     Pulmonic Valve  There is trace pulmonic valvular regurgitation.     Vessels  Moderate aortic root dilatation. 4.5 cm. Normal size ascending aorta.     _____________________________________________________________________________  __  MMode/2D Measurements & Calculations  IVSd: 1.1 cm  LVIDd: 4.8 cm  LVIDs: 3.3 cm  LVPWd: 0.95 cm  FS: 29.8 %  LV mass(C)dI: 84.5 grams/m2  Ao root diam: 4.4 cm  LA dimension: 3.2 cm  asc Aorta Diam: 3.5 cm  RWT: 0.40                    _____________________________________________________________________________  __           Report approved by: Tiffanie Ulrich 07/11/2018 12:25 PM       Specimen Collected: 07/11/18  9:57 AM Last Resulted: 07/11/18  9:57 AM Order Details View Encounter Lab and Collection Details Routing Result History            date: results:ECG reviewed date:10-16-17 results:SR date: results:          METS/Exercise Tolerance:     Hematologic:  - neg hematologic  ROS       Musculoskeletal:   (+) arthritis, , , -       GI/Hepatic:     (+) GERD       Renal/Genitourinary:  - ROS Renal section negative       Endo:  - neg endo ROS       Psychiatric:  - neg psychiatric ROS       Infectious Disease:  - neg infectious disease ROS       Malignancy:      - no malignancy   Other:    - neg other ROS                 Physical Exam  Normal systems: cardiovascular, pulmonary and dental    Airway   Mallampati: II  TM distance: <3 FB  Neck ROM: full    Dental     Cardiovascular   Rhythm and rate: regular and normal      Pulmonary    breath sounds clear to auscultation                    Anesthesia Plan      History & Physical Review  History and physical reviewed and following examination; no interval change.    ASA Status:  2 .    NPO Status:  > 8 hours    Plan for General and ETT with Intravenous and Propofol induction.  Maintenance will be Balanced.    PONV prophylaxis:  Ondansetron (or other 5HT-3) and Dexamethasone or Solumedrol       Postoperative Care  Postoperative pain management:  IV analgesics, Oral pain medications and Multi-modal analgesia.      Consents  Anesthetic plan, risks, benefits and alternatives discussed with:  Patient.  Use of blood products discussed: No .   .                          .

## 2018-08-03 NOTE — IP AVS SNAPSHOT
MRN:[id number]                      After Visit Summary   8/3/2018    Keegan Buchanan    MRN: [id number]           Thank you!     Thank you for choosing [facility] for your care. Our goal is always to provide you with excellent care. Hearing back from our patients is one way we can continue to improve our services. Please take a few minutes to complete the written survey that you may receive in the mail after you visit with us. Thank you!        Patient Information     Date Of Birth          1955        About your hospital stay     You were admitted on:  August 3, 2018 You last received care in the:  [facility] Phase II    You were discharged on:  August 3, 2018       Who to Call     For medical emergencies, please call 911.  For non-urgent questions about your medical care, please call your primary care provider or clinic, [phone]  For questions related to your surgery, please call your surgery clinic        Attending Provider     Provider Specialty    Huber Ireland MD General Surgery       Primary Care Provider Office Phone # Fax #    Fabio Stovall [phone]      After Care Instructions     Diet Instructions       Resume pre-procedure diet            Discharge Instructions       Follow up appointment as instructed by Surgeon and or RN            Do not - immerse incision in water until sutures removed       Do not immerse incision in water until sutures removed            Dressing       Keep dressing clean and dry.  Dressing / incisional care as instructed by RN and or Surgeon            Ice to affected area       Ice to operative site PRN            No Alcohol       For 24 hours post procedure            No driving or operating machinery        until the day after procedure            No lifting        No lifting over 20 lbs and no strenuous physical activity for 2 weeks            Shower       No shower for 24 hours post procedure. May shower  Postoperative Day (POD)  2                  Your next 10 appointments already scheduled     Aug 13, 2018  7:30 AM CDT   Return Visit with Huber Ireland MD   [facility] ([facility])    [address]  [place] [address] [phone]              Further instructions from your care team       [facility] Same-Day Surgery   Adult Discharge Orders & Instructions     For 24 hours after surgery    1. Get plenty of rest.  A responsible adult must stay with you for at least 24 hours after you leave the hospital.   2. Do not drive or use heavy equipment.  If you have weakness or tingling, don't drive or use heavy equipment until this feeling goes away.  3. Do not drink alcohol.  4. Avoid strenuous or risky activities.  Ask for help when climbing stairs.   5. You may feel lightheaded.  If so, sit for a few minutes before standing.  Have someone help you get up.   6. You may have a slight fever. Call the doctor if your fever is over 100 F (37.7 C) (taken under the tongue) or lasts longer than 24 hours.  7. You may have a dry mouth, a sore throat, muscle aches or trouble sleeping.  These should go away after 24 hours.  8. Do not make important or legal decisions.  Based on the surgery/procedure that you had today, we do not expect that you will have any problems.  However, we want you to know what to do if you have pain, nausea, bleeding,or infection:  To control pain:  Take medicines your physician has prescribed or or over-the counter medicine he or she advises.  Ice packs and periods of rest are often helpful.  For surgery on an arm or leg, raise it on a pillow to ease swelling.  If your pain is not managed with the above methods, contact your physician.  To control nausea:  Take anti-nausea medicine approved by your physician.  Drink clear liquids such as apple juice, ginger ale, broth or 7-Up. Be sure to drink enough fluids.  Move to a regular diet as you feel able.   Rest may also help.  Bleeding:  You may see a little blood on your dressing, about the size of a quarter in the first 24 hours.  If you see this, there is no reason to be alarmed.  However, if this continues to increase in size, apply pressure if able, and notify your physician.  Infection: Please contact your physician if you have any of the following signs:  redness, swelling, heat, increasing pain or foul-smelling drainage at your surgery site, fever or chills,     Call your doctor for any of the followin.  It has been over 8 to 10 hours since surgery and you are still not able to urinate (pass water).    2.  Headache for over 24 hours.       [facility] Nurse advice line: [phone]              Home Care Following Laparoscopic Surgery  Dr. Ireland    TYPE OF SURGERY: Left Inguinal Hernia with mesh    Wound Healing and Care of the Incision    Your recovery will be much quicker since you don t have a large abdominal incision.  During laparoscopic surgery, gas is put into your abdomen to lift the abdominal wall up and away from the operative site.  Before the end of your surgery, the doctor rinses the area with a sterile liquid.  Most, but not all of this gas is removed before your surgery ends.  Your body will absorb the rest.  You may experience pain in the shoulder areas or a bloated abdomen because of the gas.    Remove dressings/bandages after 48 hours (Sun. after 12 noon).  You have small strips of tape over your incisions, leave them in place.  They will gradually curl up and fall off.  If any of them are still in place in two weeks, remove them.    You may shower on  afternoon.  Pat your incisions dry after showering.  If there is any drainage from the incisions or if it is more comfortable for you, put a new band-aid/gauze over each incision.    Activity    For the first week, avoid heavy lifting (no more than 10 pounds) and strenuous activities, otherwise you may return to  your usual activity.    Listen to what your body is telling you.  If any activity hurts: STOP and try it again in a few days.    Many patients report feeling more tired the second week after surgery.  This is most likely related to the healing process.    Returning to work will depend on how you feel and the type of work that you do and should be discussed with your doctor.  Most people return to work within 1-2 weeks of surgery.    Drainage    You may experience drainage from one or more of your incisions.  If drainage is present, wash the area with mild soap and water twice a day.  If drainage is staining your clothing, cover with a light bandage (gauze or band-aid).  If you are concerned about the amount or the color of drainage, call your doctor.    Diet and Appetite    You may return to the diet you were on before surgery.    Remember - most prescription pain pills can and do cause constipation.  Walking, extra fluids and increased fiber (fresh fruits and vegetables, etc.) are natural remedies for constipation.  Ask your doctor about a stool softener such as Colace or Metamucil.    Driving    Most people can drive within 2-3 days of surgery.  You should have stopped taking prescription pain medication and be pain free enough to make an emergency stop before beginning to drive.    Call you physician if you have: During business hours: [phone]    Redness, or concerns about drainage from your incisions.    A temperature of more than 100.5 degrees F.    Pain not relieved by your prescription pain pills and or a short rest.    Any questions and concerns about your recovery.    Follow-up Care    Your follow up appointment with  is on Mon. 8/13 at 7:30 am in [place]. Call [phone] if you need to make any changes.                    Pending Results     No orders found from 8/1/2018 to 8/4/2018.            Admission Information     Date & Time Provider Department Dept. Phone    8/3/2018 Beka  MD [facility] Phase [phone]      Your Vitals Were     Blood Pressure Pulse Temperature Respirations Pulse Oximetry       108/80 65 98  F (36.7  C) (Oral) 12 94%       Care EveryWhere ID     This is your Care EveryWhere ID. This could be used by other organizations to access your [facility] medical records  [id number]        Equal Access to Services     [facility] Services[facility] : Hadii aad ku hadasho Soomaali, waaxda luqadaha, qaybta kaalmada adeegyada, norma martinez tuann angelescain cervantes yesi . So [facility] [phone].    ATENCIÓN: Si habla español, tiene a black disposición servicios gratuitos de asistencia lingüística. Lee al [phone].    We comply with applicable federal civil rights laws and [place] laws. We do not discriminate on the basis of race, color, national origin, age, disability, sex, sexual orientation, or gender identity.               Review of your medicines      START taking        Dose / Directions    oxyCODONE-acetaminophen 5-325 MG per tablet   Commonly known as:  PERCOCET   Used for:  Post-op pain        Dose:  1-2 tablet   Take 1-2 tablets by mouth every 4 hours as needed for pain (moderate to severe)   Quantity:  20 tablet   Refills:  0         CONTINUE these medicines which have NOT CHANGED        Dose / Directions    albuterol 108 (90 Base) MCG/ACT Inhaler   Commonly known as:  PROAIR HFA/PROVENTIL HFA/VENTOLIN HFA   Used for:  Acute bronchitis with symptoms > 10 days        Dose:  2 puff   Inhale 2 puffs into the lungs every 6 hours as needed for shortness of breath / dyspnea or wheezing   Quantity:  1 Inhaler   Refills:  1       aspirin 81 MG tablet   Used for:  ACS (acute coronary syndrome) (H)        Dose:  81 mg   Take 1 tablet by mouth daily.   Quantity:  90 tablet   Refills:  3       atorvastatin 80 MG tablet   Commonly known as:  LIPITOR   Used for:  Hyperlipidemia LDL goal <100        Dose:  80 mg   Take 1 tablet (80 mg) by mouth daily   Quantity:  90 tablet    Refills:  3       fluticasone 50 MCG/ACT spray   Commonly known as:  FLONASE   Used for:  Nasal congestion        Dose:  1-2 spray   Spray 1-2 sprays into both nostrils daily   Quantity:  16 g   Refills:  11       losartan 50 MG tablet   Commonly known as:  COZAAR   Used for:  Hypertension goal BP (blood pressure) < 140/90        TAKE ONE TABLET BY MOUTH EVERY DAY   Quantity:  90 tablet   Refills:  2       nitroGLYcerin 0.4 MG sublingual tablet   Commonly known as:  NITROSTAT   Used for:  ACS (acute coronary syndrome) (H)        Dose:  0.4 mg   Place 1 tablet (0.4 mg) under the tongue every 5 minutes as needed for chest pain   Quantity:  25 tablet   Refills:  0       omeprazole 20 MG CR capsule   Commonly known as:  priLOSEC   Used for:  Gastroesophageal reflux disease without esophagitis        Dose:  20 mg   Take 1 capsule (20 mg) by mouth daily   Quantity:  90 capsule   Refills:  3       QVAR 40 MCG/ACT Inhaler   Generic drug:  beclomethasone        Dose:  2 puff   Inhale 2 puffs into the lungs as needed Like in real humid weather he'll try using it he said.   Quantity:  1 Inhaler   Refills:  0       tamsulosin 0.4 MG capsule   Commonly known as:  FLOMAX   Used for:  Benign prostatic hyperplasia with urinary obstruction        Dose:  0.4 mg   Take 1 capsule (0.4 mg) by mouth daily   Quantity:  90 capsule   Refills:  3       tolterodine 2 MG 24 hr capsule   Commonly known as:  DETROL LA   Used for:  Urinary frequency        Dose:  2 mg   Take 1 capsule (2 mg) by mouth daily   Quantity:  90 capsule   Refills:  3            Where to get your medicines      Some of these will need a paper prescription and others can be bought over the counter. Ask your nurse if you have questions.     Bring a paper prescription for each of these medications     oxyCODONE-acetaminophen 5-325 MG per tablet                Protect others around you: Learn how to safely use, store and throw away your medicines at  www.disposemymeds.org.        Information about OPIOIDS     PRESCRIPTION OPIOIDS: WHAT YOU NEED TO KNOW   We gave you an opioid (narcotic) pain medicine. It is important to manage your pain, but opioids are not always the best choice. You should first try all the other options your care team gave you. Take this medicine for as short a time (and as few doses) as possible.     These medicines have risks:    DO NOT drive when on new or higher doses of pain medicine. These medicines can affect your alertness and reaction times, and you could be arrested for driving under the influence (DUI). If you need to use opioids long-term, talk to your care team about driving.    DO NOT operate heave machinery    DO NOT do any other dangerous activities while taking these medicines.     DO NOT drink any alcohol while taking these medicines.      If the opioid prescribed includes acetaminophen, DO NOT take with any other medicines that contain acetaminophen. Read all labels carefully. Look for the word  acetaminophen  or  Tylenol.  Ask your pharmacist if you have questions or are unsure.    You can get addicted to pain medicines, especially if you have a history of addiction (chemical, alcohol or substance dependence). Talk to your care team about ways to reduce this risk.    Store your pills in a secure place, locked if possible. We will not replace any lost or stolen medicine. If you don t finish your medicine, please throw away (dispose) as directed by your pharmacist. The [place] Pollution Control Agency has more information about safe disposal: https://www.pca.[facility].[place].us/living-green/managing-unwanted-medications.     All opioids tend to cause constipation. Drink plenty of water and eat foods that have a lot of fiber, such as fruits, vegetables, prune juice, apple juice and high-fiber cereal. Take a laxative (Miralax, milk of magnesia, Colace, Senna) if you don t move your bowels at least every other day.               Medication List: This is a list of all your medications and when to take them. Check marks below indicate your daily home schedule. Keep this list as a reference.      Medications           Morning Afternoon Evening Bedtime As Needed    albuterol 108 (90 Base) MCG/ACT Inhaler   Commonly known as:  PROAIR HFA/PROVENTIL HFA/VENTOLIN HFA   Inhale 2 puffs into the lungs every 6 hours as needed for shortness of breath / dyspnea or wheezing                                aspirin 81 MG tablet   Take 1 tablet by mouth daily.                                atorvastatin 80 MG tablet   Commonly known as:  LIPITOR   Take 1 tablet (80 mg) by mouth daily                                fluticasone 50 MCG/ACT spray   Commonly known as:  FLONASE   Spray 1-2 sprays into both nostrils daily                                losartan 50 MG tablet   Commonly known as:  COZAAR   TAKE ONE TABLET BY MOUTH EVERY DAY                                nitroGLYcerin 0.4 MG sublingual tablet   Commonly known as:  NITROSTAT   Place 1 tablet (0.4 mg) under the tongue every 5 minutes as needed for chest pain                                omeprazole 20 MG CR capsule   Commonly known as:  priLOSEC   Take 1 capsule (20 mg) by mouth daily                                oxyCODONE-acetaminophen 5-325 MG per tablet   Commonly known as:  PERCOCET   Take 1-2 tablets by mouth every 4 hours as needed for pain (moderate to severe)   Last time this was given:  1 tablet on 8/3/2018  3:23 PM                                QVAR 40 MCG/ACT Inhaler   Inhale 2 puffs into the lungs as needed Like in real humid weather he'll try using it he said.   Generic drug:  beclomethasone                                tamsulosin 0.4 MG capsule   Commonly known as:  FLOMAX   Take 1 capsule (0.4 mg) by mouth daily                                tolterodine 2 MG 24 hr capsule   Commonly known as:  DETROL LA   Take 1 capsule (2 mg) by mouth daily

## 2018-08-13 ENCOUNTER — OFFICE VISIT (OUTPATIENT)
Dept: SURGERY | Facility: OTHER | Age: 63
End: 2018-08-13
Payer: COMMERCIAL

## 2018-08-13 VITALS
WEIGHT: 198.8 LBS | TEMPERATURE: 97.4 F | HEIGHT: 70 IN | BODY MASS INDEX: 28.46 KG/M2 | SYSTOLIC BLOOD PRESSURE: 124 MMHG | DIASTOLIC BLOOD PRESSURE: 78 MMHG

## 2018-08-13 DIAGNOSIS — Z98.890 POST-OPERATIVE STATE: Primary | ICD-10-CM

## 2018-08-13 PROCEDURE — 99024 POSTOP FOLLOW-UP VISIT: CPT | Performed by: SPECIALIST

## 2018-08-13 ASSESSMENT — PAIN SCALES - GENERAL: PAINLEVEL: MILD PAIN (2)

## 2018-08-13 NOTE — LETTER
"    8/13/2018         RE: Keegan Buchanan  [address] [address] [place] [id number]        Dear Colleague,    Thank you for referring your patient, Keegan Buchanan, to the [facility]. Please see a copy of my visit note below.    /78 (BP Location: Left arm, Patient Position: Sitting, Cuff Size: Adult Large)  Temp 97.4  F (36.3  C)  Ht 1.778 m (5' 10\")  Wt 90.2 kg (198 lb 12.8 oz)  BMI 28.52 kg/m2  Body mass index is 28.52 kg/(m^2).  5' 10\"  198 lbs 12.8 oz      Purnima Hampton MA on 8/13/2018 at 7:37 AM      F/U or LIH      Subjective:  Pt feels good.  No complaints.    Objective:  B/P: 124/78, T: 97.4, P: Data Unavailable, R: Data Unavailable  Abd; Incisions healing well.  Hernia repair intact.      Assessment/Plan:  Pt s/p lap LIH repair.  Doing well. He will gradually increase his activity as tolerated. F/U with me PRN.    Huber Ireland MD, FACS    Again, thank you for allowing me to participate in the care of your patient.        Sincerely,        Huber Ireland MD    "

## 2018-08-13 NOTE — PROGRESS NOTES
"/78 (BP Location: Left arm, Patient Position: Sitting, Cuff Size: Adult Large)  Temp 97.4  F (36.3  C)  Ht 1.778 m (5' 10\")  Wt 90.2 kg (198 lb 12.8 oz)  BMI 28.52 kg/m2  Body mass index is 28.52 kg/(m^2).  5' 10\"  198 lbs 12.8 oz      Purnima Hampton MA on 8/13/2018 at 7:37 AM    "

## 2018-08-13 NOTE — MR AVS SNAPSHOT
"              After Visit Summary   8/13/2018    Keegan Buchanan    MRN: [id number]           Patient Information     Date Of Birth          1955        Visit Information        Provider Department      8/13/2018 7:30 AM Huber Ireland MD [facility] [place]        Today's Diagnoses     Post-operative state    -  1       Follow-ups after your visit        Who to contact     If you have questions or need follow up information about today's clinic visit or your schedule please contact [facility] [place] directly at [phone].  Normal or non-critical lab and imaging results will be communicated to you by MyChart, letter or phone within 4 business days after the clinic has received the results. If you do not hear from us within 7 days, please contact the clinic through MyChart or phone. If you have a critical or abnormal lab result, we will notify you by phone as soon as possible.  Submit refill requests through [employer] or call your pharmacy and they will forward the refill request to us. Please allow 3 business days for your refill to be completed.          Additional Information About Your Visit        Care EveryWhere ID     This is your Care EveryWhere ID. This could be used by other organizations to access your [facility] medical records  [id number]        Your Vitals Were     Temperature Height BMI (Body Mass Index)             97.4  F (36.3  C) 1.778 m (5' 10\") 28.52 kg/m2          Blood Pressure from Last 3 Encounters:   08/13/18 124/78   08/03/18 128/85   07/23/18 124/78    Weight from Last 3 Encounters:   08/13/18 90.2 kg (198 lb 12.8 oz)   07/23/18 89.3 kg (196 lb 14.4 oz)   07/02/18 88.5 kg (195 lb)              Today, you had the following     No orders found for display       Primary Care Provider Office Phone # Fax #    Fabio Stovall [phone] [address] [place] [address]        Equal Access to Services     TESFAYE CHAVES AH: Hadii sandeep castillo " ashley Lamb, warafiqda luqadaha, qaybta kaalmada beatris, norma saniain hayaan angelescain leonardabrendan laelsiepaige rajinder. So [facility] [phone].    ATENCIÓN: Si sylviela zoila, tiene a black disposición servicios gratuitos de asistencia lingüística. Lee al [phone].    We comply with applicable federal civil rights laws and [place] laws. We do not discriminate on the basis of race, color, national origin, age, disability, sex, sexual orientation, or gender identity.            Thank you!     Thank you for choosing [facility]  for your care. Our goal is always to provide you with excellent care. Hearing back from our patients is one way we can continue to improve our services. Please take a few minutes to complete the written survey that you may receive in the mail after your visit with us. Thank you!             Your Updated Medication List - Protect others around you: Learn how to safely use, store and throw away your medicines at www.disposemymeds.org.          This list is accurate as of 8/13/18  7:42 AM.  Always use your most recent med list.                   Brand Name Dispense Instructions for use Diagnosis    albuterol 108 (90 Base) MCG/ACT inhaler    PROAIR HFA/PROVENTIL HFA/VENTOLIN HFA    1 Inhaler    Inhale 2 puffs into the lungs every 6 hours as needed for shortness of breath / dyspnea or wheezing    Acute bronchitis with symptoms > 10 days       aspirin 81 MG tablet     90 tablet    Take 1 tablet by mouth daily.    ACS (acute coronary syndrome) (H)       atorvastatin 80 MG tablet    LIPITOR    90 tablet    Take 1 tablet (80 mg) by mouth daily    Hyperlipidemia LDL goal <100       fluticasone 50 MCG/ACT spray    FLONASE    16 g    Spray 1-2 sprays into both nostrils daily    Nasal congestion       losartan 50 MG tablet    COZAAR    90 tablet    TAKE ONE TABLET BY MOUTH EVERY DAY    Hypertension goal BP (blood pressure) < 140/90       nitroGLYcerin 0.4 MG sublingual tablet    NITROSTAT    25 tablet     Place 1 tablet (0.4 mg) under the tongue every 5 minutes as needed for chest pain    ACS (acute coronary syndrome) (H)       omeprazole 20 MG CR capsule    priLOSEC    90 capsule    Take 1 capsule (20 mg) by mouth daily    Gastroesophageal reflux disease without esophagitis       oxyCODONE-acetaminophen 5-325 MG per tablet    PERCOCET    20 tablet    Take 1-2 tablets by mouth every 4 hours as needed for pain (moderate to severe)    Post-op pain       QVAR 40 MCG/ACT Inhaler   Generic drug:  beclomethasone     1 Inhaler    Inhale 2 puffs into the lungs as needed Like in real humid weather he'll try using it he said.        tamsulosin 0.4 MG capsule    FLOMAX    90 capsule    Take 1 capsule (0.4 mg) by mouth daily    Benign prostatic hyperplasia with urinary obstruction       tolterodine 2 MG 24 hr capsule    DETROL LA    90 capsule    Take 1 capsule (2 mg) by mouth daily    Urinary frequency

## 2018-08-13 NOTE — PROGRESS NOTES
F/U or LIH      Subjective:  Pt feels good.  No complaints.    Objective:  B/P: 124/78, T: 97.4, P: Data Unavailable, R: Data Unavailable  Abd; Incisions healing well.  Hernia repair intact.      Assessment/Plan:  Pt s/p lap LIH repair.  Doing well. He will gradually increase his activity as tolerated. F/U with me PRN.    Huber Ireland MD, FACS

## 2018-08-28 ENCOUNTER — TELEPHONE (OUTPATIENT)
Dept: SURGERY | Facility: CLINIC | Age: 63
End: 2018-08-28

## 2018-08-28 NOTE — TELEPHONE ENCOUNTER
Reason for call:  Patient reporting a symptom    Symptom or request: When he sits up in bed and sneezes he gets sharp pain across his stomach     Duration (how long have symptoms been present): since 8/3/18    Have you been treated for this before? Yes    Additional comments: He had hernia surgery on 8/3/18 and when  He sits up in bed or sneezes he gets sharp pain across his stomach wondering if this normal or should he be concerned.  Please call    Phone Number patient can be reached at:  Home number on file [phone] (home)    Best Time:  any    Can we leave a detailed message on this number:  YES    Call taken on 8/28/2018 at 4:10 PM by Shelley Vidal

## 2018-09-05 ENCOUNTER — TELEPHONE (OUTPATIENT)
Dept: CARDIOLOGY | Facility: CLINIC | Age: 63
End: 2018-09-05

## 2018-09-05 NOTE — TELEPHONE ENCOUNTER
----- Message from Rasta Millan MD sent at 9/4/2018  1:33 PM CDT -----  Regarding: RE: pt's zio patch  It depends upon whether he is still having palpitations and whether he wants more testing for them. If not then the test can be cancelled, if so it should be reordered. Thanks   ----- Message -----     From: Sanam Diamond RN     Sent: 9/4/2018  10:40 AM       To: Rasta Millan MD  Subject: pt's zio patch                                   Hi Dr. Millan,     We received a call from [facility] because this pt called them with concerns about his zio patch. When this pt wore the zio patch, it sounds like he was only able to wear it for 1 day. Pt was directed to speak with the billing office because of the charges he is dealing with. But a question for you is do you think this pt needs to have a different zio patch study done?     Thanks,   JOSE F Marion

## 2018-09-05 NOTE — TELEPHONE ENCOUNTER
Spoke to this pt and he does not want any further testing and has not been having palpitations symptoms.

## 2018-09-10 ENCOUNTER — DOCUMENTATION ONLY (OUTPATIENT)
Dept: OTHER | Facility: CLINIC | Age: 63
End: 2018-09-10

## 2018-10-02 ENCOUNTER — TELEPHONE (OUTPATIENT)
Dept: FAMILY MEDICINE | Facility: OTHER | Age: 63
End: 2018-10-02

## 2018-10-02 DIAGNOSIS — N13.8 BENIGN PROSTATIC HYPERPLASIA WITH URINARY OBSTRUCTION: Primary | ICD-10-CM

## 2018-10-02 DIAGNOSIS — N40.1 BENIGN PROSTATIC HYPERPLASIA WITH URINARY OBSTRUCTION: Primary | ICD-10-CM

## 2018-10-03 NOTE — TELEPHONE ENCOUNTER
Spoke with patient appointment scheduled     Lab 10/4/2018  Closing encounter  Zulma Hernández RT (R)

## 2018-10-03 NOTE — TELEPHONE ENCOUNTER
Patient would like PSA test done he says he thinks he has done this every year, please place if appropriate   Would like a call to schedule with lab once order is placed  Thanks  Zulma Hernández RT (R)

## 2018-10-03 NOTE — TELEPHONE ENCOUNTER
It would be reasonable, but I wouldn't strongly recommend it.  If he'd like me to order it, I will.

## 2018-10-04 DIAGNOSIS — N13.8 BENIGN PROSTATIC HYPERPLASIA WITH URINARY OBSTRUCTION: ICD-10-CM

## 2018-10-04 DIAGNOSIS — N40.1 BENIGN PROSTATIC HYPERPLASIA WITH URINARY OBSTRUCTION: ICD-10-CM

## 2018-10-04 LAB — PSA SERPL-MCNC: 0.53 UG/L (ref 0–4)

## 2018-10-04 PROCEDURE — 84153 ASSAY OF PSA TOTAL: CPT | Performed by: FAMILY MEDICINE

## 2018-10-04 PROCEDURE — 36415 COLL VENOUS BLD VENIPUNCTURE: CPT | Performed by: FAMILY MEDICINE

## 2018-10-10 ENCOUNTER — TELEPHONE (OUTPATIENT)
Dept: FAMILY MEDICINE | Facility: OTHER | Age: 63
End: 2018-10-10

## 2018-10-10 DIAGNOSIS — Z86.0100 HX OF COLONIC POLYP: ICD-10-CM

## 2018-10-10 DIAGNOSIS — Z12.11 SPECIAL SCREENING FOR MALIGNANT NEOPLASMS, COLON: Primary | ICD-10-CM

## 2018-10-10 NOTE — TELEPHONE ENCOUNTER
Reason for Call:  Other call back    Detailed comments: order for colonoscopy.  Pt received a note from Ines stating he is due.  Last colonoscopy was June 2015.  Please place orders, he would like this done thru [facility]    Phone Number Patient can be reached at: Home number on file [phone] (home)    Best Time: any    Can we leave a detailed message on this number? YES    Call taken on 10/10/2018 at 1:11 PM by Eve Monge

## 2018-10-15 ENCOUNTER — TELEPHONE (OUTPATIENT)
Dept: FAMILY MEDICINE | Facility: OTHER | Age: 63
End: 2018-10-15

## 2018-10-15 NOTE — LETTER
[facility]  [address]  [place] [address] [phone]        October 15, 2018    Keegan Buchanan  [address] [facility] [address]

## 2018-10-15 NOTE — LETTER

## 2018-10-17 DIAGNOSIS — N13.8 BENIGN PROSTATIC HYPERPLASIA WITH URINARY OBSTRUCTION: ICD-10-CM

## 2018-10-17 DIAGNOSIS — N40.1 BENIGN PROSTATIC HYPERPLASIA WITH URINARY OBSTRUCTION: ICD-10-CM

## 2018-10-17 DIAGNOSIS — E78.5 HYPERLIPIDEMIA LDL GOAL <100: ICD-10-CM

## 2018-10-17 DIAGNOSIS — K21.9 GASTROESOPHAGEAL REFLUX DISEASE WITHOUT ESOPHAGITIS: ICD-10-CM

## 2018-10-17 DIAGNOSIS — R35.0 URINARY FREQUENCY: ICD-10-CM

## 2018-10-18 RX ORDER — ATORVASTATIN CALCIUM 80 MG/1
TABLET, FILM COATED ORAL
Qty: 30 TABLET | Refills: 0 | Status: SHIPPED | OUTPATIENT
Start: 2018-10-18 | End: 2018-11-13

## 2018-10-18 RX ORDER — TAMSULOSIN HYDROCHLORIDE 0.4 MG/1
CAPSULE ORAL
Qty: 90 CAPSULE | Refills: 0 | Status: SHIPPED | OUTPATIENT
Start: 2018-10-18 | End: 2019-01-22

## 2018-10-18 RX ORDER — TOLTERODINE 2 MG/1
CAPSULE, EXTENDED RELEASE ORAL
Qty: 90 CAPSULE | Refills: 0 | Status: SHIPPED | OUTPATIENT
Start: 2018-10-18 | End: 2019-01-23

## 2018-10-18 NOTE — TELEPHONE ENCOUNTER
"Requested Prescriptions   Pending Prescriptions Disp Refills     omeprazole (PRILOSEC) 20 MG CR capsule [Pharmacy Med Name: OMEPRAZOLE 20MG CPDR] 90 capsule 3     Sig: TAKE ONE CAPSULE BY MOUTH EVERY DAY    PPI Protocol Passed    10/17/2018 11:30 AM       Passed - Not on Clopidogrel (unless Pantoprazole ordered)       Passed - No diagnosis of osteoporosis on record       Passed - Recent (12 mo) or future (30 days) visit within the authorizing provider's specialty    Patient had office visit in the last 12 months or has a visit in the next 30 days with authorizing provider or within the authorizing provider's specialty.  See \"Patient Info\" tab in inbasket, or \"Choose Columns\" in Meds & Orders section of the refill encounter.         Passed - Patient is age 18 or older        tamsulosin (FLOMAX) 0.4 MG capsule [Pharmacy Med Name: TAMSULOSIN HCL 0.4MG CAPS] 90 capsule 3     Sig: TAKE ONE CAPSULE BY MOUTH EVERY DAY    Alpha Blockers Passed    10/17/2018 11:30 AM       Passed - Blood pressure under 140/90 in past 12 months    BP Readings from Last 3 Encounters:   08/13/18 124/78   08/03/18 128/85   07/23/18 124/78          Passed - Recent (12 mo) or future (30 days) visit within the authorizing provider's specialty    Patient had office visit in the last 12 months or has a visit in the next 30 days with authorizing provider or within the authorizing provider's specialty.  See \"Patient Info\" tab in inbasket, or \"Choose Columns\" in Meds & Orders section of the refill encounter.           Passed - Patient does not have Tadalafil, Vardenafil, or Sildenafil on their medication list       Passed - Patient is 18 years of age or older        tolterodine (DETROL LA) 2 MG 24 hr capsule [Pharmacy Med Name: TOLTERODINE TARTRATE ER 2MG CP24] 90 capsule 3     Sig: TAKE ONE CAPSULE BY MOUTH EVERY DAY    Muscarinic Antagonists (Urinary Incontinence Agents) Passed    10/17/2018 11:30 AM       Passed - Recent (12 mo) or future (30 days) " "visit within the authorizing provider's specialty    Patient had office visit in the last 12 months or has a visit in the next 30 days with authorizing provider or within the authorizing provider's specialty.  See \"Patient Info\" tab in inbasket, or \"Choose Columns\" in Meds & Orders section of the refill encounter.         Passed - Patient does not have a diagnosis of glaucoma on the problem list    If glauoma diagnosis is new, refer refill to physician.       Passed - Patient is 18 years of age or older        atorvastatin (LIPITOR) 80 MG tablet [Pharmacy Med Name: ATORVASTATIN CALCIUM 80MG TABS] 90 tablet 3     Sig: TAKE ONE TABLET BY MOUTH EVERY DAY    Statins Protocol Failed    10/17/2018 11:30 AM       Failed - LDL on file in past 12 months    Recent Labs   Lab Test  10/12/17   0830   LDL  58          Passed - No abnormal creatine kinase in past 12 months    No lab results found.          Passed - Recent (12 mo) or future (30 days) visit within the authorizing provider's specialty    Patient had office visit in the last 12 months or has a visit in the next 30 days with authorizing provider or within the authorizing provider's specialty.  See \"Patient Info\" tab in inbasket, or \"Choose Columns\" in Meds & Orders section of the refill encounter.           Passed - Patient is age 18 or older        omeprazole (PRILOSEC) 20 MG CR capsule  Prescription approved per [facility] Refill Protocol.    tamsulosin (FLOMAX) 0.4 MG capsule  Prescription approved per [facility] Refill Protocol.    tolterodine (DETROL LA) 2 MG 24 hr capsule  Prescription approved per [facility] Refill Protocol.    atorvastatin (LIPITOR) 80 MG tablet  Medication is being filled for 1 time refill only due to:  Future labs ordered due for fasting lipid panel.     Please assist with scheduling.    Shy Whittington, RN, BSN         "

## 2018-10-31 DIAGNOSIS — R09.81 NASAL CONGESTION: ICD-10-CM

## 2018-10-31 DIAGNOSIS — J44.1 CHRONIC OBSTRUCTIVE PULMONARY DISEASE WITH ACUTE EXACERBATION (H): ICD-10-CM

## 2018-10-31 RX ORDER — FLUTICASONE PROPIONATE 50 MCG
SPRAY, SUSPENSION (ML) NASAL
Qty: 16 G | Refills: 2 | Status: SHIPPED | OUTPATIENT
Start: 2018-10-31 | End: 2019-05-16

## 2018-10-31 NOTE — TELEPHONE ENCOUNTER
"Requested Prescriptions   Pending Prescriptions Disp Refills     fluticasone (FLONASE) 50 MCG/ACT spray [Pharmacy Med Name: FLUTICASONE 50MCG NASAL SPRAY] 16 g 11     Sig: USE 1-2 SPRAYS IN EACH NOSTRIL ONCE DAILY    Inhaled Steroids Protocol Passed    10/31/2018  9:54 AM       Passed - Patient is age 12 or older       Passed - Recent (12 mo) or future (30 days) visit within the authorizing provider's specialty    Patient had office visit in the last 12 months or has a visit in the next 30 days with authorizing provider or within the authorizing provider's specialty.  See \"Patient Info\" tab in inbasket, or \"Choose Columns\" in Meds & Orders section of the refill encounter.            QVAR 40 MCG/ACT AERS Inhaler [Pharmacy Med Name: QVAR 40MCG/ACT AERS] 8.7 g 1     Sig: INHALE 2 PUFFS BY MOUTH INTO THE LUNGS TWO TIMES A DAY    Inhaled Steroids Protocol Passed    10/31/2018  9:54 AM  No flowsheet data found.       Passed - Patient is age 12 or older       Passed - Recent (12 mo) or future (30 days) visit within the authorizing provider's specialty    Patient had office visit in the last 12 months or has a visit in the next 30 days with authorizing provider or within the authorizing provider's specialty.  See \"Patient Info\" tab in inbasket, or \"Choose Columns\" in Meds & Orders section of the refill encounter.            fluticasone (FLONASE) 50 MCG/ACT spray  Prescription approved per [facility] Refill Protocol.  Next physical due 01/18/2019    beclomethasone (QVAR) 40 MCG/ACT Inhaler  Routing refill request to provider for review/approval because:  Medication is reported/historical    Please assist with scheduling.    Shy Whittington, RN, BSN           "

## 2018-11-03 ENCOUNTER — OFFICE VISIT (OUTPATIENT)
Dept: URGENT CARE | Facility: RETAIL CLINIC | Age: 63
End: 2018-11-03
Payer: COMMERCIAL

## 2018-11-03 VITALS
DIASTOLIC BLOOD PRESSURE: 82 MMHG | SYSTOLIC BLOOD PRESSURE: 161 MMHG | TEMPERATURE: 98.4 F | OXYGEN SATURATION: 97 % | HEART RATE: 100 BPM

## 2018-11-03 DIAGNOSIS — R09.81 NASAL SINUS CONGESTION: ICD-10-CM

## 2018-11-03 DIAGNOSIS — R05.9 COUGH: ICD-10-CM

## 2018-11-03 DIAGNOSIS — J01.90 ACUTE SINUSITIS WITH COEXISTING CONDITION REQUIRING PROPHYLACTIC TREATMENT: Primary | ICD-10-CM

## 2018-11-03 DIAGNOSIS — I10 HYPERTENSION GOAL BP (BLOOD PRESSURE) < 140/90: ICD-10-CM

## 2018-11-03 PROCEDURE — 99213 OFFICE O/P EST LOW 20 MIN: CPT | Performed by: PHYSICIAN ASSISTANT

## 2018-11-03 RX ORDER — CEFDINIR 300 MG/1
300 CAPSULE ORAL 2 TIMES DAILY
Qty: 20 CAPSULE | Refills: 0 | Status: SHIPPED | OUTPATIENT
Start: 2018-11-03 | End: 2018-11-23

## 2018-11-03 NOTE — PROGRESS NOTES
Chief Complaint   Patient presents with     Sinus Problem     1 day     Cough     1 week         SUBJECTIVE:   Pt. presenting to [facility] -  with a chief complaint of cough x 1-2 weeks - about the same - nonproducitve. Albuterol helpd. Now few says of nasal and sinuse congestion and now left max sinus pain and pressure.   See CC.  Hx of asthma - uses Qvar and Albuterol with lower resp illness.  Onset of symptoms gradual  Course of illness is worsening.    Severity moderate  Current and Associated symptoms: runny nose, stuffy nose, cough - non-productive, facial pain/pressure and headache  Treatment measures tried include Inhaler (name: Albuterol few hours ago).  Will restart Qvar   Predisposing factors include hx of sinusitis and wheezing.  Last antibiotic 12/2017 and 8/3/2018 for  procedure     Smoker quit    ROS:  Afebrile   Energy level is fair  ENT - denies ear pain, throat pain  CP -Nonproducitv cough. Albuterol helps. No SOB or chest pain.  GI- - appetite ok. No nausea, vomiting or diarrhea.   No bowel or bladder changes   MSK - no joint pain or swelling   Skin: No rashes    Past Medical History:   Diagnosis Date     Hypercholesteremia      Pain in joint, shoulder region     right shoulder separation     Unspecified essential hypertension      Past Surgical History:   Procedure Laterality Date     COLONOSCOPY N/A 6/12/2015    Procedure: COMBINED COLONOSCOPY, SINGLE OR MULTIPLE BIOPSY/POLYPECTOMY BY BIOPSY;  Surgeon: Herman Rebollar MD;  Location:  GI     H STENT PROCED  2012     HC REMOVE TONSILS/ADENOIDS,<13 Y/O      T & A <12y.o.     LAPAROSCOPIC HERNIORRHAPHY INGUINAL Left 8/3/2018    Procedure: LAPAROSCOPIC HERNIORRHAPHY INGUINAL;  Laparoscopic left inguinal hernia repair;  Surgeon: Huber Ireladn MD;  Location:  OR     REMOVAL OF SPERM DUCT(S)  05/06/88    Vasectomy     Patient Active Problem List   Diagnosis     Hypertension goal BP (blood pressure) < 140/90     Trigger  finger, acquired     Impotence of organic origin     Hyperlipidemia LDL goal <100     Arthritis of elbow, right     Cubital tunnel syndrome - right     Acute coronary syndrome (H)     CAD (coronary artery disease)     Benign prostatic hyperplasia with urinary obstruction     GERD (gastroesophageal reflux disease)     Chronic airway obstruction (H)     Piriformis syndrome of left side     Nonallopathic lesion of sacral region     Nonallopathic lesion of thoracic region     Nonallopathic lesion of cervical region     Cervicalgia     Left inguinal hernia     Current Outpatient Prescriptions   Medication     albuterol (PROAIR HFA/PROVENTIL HFA/VENTOLIN HFA) 108 (90 BASE) MCG/ACT Inhaler     aspirin 81 MG tablet     atorvastatin (LIPITOR) 80 MG tablet     beclomethasone (QVAR) 40 MCG/ACT Inhaler     fluticasone (FLONASE) 50 MCG/ACT spray     losartan (COZAAR) 50 MG tablet     nitroglycerin (NITROSTAT) 0.4 MG SL tablet     omeprazole (PRILOSEC) 20 MG CR capsule     oxyCODONE-acetaminophen (PERCOCET) 5-325 MG per tablet     QVAR 40 MCG/ACT AERS Inhaler     tamsulosin (FLOMAX) 0.4 MG capsule     tolterodine (DETROL LA) 2 MG 24 hr capsule     [DISCONTINUED] tolterodine (DETROL LA) 2 MG 24 hr capsule     No current facility-administered medications for this visit.        OBJECTIVE:  /82 (Cuff Size: Adult Regular)  Pulse 100  Temp 98.4  F (36.9  C) (Oral)  SpO2 97%    GENERAL APPEARANCE: cooperative, alert and no distress. Appears well hydrated.  EYES: conjunctiva clear  HENT: Rt ear canal  clear and TM normal   Lt ear canal clear and TM normal   Nose moarked congestion. thick discharge  Mouth without ulcers or lesions. no erythema. no exudate. PND noted  NECK: supple, few small shoddy NT ant nodes. No  posterior nodes.  RESP: lungs clear to auscultation - no rales, rhonchi or wheezes. Breathing easily.  CV: regular rates and rhythm  ABDOMEN:  soft, nontender, no HSM or masses and bowel sounds normal   SKIN: no  suspicious lesions or rashes  mod tenderness to palpate over left max sinus areas.      ASSESSMENT:     Hypertension goal BP (blood pressure) < 140/90  Cough  Nasal sinus congestion  Acute sinusitis with coexisting condition requiring prophylactic treatment      PLAN:  Symptomatic measures   Prescriptions as below. Discussed indications, dosing, side affects and adverse reactions of medications with  Patient -Omnicef  Restart Q francie and Flonase  Eat yogurt daily or take a probiotic supplement when on antibiotics.  OTC cough suppressant/expectorant discussed.  saline nasal spray or nasal flush for  nasal congestion .  Cool mist vaporizer.   Stay in clean air environment.  > rest.  > fluids.  Contagiousness and hygiene discussed.  Fever and pain  control measures discussed.   If unable to swallow or any breathing difficulty to go to ED .      AVS given and discussed:  Patient Instructions   Monitor BP  Goal 140/90  /82 (Cuff Size: Adult Regular)  Pulse 100  Temp 98.4  F (36.9  C) (Oral)  SpO2 97%    Saline nose spray       Avoid decongestants      Please FOLLOW UP at primary care clinic if not improving, new symptoms, worse or this does not resolve.  [facility]  [phone]      Monitor BP  Pt is comfortable with this plan.  Electronically signed,  MARIA LUZ Emery, PAC

## 2018-11-03 NOTE — MR AVS SNAPSHOT
After Visit Summary   11/3/2018    Keegan [place]    MRN: [id number]           Patient Information     Date Of Birth          1955        Visit Information        Provider Department      11/3/2018 10:20 AM Anneliese Emery PA-C [facility]        Today's Diagnoses     Hypertension goal BP (blood pressure) < 140/90    -  1    Cough        Nasal sinus congestion        Acute sinusitis with coexisting condition requiring prophylactic treatment          Care Instructions    Monitor BP  Goal 140/90  /82 (Cuff Size: Adult Regular)  Pulse 100  Temp 98.4  F (36.9  C) (Oral)  SpO2 97%    Saline nose spray       Avoid decongestants      Please FOLLOW UP at primary care clinic if not improving, new symptoms, worse or this does not resolve.  [facility]  [phone]              Follow-ups after your visit        Your next 10 appointments already scheduled     Nov 12, 2018   Procedure with Thiago Robles MD   [facility] ([facility])    [address] [place] [id number] [phone]              Who to contact     You can reach your care team any time of the day by calling [phone].  Notification of test results:  If you have an abnormal lab result, we will notify you by phone as soon as possible.         Additional Information About Your Visit        Care EveryWhere ID     This is your Care EveryWhere ID. This could be used by other organizations to access your [facility] medical records  [id number]        Your Vitals Were     Pulse Temperature Pulse Oximetry             100 98.4  F (36.9  C) (Oral) 97%          Blood Pressure from Last 3 Encounters:   11/03/18 161/82   08/13/18 124/78   08/03/18 128/85    Weight from Last 3 Encounters:   08/13/18 198 lb 12.8 oz (90.2 kg)   07/23/18 196 lb 14.4 oz (89.3 kg)   07/02/18 195 lb (88.5 kg)              Today, you had the following     No orders found for  display         Today's Medication Changes          These changes are accurate as of 11/3/18 10:39 AM.  If you have any questions, ask your nurse or doctor.               Start taking these medicines.        Dose/Directions    cefdinir 300 MG capsule   Commonly known as:  OMNICEF   Used for:  Acute sinusitis with coexisting condition requiring prophylactic treatment   Started by:  Anneliese Emery PA-C        Dose:  300 mg   Take 1 capsule (300 mg) by mouth 2 times daily   Quantity:  20 capsule   Refills:  0            Where to get your medicines      These medications were sent to [address]  [address], [facility] [address]     Phone:  [phone]     cefdinir 300 MG capsule                Primary Care Provider Office Phone # Fax #    Fabio tSovall [phone] [address] [place] [address]        Equal Access to Services     [facility]: Hadii sandeep castillo hadasho Soomaali, waaxda luqadaha, qaybta kaalmada adeegyada, norma martinez haynayan key . So [facility] [phone].    ATENCIÓN: Si habla español, tiene a black disposición servicios gratuitos de asistencia lingüística. Llame al [phone].    We comply with applicable federal civil rights laws and [place] laws. We do not discriminate on the basis of race, color, national origin, age, disability, sex, sexual orientation, or gender identity.            Thank you!     Thank you for choosing [facility]  for your care. Our goal is always to provide you with excellent care. Hearing back from our patients is one way we can continue to improve our services. Please take a few minutes to complete the written survey that you may receive in the mail after your visit with us. Thank you!             Your Updated Medication List - Protect others around you: Learn how to safely use, store and throw away your medicines at www.disposemymeds.org.          This list is  accurate as of 11/3/18 10:39 AM.  Always use your most recent med list.                   Brand Name Dispense Instructions for use Diagnosis    albuterol 108 (90 Base) MCG/ACT inhaler    PROAIR HFA/PROVENTIL HFA/VENTOLIN HFA    1 Inhaler    Inhale 2 puffs into the lungs every 6 hours as needed for shortness of breath / dyspnea or wheezing    Acute bronchitis with symptoms > 10 days       aspirin 81 MG tablet     90 tablet    Take 1 tablet by mouth daily.    ACS (acute coronary syndrome) (H)       atorvastatin 80 MG tablet    LIPITOR    30 tablet    TAKE ONE TABLET BY MOUTH EVERY DAY    Hyperlipidemia LDL goal <100       cefdinir 300 MG capsule    OMNICEF    20 capsule    Take 1 capsule (300 mg) by mouth 2 times daily    Acute sinusitis with coexisting condition requiring prophylactic treatment       fluticasone 50 MCG/ACT spray    FLONASE    16 g    USE 1-2 SPRAYS IN EACH NOSTRIL ONCE DAILY    Nasal congestion       losartan 50 MG tablet    COZAAR    90 tablet    TAKE ONE TABLET BY MOUTH EVERY DAY    Hypertension goal BP (blood pressure) < 140/90       nitroGLYcerin 0.4 MG sublingual tablet    NITROSTAT    25 tablet    Place 1 tablet (0.4 mg) under the tongue every 5 minutes as needed for chest pain    ACS (acute coronary syndrome) (H)       omeprazole 20 MG CR capsule    priLOSEC    90 capsule    TAKE ONE CAPSULE BY MOUTH EVERY DAY    Gastroesophageal reflux disease without esophagitis       oxyCODONE-acetaminophen 5-325 MG per tablet    PERCOCET    20 tablet    Take 1-2 tablets by mouth every 4 hours as needed for pain (moderate to severe)    Post-op pain       * QVAR 40 MCG/ACT Aers IS A DISCONTINUED MEDICATION   Generic drug:  beclomethasone     1 Inhaler    Inhale 2 puffs into the lungs as needed Like in real humid weather he'll try using it he said.        * QVAR 40 MCG/ACT Aers IS A DISCONTINUED MEDICATION   Generic drug:  beclomethasone     8.7 g    INHALE 2 PUFFS BY MOUTH INTO THE LUNGS TWO TIMES A DAY     Chronic obstructive pulmonary disease with acute exacerbation (H)       tamsulosin 0.4 MG capsule    FLOMAX    90 capsule    TAKE ONE CAPSULE BY MOUTH EVERY DAY    Benign prostatic hyperplasia with urinary obstruction       tolterodine 2 MG 24 hr capsule    DETROL LA    90 capsule    TAKE ONE CAPSULE BY MOUTH EVERY DAY    Urinary frequency       * Notice:  This list has 2 medication(s) that are the same as other medications prescribed for you. Read the directions carefully, and ask your doctor or other care provider to review them with you.

## 2018-11-03 NOTE — PATIENT INSTRUCTIONS
Monitor BP  Goal 140/90  /82 (Cuff Size: Adult Regular)  Pulse 100  Temp 98.4  F (36.9  C) (Oral)  SpO2 97%    Saline nose spray       Avoid decongestants      Please FOLLOW UP at primary care clinic if not improving, new symptoms, worse or this does not resolve.  [facility] [place]  [phone]

## 2018-11-06 ENCOUNTER — TELEPHONE (OUTPATIENT)
Dept: FAMILY MEDICINE | Facility: OTHER | Age: 63
End: 2018-11-06

## 2018-11-06 NOTE — TELEPHONE ENCOUNTER
Message below given to patient, no further questions  Closing encounter  Zulma Hernández RT (R)

## 2018-11-06 NOTE — TELEPHONE ENCOUNTER
Reason for Call:  Other call back    Detailed comments: Keegan is wondering if he still needs to use QVAR 40 MCG/ACT AERS Inhaler when he gets colds?  The price has gone up and the last inhaler  with over 80 puffs left.  He still uses the albuterol (PROAIR HFA/PROVENTIL HFA/VENTOLIN HFA) 108 (90 BASE) MCG/ACT Inhaler when he has colds.  Please call    Phone Number Patient can be reached at: Home number on file [phone] (home)    Best Time: any    Can we leave a detailed message on this number? YES    Call taken on 2018 at 10:05 AM by Shelley Vidal

## 2018-11-06 NOTE — TELEPHONE ENCOUNTER
If the albuterol helps enough, then he doesn't have to take the QVAR.  If not, then we'll need to consider other options for treatment.

## 2018-11-12 ENCOUNTER — HOSPITAL ENCOUNTER (OUTPATIENT)
Facility: CLINIC | Age: 63
Discharge: HOME | End: 2018-11-12
Attending: FAMILY MEDICINE | Admitting: FAMILY MEDICINE
Payer: COMMERCIAL

## 2018-11-12 ENCOUNTER — ANESTHESIA (OUTPATIENT)
Dept: GASTROENTEROLOGY | Facility: CLINIC | Age: 63
End: 2018-11-12
Payer: COMMERCIAL

## 2018-11-12 ENCOUNTER — ANESTHESIA EVENT (OUTPATIENT)
Dept: GASTROENTEROLOGY | Facility: CLINIC | Age: 63
End: 2018-11-12
Payer: COMMERCIAL

## 2018-11-12 ENCOUNTER — SURGERY (OUTPATIENT)
Age: 63
End: 2018-11-12
Payer: COMMERCIAL

## 2018-11-12 VITALS
DIASTOLIC BLOOD PRESSURE: 90 MMHG | RESPIRATION RATE: 16 BRPM | HEART RATE: 74 BPM | OXYGEN SATURATION: 94 % | SYSTOLIC BLOOD PRESSURE: 130 MMHG

## 2018-11-12 LAB — COLONOSCOPY: NORMAL

## 2018-11-12 PROCEDURE — 25000128 H RX IP 250 OP 636: Performed by: NURSE ANESTHETIST, CERTIFIED REGISTERED

## 2018-11-12 PROCEDURE — 88305 TISSUE EXAM BY PATHOLOGIST: CPT | Performed by: FAMILY MEDICINE

## 2018-11-12 PROCEDURE — 25000125 ZZHC RX 250: Performed by: NURSE ANESTHETIST, CERTIFIED REGISTERED

## 2018-11-12 PROCEDURE — 45380 COLONOSCOPY AND BIOPSY: CPT | Mod: PT | Performed by: FAMILY MEDICINE

## 2018-11-12 PROCEDURE — 40000296 ZZH STATISTIC ENDO RECOVERY CLASS 1:2 FIRST HOUR: Performed by: FAMILY MEDICINE

## 2018-11-12 PROCEDURE — 25000125 ZZHC RX 250: Performed by: FAMILY MEDICINE

## 2018-11-12 PROCEDURE — 88305 TISSUE EXAM BY PATHOLOGIST: CPT | Mod: 26 | Performed by: FAMILY MEDICINE

## 2018-11-12 PROCEDURE — 37000009 ZZH ANESTHESIA TECHNICAL FEE, EACH ADDTL 15 MIN: Performed by: FAMILY MEDICINE

## 2018-11-12 PROCEDURE — 37000008 ZZH ANESTHESIA TECHNICAL FEE, 1ST 30 MIN: Performed by: FAMILY MEDICINE

## 2018-11-12 PROCEDURE — 45380 COLONOSCOPY AND BIOPSY: CPT | Performed by: FAMILY MEDICINE

## 2018-11-12 PROCEDURE — 45378 DIAGNOSTIC COLONOSCOPY: CPT | Performed by: FAMILY MEDICINE

## 2018-11-12 RX ORDER — PROPOFOL 10 MG/ML
INJECTION, EMULSION INTRAVENOUS CONTINUOUS PRN
Status: DISCONTINUED | OUTPATIENT
Start: 2018-11-12 | End: 2018-11-12

## 2018-11-12 RX ORDER — ONDANSETRON 2 MG/ML
4 INJECTION INTRAMUSCULAR; INTRAVENOUS
Status: DISCONTINUED | OUTPATIENT
Start: 2018-11-12 | End: 2018-11-12 | Stop reason: HOSPADM

## 2018-11-12 RX ORDER — SODIUM CHLORIDE, SODIUM LACTATE, POTASSIUM CHLORIDE, CALCIUM CHLORIDE 600; 310; 30; 20 MG/100ML; MG/100ML; MG/100ML; MG/100ML
INJECTION, SOLUTION INTRAVENOUS CONTINUOUS
Status: DISCONTINUED | OUTPATIENT
Start: 2018-11-12 | End: 2018-11-12 | Stop reason: HOSPADM

## 2018-11-12 RX ORDER — NALOXONE HYDROCHLORIDE 0.4 MG/ML
.1-.4 INJECTION, SOLUTION INTRAMUSCULAR; INTRAVENOUS; SUBCUTANEOUS
Status: CANCELLED | OUTPATIENT
Start: 2018-11-12 | End: 2018-11-13

## 2018-11-12 RX ORDER — MEPERIDINE HYDROCHLORIDE 25 MG/ML
12.5 INJECTION INTRAMUSCULAR; INTRAVENOUS; SUBCUTANEOUS
Status: CANCELLED | OUTPATIENT
Start: 2018-11-12

## 2018-11-12 RX ORDER — SODIUM CHLORIDE, SODIUM LACTATE, POTASSIUM CHLORIDE, CALCIUM CHLORIDE 600; 310; 30; 20 MG/100ML; MG/100ML; MG/100ML; MG/100ML
INJECTION, SOLUTION INTRAVENOUS CONTINUOUS
Status: CANCELLED | OUTPATIENT
Start: 2018-11-12

## 2018-11-12 RX ORDER — PROPOFOL 10 MG/ML
INJECTION, EMULSION INTRAVENOUS PRN
Status: DISCONTINUED | OUTPATIENT
Start: 2018-11-12 | End: 2018-11-12

## 2018-11-12 RX ORDER — ONDANSETRON 2 MG/ML
4 INJECTION INTRAMUSCULAR; INTRAVENOUS EVERY 30 MIN PRN
Status: CANCELLED | OUTPATIENT
Start: 2018-11-12

## 2018-11-12 RX ORDER — LIDOCAINE 40 MG/G
CREAM TOPICAL
Status: DISCONTINUED | OUTPATIENT
Start: 2018-11-12 | End: 2018-11-12 | Stop reason: HOSPADM

## 2018-11-12 RX ORDER — LIDOCAINE HYDROCHLORIDE 20 MG/ML
INJECTION, SOLUTION INFILTRATION; PERINEURAL PRN
Status: DISCONTINUED | OUTPATIENT
Start: 2018-11-12 | End: 2018-11-12

## 2018-11-12 RX ORDER — ONDANSETRON 4 MG/1
4 TABLET, ORALLY DISINTEGRATING ORAL EVERY 30 MIN PRN
Status: CANCELLED | OUTPATIENT
Start: 2018-11-12

## 2018-11-12 RX ADMIN — PROPOFOL 50 MG: 10 INJECTION, EMULSION INTRAVENOUS at 10:22

## 2018-11-12 RX ADMIN — LIDOCAINE HYDROCHLORIDE 30 MG: 20 INJECTION, SOLUTION INFILTRATION; PERINEURAL at 10:22

## 2018-11-12 RX ADMIN — LIDOCAINE HYDROCHLORIDE 1 ML: 10 INJECTION, SOLUTION EPIDURAL; INFILTRATION; INTRACAUDAL; PERINEURAL at 09:39

## 2018-11-12 RX ADMIN — PROPOFOL 150 MCG/KG/MIN: 10 INJECTION, EMULSION INTRAVENOUS at 10:22

## 2018-11-12 RX ADMIN — SODIUM CHLORIDE, POTASSIUM CHLORIDE, SODIUM LACTATE AND CALCIUM CHLORIDE: 600; 310; 30; 20 INJECTION, SOLUTION INTRAVENOUS at 09:40

## 2018-11-12 RX ADMIN — PROPOFOL 20 MG: 10 INJECTION, EMULSION INTRAVENOUS at 10:26

## 2018-11-12 ASSESSMENT — LIFESTYLE VARIABLES: TOBACCO_USE: 1

## 2018-11-12 ASSESSMENT — COPD QUESTIONNAIRES
CAT_SEVERITY: MILD
COPD: 1

## 2018-11-12 NOTE — ANESTHESIA CARE TRANSFER NOTE
Patient: Keegan A Glauvitz    Procedure(s):  colonoscopy    Diagnosis: Special screening for malignant neoplasms, colon, Hx of colonic polyp    Diagnosis Additional Information: No value filed.    Anesthesia Type:   MAC     Note:  Airway :Room Air  Patient transferred to:Phase II  Handoff Report: Identifed the Patient, Identified the Reponsible Provider, Reviewed the pertinent medical history, Discussed the surgical course, Reviewed Intra-OP anesthesia mangement and issues during anesthesia, Set expectations for post-procedure period and Allowed opportunity for questions and acknowledgement of understanding      Vitals: (Last set prior to Anesthesia Care Transfer)    CRNA VITALS  11/12/2018 1014 - 11/12/2018 1114      11/12/2018             Resp Rate (observed): 18                Electronically Signed By: DAMIAN Caraballo CRNA  November 12, 2018  11:22 AM

## 2018-11-12 NOTE — ANESTHESIA PREPROCEDURE EVALUATION
Anesthesia Evaluation     . Pt has had prior anesthetic. Type: MAC    No history of anesthetic complications          ROS/MED HX    ENT/Pulmonary:     (+)tobacco use, Past use mild COPD, , . .    Neurologic:  - neg neurologic ROS     Cardiovascular:     (+) Dyslipidemia, hypertension--CAD, -past MI,-stent,. : . . . :. . Previous cardiac testing Echodate:[id number]:ECHO STRESS WITH OPTISON   Order: [id number]   Status: Edited Result - FINAL   Visible to patient: No (Not Released) Next appt: 2018 at 07:30 AM in Surgery (Huber Ireland MD) Dx: Atypical chest pain; Coronary artery ...   Details     Reading Physician Reading Date Result Priority  Alberto Leung MD 2018   Narrative        [id number]  [id number]  [id number]  [id number]^SEAN^MERLIN^IDRIS           [facility]  Echocardiography Laboratory  [address] [place] [address]        Name: JAZZY KELLEY  MRN: [id number]  : 1955  Study Date: 2018 09:57 AM  Age: 62 yrs  Gender: Male  Patient Location: [facility]  Reason For Study: , Atypical chest pain, Coronary artery disease involving  native  History: Increased Cholesterol, HTN, Ex Smoker  Ordering Physician: MERLIN ESTRADA  Referring Physician: Fabio Stovall  Performed By: Romeo Thorpe     BSA: 2.1 m2  Height: 70 in  Weight: 195 lb  HR: 77  BP: 164/80 mmHg  _____________________________________________________________________________  __        Procedure  Stress Echo Complete. Contrast Optison.  _____________________________________________________________________________  __        Interpretation Summary  The patient exercised 11 minutes.  The patient exhibited no chest pain during exercise.  The EKG portion of this stress test was negative for inducible ischemia (see  echo results below).  Normal resting wall motion and no stress-induced wall motion abnormality.  This was a normal stress echocardiogram with no evidence of  stress-induced  ischemia.  Moderately dilated aortic root 4.5 cm (although not reported but on personal  review of stress echo images dated 11/24/2014 aortic root measured 4.3 cm in  that study)  _____________________________________________________________________________  __     Stress  There was a normal BP response to exercise.  Exercise was stopped due to fatigue.  The patient exercised 11 minutes.  The patient exhibited no chest pain during exercise.  Target Heart Rate was achieved.  The EKG portion of this stress test was negative for inducible ischemia (see  echo results below).  Arrhythmia induced during stress: occasional PVC's.  The visual ejection fraction is estimated at >70%.  Left ventricular cavity size decreases with exercise.  Global LV systolic function augments with exercise.  Normal resting wall motion and no stress-induced wall motion abnormality.  This was a normal stress echocardiogram with no evidence of stress-induced  ischemia.     Baseline  sinus rhythm, right axis deviation.  No regional wall motion abnormalities noted.  The visual ejection fraction is estimated at 55-60%.  moderately dilated aortic root- 4.5 cm.     Stress Results            Protocol:  [place] JOSE        Maximum Predicted HR:   158 bpm            Target HR: 134 bpm         % Maximum Predicted HR: 97 %            Stage  DurationHeart Rate  BP                Comment                (mm:ss)   (bpm)        Stage 1   3:00     108    154/80        Stage 2   3:00     121    160/80        Stage 3   3:00     136    158/72        Stage 4   2:00     153      /   RPP [id number], FAC Above, Tatum Score 12       RECOVERYR  6:00      94    136/76               Stress Duration:   11:00 mm:ss *        Recovery Time: 6:00 mm:ss         Maximum Stress HR: 153 bpm              METS:          13     Left Ventricle  Left ventricular systolic function is normal. The visual ejection fraction is  estimated at 55-60%. No regional wall motion abnormalities  noted.        Tricuspid Valve  There is trace tricuspid regurgitation.     Aortic Valve  The aortic valve is trileaflet. No aortic stenosis is present.     Pulmonic Valve  There is trace pulmonic valvular regurgitation.     Vessels  Moderate aortic root dilatation. 4.5 cm. Normal size ascending aorta.     _____________________________________________________________________________  __  MMode/2D Measurements & Calculations  IVSd: 1.1 cm  LVIDd: 4.8 cm  LVIDs: 3.3 cm  LVPWd: 0.95 cm  FS: 29.8 %  LV mass(C)dI: 84.5 grams/m2  Ao root diam: 4.4 cm  LA dimension: 3.2 cm  asc Aorta Diam: 3.5 cm  RWT: 0.40                    _____________________________________________________________________________  __           Report approved by: Tiffanie Ulrich 07/11/2018 12:25 PM       Specimen Collected: 07/11/18  9:57 AM Last Resulted: 07/11/18  9:57 AM Order Details View Encounter Lab and Collection Details Routing Result History            date: results:ECG reviewed date:10-16-17 results:SR date: results:          METS/Exercise Tolerance:     Hematologic:  - neg hematologic  ROS       Musculoskeletal:   (+) arthritis, , , -       GI/Hepatic:     (+) GERD       Renal/Genitourinary:     (+) BPH,       Endo:  - neg endo ROS       Psychiatric:  - neg psychiatric ROS       Infectious Disease:  - neg infectious disease ROS       Malignancy:      - no malignancy   Other:    - neg other ROS                 Physical Exam  Normal systems: cardiovascular, pulmonary and dental    Airway   Mallampati: II  TM distance: >3 FB  Neck ROM: full    Dental     Cardiovascular   Rhythm and rate: regular and normal      Pulmonary    breath sounds clear to auscultation                    Anesthesia Plan      History & Physical Review  History and physical reviewed and following examination; no interval change.    ASA Status:  3 .    NPO Status:  > 8 hours    Plan for MAC with Intravenous and Propofol induction. Maintenance will be TIVA.  Reason for  MAC:  Deep or markedly invasive procedure (G8)         Postoperative Care  Postoperative pain management:  Oral pain medications.      Consents  Anesthetic plan, risks, benefits and alternatives discussed with:  Patient..                          .

## 2018-11-12 NOTE — ANESTHESIA POSTPROCEDURE EVALUATION
Patient: Keegan TIRADO Glauvitz    Procedure(s):  colonoscopy    Diagnosis:Special screening for malignant neoplasms, colon, Hx of colonic polyp    Diagnosis Additional Information: No value filed.    Anesthesia Type:  MAC    Note:  Anesthesia Post Evaluation    Patient location during evaluation: Phase 2  Patient participation: Able to fully participate in evaluation  Level of consciousness: awake and alert  Pain management: adequate  Airway patency: patent  Cardiovascular status: blood pressure returned to baseline  Respiratory status: spontaneous ventilation and room air  Hydration status: acceptable  PONV: none     Anesthetic complications: None    Comments: Patient resting without complaint. No anesthesia concerns.         Last vitals:  Vitals:    11/12/18 1050 11/12/18 1100   BP: 107/74 116/81   SpO2: 94%          Electronically Signed By: DAMIAN Caraballo CRNA  November 12, 2018  11:19 AM

## 2018-11-12 NOTE — DISCHARGE INSTRUCTIONS
[facility]    Home Care Following Endoscopy          Activity:    You have just undergone an endoscopic procedure usually performed with conscious sedation.  Do not work or operate machinery (including a car), sign any legal documents or drink any alcohol for at least 12 hours.      I encourage you to walk and attempt to pass this air as soon as possible.    Diet:    Return to the diet you were on before your procedure but eat lightly for the first 12-24 hours.    Drink plenty of water.    Resume any regular medications unless otherwise advised by your physician.  Please begin any new medication prescribed as a result of your procedure as directed by your physician.     If you had any biopsy or polyp removed please refrain from aspirin or aspirin products for 2 days.  If on Coumadin please restart as instructed by your physician.   Pain:    You may take Tylenol as needed for pain.  Expected Recovery:    You can expect some mild abdominal fullness and/or discomfort due to the air used to inflate your intestinal tract.    Call Your Physician if You Have:    After Colonoscopy:  o Worsening persisting abdominal pain which is worse with activity.  o Fevers (>101 degrees F), chills or shakes.  o Passage of continued blood with bowel movements.   Any questions or concerns about your recovery, please call [phone] or after hours [phone] Nurse Advice Line.    Follow-up Care:    You should receive a call or letter with your results within 1 week. Please call if you have not received a notification of your results.

## 2018-11-12 NOTE — LETTER
November 15, 2018      Keegan Buchanan  [address] [address] [place] [id number]        Dear Freedom Allison- these are your results from your recent colonoscopy and biopsies.  They show some mild inflammation which I think was due to your preparation.  Your next colonoscopy should be in 5 years for routine follow-up, thanks        Here are your recent biopsy findings. I am including the pathology report for your review. 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 Tubular adenomas, according to latest estimates, have a 10% chance of becoming cancerous if left alone for 10 yrs. If you only had one or two, current screening guidelines say you can go 5 yrs before your next colonscopy. Please have a discussion with your primary MD about what is best for you. This should take into account your family and personal medical history.  This of course changes and should be done sooner if you develop symptoms such as blood in your stool, unexplained weight loss, or a family member develops colon cancer.     Please do not hesitate to call my office if you have any questions or concerns. Your primary doctor will be able to arrange any future testing that needs to be done, of feel free to contact my office with any questions, at any time. It was a pleasure to meet you,          Sincerely,        No name on file.

## 2018-11-12 NOTE — H&P
Pre-Endoscopy History and Physical     Keegan Buchanan MRN# [id number]   YOB: 1955 Age: 63 year old     Date of Procedure: 11/12/2018  Primary care provider: Fabio Stovall  Type of Endoscopy: colonoscopy  Type of Anesthesia Anticipated: MAC    HPI:    Keegan is a 63 year old male who will be undergoing the above procedure.      Keegan is feeling well today. Can get up a single flight of stairs without dyspnea. Estimated METS > 4.     Patient Active Problem List   Diagnosis     Hypertension goal BP (blood pressure) < 140/90     Trigger finger, acquired     Impotence of organic origin     Hyperlipidemia LDL goal <100     Arthritis of elbow, right     Cubital tunnel syndrome - right     Acute coronary syndrome (H)     CAD (coronary artery disease)     Benign prostatic hyperplasia with urinary obstruction     GERD (gastroesophageal reflux disease)     Chronic airway obstruction (H)     Piriformis syndrome of left side     Nonallopathic lesion of sacral region     Nonallopathic lesion of thoracic region     Nonallopathic lesion of cervical region     Cervicalgia     Left inguinal hernia        Prescriptions Prior to Admission   Medication Sig Dispense Refill Last Dose     albuterol (PROAIR HFA/PROVENTIL HFA/VENTOLIN HFA) 108 (90 BASE) MCG/ACT Inhaler Inhale 2 puffs into the lungs every 6 hours as needed for shortness of breath / dyspnea or wheezing 1 Inhaler 1 11/12/2018     aspirin 81 MG tablet Take 1 tablet by mouth daily. 90 tablet 3 11/11/2018     atorvastatin (LIPITOR) 80 MG tablet TAKE ONE TABLET BY MOUTH EVERY DAY 30 tablet 0 11/11/2018     beclomethasone (QVAR) 40 MCG/ACT Inhaler Inhale 2 puffs into the lungs as needed Like in real humid weather he'll try using it he said. 1 Inhaler 0 11/11/2018     cefdinir (OMNICEF) 300 MG capsule Take 1 capsule (300 mg) by mouth 2 times daily 20 capsule 0 Past Week     fluticasone (FLONASE) 50 MCG/ACT spray USE 1-2 SPRAYS IN EACH NOSTRIL ONCE DAILY 16 g 2  "11/12/2018     losartan (COZAAR) 50 MG tablet TAKE ONE TABLET BY MOUTH EVERY DAY 90 tablet 2 11/11/2018     omeprazole (PRILOSEC) 20 MG CR capsule TAKE ONE CAPSULE BY MOUTH EVERY DAY 90 capsule 0 11/11/2018     QVAR 40 MCG/ACT AERS Inhaler INHALE 2 PUFFS BY MOUTH INTO THE LUNGS TWO TIMES A DAY 8.7 g 1 11/11/2018     tamsulosin (FLOMAX) 0.4 MG capsule TAKE ONE CAPSULE BY MOUTH EVERY DAY 90 capsule 0 11/11/2018     tolterodine (DETROL LA) 2 MG 24 hr capsule TAKE ONE CAPSULE BY MOUTH EVERY DAY 90 capsule 0 11/11/2018     nitroglycerin (NITROSTAT) 0.4 MG SL tablet Place 1 tablet (0.4 mg) under the tongue every 5 minutes as needed for chest pain 25 tablet 0 Unknown        REVIEW OF SYSTEMS:     5 point ROS negative except as noted above in HPI, including Gen., Resp., CV, GI &  system review.      PHYSICAL EXAM:   There were no vitals taken for this visit.   Estimated body mass index is 28.52 kg/(m^2) as calculated from the following:    Height as of 8/13/18: 5' 10\" (1.778 m).    Weight as of 8/13/18: 198 lb 12.8 oz (90.2 kg).    GENERAL APPEARANCE: alert and no distress  RESP: lungs clear and unlabored  CV: regular  ABD: soft, nt/nd    DIAGNOSTICS:    Not indicated      IMPRESSION   ASA Class 3 - Severe systemic disease, but not incapacitating        PLAN:     Plan for colonoscopy. No medical contraindications to proceed, or further work up needed. The risks and benefits of the procedure and the sedation options and risks were discussed with the patient. These include infection, bleeding, and small risk of colon perforation (1/1000 to 1/28553 depending on patient characteristics and type of procedure). Keegan was also explained to alternatives for colo-rectal screening. All questions were answered and informed consent was obtained.      The above has been forwarded to the consulting provider.      Signed Electronically by: Thiago Robles  November 12, 2018     "

## 2018-11-12 NOTE — IP AVS SNAPSHOT
MRN:[id number]                      After Visit Summary   11/12/2018    Keegan Buchanan    MRN: [id number]           Thank you!     Thank you for choosing [facility] for your care. Our goal is always to provide you with excellent care. Hearing back from our patients is one way we can continue to improve our services. Please take a few minutes to complete the written survey that you may receive in the mail after you visit with us. Thank you!        Patient Information     Date Of Birth          1955        About your hospital stay     You were admitted on:  November 12, 2018 You last received care in the:  [facility]    You were discharged on:  November 12, 2018       Who to Call     For medical emergencies, please call 911.  For non-urgent questions about your medical care, please call your primary care provider or clinic, [phone]  For questions related to your surgery, please call your surgery clinic        Attending Provider     Provider Thiago Washburn MD [facility]       Primary Care Provider Office Phone # Fax #    Fabio Stovall [phone]      Further instructions from your care team         [facility]    Home Care Following Endoscopy          Activity:    You have just undergone an endoscopic procedure usually performed with conscious sedation.  Do not work or operate machinery (including a car), sign any legal documents or drink any alcohol for at least 12 hours.      I encourage you to walk and attempt to pass this air as soon as possible.    Diet:    Return to the diet you were on before your procedure but eat lightly for the first 12-24 hours.    Drink plenty of water.    Resume any regular medications unless otherwise advised by your physician.  Please begin any new medication prescribed as a result of your procedure as directed by your physician.     If you had any biopsy or polyp removed please  refrain from aspirin or aspirin products for 2 days.  If on Coumadin please restart as instructed by your physician.   Pain:    You may take Tylenol as needed for pain.  Expected Recovery:    You can expect some mild abdominal fullness and/or discomfort due to the air used to inflate your intestinal tract.    Call Your Physician if You Have:    After Colonoscopy:  o Worsening persisting abdominal pain which is worse with activity.  o Fevers (>101 degrees F), chills or shakes.  o Passage of continued blood with bowel movements.   Any questions or concerns about your recovery, please call [phone] or after hours [phone] Nurse Advice Line.    Follow-up Care:    You should receive a call or letter with your results within 1 week. Please call if you have not received a notification of your results.    Pending Results     No orders found from 11/10/2018 to 11/13/2018.            Admission Information     Date & Time Provider Department Dept. Phone    11/12/2018 Thiago Robles MD [facility] [phone]      Care EveryWhere ID     This is your Care EveryWhere ID. This could be used by other organizations to access your [facility] medical records  [id number]        Equal Access to Services     TESFAYE CHAVES : Hadii sandeep Lamb, waaxda georgi, qaybta kaalmakimberly spear, norma key . So [facility] [phone].    ATENCIÓN: Si habla español, tiene a black disposición servicios gratuitos de asistencia lingüística. Llame al [phone].    We comply with applicable federal civil rights laws and [place] laws. We do not discriminate on the basis of race, color, national origin, age, disability, sex, sexual orientation, or gender identity.               Review of your medicines      UNREVIEWED medicines. Ask your doctor about these medicines        Dose / Directions    albuterol 108 (90 Base) MCG/ACT inhaler   Commonly known as:  PROAIR HFA/PROVENTIL  HFA/VENTOLIN HFA   Used for:  Acute bronchitis with symptoms > 10 days        Dose:  2 puff   Inhale 2 puffs into the lungs every 6 hours as needed for shortness of breath / dyspnea or wheezing   Quantity:  1 Inhaler   Refills:  1       aspirin 81 MG tablet   Used for:  ACS (acute coronary syndrome) (H)        Dose:  81 mg   Take 1 tablet by mouth daily.   Quantity:  90 tablet   Refills:  3       atorvastatin 80 MG tablet   Commonly known as:  LIPITOR   Used for:  Hyperlipidemia LDL goal <100        TAKE ONE TABLET BY MOUTH EVERY DAY   Quantity:  30 tablet   Refills:  0       cefdinir 300 MG capsule   Commonly known as:  OMNICEF   Used for:  Acute sinusitis with coexisting condition requiring prophylactic treatment        Dose:  300 mg   Take 1 capsule (300 mg) by mouth 2 times daily   Quantity:  20 capsule   Refills:  0       fluticasone 50 MCG/ACT spray   Commonly known as:  FLONASE   Used for:  Nasal congestion        USE 1-2 SPRAYS IN EACH NOSTRIL ONCE DAILY   Quantity:  16 g   Refills:  2       losartan 50 MG tablet   Commonly known as:  COZAAR   Used for:  Hypertension goal BP (blood pressure) < 140/90        TAKE ONE TABLET BY MOUTH EVERY DAY   Quantity:  90 tablet   Refills:  2       nitroGLYcerin 0.4 MG sublingual tablet   Commonly known as:  NITROSTAT   Used for:  ACS (acute coronary syndrome) (H)        Dose:  0.4 mg   Place 1 tablet (0.4 mg) under the tongue every 5 minutes as needed for chest pain   Quantity:  25 tablet   Refills:  0       omeprazole 20 MG CR capsule   Commonly known as:  priLOSEC   Used for:  Gastroesophageal reflux disease without esophagitis        TAKE ONE CAPSULE BY MOUTH EVERY DAY   Quantity:  90 capsule   Refills:  0       * QVAR 40 MCG/ACT Aers IS A DISCONTINUED MEDICATION   Generic drug:  beclomethasone        Dose:  2 puff   Inhale 2 puffs into the lungs as needed Like in real humid weather he'll try using it he said.   Quantity:  1 Inhaler   Refills:  0       * QVAR 40  MCG/ACT Aers IS A DISCONTINUED MEDICATION   Used for:  Chronic obstructive pulmonary disease with acute exacerbation (H)   Generic drug:  beclomethasone        INHALE 2 PUFFS BY MOUTH INTO THE LUNGS TWO TIMES A DAY   Quantity:  8.7 g   Refills:  1       tamsulosin 0.4 MG capsule   Commonly known as:  FLOMAX   Used for:  Benign prostatic hyperplasia with urinary obstruction        TAKE ONE CAPSULE BY MOUTH EVERY DAY   Quantity:  90 capsule   Refills:  0       tolterodine 2 MG 24 hr capsule   Commonly known as:  DETROL LA   Used for:  Urinary frequency        TAKE ONE CAPSULE BY MOUTH EVERY DAY   Quantity:  90 capsule   Refills:  0       * Notice:  This list has 2 medication(s) that are the same as other medications prescribed for you. Read the directions carefully, and ask your doctor or other care provider to review them with you.             Protect others around you: Learn how to safely use, store and throw away your medicines at www.disposemymeds.org.             Medication List: This is a list of all your medications and when to take them. Check marks below indicate your daily home schedule. Keep this list as a reference.      Medications           Morning Afternoon Evening Bedtime As Needed    albuterol 108 (90 Base) MCG/ACT inhaler   Commonly known as:  PROAIR HFA/PROVENTIL HFA/VENTOLIN HFA   Inhale 2 puffs into the lungs every 6 hours as needed for shortness of breath / dyspnea or wheezing                                aspirin 81 MG tablet   Take 1 tablet by mouth daily.                                atorvastatin 80 MG tablet   Commonly known as:  LIPITOR   TAKE ONE TABLET BY MOUTH EVERY DAY                                cefdinir 300 MG capsule   Commonly known as:  OMNICEF   Take 1 capsule (300 mg) by mouth 2 times daily                                fluticasone 50 MCG/ACT spray   Commonly known as:  FLONASE   USE 1-2 SPRAYS IN EACH NOSTRIL ONCE DAILY                                losartan 50 MG tablet    Commonly known as:  COZAAR   TAKE ONE TABLET BY MOUTH EVERY DAY                                nitroGLYcerin 0.4 MG sublingual tablet   Commonly known as:  NITROSTAT   Place 1 tablet (0.4 mg) under the tongue every 5 minutes as needed for chest pain                                omeprazole 20 MG CR capsule   Commonly known as:  priLOSEC   TAKE ONE CAPSULE BY MOUTH EVERY DAY                                * QVAR 40 MCG/ACT Aers IS A DISCONTINUED MEDICATION   Inhale 2 puffs into the lungs as needed Like in real humid weather he'll try using it he said.   Generic drug:  beclomethasone                                * QVAR 40 MCG/ACT Aers IS A DISCONTINUED MEDICATION   INHALE 2 PUFFS BY MOUTH INTO THE LUNGS TWO TIMES A DAY   Generic drug:  beclomethasone                                tamsulosin 0.4 MG capsule   Commonly known as:  FLOMAX   TAKE ONE CAPSULE BY MOUTH EVERY DAY                                tolterodine 2 MG 24 hr capsule   Commonly known as:  DETROL LA   TAKE ONE CAPSULE BY MOUTH EVERY DAY                                * Notice:  This list has 2 medication(s) that are the same as other medications prescribed for you. Read the directions carefully, and ask your doctor or other care provider to review them with you.

## 2018-11-12 NOTE — IP AVS SNAPSHOT
[facility]    [address] [address]    Phone:  [phone]                                       After Visit Summary   11/12/2018    Keegan Buchanan    MRN: [id number]           After Visit Summary Signature Page     I have received my discharge instructions, and my questions have been answered. I have discussed any challenges I see with this plan with the nurse or doctor.    ..........................................................................................................................................  Patient/Patient Representative Signature      ..........................................................................................................................................  Patient Representative Print Name and Relationship to Patient    ..................................................               ................................................  Date                                   Time    ..........................................................................................................................................  Reviewed by Signature/Title    ...................................................              ..............................................  Date                                               Time          22EPIC Rev 08/18

## 2018-11-13 DIAGNOSIS — E78.5 HYPERLIPIDEMIA LDL GOAL <100: ICD-10-CM

## 2018-11-13 NOTE — TELEPHONE ENCOUNTER
Lipitor  Routing refill request to provider for review/approval because:  Dianna given x1 and patient did not follow up  Labs not current:  FLP    Joann Vila, RN, BSN

## 2018-11-14 ENCOUNTER — TELEPHONE (OUTPATIENT)
Dept: FAMILY MEDICINE | Facility: OTHER | Age: 63
End: 2018-11-14

## 2018-11-14 DIAGNOSIS — E78.5 HYPERLIPIDEMIA LDL GOAL <100: ICD-10-CM

## 2018-11-14 DIAGNOSIS — I10 HYPERTENSION GOAL BP (BLOOD PRESSURE) < 140/90: Primary | ICD-10-CM

## 2018-11-14 LAB — COPATH REPORT: NORMAL

## 2018-11-14 NOTE — TELEPHONE ENCOUNTER
Pt overdue for cholesterol labs.  Please schedule lab draw and then can fill enough to get him to that appointment.

## 2018-11-14 NOTE — TELEPHONE ENCOUNTER
Patient is coming in tomorrow for labs. Health maintenance shows he is also due for a cmp, if you would like this done please place a future order.    Thank You,  Beckie Cornejo MLT(ASCP)

## 2018-11-14 NOTE — TELEPHONE ENCOUNTER
Called and informed patient. He is scheduled for tomorrow for a lab draw. After labs are drawn patient is hoping for a 3 month supply because it is cheaper for him then a month to month prescription. Informed patient I will inform provider.     Darell Jurado,

## 2018-11-15 ENCOUNTER — TELEPHONE (OUTPATIENT)
Dept: FAMILY MEDICINE | Facility: OTHER | Age: 63
End: 2018-11-15

## 2018-11-15 DIAGNOSIS — I10 HYPERTENSION GOAL BP (BLOOD PRESSURE) < 140/90: ICD-10-CM

## 2018-11-15 DIAGNOSIS — E78.5 HYPERLIPIDEMIA LDL GOAL <100: ICD-10-CM

## 2018-11-15 LAB
ALBUMIN SERPL-MCNC: 4 G/DL (ref 3.4–5)
ALP SERPL-CCNC: 107 U/L (ref 40–150)
ALT SERPL W P-5'-P-CCNC: 43 U/L (ref 0–70)
ANION GAP SERPL CALCULATED.3IONS-SCNC: 9 MMOL/L (ref 3–14)
AST SERPL W P-5'-P-CCNC: 24 U/L (ref 0–45)
BILIRUB SERPL-MCNC: 0.9 MG/DL (ref 0.2–1.3)
BUN SERPL-MCNC: 15 MG/DL (ref 7–30)
CALCIUM SERPL-MCNC: 9.1 MG/DL (ref 8.5–10.1)
CHLORIDE SERPL-SCNC: 104 MMOL/L (ref 94–109)
CHOLEST SERPL-MCNC: 127 MG/DL
CO2 SERPL-SCNC: 27 MMOL/L (ref 20–32)
CREAT SERPL-MCNC: 0.99 MG/DL (ref 0.66–1.25)
GFR SERPL CREATININE-BSD FRML MDRD: 77 ML/MIN/1.7M2
GLUCOSE SERPL-MCNC: 105 MG/DL (ref 70–99)
HDLC SERPL-MCNC: 36 MG/DL
LDLC SERPL CALC-MCNC: 66 MG/DL
NONHDLC SERPL-MCNC: 91 MG/DL
POTASSIUM SERPL-SCNC: 4.1 MMOL/L (ref 3.4–5.3)
PROT SERPL-MCNC: 7.7 G/DL (ref 6.8–8.8)
SODIUM SERPL-SCNC: 140 MMOL/L (ref 133–144)
TRIGL SERPL-MCNC: 124 MG/DL

## 2018-11-15 PROCEDURE — 36415 COLL VENOUS BLD VENIPUNCTURE: CPT | Performed by: FAMILY MEDICINE

## 2018-11-15 PROCEDURE — 80061 LIPID PANEL: CPT | Performed by: FAMILY MEDICINE

## 2018-11-15 PROCEDURE — 80053 COMPREHEN METABOLIC PANEL: CPT | Performed by: FAMILY MEDICINE

## 2018-11-15 RX ORDER — ATORVASTATIN CALCIUM 80 MG/1
TABLET, FILM COATED ORAL
Qty: 90 TABLET | Refills: 1 | Status: SHIPPED | OUTPATIENT
Start: 2018-11-15 | End: 2019-05-16

## 2018-11-15 NOTE — TELEPHONE ENCOUNTER
Zulma FRANCIS (R) contacted Keegan on 11/15/18 and left a message. If patient calls back please inform patient of results below, thanks    All of your labs were normal for you.  Patient's blood sugar is in the prediabetic range.  Can try to prevent this from getting worse by exercising regularly and controlling weight and diet.    Have a nice day!    Dr. Stovall

## 2018-11-15 NOTE — PROGRESS NOTES
All of your labs were normal for you.  Patient's blood sugar is in the prediabetic range.  Can try to prevent this from getting worse by exercising regularly and controlling weight and diet.    Have a nice day!    Dr. Stovall

## 2018-11-16 ENCOUNTER — ALLIED HEALTH/NURSE VISIT (OUTPATIENT)
Dept: FAMILY MEDICINE | Facility: OTHER | Age: 63
End: 2018-11-16
Payer: COMMERCIAL

## 2018-11-16 VITALS — HEART RATE: 84 BPM | DIASTOLIC BLOOD PRESSURE: 84 MMHG | SYSTOLIC BLOOD PRESSURE: 136 MMHG

## 2018-11-16 DIAGNOSIS — Z23 NEED FOR PROPHYLACTIC VACCINATION AND INOCULATION AGAINST INFLUENZA: Primary | ICD-10-CM

## 2018-11-16 DIAGNOSIS — I10 HYPERTENSION GOAL BP (BLOOD PRESSURE) < 140/90: ICD-10-CM

## 2018-11-16 PROCEDURE — 90471 IMMUNIZATION ADMIN: CPT

## 2018-11-16 PROCEDURE — 90682 RIV4 VACC RECOMBINANT DNA IM: CPT

## 2018-11-16 PROCEDURE — 99207 ZZC NO CHARGE NURSE ONLY: CPT

## 2018-11-16 NOTE — NURSING NOTE
Keegan Buchanan is a 63 year old patient who comes in today for a Blood Pressure check.  Initial BP:  /84 (Cuff Size: Adult Large)  Pulse 84     84  Disposition: follow-up as previously indicated by provider  Della Barrientos CMA ([facility])

## 2018-11-16 NOTE — TELEPHONE ENCOUNTER
Other than pre-op visits, pt hasn't been seen since January 2018.  I have given him 6 month supply of medication, which should get him to late spring/early summer.  He would be due for a physical around that time.

## 2018-11-16 NOTE — PROGRESS NOTES
Prior to injection verified patient identity using patient's name and date of birth.  Due to injection administration, patient instructed to remain in clinic for 15 minutes  afterwards, and to report any adverse reaction to me immediately.      Injectable Influenza Immunization Documentation    1.  Is the person to be vaccinated sick today?   No    2. Does the person to be vaccinated have an allergy to a component   of the vaccine?   No  Egg Allergy Algorithm Link    3. Has the person to be vaccinated ever had a serious reaction   to influenza vaccine in the past?   No    4. Has the person to be vaccinated ever had Guillain-Barré syndrome?   No    Form completed by Della Barrientos CMA (AAMA)

## 2018-11-16 NOTE — TELEPHONE ENCOUNTER
Called patient explained we will want to see him back in clinic in about 5-6 months before he runs out because of his blood sugar. Also, discussed that patient is due for his flu shot and he could use a BP check. Patient was near by and could do it now. Added patient to float schedule.     Darell Jurado,

## 2018-11-16 NOTE — TELEPHONE ENCOUNTER
Left message for patient to return call to clinic. When call is returned please relay results below. Also please inform patient a 90 supply of the lipator with a refill has been sent to the pharmacy.    Notes Recorded by Fabio Stovall MD on 11/15/2018 at 3:46 PM  All of your labs were normal for you.  Patient's blood sugar is in the prediabetic range.  Can try to prevent this from getting worse by exercising regularly and controlling weight and diet.    Darell Jurado,

## 2018-11-16 NOTE — MR AVS SNAPSHOT
After Visit Summary   11/16/2018    Keegan Buchanan    MRN: [id number]           Patient Information     Date Of Birth          1955        Visit Information        Provider Department      11/16/2018 10:30 AM NL FLOMODESTO NURSE [facility]        Today's Diagnoses     Need for prophylactic vaccination and inoculation against influenza    -  1    Hypertension goal BP (blood pressure) < 140/90           Follow-ups after your visit        Who to contact     If you have questions or need follow up information about today's clinic visit or your schedule please contact [facility] directly at [phone].  Normal or non-critical lab and imaging results will be communicated to you by MyChart, letter or phone within 4 business days after the clinic has received the results. If you do not hear from us within 7 days, please contact the clinic through MyChart or phone. If you have a critical or abnormal lab result, we will notify you by phone as soon as possible.  Submit refill requests through War[employer] or call your pharmacy and they will forward the refill request to us. Please allow 3 business days for your refill to be completed.          Additional Information About Your Visit        Care EveryWhere ID     This is your Care EveryWhere ID. This could be used by other organizations to access your [facility] medical records  [id number]        Your Vitals Were     Pulse                   84            Blood Pressure from Last 3 Encounters:   11/16/18 136/84   11/12/18 130/90   11/03/18 161/82    Weight from Last 3 Encounters:   08/13/18 198 lb 12.8 oz (90.2 kg)   07/23/18 196 lb 14.4 oz (89.3 kg)   07/02/18 195 lb (88.5 kg)              We Performed the Following     FLU VACCINE, (RIV4) RECOMBINANT HA  , IM (FluBlok, egg free) [21574]- >18 YRS (FMG recommended  50-64 YRS)     Vaccine Administration, Initial [31465]        Primary Care Provider Office Phone # Fax #    Fabio  Isidro Stovall [phone]       [address] [place] [address]        Equal Access to Services     TESFAYE CHAVES : Hadpennie sandeep castillo ashley Lamb, warafiqda luqmariya, qagermánta kaalmada beatris, norma calvillo. So [facility] [phone].    ATENCIÓN: Si habla español, tiene a black disposición servicios gratuitos de asistencia lingüística. Llame al [phone].    We comply with applicable federal civil rights laws and [place] laws. We do not discriminate on the basis of race, color, national origin, age, disability, sex, sexual orientation, or gender identity.            Thank you!     Thank you for choosing [facility]  for your care. Our goal is always to provide you with excellent care. Hearing back from our patients is one way we can continue to improve our services. Please take a few minutes to complete the written survey that you may receive in the mail after your visit with us. Thank you!             Your Updated Medication List - Protect others around you: Learn how to safely use, store and throw away your medicines at www.disposemymeds.org.          This list is accurate as of 11/16/18 10:35 AM.  Always use your most recent med list.                   Brand Name Dispense Instructions for use Diagnosis    albuterol 108 (90 Base) MCG/ACT inhaler    PROAIR HFA/PROVENTIL HFA/VENTOLIN HFA    1 Inhaler    Inhale 2 puffs into the lungs every 6 hours as needed for shortness of breath / dyspnea or wheezing    Acute bronchitis with symptoms > 10 days       aspirin 81 MG tablet     90 tablet    Take 1 tablet by mouth daily.    ACS (acute coronary syndrome) (H)       atorvastatin 80 MG tablet    LIPITOR    90 tablet    TAKE ONE TABLET BY MOUTH ONCE DAILY **NEED TO BE SEEN FOR FUTHER REFILLS**    Hyperlipidemia LDL goal <100       cefdinir 300 MG capsule    OMNICEF    20 capsule    Take 1 capsule (300 mg) by mouth 2 times daily    Acute sinusitis with coexisting  condition requiring prophylactic treatment       fluticasone 50 MCG/ACT spray    FLONASE    16 g    USE 1-2 SPRAYS IN EACH NOSTRIL ONCE DAILY    Nasal congestion       losartan 50 MG tablet    COZAAR    90 tablet    TAKE ONE TABLET BY MOUTH EVERY DAY    Hypertension goal BP (blood pressure) < 140/90       nitroGLYcerin 0.4 MG sublingual tablet    NITROSTAT    25 tablet    Place 1 tablet (0.4 mg) under the tongue every 5 minutes as needed for chest pain    ACS (acute coronary syndrome) (H)       omeprazole 20 MG CR capsule    priLOSEC    90 capsule    TAKE ONE CAPSULE BY MOUTH EVERY DAY    Gastroesophageal reflux disease without esophagitis       * QVAR 40 MCG/ACT Aers IS A DISCONTINUED MEDICATION   Generic drug:  beclomethasone     1 Inhaler    Inhale 2 puffs into the lungs as needed Like in real humid weather he'll try using it he said.        * QVAR 40 MCG/ACT Aers IS A DISCONTINUED MEDICATION   Generic drug:  beclomethasone     8.7 g    INHALE 2 PUFFS BY MOUTH INTO THE LUNGS TWO TIMES A DAY    Chronic obstructive pulmonary disease with acute exacerbation (H)       tamsulosin 0.4 MG capsule    FLOMAX    90 capsule    TAKE ONE CAPSULE BY MOUTH EVERY DAY    Benign prostatic hyperplasia with urinary obstruction       tolterodine 2 MG 24 hr capsule    DETROL LA    90 capsule    TAKE ONE CAPSULE BY MOUTH EVERY DAY    Urinary frequency       * Notice:  This list has 2 medication(s) that are the same as other medications prescribed for you. Read the directions carefully, and ask your doctor or other care provider to review them with you.

## 2018-11-23 ENCOUNTER — RADIANT APPOINTMENT (OUTPATIENT)
Dept: GENERAL RADIOLOGY | Facility: OTHER | Age: 63
End: 2018-11-23
Attending: PHYSICIAN ASSISTANT
Payer: COMMERCIAL

## 2018-11-23 ENCOUNTER — OFFICE VISIT (OUTPATIENT)
Dept: FAMILY MEDICINE | Facility: OTHER | Age: 63
End: 2018-11-23
Payer: COMMERCIAL

## 2018-11-23 VITALS
SYSTOLIC BLOOD PRESSURE: 122 MMHG | HEART RATE: 94 BPM | DIASTOLIC BLOOD PRESSURE: 76 MMHG | BODY MASS INDEX: 28.55 KG/M2 | OXYGEN SATURATION: 94 % | TEMPERATURE: 97.5 F | WEIGHT: 199 LBS | RESPIRATION RATE: 20 BRPM

## 2018-11-23 DIAGNOSIS — R05.9 COUGH: ICD-10-CM

## 2018-11-23 DIAGNOSIS — R05.9 COUGH: Primary | ICD-10-CM

## 2018-11-23 DIAGNOSIS — J43.8 OTHER EMPHYSEMA (H): ICD-10-CM

## 2018-11-23 DIAGNOSIS — J30.89 SEASONAL ALLERGIC RHINITIS DUE TO OTHER ALLERGIC TRIGGER: ICD-10-CM

## 2018-11-23 DIAGNOSIS — R19.7 DIARRHEA, UNSPECIFIED TYPE: ICD-10-CM

## 2018-11-23 DIAGNOSIS — I10 HYPERTENSION GOAL BP (BLOOD PRESSURE) < 140/90: ICD-10-CM

## 2018-11-23 DIAGNOSIS — R09.81 CONGESTION OF PARANASAL SINUS: ICD-10-CM

## 2018-11-23 LAB
ALBUMIN SERPL-MCNC: 3.9 G/DL (ref 3.4–5)
ALP SERPL-CCNC: 114 U/L (ref 40–150)
ALT SERPL W P-5'-P-CCNC: 35 U/L (ref 0–70)
ANION GAP SERPL CALCULATED.3IONS-SCNC: 12 MMOL/L (ref 3–14)
AST SERPL W P-5'-P-CCNC: 23 U/L (ref 0–45)
BASOPHILS # BLD AUTO: 0.1 10E9/L (ref 0–0.2)
BASOPHILS NFR BLD AUTO: 0.5 %
BILIRUB SERPL-MCNC: 0.8 MG/DL (ref 0.2–1.3)
BUN SERPL-MCNC: 10 MG/DL (ref 7–30)
CALCIUM SERPL-MCNC: 8.5 MG/DL (ref 8.5–10.1)
CHLORIDE SERPL-SCNC: 104 MMOL/L (ref 94–109)
CO2 SERPL-SCNC: 25 MMOL/L (ref 20–32)
CREAT SERPL-MCNC: 0.93 MG/DL (ref 0.66–1.25)
DIFFERENTIAL METHOD BLD: ABNORMAL
EOSINOPHIL # BLD AUTO: 0.8 10E9/L (ref 0–0.7)
EOSINOPHIL NFR BLD AUTO: 9.2 %
ERYTHROCYTE [DISTWIDTH] IN BLOOD BY AUTOMATED COUNT: 13.6 % (ref 10–15)
GFR SERPL CREATININE-BSD FRML MDRD: 82 ML/MIN/1.7M2
GLUCOSE SERPL-MCNC: 100 MG/DL (ref 70–99)
HCT VFR BLD AUTO: 45.1 % (ref 40–53)
HGB BLD-MCNC: 15.4 G/DL (ref 13.3–17.7)
LYMPHOCYTES # BLD AUTO: 1.7 10E9/L (ref 0.8–5.3)
LYMPHOCYTES NFR BLD AUTO: 18.9 %
MCH RBC QN AUTO: 29.1 PG (ref 26.5–33)
MCHC RBC AUTO-ENTMCNC: 34.1 G/DL (ref 31.5–36.5)
MCV RBC AUTO: 85 FL (ref 78–100)
MONOCYTES # BLD AUTO: 0.9 10E9/L (ref 0–1.3)
MONOCYTES NFR BLD AUTO: 9.6 %
NEUTROPHILS # BLD AUTO: 5.6 10E9/L (ref 1.6–8.3)
NEUTROPHILS NFR BLD AUTO: 61.8 %
PLATELET # BLD AUTO: 268 10E9/L (ref 150–450)
POTASSIUM SERPL-SCNC: 3.9 MMOL/L (ref 3.4–5.3)
PROT SERPL-MCNC: 7.6 G/DL (ref 6.8–8.8)
RBC # BLD AUTO: 5.29 10E12/L (ref 4.4–5.9)
SODIUM SERPL-SCNC: 141 MMOL/L (ref 133–144)
WBC # BLD AUTO: 9.1 10E9/L (ref 4–11)

## 2018-11-23 PROCEDURE — 99214 OFFICE O/P EST MOD 30 MIN: CPT | Performed by: PHYSICIAN ASSISTANT

## 2018-11-23 PROCEDURE — 80053 COMPREHEN METABOLIC PANEL: CPT | Performed by: PHYSICIAN ASSISTANT

## 2018-11-23 PROCEDURE — 71046 X-RAY EXAM CHEST 2 VIEWS: CPT | Mod: FY

## 2018-11-23 PROCEDURE — 85025 COMPLETE CBC W/AUTO DIFF WBC: CPT | Performed by: PHYSICIAN ASSISTANT

## 2018-11-23 PROCEDURE — 36415 COLL VENOUS BLD VENIPUNCTURE: CPT | Performed by: PHYSICIAN ASSISTANT

## 2018-11-23 RX ORDER — FLUTICASONE PROPIONATE 50 MCG
1-2 SPRAY, SUSPENSION (ML) NASAL DAILY
Qty: 3 BOTTLE | Refills: 1 | Status: SHIPPED | OUTPATIENT
Start: 2018-11-23 | End: 2019-11-18

## 2018-11-23 RX ORDER — CETIRIZINE HYDROCHLORIDE 10 MG/1
10 TABLET ORAL EVERY EVENING
Qty: 90 TABLET | Refills: 1 | Status: SHIPPED | OUTPATIENT
Start: 2018-11-23 | End: 2019-05-16

## 2018-11-23 ASSESSMENT — PAIN SCALES - GENERAL: PAINLEVEL: NO PAIN (0)

## 2018-11-23 NOTE — LETTER
November 23, 2018      Keegan Buchanan  [address] [address] [place] [id number]        Dear ,    We are writing to inform you of your test results.    Your test results fall within the expected range(s) or remain unchanged from previous results.  Please continue with current treatment plan.    Resulted Orders   CBC with platelets and differential   Result Value Ref Range    WBC 9.1 4.0 - 11.0 10e9/L    RBC Count 5.29 4.4 - 5.9 10e12/L    Hemoglobin 15.4 13.3 - 17.7 g/dL    Hematocrit 45.1 40.0 - 53.0 %    MCV 85 78 - 100 fl    MCH 29.1 26.5 - 33.0 pg    MCHC 34.1 31.5 - 36.5 g/dL    RDW 13.6 10.0 - 15.0 %    Platelet Count 268 150 - 450 10e9/L    % Neutrophils 61.8 %    % Lymphocytes 18.9 %    % Monocytes 9.6 %    % Eosinophils 9.2 %    % Basophils 0.5 %    Absolute Neutrophil 5.6 1.6 - 8.3 10e9/L    Absolute Lymphocytes 1.7 0.8 - 5.3 10e9/L    Absolute Monocytes 0.9 0.0 - 1.3 10e9/L    Absolute Eosinophils 0.8 (H) 0.0 - 0.7 10e9/L    Absolute Basophils 0.1 0.0 - 0.2 10e9/L    Diff Method Automated Method    Comprehensive metabolic panel   Result Value Ref Range    Sodium 141 133 - 144 mmol/L    Potassium 3.9 3.4 - 5.3 mmol/L    Chloride 104 94 - 109 mmol/L    Carbon Dioxide 25 20 - 32 mmol/L    Anion Gap 12 3 - 14 mmol/L    Glucose 100 (H) 70 - 99 mg/dL    Urea Nitrogen 10 7 - 30 mg/dL    Creatinine 0.93 0.66 - 1.25 mg/dL    GFR Estimate 82 >60 mL/min/1.7m2      Comment:      Non  GFR Calc    GFR Estimate If Black >90 >60 mL/min/1.7m2      Comment:       GFR Calc    Calcium 8.5 8.5 - 10.1 mg/dL    Bilirubin Total 0.8 0.2 - 1.3 mg/dL    Albumin 3.9 3.4 - 5.0 g/dL    Protein Total 7.6 6.8 - 8.8 g/dL    Alkaline Phosphatase 114 40 - 150 U/L    ALT 35 0 - 70 U/L    AST 23 0 - 45 U/L       If you have any questions or concerns, please call the clinic at the number listed above.       Sincerely,        Gregory John PA-C

## 2018-11-23 NOTE — PROGRESS NOTES
SUBJECTIVE:   Keegan Buchanan is a 63 year old male who presents to clinic today for the following health issues:      HPI  Acute Illness   Acute illness concerns: Cough, Sinus  Onset: 1 month    Fever: no    Chills/Sweats: no    Headache (location?): no     Sinus Pressure:YES- post-nasal drainage    Conjunctivitis:  no    Ear Pain: no    Rhinorrhea: no    Congestion: YES    Sore Throat: no     Cough: YES    Wheeze: YES    Decreased Appetite: no    Nausea: no    Vomiting: no    Diarrhea:  YES- Since being on an antibiotic Omnicef.     Dysuria/Freq.: no    Fatigue/Achiness: no    Sick/Strep Exposure: YES     Therapies Tried and outcome: Omnicef, Flonase    Problem list and histories reviewed & adjusted, as indicated.  Additional history: Has had seasonal struggles with his breathing for many years.  He was a smoker but quit some time ago.  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  No fever has been noted.    Patient Active Problem List   Diagnosis     Hypertension goal BP (blood pressure) < 140/90     Trigger finger, acquired     Impotence of organic origin     Hyperlipidemia LDL goal <100     Arthritis of elbow, right     Cubital tunnel syndrome - right     Acute coronary syndrome (H)     CAD (coronary artery disease)     Benign prostatic hyperplasia with urinary obstruction     GERD (gastroesophageal reflux disease)     Chronic airway obstruction (H)     Piriformis syndrome of left side     Nonallopathic lesion of sacral region     Nonallopathic lesion of thoracic region     Nonallopathic lesion of cervical region     Cervicalgia     Left inguinal hernia     Congestion of paranasal sinus     Past Surgical History:   Procedure Laterality Date     COLONOSCOPY N/A 6/12/2015    Procedure: COMBINED COLONOSCOPY, SINGLE OR MULTIPLE BIOPSY/POLYPECTOMY BY BIOPSY;  Surgeon: Herman Rebollar MD;  Location:  GI     COLONOSCOPY N/A 11/12/2018     Procedure: colonoscopy;  Surgeon: Thiago Robles MD;  Location: [facility] GI     H STENT PROCED       HC REMOVE TONSILS/ADENOIDS,<11 Y/O      T & A <12y.o.     LAPAROSCOPIC HERNIORRHAPHY INGUINAL Left 8/3/2018    Procedure: LAPAROSCOPIC HERNIORRHAPHY INGUINAL;  Laparoscopic left inguinal hernia repair;  Surgeon: Huber Ireland MD;  Location: [facility] OR     REMOVAL OF SPERM DUCT(S)  88    Vasectomy       Social History   Substance Use Topics     Smoking status: Former Smoker     Packs/day: 1.00     Years: 15.00     Smokeless tobacco: Former User     Types: Chew     Quit date: 1987      Comment: quit 25 years ago.     Alcohol use Yes      Comment: 1 -2 monthly     Family History   Problem Relation Age of Onset     Diabetes Mother      adult onset diabetes     Cancer Mother      kidney dx age 75     Cancer Father      prostate cancer/ at age 73     Hypertension Brother      Cancer Paternal Grandfather      prostat ca     Lung Cancer Brother          Current Outpatient Prescriptions   Medication Sig Dispense Refill     albuterol (PROAIR HFA/PROVENTIL HFA/VENTOLIN HFA) 108 (90 BASE) MCG/ACT Inhaler Inhale 2 puffs into the lungs every 6 hours as needed for shortness of breath / dyspnea or wheezing 1 Inhaler 1     aspirin 81 MG tablet Take 1 tablet by mouth daily. 90 tablet 3     atorvastatin (LIPITOR) 80 MG tablet TAKE ONE TABLET BY MOUTH ONCE DAILY **NEED TO BE SEEN FOR FUTHER REFILLS** 90 tablet 1     fluticasone (FLONASE) 50 MCG/ACT spray USE 1-2 SPRAYS IN EACH NOSTRIL ONCE DAILY 16 g 2     losartan (COZAAR) 50 MG tablet TAKE ONE TABLET BY MOUTH EVERY DAY 90 tablet 2     nitroglycerin (NITROSTAT) 0.4 MG SL tablet Place 1 tablet (0.4 mg) under the tongue every 5 minutes as needed for chest pain 25 tablet 0     omeprazole (PRILOSEC) 20 MG CR capsule TAKE ONE CAPSULE BY MOUTH EVERY DAY 90 capsule 0     tamsulosin (FLOMAX) 0.4 MG capsule TAKE ONE CAPSULE BY MOUTH EVERY DAY 90 capsule 0     tolterodine  (DETROL LA) 2 MG 24 hr capsule TAKE ONE CAPSULE BY MOUTH EVERY DAY 90 capsule 0     [DISCONTINUED] tolterodine (DETROL LA) 2 MG 24 hr capsule Take 2 mg by mouth 2 times daily.       Allergies   Allergen Reactions     Augmentin Diarrhea and GI Disturbance     Codeine Nausea     significant     Penicillins      GI upset and diarrhea     Recent Labs   Lab Test  11/15/18   0838  07/23/18   1536  10/12/17   0830  10/10/16   0828   12/27/12   0838   A1C   --    --    --    --    --   5.6   LDL  66   --   58  77   < >  83   HDL  36*   --   40  41   < >  37*   TRIG  124   --   213*  235*   < >  196*   ALT  43   --   39  38   < >   --    CR  0.99  0.96  0.82  0.85   < >   --    GFRESTIMATED  77  79  >90  >90  Non African American GFR Calc     < >   --    GFRESTBLACK  >90  >90  >90  >90  African American GFR Calc     < >   --    POTASSIUM  4.1  3.6  4.4  3.9   < >   --     < > = values in this interval not displayed.      BP Readings from Last 3 Encounters:   11/23/18 122/76   11/16/18 136/84   11/12/18 130/90    Wt Readings from Last 3 Encounters:   11/23/18 199 lb (90.3 kg)   08/13/18 198 lb 12.8 oz (90.2 kg)   07/23/18 196 lb 14.4 oz (89.3 kg)                  Labs reviewed in EPIC    ROS:  Constitutional, HEENT, cardiovascular, pulmonary, gi and gu systems are negative, except as otherwise noted.    OBJECTIVE:     /76 (Cuff Size: Adult Large)  Pulse 94  Temp 97.5  F (36.4  C) (Temporal)  Resp 20  Wt 199 lb (90.3 kg)  SpO2 94%  BMI 28.55 kg/m2  Body mass index is 28.55 kg/(m^2).  GENERAL: healthy, alert and no distress  EYES: Eyes grossly normal to inspection, PERRL and conjunctivae and sclerae normal  HENT: normal cephalic/atraumatic, both ears: clear effusion, nose and mouth without ulcers or lesions, oropharynx clear and oral mucous membranes moist  NECK: no adenopathy, no asymmetry, masses, or scars and trachea midline and normal to palpation  RESP: lungs clear to auscultation - no rales, rhonchi or  wheezes  CV: regular rate and rhythm, normal S1 S2, no S3 or S4, no murmur, click or rub, no peripheral edema and peripheral pulses strong  ABDOMEN: soft, nontender, no hepatosplenomegaly, no masses and bowel sounds normal  MS: no gross musculoskeletal defects noted, no edema  SKIN: no suspicious lesions or rashes to visible skin.  NEURO: Normal strength and tone, mentation intact and speech normal  PSYCH: mentation appears normal, affect normal/bright    Diagnostic Test Results:  Results for orders placed or performed in visit on 11/23/18 (from the past 24 hour(s))   CBC with platelets and differential   Result Value Ref Range    WBC 9.1 4.0 - 11.0 10e9/L    RBC Count 5.29 4.4 - 5.9 10e12/L    Hemoglobin 15.4 13.3 - 17.7 g/dL    Hematocrit 45.1 40.0 - 53.0 %    MCV 85 78 - 100 fl    MCH 29.1 26.5 - 33.0 pg    MCHC 34.1 31.5 - 36.5 g/dL    RDW 13.6 10.0 - 15.0 %    Platelet Count 268 150 - 450 10e9/L    % Neutrophils 61.8 %    % Lymphocytes 18.9 %    % Monocytes 9.6 %    % Eosinophils 9.2 %    % Basophils 0.5 %    Absolute Neutrophil 5.6 1.6 - 8.3 10e9/L    Absolute Lymphocytes 1.7 0.8 - 5.3 10e9/L    Absolute Monocytes 0.9 0.0 - 1.3 10e9/L    Absolute Eosinophils 0.8 (H) 0.0 - 0.7 10e9/L    Absolute Basophils 0.1 0.0 - 0.2 10e9/L    Diff Method Automated Method      CXR Findings: Normal- no infiltrates, effusions, pneumothoraces, cardiomegaly or masses      ASSESSMENT/PLAN:     1. Diarrhea, unspecified type  - Enteric Bacteria and Virus Panel by HU Stool; Future  - Giardia antigen; Future  - Clostridium difficile Toxin B PCR; Future  - Ova and Parasite Exam Routine; Future    2. Cough  3. Congestion of paranasal sinus  5. Other emphysema (H)  - CBC with platelets and differential  - XR Chest 2 Views; Future  - Comprehensive metabolic panel  - General PFT Lab (Please always keep checked); Future  - Pulmonary Function Test; Future    4. Hypertension goal BP (blood pressure) < 140/90  - General PFT Lab (Please always  keep checked); Future  - Pulmonary Function Test; Future    6. Seasonal allergic rhinitis due to other allergic trigger  Suspected of overall etiology and source of his problems given the timing being at the fall of each year.  I suspect he has postnasal drip which is irritating his pulmonary tree and causing a chronic cough.  Add to that the presence of emphysema and long-term cough is certainly probable.  - fluticasone (FLONASE) 50 MCG/ACT spray; Spray 1-2 sprays into both nostrils daily  Dispense: 3 Bottle; Refill: 1  - cetirizine (ZYRTEC) 10 MG tablet; Take 1 tablet (10 mg) by mouth every evening  Dispense: 90 tablet; Refill: 1    Patient is to do the stool samples to check for colostrum difficile etc.  He is to respond to phone call related to a pulmonary function test to evaluate the level of COPD.    Follow-up in 4-6 weeks is advised.    Gregory John PA-C  [facility]

## 2018-11-23 NOTE — MR AVS SNAPSHOT
After Visit Summary   11/23/2018    Keegan Buchanan    MRN: [id number]           Patient Information     Date Of Birth          1955        Visit Information        Provider Department      11/23/2018 9:40 AM Gregory Morgan PA-C [facility]        Today's Diagnoses     Cough    -  1    Diarrhea, unspecified type        Congestion of paranasal sinus        Hypertension goal BP (blood pressure) < 140/90        Other emphysema (H)        Seasonal allergic rhinitis due to other allergic trigger           Follow-ups after your visit        Future tests that were ordered for you today     Open Future Orders        Priority Expected Expires Ordered    General PFT Lab (Please always keep checked) Routine  11/23/2019 11/23/2018    Pulmonary Function Test Routine  11/23/2019 11/23/2018    Enteric Bacteria and Virus Panel by HU Stool Routine  11/23/2019 11/23/2018    Giardia antigen Routine  11/23/2019 11/23/2018    Clostridium difficile Toxin B PCR Routine  12/23/2018 11/23/2018    Ova and Parasite Exam Routine Routine  11/23/2019 11/23/2018            Who to contact     If you have questions or need follow up information about today's clinic visit or your schedule please contact [facility] directly at [phone].  Normal or non-critical lab and imaging results will be communicated to you by MyChart, letter or phone within 4 business days after the clinic has received the results. If you do not hear from us within 7 days, please contact the clinic through MyChart or phone. If you have a critical or abnormal lab result, we will notify you by phone as soon as possible.  Submit refill requests through [employer] or call your pharmacy and they will forward the refill request to us. Please allow 3 business days for your refill to be completed.          Additional Information About Your Visit        Care EveryWhere ID     This is your Care EveryWhere ID. This could be used by other  organizations to access your [facility] medical records  [id number]        Your Vitals Were     Pulse Temperature Respirations Pulse Oximetry BMI (Body Mass Index)       94 97.5  F (36.4  C) (Temporal) 20 94% 28.55 kg/m2        Blood Pressure from Last 3 Encounters:   11/23/18 122/76   11/16/18 136/84   11/12/18 130/90    Weight from Last 3 Encounters:   11/23/18 199 lb (90.3 kg)   08/13/18 198 lb 12.8 oz (90.2 kg)   07/23/18 196 lb 14.4 oz (89.3 kg)              We Performed the Following     CBC with platelets and differential     Comprehensive metabolic panel          Today's Medication Changes          These changes are accurate as of 11/23/18 10:37 AM.  If you have any questions, ask your nurse or doctor.               Start taking these medicines.        Dose/Directions    cetirizine 10 MG tablet   Commonly known as:  zyrTEC   Used for:  Seasonal allergic rhinitis due to other allergic trigger   Started by:  Gregory Morgan PA-C        Dose:  10 mg   Take 1 tablet (10 mg) by mouth every evening   Quantity:  90 tablet   Refills:  1         These medicines have changed or have updated prescriptions.        Dose/Directions    * fluticasone 50 MCG/ACT spray   Commonly known as:  FLONASE   This may have changed:  Another medication with the same name was added. Make sure you understand how and when to take each.   Used for:  Nasal congestion   Changed by:  Gregory Morgan PA-C        USE 1-2 SPRAYS IN EACH NOSTRIL ONCE DAILY   Quantity:  16 g   Refills:  2       * fluticasone 50 MCG/ACT spray   Commonly known as:  FLONASE   This may have changed:  You were already taking a medication with the same name, and this prescription was added. Make sure you understand how and when to take each.   Used for:  Seasonal allergic rhinitis due to other allergic trigger   Changed by:  Gregory Morgan PA-C        Dose:  1-2 spray   Spray 1-2 sprays into both nostrils daily   Quantity:  3 Bottle   Refills:  1       * Notice:  This  list has 2 medication(s) that are the same as other medications prescribed for you. Read the directions carefully, and ask your doctor or other care provider to review them with you.      Stop taking these medicines if you haven't already. Please contact your care team if you have questions.     QVAR 40 MCG/ACT Aers IS A DISCONTINUED MEDICATION   Generic drug:  beclomethasone   Stopped by:  Gregory Morgan PA-C                Where to get your medicines      These medications were sent to [facility] [place] - [address]   [address] [place] [address]     Phone:  [phone]     cetirizine 10 MG tablet    fluticasone 50 MCG/ACT spray                Primary Care Provider Office Phone # Fax #    Fabio Isidro Stovall [phone] [address] [place] [address]        Equal Access to Services     [facility]: Hadii aad ku hadasho Soomaali, waaxda luqadaha, qaybta kaalmada adeegyada, waxay idiin hayaan javier key . So [facility] [phone].    ATENCIÓN: Si habla español, tiene a black disposición servicios gratuitos de asistencia lingüística. Llame al [phone].    We comply with applicable federal civil rights laws and [place] laws. We do not discriminate on the basis of race, color, national origin, age, disability, sex, sexual orientation, or gender identity.            Thank you!     Thank you for choosing [facility]  for your care. Our goal is always to provide you with excellent care. Hearing back from our patients is one way we can continue to improve our services. Please take a few minutes to complete the written survey that you may receive in the mail after your visit with us. Thank you!             Your Updated Medication List - Protect others around you: Learn how to safely use, store and throw away your medicines at www.disposemymeds.org.          This list is accurate as of 11/23/18 10:37 AM.  Always use your  most recent med list.                   Brand Name Dispense Instructions for use Diagnosis    albuterol 108 (90 Base) MCG/ACT inhaler    PROAIR HFA/PROVENTIL HFA/VENTOLIN HFA    1 Inhaler    Inhale 2 puffs into the lungs every 6 hours as needed for shortness of breath / dyspnea or wheezing    Acute bronchitis with symptoms > 10 days       aspirin 81 MG tablet    ASA    90 tablet    Take 1 tablet by mouth daily.    ACS (acute coronary syndrome) (H)       atorvastatin 80 MG tablet    LIPITOR    90 tablet    TAKE ONE TABLET BY MOUTH ONCE DAILY **NEED TO BE SEEN FOR FUTHER REFILLS**    Hyperlipidemia LDL goal <100       cetirizine 10 MG tablet    zyrTEC    90 tablet    Take 1 tablet (10 mg) by mouth every evening    Seasonal allergic rhinitis due to other allergic trigger       * fluticasone 50 MCG/ACT spray    FLONASE    16 g    USE 1-2 SPRAYS IN EACH NOSTRIL ONCE DAILY    Nasal congestion       * fluticasone 50 MCG/ACT spray    FLONASE    3 Bottle    Spray 1-2 sprays into both nostrils daily    Seasonal allergic rhinitis due to other allergic trigger       losartan 50 MG tablet    COZAAR    90 tablet    TAKE ONE TABLET BY MOUTH EVERY DAY    Hypertension goal BP (blood pressure) < 140/90       nitroGLYcerin 0.4 MG sublingual tablet    NITROSTAT    25 tablet    Place 1 tablet (0.4 mg) under the tongue every 5 minutes as needed for chest pain    ACS (acute coronary syndrome) (H)       omeprazole 20 MG CR capsule    priLOSEC    90 capsule    TAKE ONE CAPSULE BY MOUTH EVERY DAY    Gastroesophageal reflux disease without esophagitis       tamsulosin 0.4 MG capsule    FLOMAX    90 capsule    TAKE ONE CAPSULE BY MOUTH EVERY DAY    Benign prostatic hyperplasia with urinary obstruction       tolterodine 2 MG 24 hr capsule    DETROL LA    90 capsule    TAKE ONE CAPSULE BY MOUTH EVERY DAY    Urinary frequency       * Notice:  This list has 2 medication(s) that are the same as other medications prescribed for you. Read the  directions carefully, and ask your doctor or other care provider to review them with you.

## 2018-11-24 DIAGNOSIS — R19.7 DIARRHEA, UNSPECIFIED TYPE: ICD-10-CM

## 2018-11-24 DIAGNOSIS — A04.72 C. DIFFICILE COLITIS: Primary | ICD-10-CM

## 2018-11-24 LAB
C DIFF TOX B STL QL: POSITIVE
SPECIMEN SOURCE: ABNORMAL

## 2018-11-24 PROCEDURE — 87506 IADNA-DNA/RNA PROBE TQ 6-11: CPT | Performed by: PHYSICIAN ASSISTANT

## 2018-11-24 PROCEDURE — 87177 OVA AND PARASITES SMEARS: CPT | Performed by: PHYSICIAN ASSISTANT

## 2018-11-24 PROCEDURE — 87329 GIARDIA AG IA: CPT | Mod: 59 | Performed by: PHYSICIAN ASSISTANT

## 2018-11-24 PROCEDURE — 87209 SMEAR COMPLEX STAIN: CPT | Performed by: PHYSICIAN ASSISTANT

## 2018-11-24 PROCEDURE — 87493 C DIFF AMPLIFIED PROBE: CPT | Mod: 59 | Performed by: PHYSICIAN ASSISTANT

## 2018-11-26 ENCOUNTER — TELEPHONE (OUTPATIENT)
Dept: FAMILY MEDICINE | Facility: OTHER | Age: 63
End: 2018-11-26

## 2018-11-26 LAB
G LAMBLIA AG STL QL IA: NORMAL
O+P STL MICRO: NORMAL
O+P STL MICRO: NORMAL
SPECIMEN SOURCE: NORMAL
SPECIMEN SOURCE: NORMAL

## 2018-11-26 NOTE — PROGRESS NOTES
Stool culture came back positive for C. difficile colitis.  Prescription for Dificid 200 mg p.o. twice daily for 10 days is prescribed after review of up-to-date information.  If this is not available or is cost prohibitive we will need to proceed with vancomycin and metronidazole.  Electronically signed:    Gregory John PA-C

## 2018-11-26 NOTE — TELEPHONE ENCOUNTER
Spoke with pt and gave information below. Pt stated understanding.     Notes Recorded by Gregory Morgan PA-C on 11/26/2018 at 7:15 AM  Prescription has been sent to the [place] pharmacy for him.  If the medication prescribed is cost prohibitive or unavailable I will have to rewrite prescriptions for 2 medications to be taken.  Electronically signed:    Gregory Morgan PA-C  ------    Notes Recorded by Gregory Morgan PA-C on 11/26/2018 at 7:09 AM  The patient has C. difficile colitis.  I believe that we talked about the possibility of this during our office visit.  We need to begin antibiotics for this.  Please contact him and inquire as to where he would want us to send prescriptions.  If he has any further questions please forward a patient call to my in basket.  Electronically signed:    Gregory Morgan PA-C    Pt would like to know about chest xray; huddled with Gregory Morgan and he stated ok to tell him normal chest xray. Pt informed.    Cahty Golden CMA ([facility])

## 2018-11-28 ENCOUNTER — TELEPHONE (OUTPATIENT)
Dept: FAMILY MEDICINE | Facility: OTHER | Age: 63
End: 2018-11-28

## 2018-11-28 ENCOUNTER — HOSPITAL ENCOUNTER (OUTPATIENT)
Dept: RESPIRATORY THERAPY | Facility: CLINIC | Age: 63
Discharge: HOME | End: 2018-11-28
Attending: PHYSICIAN ASSISTANT | Admitting: PHYSICIAN ASSISTANT
Payer: COMMERCIAL

## 2018-11-28 DIAGNOSIS — R05.9 COUGH: ICD-10-CM

## 2018-11-28 DIAGNOSIS — R09.81 CONGESTION OF PARANASAL SINUS: ICD-10-CM

## 2018-11-28 DIAGNOSIS — J43.8 OTHER EMPHYSEMA (H): ICD-10-CM

## 2018-11-28 DIAGNOSIS — I10 HYPERTENSION GOAL BP (BLOOD PRESSURE) < 140/90: ICD-10-CM

## 2018-11-28 PROCEDURE — 94729 DIFFUSING CAPACITY: CPT

## 2018-11-28 PROCEDURE — 94726 PLETHYSMOGRAPHY LUNG VOLUMES: CPT

## 2018-11-28 PROCEDURE — 25000125 ZZHC RX 250

## 2018-11-28 PROCEDURE — 94060 EVALUATION OF WHEEZING: CPT

## 2018-11-28 RX ORDER — ALBUTEROL SULFATE 0.83 MG/ML
2.5 SOLUTION RESPIRATORY (INHALATION) ONCE
Status: COMPLETED | OUTPATIENT
Start: 2018-11-28 | End: 2018-11-28

## 2018-11-28 RX ADMIN — ALBUTEROL SULFATE 2.5 MG: 2.5 SOLUTION RESPIRATORY (INHALATION) at 09:34

## 2018-11-28 NOTE — TELEPHONE ENCOUNTER
Spoke with patient. Coughing up yellow mucus.  Will be good for a while and then will hack up a bunch as again.  Advised to continue the home cares J gave him.  Could try an OTC cough supressant at night to help with sleep.  Follow up in clinic if not improving.  Will await PFT results for any change in plan.    Dorian Toscano, RN, BSN

## 2018-11-28 NOTE — TELEPHONE ENCOUNTER
Reason for call:  Patient reporting a symptom    Symptom or request: symptom    Duration (how long have symptoms been present): over a month    Have you been treated for this before? Yes    Additional comments: patient is waiting for pulmonology results to come back but in the mean time he is wondering what he can do for his cough.      Phone Number patient can be reached at:  Home number on file [phone] (home)    Best Time:  any    Can we leave a detailed message on this number:  YES    Call taken on 11/28/2018 at 10:39 AM by Shelley Vidal

## 2018-11-28 NOTE — TELEPHONE ENCOUNTER
Patient is indeed using his albuterol inhaler. He states it is giving him some relief. Patient is hoping for some answer from the PFT testing that was done today and is awaiting lab results. Will route to Gregory.     Darell Jurado,

## 2018-11-28 NOTE — DISCHARGE INSTRUCTIONS
Thank you for completing pulmonary function testing today.  All results will be scanned into your epic results for your doctor to review.  Please resume taking all your current prescribed medications and diet as directed by your provider.   If you have not heard from your provider about your testing within two weeks and do not have a follow-up appointment scheduled with them please contact your provider about any questions you have concerning your testing.   Thank you  [facility] Pulmonary Function [facility]

## 2018-11-28 NOTE — PROGRESS NOTES
Pre-Bronch    Post-Bronch     Actual Pred %Pred LLN Actual %Pred %Chng   ---- SPIROMETRY ----                  FVC (L) 3.67 4.52 81 3.41 3.70 81 +0    FEV1 (L) 2.40 3.47 69 2.61 2.58 74 +7    FEV1/FVC (%) 65 75   65 70   +6    FEF 25% (L/sec) 3.60       4.30   +19    FEF 75% (L/sec) 0.52       0.50   -3    FEF 25-75% (L/sec) 1.29 2.82 45 1.05 1.58 56 +22    FEF Max (L/sec) 6.19 8.99 68 6.66 6.03 67 -2    FIVC (L) 3.14       3.04   -3    FIF Max (L/sec) 4.49 7.35 61 4.68 3.66 49 -18    MVV (L/min)   136   88          MEP (cmH2O)                  MIP (cmH2O)                                     ---- LUNG VOLUMES ----                  SVC (L) 3.90 4.92 79 3.93          IC (L) 3.11 3.83 81            ERV (L) 0.73 1.09 67            FRC (N2) (L)   3.64   2.44          RV (N2) (L)   2.49   1.67          TLC (N2) (L)   7.13   5.73          RV/TLC (N2) (%)   39   29          Washout Time (min)                  FRC(Pleth) (L) 3.79 3.64 104 2.44          RV (Pleth) (L) 3.00 2.49 120 1.67          TLC (Pleth) (L) 6.90 7.13 96 5.73          RV/TLC (Pleth) (%) 43 39   29          Trapped Gas (L)                                     ---- DIFFUSION ----                  DLCOunc (ml/min/mmHg) 34.05 27.73 122 19.73          DLCOcor (ml/min/mmHg) 33.32 27.73 120 19.73          DL/VA (ml/min/mmHg/L) 5.45 4.07              VA (L) 6.12 6.82 89 5.45          Hgb (gm/dL) 15.4 12-18                                 ---- AIRWAYS RESISTANCE ----                  Raw (cmH2O/L/s) 1.09 2.24 48            Gaw (L/s/cmH2O) 0.92 1.03 89            sRaw (cmH2O*s) 4.61 < 4.76              sGaw (1/cmH2O*s) 0.22 0.08 272

## 2018-11-28 NOTE — PROGRESS NOTES
The FEV1, FEV1/FVC ratio and DIB73-15% are reduced indicating airway obstruction.  The inspiratory flow rates are reduced.  The airway resistance is normal.  The SVC is reduced, but the TLC is within normal limits.  Following administration of bronchodilators, there is no significant response.  The diffusing capacity is high for the measured volume.    Conclusions: Mild airway obstruction is present.  The lack of response to bronchodilators may indicate a refractory state. Prolonged use may be of benefit.  The increased diffusing capacity would be consistent with asthma or a cardiovascular process such as left heart failure or shunting.    Pulmonary Function Diagnosis:  Mild Obstructive Airways Disease  Increased Diffusion        This preliminary report should not be used clinically unless reviewed and signed by a physician.

## 2018-11-29 LAB
DLCOCOR-%PRED-PRE: 120 %
DLCOCOR-PRE: 33.32 ML/MIN/MMHG
DLCOUNC-%PRED-PRE: 122 %
DLCOUNC-PRE: 34.05 ML/MIN/MMHG
DLCOUNC-PRED: 27.73 ML/MIN/MMHG
ERV-%PRED-PRE: 67 %
ERV-PRE: 0.73 L
ERV-PRED: 1.09 L
EXPTIME-PRE: 7.08 SEC
FEF2575-%PRED-POST: 56 %
FEF2575-%PRED-PRE: 45 %
FEF2575-POST: 1.58 L/SEC
FEF2575-PRE: 1.29 L/SEC
FEF2575-PRED: 2.82 L/SEC
FEFMAX-%PRED-PRE: 68 %
FEFMAX-PRE: 6.19 L/SEC
FEFMAX-PRED: 8.99 L/SEC
FEV1-%PRED-PRE: 69 %
FEV1-PRE: 2.4 L
FEV1FEV6-PRE: 65 %
FEV1FEV6-PRED: 79 %
FEV1FVC-PRE: 65 %
FEV1FVC-PRED: 75 %
FEV1SVC-PRE: 61 %
FEV1SVC-PRED: 71 %
FIFMAX-PRE: 4.49 L/SEC
FRCPLETH-%PRED-PRE: 104 %
FRCPLETH-PRE: 3.79 L
FRCPLETH-PRED: 3.64 L
FVC-%PRED-PRE: 81 %
FVC-PRE: 3.67 L
FVC-PRED: 4.52 L
GAW-%PRED-PRE: 89 %
GAW-PRE: 0.92 L/S/CMH2O
GAW-PRED: 1.03 L/S/CMH2O
IC-%PRED-PRE: 81 %
IC-PRE: 3.11 L
IC-PRED: 3.83 L
RVPLETH-%PRED-PRE: 120 %
RVPLETH-PRE: 3 L
RVPLETH-PRED: 2.49 L
SGAW-%PRED-PRE: 272 %
SGAW-PRE: 0.22 1/CMH2O*S
SGAW-PRED: 0.08 1/CMH2O*S
SRAW-%PRED-PRE: 96 %
SRAW-PRE: 4.61 CMH2O*S
SRAW-PRED: 4.76 CMH2O*S
TLCPLETH-%PRED-PRE: 96 %
TLCPLETH-PRE: 6.9 L
TLCPLETH-PRED: 7.13 L
VA-%PRED-PRE: 89 %
VA-PRE: 6.12 L
VC-%PRED-PRE: 79 %
VC-PRE: 3.9 L
VC-PRED: 4.92 L

## 2018-11-29 NOTE — TELEPHONE ENCOUNTER
We are awaiting completed evaluation by pulmonology prior to forwarding the results to the patient.  Electronically signed:    Gregory John PA-C

## 2018-11-30 ENCOUNTER — OFFICE VISIT (OUTPATIENT)
Dept: FAMILY MEDICINE | Facility: OTHER | Age: 63
End: 2018-11-30
Payer: COMMERCIAL

## 2018-11-30 ENCOUNTER — TELEPHONE (OUTPATIENT)
Dept: FAMILY MEDICINE | Facility: OTHER | Age: 63
End: 2018-11-30

## 2018-11-30 ENCOUNTER — RADIANT APPOINTMENT (OUTPATIENT)
Dept: GENERAL RADIOLOGY | Facility: OTHER | Age: 63
End: 2018-11-30
Attending: PHYSICIAN ASSISTANT
Payer: COMMERCIAL

## 2018-11-30 VITALS
TEMPERATURE: 97.2 F | HEART RATE: 76 BPM | WEIGHT: 195.9 LBS | OXYGEN SATURATION: 92 % | RESPIRATION RATE: 20 BRPM | SYSTOLIC BLOOD PRESSURE: 132 MMHG | DIASTOLIC BLOOD PRESSURE: 78 MMHG | BODY MASS INDEX: 28.11 KG/M2

## 2018-11-30 DIAGNOSIS — R05.9 COUGH: ICD-10-CM

## 2018-11-30 DIAGNOSIS — J40 BRONCHITIS: Primary | ICD-10-CM

## 2018-11-30 DIAGNOSIS — I10 HYPERTENSION GOAL BP (BLOOD PRESSURE) < 140/90: ICD-10-CM

## 2018-11-30 DIAGNOSIS — J44.9 MODERATE CHRONIC OBSTRUCTIVE PULMONARY DISEASE (H): ICD-10-CM

## 2018-11-30 DIAGNOSIS — I10 HYPERTENSION GOAL BP (BLOOD PRESSURE) < 140/90: Primary | ICD-10-CM

## 2018-11-30 DIAGNOSIS — R09.89 CHEST CONGESTION: ICD-10-CM

## 2018-11-30 DIAGNOSIS — J18.9 PNEUMONIA OF RIGHT MIDDLE LOBE DUE TO INFECTIOUS ORGANISM: ICD-10-CM

## 2018-11-30 LAB
BASOPHILS # BLD AUTO: 0.1 10E9/L (ref 0–0.2)
BASOPHILS NFR BLD AUTO: 1 %
DIFFERENTIAL METHOD BLD: ABNORMAL
EOSINOPHIL # BLD AUTO: 1.7 10E9/L (ref 0–0.7)
EOSINOPHIL NFR BLD AUTO: 14 %
ERYTHROCYTE [DISTWIDTH] IN BLOOD BY AUTOMATED COUNT: 13.5 % (ref 10–15)
HCT VFR BLD AUTO: 46.5 % (ref 40–53)
HGB BLD-MCNC: 15.6 G/DL (ref 13.3–17.7)
LYMPHOCYTES # BLD AUTO: 1.9 10E9/L (ref 0.8–5.3)
LYMPHOCYTES NFR BLD AUTO: 15.4 %
MCH RBC QN AUTO: 29 PG (ref 26.5–33)
MCHC RBC AUTO-ENTMCNC: 33.5 G/DL (ref 31.5–36.5)
MCV RBC AUTO: 86 FL (ref 78–100)
MONOCYTES # BLD AUTO: 1.2 10E9/L (ref 0–1.3)
MONOCYTES NFR BLD AUTO: 9.6 %
NEUTROPHILS # BLD AUTO: 7.2 10E9/L (ref 1.6–8.3)
NEUTROPHILS NFR BLD AUTO: 60 %
PLATELET # BLD AUTO: 249 10E9/L (ref 150–450)
RBC # BLD AUTO: 5.38 10E12/L (ref 4.4–5.9)
WBC # BLD AUTO: 12 10E9/L (ref 4–11)

## 2018-11-30 PROCEDURE — 85025 COMPLETE CBC W/AUTO DIFF WBC: CPT | Performed by: PHYSICIAN ASSISTANT

## 2018-11-30 PROCEDURE — 99214 OFFICE O/P EST MOD 30 MIN: CPT | Performed by: PHYSICIAN ASSISTANT

## 2018-11-30 PROCEDURE — 71046 X-RAY EXAM CHEST 2 VIEWS: CPT | Mod: FY

## 2018-11-30 PROCEDURE — 36415 COLL VENOUS BLD VENIPUNCTURE: CPT | Performed by: PHYSICIAN ASSISTANT

## 2018-11-30 RX ORDER — PREDNISONE 20 MG/1
TABLET ORAL
Qty: 20 TABLET | Refills: 0 | Status: CANCELLED | OUTPATIENT
Start: 2018-11-30

## 2018-11-30 RX ORDER — CODEINE PHOSPHATE AND GUAIFENESIN 10; 100 MG/5ML; MG/5ML
1 SOLUTION ORAL EVERY 4 HOURS PRN
Qty: 120 ML | Refills: 0 | Status: SHIPPED | OUTPATIENT
Start: 2018-11-30 | End: 2019-04-25

## 2018-11-30 RX ORDER — METHYLPREDNISOLONE 4 MG
TABLET, DOSE PACK ORAL
Qty: 21 TABLET | Refills: 0 | Status: SHIPPED | OUTPATIENT
Start: 2018-11-30 | End: 2019-04-05

## 2018-11-30 RX ORDER — GUAIFENESIN 600 MG/1
600 TABLET, EXTENDED RELEASE ORAL 2 TIMES DAILY PRN
Qty: 20 TABLET | Refills: 0 | Status: SHIPPED | OUTPATIENT
Start: 2018-11-30 | End: 2019-04-25

## 2018-11-30 RX ORDER — CODEINE PHOSPHATE AND GUAIFENESIN 10; 100 MG/5ML; MG/5ML
1 SOLUTION ORAL EVERY 4 HOURS PRN
Qty: 120 ML | Refills: 0 | Status: CANCELLED | OUTPATIENT
Start: 2018-11-30

## 2018-11-30 ASSESSMENT — PAIN SCALES - GENERAL: PAINLEVEL: MODERATE PAIN (4)

## 2018-11-30 NOTE — MR AVS SNAPSHOT
After Visit Summary   11/30/2018    Keegan Buchanan    MRN: [id number]           Patient Information     Date Of Birth          1955        Visit Information        Provider Department      11/30/2018 4:20 PM Gregory Morgan PA-C [facility]        Today's Diagnoses     Hypertension goal BP (blood pressure) < 140/90    -  1    Moderate chronic obstructive pulmonary disease (H)        Pneumonia of right middle lobe due to infectious organism (H)        Cough           Follow-ups after your visit        Your next 10 appointments already scheduled     Dec 06, 2018  7:20 AM CST   Office Visit with Gregory Morgan PA-C   [facility]    [address] [address] [phone]           Bring a current list of meds and any records pertaining to this visit. For Physicals, please bring immunization records and any forms needing to be filled out. Please arrive 10 minutes early to complete paperwork.              Future tests that were ordered for you today     Open Future Orders        Priority Expected Expires Ordered    CBC with platelets and differential Routine 12/3/2018 12/7/2018 11/30/2018            Who to contact     If you have questions or need follow up information about today's clinic visit or your schedule please contact [facility] directly at [phone].  Normal or non-critical lab and imaging results will be communicated to you by MyChart, letter or phone within 4 business days after the clinic has received the results. If you do not hear from us within 7 days, please contact the clinic through MyChart or phone. If you have a critical or abnormal lab result, we will notify you by phone as soon as possible.  Submit refill requests through [employer] or call your pharmacy and they will forward the refill request to us. Please allow 3 business days for your refill to be completed.          Additional  Information About Your Visit        Care EveryWhere ID     This is your Care EveryWhere ID. This could be used by other organizations to access your [facility] medical records  [id number]        Your Vitals Were     Pulse Temperature Respirations Pulse Oximetry BMI (Body Mass Index)       76 97.2  F (36.2  C) (Temporal) 20 92% 28.11 kg/m2        Blood Pressure from Last 3 Encounters:   11/30/18 132/78   11/23/18 122/76   11/16/18 136/84    Weight from Last 3 Encounters:   11/30/18 195 lb 14.4 oz (88.9 kg)   11/23/18 199 lb (90.3 kg)   08/13/18 198 lb 12.8 oz (90.2 kg)              We Performed the Following     CBC with platelets and differential          Today's Medication Changes          These changes are accurate as of 11/30/18  5:15 PM.  If you have any questions, ask your nurse or doctor.               Start taking these medicines.        Dose/Directions    guaiFENesin-codeine 100-10 MG/5ML solution   Commonly known as:  ROBITUSSIN AC   Used for:  Cough, Pneumonia of right middle lobe due to infectious organism (H), Moderate chronic obstructive pulmonary disease (H)   Started by:  Gregory Morgan PA-C        Dose:  1 tsp.   Take 5 mLs by mouth every 4 hours as needed for cough   Quantity:  120 mL   Refills:  0       methylPREDNISolone 4 MG tablet therapy pack   Commonly known as:  MEDROL DOSEPAK   Used for:  Moderate chronic obstructive pulmonary disease (H), Pneumonia of right middle lobe due to infectious organism (H), Cough   Started by:  Gregory Morgan PA-C        Follow package instructions   Quantity:  21 tablet   Refills:  0            Where to get your medicines      These medications were sent to [facility] - [address]  [address] [place] [address]     Phone:  [phone]     methylPREDNISolone 4 MG tablet therapy pack         Some of these will need a paper prescription and others can be bought over the counter.  Ask your nurse if you have  questions.     Bring a paper prescription for each of these medications     guaiFENesin-codeine 100-10 MG/5ML solution                Primary Care Provider Office Phone # Fax #    Fabio Stovall [phone] [address] [place] [address]        Equal Access to Services     [facility]: Hadii aad ku hadasho Soomaali, waaxda luqadaha, qaybta kaalmada adeegyada, waxay idiin hayaan adeeg khbrendan laelsien ah. So [facility] [phone].    ATENCIÓN: Si habla español, tiene a black disposición servicios gratuitos de asistencia lingüística. Llame al [phone].    We comply with applicable federal civil rights laws and [place] laws. We do not discriminate on the basis of race, color, national origin, age, disability, sex, sexual orientation, or gender identity.            Thank you!     Thank you for choosing [facility]  for your care. Our goal is always to provide you with excellent care. Hearing back from our patients is one way we can continue to improve our services. Please take a few minutes to complete the written survey that you may receive in the mail after your visit with us. Thank you!             Your Updated Medication List - Protect others around you: Learn how to safely use, store and throw away your medicines at www.disposemymeds.org.          This list is accurate as of 11/30/18  5:15 PM.  Always use your most recent med list.                   Brand Name Dispense Instructions for use Diagnosis    albuterol 108 (90 Base) MCG/ACT inhaler    PROAIR HFA/PROVENTIL HFA/VENTOLIN HFA    1 Inhaler    Inhale 2 puffs into the lungs every 6 hours as needed for shortness of breath / dyspnea or wheezing    Acute bronchitis with symptoms > 10 days       aspirin 81 MG tablet    ASA    90 tablet    Take 1 tablet by mouth daily.    ACS (acute coronary syndrome) (H)       atorvastatin 80 MG tablet    LIPITOR    90 tablet    TAKE ONE TABLET BY MOUTH ONCE DAILY **NEED TO BE SEEN  FOR FUTHER REFILLS**    Hyperlipidemia LDL goal <100       cetirizine 10 MG tablet    zyrTEC    90 tablet    Take 1 tablet (10 mg) by mouth every evening    Seasonal allergic rhinitis due to other allergic trigger       fidaxomicin 200 MG tablet    DIFICID    20 tablet    Take 1 tablet (200 mg) by mouth 2 times daily for 10 days    Diarrhea, unspecified type, C. difficile colitis       * fluticasone 50 MCG/ACT nasal spray    FLONASE    16 g    USE 1-2 SPRAYS IN EACH NOSTRIL ONCE DAILY    Nasal congestion       * fluticasone 50 MCG/ACT nasal spray    FLONASE    3 Bottle    Spray 1-2 sprays into both nostrils daily    Seasonal allergic rhinitis due to other allergic trigger       guaiFENesin-codeine 100-10 MG/5ML solution    ROBITUSSIN AC    120 mL    Take 5 mLs by mouth every 4 hours as needed for cough    Cough, Pneumonia of right middle lobe due to infectious organism (H), Moderate chronic obstructive pulmonary disease (H)       losartan 50 MG tablet    COZAAR    90 tablet    TAKE ONE TABLET BY MOUTH EVERY DAY    Hypertension goal BP (blood pressure) < 140/90       methylPREDNISolone 4 MG tablet therapy pack    MEDROL DOSEPAK    21 tablet    Follow package instructions    Moderate chronic obstructive pulmonary disease (H), Pneumonia of right middle lobe due to infectious organism (H), Cough       nitroGLYcerin 0.4 MG sublingual tablet    NITROSTAT    25 tablet    Place 1 tablet (0.4 mg) under the tongue every 5 minutes as needed for chest pain    ACS (acute coronary syndrome) (H)       omeprazole 20 MG DR capsule    priLOSEC    90 capsule    TAKE ONE CAPSULE BY MOUTH EVERY DAY    Gastroesophageal reflux disease without esophagitis       tamsulosin 0.4 MG capsule    FLOMAX    90 capsule    TAKE ONE CAPSULE BY MOUTH EVERY DAY    Benign prostatic hyperplasia with urinary obstruction       tolterodine ER 2 MG 24 hr capsule    DETROL LA    90 capsule    TAKE ONE CAPSULE BY MOUTH EVERY DAY    Urinary frequency       *  Notice:  This list has 2 medication(s) that are the same as other medications prescribed for you. Read the directions carefully, and ask your doctor or other care provider to review them with you.

## 2018-11-30 NOTE — PROGRESS NOTES
SUBJECTIVE:   Keegan Buchanan is a 63 year old male who presents to clinic today for the following health issues:    We have a follow-up conversation with respect to overall dust exposure.  He states that they live in a home that is still heated by a fuel oil heater.  He states that the home is quite winifred and that he wonders if the fuel oil heater may be affecting his overall health.  We talked about the fact that if his home is as winifred as he is explaining and we think is due to the fuel oil heater that it is time for him to consider an alternative heating source for his home.  I advised natural gas or propane as these are  fuels that do not leave dust from their combustion.  We have a follow-up conversation with respect to his current employment.  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  The shop does have quite a bit of leather dust that floats in the air as well as a fair amount of fumes from the adhesive used to put boots back together.  I advised that the combination of this may be affecting his overall pulmonary health as well.  He should consider either a regular dust mask or other type of air filtration equipment to clean up the air prior to his breathing in.  At minimum I encouraged him to take a week off from work to see if this will help his overall pulmonary congestion.       HPI  COPD Follow-Up    Symptoms are currently: much worse    Current fatigue or dyspnea with ambulation: worsened from baseline    Shortness of breath: much worse    Increased or change in Cough/Sputum: Yes-  Increased sputum production and decreased SpO2 today     Fever(s): No    Baseline ambulation without stopping to rest:  45 minutes . Able to walk up Unsure flights of stairs without stopping to rest.    Any ER/UC or hospital admissions since your last visit? No     History   Smoking Status     Former Smoker     Packs/day: 1.00     Years: 15.00   Smokeless Tobacco     Former  User     Types: Chew     Quit date: 1/20/1987     Comment: quit 25 years ago.     No results found for: FEV1, [id number]  Problem list and histories reviewed & adjusted, as indicated.  Additional history: as documented    Patient Active Problem List   Diagnosis     Hypertension goal BP (blood pressure) < 140/90     Trigger finger, acquired     Impotence of organic origin     Hyperlipidemia LDL goal <100     Arthritis of elbow, right     Cubital tunnel syndrome - right     Acute coronary syndrome (H)     CAD (coronary artery disease)     Benign prostatic hyperplasia with urinary obstruction     GERD (gastroesophageal reflux disease)     Moderate chronic obstructive pulmonary disease (H)     Piriformis syndrome of left side     Nonallopathic lesion of sacral region     Nonallopathic lesion of thoracic region     Nonallopathic lesion of cervical region     Cervicalgia     Left inguinal hernia     Congestion of paranasal sinus     Past Surgical History:   Procedure Laterality Date     COLONOSCOPY N/A 6/12/2015    Procedure: COMBINED COLONOSCOPY, SINGLE OR MULTIPLE BIOPSY/POLYPECTOMY BY BIOPSY;  Surgeon: Herman Rebollar MD;  [facility]     COLONOSCOPY N/A 11/12/2018    Procedure: colonoscopy;  Surgeon: Thiago Robles MD;  Location: [facility]     H STENT PROCED  2012     HC REMOVE TONSILS/ADENOIDS,<11 Y/O      T & A <12y.o.     LAPAROSCOPIC HERNIORRHAPHY INGUINAL Left 8/3/2018    Procedure: LAPAROSCOPIC HERNIORRHAPHY INGUINAL;  Laparoscopic left inguinal hernia repair;  Surgeon: Huber Ireland MD;  [facility]     REMOVAL OF SPERM DUCT(S)  05/06/88    Vasectomy       Social History   Substance Use Topics     Smoking status: Former Smoker     Packs/day: 1.00     Years: 15.00     Smokeless tobacco: Former User     Types: Chew     Quit date: 1/20/1987      Comment: quit 25 years ago.     Alcohol use Yes      Comment: 1 -2 monthly     Family History   Problem Relation Age of Onset     Diabetes Mother       adult onset diabetes     Cancer Mother      kidney dx age 75     Cancer Father      prostate cancer/ at age 73     Hypertension Brother      Cancer Paternal Grandfather      prostat ca     Lung Cancer Brother          Current Outpatient Prescriptions   Medication Sig Dispense Refill     albuterol (PROAIR HFA/PROVENTIL HFA/VENTOLIN HFA) 108 (90 BASE) MCG/ACT Inhaler Inhale 2 puffs into the lungs every 6 hours as needed for shortness of breath / dyspnea or wheezing 1 Inhaler 1     aspirin 81 MG tablet Take 1 tablet by mouth daily. 90 tablet 3     atorvastatin (LIPITOR) 80 MG tablet TAKE ONE TABLET BY MOUTH ONCE DAILY **NEED TO BE SEEN FOR FUTHER REFILLS** 90 tablet 1     cetirizine (ZYRTEC) 10 MG tablet Take 1 tablet (10 mg) by mouth every evening 90 tablet 1     fidaxomicin (DIFICID) 200 MG tablet Take 1 tablet (200 mg) by mouth 2 times daily for 10 days 20 tablet 0     fluticasone (FLONASE) 50 MCG/ACT spray Spray 1-2 sprays into both nostrils daily 3 Bottle 1     fluticasone (FLONASE) 50 MCG/ACT spray USE 1-2 SPRAYS IN EACH NOSTRIL ONCE DAILY 16 g 2     losartan (COZAAR) 50 MG tablet TAKE ONE TABLET BY MOUTH EVERY DAY 90 tablet 2     nitroglycerin (NITROSTAT) 0.4 MG SL tablet Place 1 tablet (0.4 mg) under the tongue every 5 minutes as needed for chest pain 25 tablet 0     omeprazole (PRILOSEC) 20 MG CR capsule TAKE ONE CAPSULE BY MOUTH EVERY DAY 90 capsule 0     tamsulosin (FLOMAX) 0.4 MG capsule TAKE ONE CAPSULE BY MOUTH EVERY DAY 90 capsule 0     tolterodine (DETROL LA) 2 MG 24 hr capsule TAKE ONE CAPSULE BY MOUTH EVERY DAY 90 capsule 0     [DISCONTINUED] tolterodine (DETROL LA) 2 MG 24 hr capsule Take 2 mg by mouth 2 times daily.       Allergies   Allergen Reactions     Augmentin Diarrhea and GI Disturbance     Codeine Nausea     significant     Penicillins      GI upset and diarrhea     BP Readings from Last 3 Encounters:   18 132/78   18 122/76   18 136/84    Wt Readings from Last 3  Encounters:   11/30/18 195 lb 14.4 oz (88.9 kg)   11/23/18 199 lb (90.3 kg)   08/13/18 198 lb 12.8 oz (90.2 kg)         ROS:  Constitutional, HEENT, cardiovascular, pulmonary, GI, , musculoskeletal, neuro, skin, endocrine and psych systems are negative, except as otherwise noted.    OBJECTIVE:     /78 (Cuff Size: Adult Regular)  Pulse 76  Temp 97.2  F (36.2  C) (Temporal)  Resp 20  Wt 195 lb 14.4 oz (88.9 kg)  SpO2 92%  BMI 28.11 kg/m2  Body mass index is 28.11 kg/(m^2).  GENERAL: healthy, alert and no distress  RESP: Rhonchi noted to the right lung posteriorly. This is more towards the base and laterally than globally on the right.  CV: regular rate and rhythm, normal S1 S2, no S3 or S4, no murmur, click or rub, no peripheral edema and peripheral pulses strong  MS: no gross musculoskeletal defects noted, no edema  SKIN: no suspicious lesions or rashes to visible skin  NEURO: Normal strength and tone, mentation intact and speech normal  PSYCH: mentation appears normal, affect normal/bright    Diagnostic Test Results:  No results found for this or any previous visit (from the past 24 hour(s)).    ASSESSMENT/PLAN:     1. Hypertension goal BP (blood pressure) < 140/90  2. Moderate chronic obstructive pulmonary disease (H)  3. Pneumonia of right middle lobe due to infectious organism (H)  4. Cough  Discussed the fact that he is on a macrolide already which theoretically would have some antibacterial effect for what is suspected to be a right middle lobe pneumonia based on his overall signs and symptoms the rhonchi that I hear to the right lung posteriorly and slight elevation in white blood cell count.  We do note that his slight elevation in white blood cell count could also be from his body's response to C. difficile colitis.  Advised that we try medications as noted below over the weekend and have him recheck his blood work on Monday if we see a change in white blood cell count we may need to consider a  change in antibiotics from a global perspective.     Further evaluation with a CT scan of his chest may be necessary if this does not clear his current condition.  - CBC with platelets and differential  - XR Chest 2 Views; Future  - methylPREDNISolone (MEDROL DOSEPAK) 4 MG tablet therapy pack; Follow package instructions  Dispense: 21 tablet; Refill: 0  - guaiFENesin-codeine (ROBITUSSIN AC) 100-10 MG/5ML solution; Take 5 mLs by mouth every 4 hours as needed for cough  Dispense: 120 mL; Refill: 0  - CBC with platelets and differential; Future    ROV 2 weeks PRN    Gregory John PA-C  [facility]

## 2018-11-30 NOTE — TELEPHONE ENCOUNTER
Keegan Buchanan is a 63 year old male who calls with questions about his albuterol inhaler. Has been using inhaler one puff every 3 hours instead of every 6 hours. States he has been using it one puff every 3 hours due to worsening SOB that     NURSING ASSESSMENT:  Description:  Awaiting PFT results. Says breathing has been getting worse. Still coughing up mucus, denies fevers.    Allergies:   Allergies   Allergen Reactions     Augmentin Diarrhea and GI Disturbance     Codeine Nausea     significant     Penicillins      GI upset and diarrhea       NURSING PLAN: Continue     RECOMMENDED DISPOSITION:  See in 24 hours. Gve suggestions to use albuterol every 4 hours, with two puffs but to wait 5 minutes between puffs to improve effectiveness.  Will comply with recommendation: Yes  If further questions/concerns or if symptoms do not improve, worsen or new symptoms develop, call your PCP or [facility] as soon as possible.      Guideline used:  Telephone Triage Protocols for Nurses, Fifth Edition, Purnima Bain RN

## 2018-12-03 DIAGNOSIS — J18.9 PNEUMONIA OF RIGHT MIDDLE LOBE DUE TO INFECTIOUS ORGANISM: ICD-10-CM

## 2018-12-03 DIAGNOSIS — R05.9 COUGH: ICD-10-CM

## 2018-12-03 DIAGNOSIS — J44.9 MODERATE CHRONIC OBSTRUCTIVE PULMONARY DISEASE (H): ICD-10-CM

## 2018-12-03 LAB
BASOPHILS # BLD AUTO: 0.1 10E9/L (ref 0–0.2)
BASOPHILS NFR BLD AUTO: 0.4 %
DIFFERENTIAL METHOD BLD: ABNORMAL
EOSINOPHIL # BLD AUTO: 0.2 10E9/L (ref 0–0.7)
EOSINOPHIL NFR BLD AUTO: 1.4 %
ERYTHROCYTE [DISTWIDTH] IN BLOOD BY AUTOMATED COUNT: 13.7 % (ref 10–15)
HCT VFR BLD AUTO: 47.2 % (ref 40–53)
HGB BLD-MCNC: 15.9 G/DL (ref 13.3–17.7)
LYMPHOCYTES # BLD AUTO: 2.7 10E9/L (ref 0.8–5.3)
LYMPHOCYTES NFR BLD AUTO: 20.2 %
MCH RBC QN AUTO: 29 PG (ref 26.5–33)
MCHC RBC AUTO-ENTMCNC: 33.7 G/DL (ref 31.5–36.5)
MCV RBC AUTO: 86 FL (ref 78–100)
MONOCYTES # BLD AUTO: 0.9 10E9/L (ref 0–1.3)
MONOCYTES NFR BLD AUTO: 6.9 %
NEUTROPHILS # BLD AUTO: 9.3 10E9/L (ref 1.6–8.3)
NEUTROPHILS NFR BLD AUTO: 71.1 %
PLATELET # BLD AUTO: 262 10E9/L (ref 150–450)
RBC # BLD AUTO: 5.49 10E12/L (ref 4.4–5.9)
WBC # BLD AUTO: 13.1 10E9/L (ref 4–11)

## 2018-12-03 PROCEDURE — 36415 COLL VENOUS BLD VENIPUNCTURE: CPT | Performed by: PHYSICIAN ASSISTANT

## 2018-12-03 PROCEDURE — 85025 COMPLETE CBC W/AUTO DIFF WBC: CPT | Performed by: PHYSICIAN ASSISTANT

## 2018-12-04 ENCOUNTER — TELEPHONE (OUTPATIENT)
Dept: FAMILY MEDICINE | Facility: OTHER | Age: 63
End: 2018-12-04

## 2018-12-04 NOTE — TELEPHONE ENCOUNTER
Will flag for provider to review.  Would you like him to start a probiotic?  Has been on fidaxomicin since 11/26/2018    Dorian Toscano RN, BSN

## 2018-12-04 NOTE — TELEPHONE ENCOUNTER
Reason for call:  Patient reporting a symptom    Symptom or request: c diff    Duration (how long have symptoms been present): a couple of weeks    Have you been treated for this before? Yes    Additional comments: he got a bottle of florigen at [facility] and is wondering if it is safe to take?     Phone Number patient can be reached at:  Home number on file [phone] (home)    Best Time:  any    Can we leave a detailed message on this number:  YES    Call taken on 12/4/2018 at 11:23 AM by Justyna Hartman

## 2018-12-04 NOTE — TELEPHONE ENCOUNTER
Spoke to patient informed. No further questions.  Della Barrientos CMA ([facility])

## 2018-12-06 DIAGNOSIS — D72.829 LEUKOCYTOSIS, UNSPECIFIED TYPE: ICD-10-CM

## 2018-12-06 LAB
BASOPHILS # BLD AUTO: 0 10E9/L (ref 0–0.2)
BASOPHILS NFR BLD AUTO: 0.4 %
DIFFERENTIAL METHOD BLD: NORMAL
EOSINOPHIL # BLD AUTO: 0.6 10E9/L (ref 0–0.7)
EOSINOPHIL NFR BLD AUTO: 5.6 %
ERYTHROCYTE [DISTWIDTH] IN BLOOD BY AUTOMATED COUNT: 13.5 % (ref 10–15)
HCT VFR BLD AUTO: 47.4 % (ref 40–53)
HGB BLD-MCNC: 16.2 G/DL (ref 13.3–17.7)
LYMPHOCYTES # BLD AUTO: 2.6 10E9/L (ref 0.8–5.3)
LYMPHOCYTES NFR BLD AUTO: 24 %
MCH RBC QN AUTO: 29 PG (ref 26.5–33)
MCHC RBC AUTO-ENTMCNC: 34.2 G/DL (ref 31.5–36.5)
MCV RBC AUTO: 85 FL (ref 78–100)
MONOCYTES # BLD AUTO: 1.1 10E9/L (ref 0–1.3)
MONOCYTES NFR BLD AUTO: 10.6 %
NEUTROPHILS # BLD AUTO: 6.4 10E9/L (ref 1.6–8.3)
NEUTROPHILS NFR BLD AUTO: 59.4 %
PLATELET # BLD AUTO: 260 10E9/L (ref 150–450)
RBC # BLD AUTO: 5.59 10E12/L (ref 4.4–5.9)
WBC # BLD AUTO: 10.7 10E9/L (ref 4–11)

## 2018-12-06 PROCEDURE — 85025 COMPLETE CBC W/AUTO DIFF WBC: CPT | Performed by: PHYSICIAN ASSISTANT

## 2018-12-06 PROCEDURE — 36415 COLL VENOUS BLD VENIPUNCTURE: CPT | Performed by: PHYSICIAN ASSISTANT

## 2018-12-11 ENCOUNTER — TELEPHONE (OUTPATIENT)
Dept: CARDIOLOGY | Facility: CLINIC | Age: 63
End: 2018-12-11

## 2018-12-11 ENCOUNTER — HOSPITAL ENCOUNTER (OUTPATIENT)
Dept: CARDIOLOGY | Facility: CLINIC | Age: 63
Discharge: HOME | End: 2018-12-11
Attending: INTERNAL MEDICINE | Admitting: INTERNAL MEDICINE
Payer: COMMERCIAL

## 2018-12-11 DIAGNOSIS — R00.2 PALPITATIONS: Primary | ICD-10-CM

## 2018-12-11 DIAGNOSIS — R00.2 PALPITATIONS: ICD-10-CM

## 2018-12-11 PROCEDURE — 93227 XTRNL ECG REC<48 HR R&I: CPT | Performed by: INTERNAL MEDICINE

## 2018-12-11 PROCEDURE — 93226 XTRNL ECG REC<48 HR SCAN A/R: CPT

## 2018-12-11 NOTE — TELEPHONE ENCOUNTER
----- Message from Rasta Millan MD sent at 12/11/2018 12:42 PM CST -----  Regarding: RE: pt having more symptoms  If symptoms are occurring each day, a Holter monitor is a reasonable alternative. If you wish to order that is fine with me.   ----- Message -----  From: Sanam Diamond RN  Sent: 12/11/2018  11:27 AM  To: Rasta Millan MD  Subject: pt having more symptoms                          Hi Dr. Millan,    We received a call from this pt and he is wanting to wear a zio patch again. Back in July, this pt had one placed but it kept falling off and he only had it on for 1 day. Pt was not having any symptoms at that time so he decided to not have another placed.    Now this pt is calling and saying that every morning he has symptoms of palpitations and it occasionally happens during the day but he notices it more in the morning.     This pt will be leaving for the winter on December 28th so another zio patch will not work getting the results in time. I am wondering what you think we should do, would you want him to have a 48 hr holter monitor instead since he says it happens every morning?    ThanksSanam RN

## 2018-12-18 ENCOUNTER — TELEPHONE (OUTPATIENT)
Dept: CARDIOLOGY | Facility: CLINIC | Age: 63
End: 2018-12-18

## 2018-12-18 NOTE — TELEPHONE ENCOUNTER
Received the following result note from Dr. Millan:   Notes recorded by Rasta Millan MD on 12/17/2018 at 5:00 PM CST  The patient'sHolter shows occasional symptomatic PVCS. I would advise no therapy unless he finds the symptoms disabling as this is not a lifethreatening arrhythmia. If he wishes treatment , a follow visit can be arranged.    Pt is scheduled for follow up with Bashir on 12/27/18. Called and spoke with pt, communicated Dr. Millan's recommendations. Pt verbalized understanding. Pt noted that he had a bad cold when he called to report increased symptoms and was taking cold medication. Pt wondered if this could have exacerbated his symptoms. Advised this is a possibility. Discussed other potential triggers of PVCs, including stress, caffeine and alcohol. Pt stated that right now he feels an occasional palpitation but he is overall feeling well and is not interested in starting any new medications for his symptoms. Pt requested to cancel upcoming appointment on 12/27/18 and stated he will contact our office in the future if his symptoms worsen.

## 2019-01-22 ENCOUNTER — TELEPHONE (OUTPATIENT)
Dept: FAMILY MEDICINE | Facility: OTHER | Age: 64
End: 2019-01-22

## 2019-01-22 DIAGNOSIS — R35.0 URINARY FREQUENCY: ICD-10-CM

## 2019-01-22 DIAGNOSIS — N13.8 BENIGN PROSTATIC HYPERPLASIA WITH URINARY OBSTRUCTION: ICD-10-CM

## 2019-01-22 DIAGNOSIS — K21.9 GASTROESOPHAGEAL REFLUX DISEASE WITHOUT ESOPHAGITIS: ICD-10-CM

## 2019-01-22 DIAGNOSIS — N40.1 BENIGN PROSTATIC HYPERPLASIA WITH URINARY OBSTRUCTION: ICD-10-CM

## 2019-01-23 RX ORDER — TOLTERODINE 2 MG/1
2 CAPSULE, EXTENDED RELEASE ORAL DAILY
Qty: 90 CAPSULE | Refills: 0 | Status: SHIPPED | OUTPATIENT
Start: 2019-01-23 | End: 2019-05-16

## 2019-01-23 RX ORDER — TAMSULOSIN HYDROCHLORIDE 0.4 MG/1
0.4 CAPSULE ORAL DAILY
Qty: 90 CAPSULE | Refills: 0 | Status: SHIPPED | OUTPATIENT
Start: 2019-01-23 | End: 2019-05-16

## 2019-01-23 NOTE — TELEPHONE ENCOUNTER
Pt calling in regards to below. Pt states will be out of  Medication Saturday and needing this filled. Please call pt at [phone]

## 2019-01-23 NOTE — TELEPHONE ENCOUNTER
Omeprazole    Prescription approved per [facility] Refill Protocol.    tamsulosin    Prescription approved per [facility] Refill Protocol.    Tolterodine    Prescription approved per [facility] Refill Protocol.    Amber Lopez RN, BSN

## 2019-03-01 ENCOUNTER — TELEPHONE (OUTPATIENT)
Dept: FAMILY MEDICINE | Facility: OTHER | Age: 64
End: 2019-03-01

## 2019-03-01 NOTE — TELEPHONE ENCOUNTER
I would recommend he try QVAR if he has this.  If fairly ill, should probably be seen.    (I haven't seen patient other than for preop visit for over a year, so not sure how long ago we discussed this.)

## 2019-03-01 NOTE — TELEPHONE ENCOUNTER
Spoke to patient he was hoping provider Gregory John would fill the Qvar inhaler.   Della SWEENEY)

## 2019-03-01 NOTE — TELEPHONE ENCOUNTER
Pt called [facility] pharmacy told him there is one refill left for Qvar inhaler on file there. Will have Rx transferred to [place] pharmacy : [facility]. [address], [place], [place] [id number] [phone]

## 2019-03-01 NOTE — TELEPHONE ENCOUNTER
Reason for call:  Symptom  Reason for call:  Patient reporting a symptom    Symptom or request: cold symptoms    Duration (how long have symptoms been present): 2nd week    Have you been treated for this before? Yes    Additional comments: Pt is currently in [place] and he has COPD and he recently came down with cold symptoms. He was told when he comes down with a cold to take qvair inhaler and maybe be put on antibiotics. What ever  thinks he should do. He gets this every year and  is aware. Please advise.     Phone Number patient can be reached at:  Cell number on file:    Telephone Information:   Mobile [phone]       Best Time:  anytime    Can we leave a detailed message on this number:  YES    Call taken on 3/1/2019 at 9:49 AM by Rae Tucker

## 2019-04-03 NOTE — PROGRESS NOTES
SUBJECTIVE:   Keegan Buchanan is a 63 year old male who presents to clinic today for the following health issues:      HPI    Problem list and histories reviewed & adjusted, as indicated.  Additional history: as documented    Patient Active Problem List   Diagnosis     Hypertension goal BP (blood pressure) < 140/90     Trigger finger, acquired     Impotence of organic origin     Hyperlipidemia LDL goal <100     Arthritis of elbow, right     Cubital tunnel syndrome - right     Acute coronary syndrome (H)     CAD (coronary artery disease)     Benign prostatic hyperplasia with urinary obstruction     GERD (gastroesophageal reflux disease)     Moderate chronic obstructive pulmonary disease (H)     Piriformis syndrome of left side     Nonallopathic lesion of sacral region     Nonallopathic lesion of thoracic region     Nonallopathic lesion of cervical region     Cervicalgia     Left inguinal hernia     Congestion of paranasal sinus     Past Surgical History:   Procedure Laterality Date     COLONOSCOPY N/A 6/12/2015    Procedure: COMBINED COLONOSCOPY, SINGLE OR MULTIPLE BIOPSY/POLYPECTOMY BY BIOPSY;  Surgeon: Herman Rebollar MD;  [facility]     COLONOSCOPY N/A 11/12/2018    Procedure: colonoscopy;  Surgeon: Thiago Robles MD;  [facility]     H STENT PROCED  2012     HC REMOVE TONSILS/ADENOIDS,<11 Y/O      T & A <12y.o.     LAPAROSCOPIC HERNIORRHAPHY INGUINAL Left 8/3/2018    Procedure: LAPAROSCOPIC HERNIORRHAPHY INGUINAL;  Laparoscopic left inguinal hernia repair;  Surgeon: Huber Ireland MD;  [facility]     REMOVAL OF SPERM DUCT(S)  05/06/88    Vasectomy       Social History     Tobacco Use     Smoking status: Former Smoker     Packs/day: 1.00     Years: 15.00     Pack years: 15.00     Smokeless tobacco: Former User     Types: Chew     Quit date: 1/20/1987     Tobacco comment: quit 25 years ago.   Substance Use Topics     Alcohol use: Yes     Comment: 1 -2 monthly     Family History    Problem Relation Age of Onset     Diabetes Mother         adult onset diabetes     Cancer Mother         kidney dx age 75     Cancer Father         prostate cancer/ at age 73     Hypertension Brother      Cancer Paternal Grandfather         prostat ca     Lung Cancer Brother          Current Outpatient Medications   Medication Sig Dispense Refill     albuterol (PROAIR HFA/PROVENTIL HFA/VENTOLIN HFA) 108 (90 Base) MCG/ACT inhaler Inhale 2 puffs into the lungs every 6 hours as needed for shortness of breath / dyspnea or wheezing 18 g 1     aspirin 81 MG tablet Take 1 tablet by mouth daily. 90 tablet 3     atorvastatin (LIPITOR) 80 MG tablet TAKE ONE TABLET BY MOUTH ONCE DAILY **NEED TO BE SEEN FOR FUTHER REFILLS** 90 tablet 1     fluticasone (ARNUITY ELLIPTA) 200 MCG/ACT inhaler Inhale 1 puff into the lungs daily 3 each 1     fluticasone (FLONASE) 50 MCG/ACT spray USE 1-2 SPRAYS IN EACH NOSTRIL ONCE DAILY 16 g 2     losartan (COZAAR) 50 MG tablet TAKE ONE TABLET BY MOUTH EVERY DAY 90 tablet 2     nitroglycerin (NITROSTAT) 0.4 MG SL tablet Place 1 tablet (0.4 mg) under the tongue every 5 minutes as needed for chest pain 25 tablet 0     omeprazole (PRILOSEC) 20 MG DR capsule Take 1 capsule (20 mg) by mouth daily 90 capsule 0     tamsulosin (FLOMAX) 0.4 MG capsule Take 1 capsule (0.4 mg) by mouth daily 90 capsule 0     tolterodine ER (DETROL LA) 2 MG 24 hr capsule Take 1 capsule (2 mg) by mouth daily 90 capsule 0     cetirizine (ZYRTEC) 10 MG tablet Take 1 tablet (10 mg) by mouth every evening (Patient not taking: Reported on 2019) 90 tablet 1     fluticasone (FLONASE) 50 MCG/ACT spray Spray 1-2 sprays into both nostrils daily (Patient not taking: Reported on 2019) 3 Bottle 1     guaiFENesin (MUCINEX) 600 MG 12 hr tablet Take 1 tablet (600 mg) by mouth 2 times daily as needed for congestion (Patient not taking: Reported on 2019) 20 tablet 0     guaiFENesin-codeine (ROBITUSSIN AC) 100-10 MG/5ML  "solution Take 5 mLs by mouth every 4 hours as needed for cough (Patient not taking: Reported on 4/5/2019) 120 mL 0     Allergies   Allergen Reactions     Augmentin Diarrhea and GI Disturbance     Codeine Nausea     significant     Penicillins      GI upset and diarrhea     Recent Labs   Lab Test 11/23/18  1021 11/15/18  0838  10/12/17  0830 10/10/16  0828  12/27/12  0838   A1C  --   --   --   --   --   --  5.6   LDL  --  66  --  58 77   < > 83   HDL  --  36*  --  40 41   < > 37*   TRIG  --  124  --  213* 235*   < > 196*   ALT 35 43  --  39 38   < >  --    CR 0.93 0.99   < > 0.82 0.85   < >  --    GFRESTIMATED 82 77   < > >90 >90  Non African American GFR Calc     < >  --    GFRESTBLACK >90 >90   < > >90 >90  African American GFR Calc     < >  --    POTASSIUM 3.9 4.1   < > 4.4 3.9   < >  --     < > = values in this interval not displayed.      BP Readings from Last 3 Encounters:   04/05/19 138/80   11/30/18 132/78   11/23/18 122/76    Wt Readings from Last 3 Encounters:   04/05/19 89.2 kg (196 lb 11.2 oz)   11/30/18 88.9 kg (195 lb 14.4 oz)   11/23/18 90.3 kg (199 lb)                  Labs reviewed in EPIC    ROS:  CONSTITUTIONAL: NEGATIVE for fever, chills, change in weight  INTEGUMENTARY/SKIN: NEGATIVE for worrisome rashes, moles or lesions  ENT/MOUTH: NEGATIVE for ear, mouth and throat problems  RESP: NEGATIVE for significant cough or SOB  CV: NEGATIVE for chest pain, palpitations or peripheral edema  GI: NEGATIVE for nausea, abdominal pain, heartburn, or change in bowel habits  : negative for dysuria, hematuria, decreased urinary stream, erectile dysfunction  MUSCULOSKELETAL: NEGATIVE for significant arthralgias or myalgia  NEURO: NEGATIVE for weakness, dizziness or paresthesias  PSYCHIATRIC: NEGATIVE for changes in mood or affect    OBJECTIVE:     /80 (Cuff Size: Adult Large)   Pulse 86   Temp 97.2  F (36.2  C) (Temporal)   Resp 18   Ht 1.778 m (5' 10\")   Wt 89.2 kg (196 lb 11.2 oz)   SpO2 97%   " BMI 28.22 kg/m    Body mass index is 28.22 kg/m .  GENERAL: healthy, alert and no distress  NECK: no adenopathy, no asymmetry, masses, or scars and trachea midline and normal to palpation  RESP: lungs clear to auscultation - no rales, rhonchi or wheezes  CV: regular rate and rhythm, normal S1 S2, no S3 or S4, no murmur, click or rub, no peripheral edema and peripheral pulses strong  MS: no gross musculoskeletal defects noted, no edema  SKIN: no suspicious lesions or rashes to exposed skin  NEURO: Normal strength and tone, mentation intact and speech normal  PSYCH: mentation appears normal, affect normal/bright    Diagnostic Test Results:  No results found for this or any previous visit (from the past 24 hour(s)).    ASSESSMENT/PLAN:     1. Screening for HIV (human immunodeficiency virus)  declines  - Lipid panel reflex to direct LDL Fasting  - Comprehensive metabolic panel  - Hemoglobin    2. Moderate chronic obstructive pulmonary disease (H)  We will try to change to a different inhaled steroid that is less expensive for him.  Follow-up in 6 months.  - COPD ACTION PLAN  - fluticasone (ARNUITY ELLIPTA) 200 MCG/ACT inhaler; Inhale 1 puff into the lungs daily  Dispense: 3 each; Refill: 1  - Lipid panel reflex to direct LDL Fasting  - Comprehensive metabolic panel  - Hemoglobin    3. Hyperlipidemia LDL goal <100  Rechecking related labs.  - Lipid panel reflex to direct LDL Fasting  - Comprehensive metabolic panel  - Hemoglobin    4. Hypertension goal BP (blood pressure) < 140/90  Goal blood pressure has been more variable since starting Cozaar so would like to go back to lisinopril.  Prescriptions written.  We may need to decrease the dose back down to 10 mg but he is to follow-up in 2-3 weeks for blood pressure check with a human.  - Lipid panel reflex to direct LDL Fasting  - Comprehensive metabolic panel  - Hemoglobin  - lisinopril (PRINIVIL/ZESTRIL) 20 MG tablet; Take 1 tablet (20 mg) by mouth daily  Dispense: 90  tablet; Refill: 1    5. Acute bronchitis with symptoms > 10 days  - albuterol (PROAIR HFA/PROVENTIL HFA/VENTOLIN HFA) 108 (90 Base) MCG/ACT inhaler; Inhale 2 puffs into the lungs every 6 hours as needed for shortness of breath / dyspnea or wheezing  Dispense: 18 g; Refill: 1  - Lipid panel reflex to direct LDL Fasting  - Comprehensive metabolic panel  - Hemoglobin    Gregory John PA-C  [facility]

## 2019-04-05 ENCOUNTER — OFFICE VISIT (OUTPATIENT)
Dept: FAMILY MEDICINE | Facility: OTHER | Age: 64
End: 2019-04-05
Payer: COMMERCIAL

## 2019-04-05 ENCOUNTER — TELEPHONE (OUTPATIENT)
Dept: FAMILY MEDICINE | Facility: OTHER | Age: 64
End: 2019-04-05

## 2019-04-05 VITALS
WEIGHT: 196.7 LBS | HEIGHT: 70 IN | DIASTOLIC BLOOD PRESSURE: 80 MMHG | OXYGEN SATURATION: 97 % | HEART RATE: 86 BPM | RESPIRATION RATE: 18 BRPM | BODY MASS INDEX: 28.16 KG/M2 | TEMPERATURE: 97.2 F | SYSTOLIC BLOOD PRESSURE: 138 MMHG

## 2019-04-05 DIAGNOSIS — J44.9 MODERATE CHRONIC OBSTRUCTIVE PULMONARY DISEASE (H): Primary | ICD-10-CM

## 2019-04-05 DIAGNOSIS — I10 HYPERTENSION GOAL BP (BLOOD PRESSURE) < 140/90: ICD-10-CM

## 2019-04-05 DIAGNOSIS — J20.9 ACUTE BRONCHITIS WITH SYMPTOMS > 10 DAYS: ICD-10-CM

## 2019-04-05 DIAGNOSIS — J44.9 MODERATE CHRONIC OBSTRUCTIVE PULMONARY DISEASE (H): ICD-10-CM

## 2019-04-05 DIAGNOSIS — I49.3 PVC'S (PREMATURE VENTRICULAR CONTRACTIONS): ICD-10-CM

## 2019-04-05 DIAGNOSIS — Z11.4 SCREENING FOR HIV (HUMAN IMMUNODEFICIENCY VIRUS): Primary | ICD-10-CM

## 2019-04-05 DIAGNOSIS — E78.5 HYPERLIPIDEMIA LDL GOAL <100: ICD-10-CM

## 2019-04-05 LAB
ALBUMIN SERPL-MCNC: 4.2 G/DL (ref 3.4–5)
ALP SERPL-CCNC: 100 U/L (ref 40–150)
ALT SERPL W P-5'-P-CCNC: 35 U/L (ref 0–70)
ANION GAP SERPL CALCULATED.3IONS-SCNC: 10 MMOL/L (ref 3–14)
AST SERPL W P-5'-P-CCNC: 20 U/L (ref 0–45)
BILIRUB SERPL-MCNC: 0.9 MG/DL (ref 0.2–1.3)
BUN SERPL-MCNC: 12 MG/DL (ref 7–30)
CALCIUM SERPL-MCNC: 9.1 MG/DL (ref 8.5–10.1)
CHLORIDE SERPL-SCNC: 105 MMOL/L (ref 94–109)
CHOLEST SERPL-MCNC: 143 MG/DL
CO2 SERPL-SCNC: 26 MMOL/L (ref 20–32)
CREAT SERPL-MCNC: 0.9 MG/DL (ref 0.66–1.25)
GFR SERPL CREATININE-BSD FRML MDRD: 90 ML/MIN/{1.73_M2}
GLUCOSE SERPL-MCNC: 102 MG/DL (ref 70–99)
HDLC SERPL-MCNC: 43 MG/DL
HGB BLD-MCNC: 17 G/DL (ref 13.3–17.7)
LDLC SERPL CALC-MCNC: 73 MG/DL
NONHDLC SERPL-MCNC: 100 MG/DL
POTASSIUM SERPL-SCNC: 4.4 MMOL/L (ref 3.4–5.3)
PROT SERPL-MCNC: 7.5 G/DL (ref 6.8–8.8)
SODIUM SERPL-SCNC: 141 MMOL/L (ref 133–144)
TRIGL SERPL-MCNC: 134 MG/DL

## 2019-04-05 PROCEDURE — 36415 COLL VENOUS BLD VENIPUNCTURE: CPT | Performed by: PHYSICIAN ASSISTANT

## 2019-04-05 PROCEDURE — 85018 HEMOGLOBIN: CPT | Performed by: PHYSICIAN ASSISTANT

## 2019-04-05 PROCEDURE — 99213 OFFICE O/P EST LOW 20 MIN: CPT | Performed by: PHYSICIAN ASSISTANT

## 2019-04-05 PROCEDURE — 80061 LIPID PANEL: CPT | Performed by: PHYSICIAN ASSISTANT

## 2019-04-05 PROCEDURE — 80053 COMPREHEN METABOLIC PANEL: CPT | Performed by: PHYSICIAN ASSISTANT

## 2019-04-05 RX ORDER — LISINOPRIL 20 MG/1
20 TABLET ORAL DAILY
Qty: 90 TABLET | Refills: 1 | Status: SHIPPED | OUTPATIENT
Start: 2019-04-05 | End: 2019-10-23

## 2019-04-05 RX ORDER — ALBUTEROL SULFATE 90 UG/1
2 AEROSOL, METERED RESPIRATORY (INHALATION) EVERY 6 HOURS PRN
Qty: 18 G | Refills: 1 | Status: SHIPPED | OUTPATIENT
Start: 2019-04-05 | End: 2019-10-23

## 2019-04-05 ASSESSMENT — MIFFLIN-ST. JEOR: SCORE: 1693.48

## 2019-04-05 ASSESSMENT — PAIN SCALES - GENERAL: PAINLEVEL: NO PAIN (0)

## 2019-04-05 NOTE — TELEPHONE ENCOUNTER
Patient is requesting a refill of Qvar Redihaler 2 puffs twice daily.  I do not see this medication on his active med list.    Thanks.

## 2019-04-25 ENCOUNTER — HOSPITAL ENCOUNTER (EMERGENCY)
Facility: CLINIC | Age: 64
Discharge: HOME | End: 2019-04-25
Attending: FAMILY MEDICINE | Admitting: FAMILY MEDICINE
Payer: COMMERCIAL

## 2019-04-25 VITALS
BODY MASS INDEX: 28.2 KG/M2 | TEMPERATURE: 96.1 F | RESPIRATION RATE: 16 BRPM | WEIGHT: 197 LBS | OXYGEN SATURATION: 97 % | SYSTOLIC BLOOD PRESSURE: 136 MMHG | HEIGHT: 70 IN | DIASTOLIC BLOOD PRESSURE: 92 MMHG

## 2019-04-25 DIAGNOSIS — H81.12 BENIGN PAROXYSMAL POSITIONAL VERTIGO OF LEFT EAR: ICD-10-CM

## 2019-04-25 PROCEDURE — 99284 EMERGENCY DEPT VISIT MOD MDM: CPT | Mod: Z6 | Performed by: FAMILY MEDICINE

## 2019-04-25 PROCEDURE — 25000132 ZZH RX MED GY IP 250 OP 250 PS 637: Performed by: FAMILY MEDICINE

## 2019-04-25 PROCEDURE — 25000131 ZZH RX MED GY IP 250 OP 636 PS 637: Performed by: FAMILY MEDICINE

## 2019-04-25 PROCEDURE — 99283 EMERGENCY DEPT VISIT LOW MDM: CPT | Performed by: FAMILY MEDICINE

## 2019-04-25 RX ORDER — ONDANSETRON 4 MG/1
4 TABLET, ORALLY DISINTEGRATING ORAL ONCE
Status: COMPLETED | OUTPATIENT
Start: 2019-04-25 | End: 2019-04-25

## 2019-04-25 RX ORDER — ONDANSETRON 4 MG/1
4 TABLET, ORALLY DISINTEGRATING ORAL EVERY 6 HOURS PRN
Qty: 12 TABLET | Refills: 0 | Status: SHIPPED | OUTPATIENT
Start: 2019-04-25 | End: 2019-10-23

## 2019-04-25 RX ORDER — MECLIZINE HYDROCHLORIDE 25 MG/1
25 TABLET ORAL 3 TIMES DAILY PRN
Qty: 30 TABLET | Refills: 0 | Status: SHIPPED | OUTPATIENT
Start: 2019-04-25 | End: 2019-05-16

## 2019-04-25 RX ORDER — ONDANSETRON 4 MG/1
4 TABLET, ORALLY DISINTEGRATING ORAL
Status: COMPLETED | OUTPATIENT
Start: 2019-04-25 | End: 2019-04-25

## 2019-04-25 RX ORDER — MECLIZINE HYDROCHLORIDE 25 MG/1
25 TABLET ORAL ONCE
Status: COMPLETED | OUTPATIENT
Start: 2019-04-25 | End: 2019-04-25

## 2019-04-25 RX ADMIN — ONDANSETRON 4 MG: 4 TABLET, ORALLY DISINTEGRATING ORAL at 07:49

## 2019-04-25 RX ADMIN — ONDANSETRON 4 MG: 4 TABLET, ORALLY DISINTEGRATING ORAL at 06:51

## 2019-04-25 RX ADMIN — MECLIZINE HYDROCHLORIDE 25 MG: 25 TABLET ORAL at 08:42

## 2019-04-25 ASSESSMENT — MIFFLIN-ST. JEOR
SCORE: 1694.84
SCORE: 1694.84

## 2019-04-25 NOTE — ED PROVIDER NOTES
"  History     Chief Complaint   Patient presents with     Dizziness     HPI  Keegan Buchanan is a 63 year old male who presents to the ED by ambulance this morning with acute vertigo.  He awoke abruptly at 4:10 AM and startled.  He sat up quickly and had to put his hands down to maintain his balance because he became dizzy.  He felt like the room was spinning.  Looking up at the ceiling it seemed to be rotating and then jerking backwards.  Worsened when he stood up again to go to the bathroom.  The typical 15 steps to the bathroom took him about 30 steps because he was zigzagging.  He urinated and then made it back to bed.  He tends to change positions a lot when he sleeps.  He laid on his right side and had the symptoms for about 10 or 15 seconds and then was okay.  He then rolled onto his back and \"all hell broke loose\".  He felt like everything was spinning.  This again lasted for about 10 or 15 seconds and then settled down.  He rolled onto his left side and he was okay.  He tried to go back to sleep and was dozing off and on but would start a little bit dizzy felt like his symptoms would recur.  At 5:20 AM he was laying on his back and rolled to his right side and his symptoms recurred once again.    He has a history of hypertension and was on lisinopril for many years switched to losartan because he thought maybe he had a ACE inhibitor cough but it turns out that he probably has some COPD secondary to working with [employer] his entire career.  The losartan was not as good at maintaining his blood pressure so he now switch back to lisinopril a couple weeks ago.  He is also on a couple of medications for his prostate including Detrol and Flomax and takes some heart meds after having a couple of stents placed back in 2012.    He said fluid in his left ear for the past 4 years.  Antibiotics have not been helpful.  He saw ENT and had a nasopharyngeal scope exam done which was okay.  Has a chronic tinnitus and " wears hearing aids bilaterally as well.  Does not have them in this morning.    He denies any headaches.  No focal weakness or numbness.  No fevers chills or sweats.  Just some mild nausea with the vertigo symptoms.  No vomiting.  States his head has felt heavy and just a bit off for several years now.  Nothing new today.    Allergies:  Allergies   Allergen Reactions     Augmentin Diarrhea and GI Disturbance     Codeine Nausea     significant     Penicillins      GI upset and diarrhea       Problem List:    Patient Active Problem List    Diagnosis Date Noted     PVC's (premature ventricular contractions) 04/05/2019     Priority: Medium     Congestion of paranasal sinus 11/23/2018     Priority: Medium     Left inguinal hernia 01/18/2018     Priority: Medium     Piriformis syndrome of left side 02/10/2016     Priority: Medium     Nonallopathic lesion of sacral region 02/10/2016     Priority: Medium     Nonallopathic lesion of thoracic region 02/10/2016     Priority: Medium     Nonallopathic lesion of cervical region 02/10/2016     Priority: Medium     Cervicalgia 02/10/2016     Priority: Medium     Moderate chronic obstructive pulmonary disease (H) 08/20/2014     Priority: Medium     Problem list name updated by automated process. Provider to review       GERD (gastroesophageal reflux disease) 02/05/2013     Priority: Medium     Benign prostatic hyperplasia with urinary obstruction 12/27/2012     Priority: Medium     Acute coronary syndrome (H) 11/14/2012     Priority: Medium     CAD (coronary artery disease) 11/14/2012     Priority: Medium     Arthritis of elbow, right 04/09/2012     Priority: Medium     Cubital tunnel syndrome - right 04/09/2012     Priority: Medium     Hyperlipidemia LDL goal <100 10/31/2010     Priority: Medium     Impotence of organic origin 03/19/2008     Priority: Medium     Trigger finger, acquired 06/02/2005     Priority: Medium     Problem list name updated by automated process. Provider to  review       Hypertension goal BP (blood pressure) < 140/90 2003     Priority: Medium        Past Medical History:    Past Medical History:   Diagnosis Date     Hypercholesteremia      Pain in joint, shoulder region      Unspecified essential hypertension        Past Surgical History:    Past Surgical History:   Procedure Laterality Date     COLONOSCOPY N/A 2015    Procedure: COMBINED COLONOSCOPY, SINGLE OR MULTIPLE BIOPSY/POLYPECTOMY BY BIOPSY;  Surgeon: Herman Rebollar MD;  Location: [facility]     COLONOSCOPY N/A 2018    Procedure: colonoscopy;  Surgeon: Thiago Robles MD;  Location: [facility]     H STENT PROCED       HC REMOVE TONSILS/ADENOIDS,<11 Y/O      T & A <12y.o.     LAPAROSCOPIC HERNIORRHAPHY INGUINAL Left 8/3/2018    Procedure: LAPAROSCOPIC HERNIORRHAPHY INGUINAL;  Laparoscopic left inguinal hernia repair;  Surgeon: Huber Ireland MD;  Location: [facility]     REMOVAL OF SPERM DUCT(S)  88    Vasectomy       Family History:    Family History   Problem Relation Age of Onset     Diabetes Mother         adult onset diabetes     Cancer Mother         kidney dx age 75     Cancer Father         prostate cancer/ at age 73     Hypertension Brother      Cancer Paternal Grandfather         prostat ca     Lung Cancer Brother        Social History:  Marital Status:   [2]  Social History     Tobacco Use     Smoking status: Former Smoker     Packs/day: 1.00     Years: 15.00     Pack years: 15.00     Smokeless tobacco: Former User     Types: Chew     Quit date: 1987     Tobacco comment: quit 25 years ago.   Substance Use Topics     Alcohol use: Yes     Comment: 1 -2 monthly     Drug use: No        Medications:      albuterol (PROAIR HFA/PROVENTIL HFA/VENTOLIN HFA) 108 (90 Base) MCG/ACT inhaler   aspirin 81 MG tablet   atorvastatin (LIPITOR) 80 MG tablet   beclomethasone HFA (QVAR REDIHALER) 80 MCG/ACT inhaler   cetirizine (ZYRTEC) 10 MG tablet   fluticasone (FLONASE) 50  "MCG/ACT spray   fluticasone (FLONASE) 50 MCG/ACT spray   lisinopril (PRINIVIL/ZESTRIL) 20 MG tablet   meclizine (ANTIVERT) 25 MG tablet   nitroglycerin (NITROSTAT) 0.4 MG SL tablet   omeprazole (PRILOSEC) 20 MG DR capsule   ondansetron (ZOFRAN ODT) 4 MG ODT tab   tamsulosin (FLOMAX) 0.4 MG capsule   tolterodine ER (DETROL LA) 2 MG 24 hr capsule         Review of Systems   All other systems reviewed and are negative.      Physical Exam   BP: 140/89  Heart Rate: 71  Temp: 96.1  F (35.6  C)  Resp: 20  Height: 177.8 cm (5' 10\")  Weight: 89.4 kg (197 lb)  SpO2: 97 %      Physical Exam   Constitutional: He is oriented to person, place, and time. He appears well-developed and well-nourished. No distress.   HENT:   Head: Normocephalic and atraumatic.   Right Ear: Tympanic membrane normal.   Left TM has a good light reflex.  He either has scarring or bubbles around the periphery especially posteriorly.  This might be the fluid that they have talked about but it may actually be scarring.  There is no infection.   Eyes: Pupils are equal, round, and reactive to light. EOM are normal.   3-4 beats of nystagmus with leftward gaze that extinguishes.   Cardiovascular: Normal rate and regular rhythm.   Pulmonary/Chest: Effort normal and breath sounds normal.   Musculoskeletal: Normal range of motion.   Neurological: He is alert and oriented to person, place, and time. He has normal strength. No cranial nerve deficit or sensory deficit. He displays a negative Romberg sign. Coordination normal. GCS eye subscore is 4. GCS verbal subscore is 5. GCS motor subscore is 6.   Skin: Skin is warm and dry.   Psychiatric: He has a normal mood and affect.       ED Course  (with Medical Decision Making)    63-year-old gentleman awoke suddenly this morning with a start and sat up quickly and developed vertigo symptoms that lasted 10 or 15 seconds.  Associated with movement and would extinguish if he remained still.  No focal weakness or numbness.  " No headache or fevers.  Certainly sounds consistent with BPPV.    Maia-Hallpike was negative on the right and positive on the left.  I completed a modified Epley maneuver on him treating the left side.  He was quite symptomatic in the first and third positions.  He was given Zofran ODT 4 mg orally prior to testing as he had some slight nausea.    We did a modified Epley maneuver a second time and he did quite well until the last position when he became more symptomatic.  We set him back at 45 degrees and let him rest.  We are going to stop there for now.  Recommend not laying flat for 24 hours and avoiding laying with the left ear down for the next 7 days if possible.  I am going to send him with Zofran ODT and meclizine 25 mg 3 times a day as needed.  Recheck in clinic if any persistent problems.  May benefit from some physical therapy if problems persist or may need further work-up but at this point in time, this looks like classic BPPV.    He does well at rest.  He was given Meclizine 25 mg orally in the ED prior to discharge.              Procedures               Critical Care time:  none               No results found for this or any previous visit (from the past 24 hour(s)).        Assessments & Plan      I have reviewed the nursing notes.    I have reviewed the findings, diagnosis, plan and need for follow up with the patient.          Medication List      Started    meclizine 25 MG tablet  Commonly known as:  ANTIVERT  25 mg, Oral, 3 TIMES DAILY PRN     ondansetron 4 MG ODT tab  Commonly known as:  ZOFRAN ODT  4 mg, Oral, EVERY 6 HOURS PRN            Final diagnoses:   Benign paroxysmal positional vertigo of left ear       4/25/2019   [facility] EMERGENCY DEPARTMENT     Hernan Caballero MD  04/25/19 0826

## 2019-04-25 NOTE — ED AVS SNAPSHOT
[facility] Emergency Department  [address] [place] [address]  Phone:  [phone]  Fax:  [phone]                                    Keegan Buchanan   MRN: [id number]    Department:  [facility] Emergency Department   Date of Visit:  4/25/2019           After Visit Summary Signature Page    I have received my discharge instructions, and my questions have been answered. I have discussed any challenges I see with this plan with the nurse or doctor.    ..........................................................................................................................................  Patient/Patient Representative Signature      ..........................................................................................................................................  Patient Representative Print Name and Relationship to Patient    ..................................................               ................................................  Date                                   Time    ..........................................................................................................................................  Reviewed by Signature/Title    ...................................................              ..............................................  Date                                               Time          22EPIC Rev 08/18

## 2019-04-25 NOTE — DISCHARGE INSTRUCTIONS
"Zofran ODT as needed for nausea.  You may use the meclizine 3 times a day as needed for vertigo.  We did a modified Epley maneuver on your left side.  You could repeat this at home.  There are many videos on YouTube showing how to do it.  Please recheck in clinic with Dr. Stovall if persistent problems or return to the ED if worse/concerns.  It was nice visiting with both of you this morning.  Say\"Hi\" to Abhi Hudson Emily and Musa for me.    Thank you for choosing [facility]. We appreciate the opportunity to meet your urgent medical needs. Please let us know if we could have done anything to make your stay more satisfying.    After discharge, please closely monitor for any new or worsening symptoms. Return to the Emergency Department if you develop any acute worsening signs or symptoms.    If you had lab work, cultures or imaging studies done during your stay, the final results may still be pending. We will call you if your plan of care needs to change. However, if you are not improving as expected, please follow up with your primary care provider or clinic.     Start any prescription medications that were prescribed to you and take them as directed.     Please see additional handouts that may be pertinent to your condition.      "

## 2019-04-29 ENCOUNTER — TELEPHONE (OUTPATIENT)
Dept: OTOLARYNGOLOGY | Facility: CLINIC | Age: 64
End: 2019-04-29

## 2019-04-29 ENCOUNTER — TELEPHONE (OUTPATIENT)
Dept: FAMILY MEDICINE | Facility: OTHER | Age: 64
End: 2019-04-29

## 2019-04-29 ENCOUNTER — HOSPITAL ENCOUNTER (OUTPATIENT)
Dept: PHYSICAL THERAPY | Facility: CLINIC | Age: 64
Setting detail: THERAPIES SERIES
End: 2019-04-29
Attending: FAMILY MEDICINE
Payer: COMMERCIAL

## 2019-04-29 DIAGNOSIS — K21.9 GASTROESOPHAGEAL REFLUX DISEASE WITHOUT ESOPHAGITIS: ICD-10-CM

## 2019-04-29 DIAGNOSIS — N40.1 BENIGN PROSTATIC HYPERPLASIA WITH URINARY OBSTRUCTION: ICD-10-CM

## 2019-04-29 DIAGNOSIS — H81.10 BENIGN PAROXYSMAL POSITIONAL VERTIGO, UNSPECIFIED LATERALITY: Primary | ICD-10-CM

## 2019-04-29 DIAGNOSIS — H81.10 BENIGN PAROXYSMAL POSITIONAL VERTIGO, UNSPECIFIED LATERALITY: ICD-10-CM

## 2019-04-29 DIAGNOSIS — R35.0 URINARY FREQUENCY: ICD-10-CM

## 2019-04-29 DIAGNOSIS — R09.81 NASAL CONGESTION: ICD-10-CM

## 2019-04-29 DIAGNOSIS — E78.5 HYPERLIPIDEMIA LDL GOAL <100: ICD-10-CM

## 2019-04-29 DIAGNOSIS — N13.8 BENIGN PROSTATIC HYPERPLASIA WITH URINARY OBSTRUCTION: ICD-10-CM

## 2019-04-29 PROCEDURE — 95992 CANALITH REPOSITIONING PROC: CPT | Mod: GP | Performed by: PHYSICAL THERAPIST

## 2019-04-29 PROCEDURE — 97162 PT EVAL MOD COMPLEX 30 MIN: CPT | Mod: GP | Performed by: PHYSICAL THERAPIST

## 2019-04-29 NOTE — TELEPHONE ENCOUNTER
Spoke with pt and he stated he has been told multiple time he has fluid in middle ear and was given antibiotics from Dr Stovall instead of having tubes placed. This has beeen going on at least 3-4 years. He did have an ED visit on 4/25/49 for vertigo and that doctor stated to pt that he is started to have scar tissue and recommended seeing ENT as well per pt. He did see physical therapy today and she thinks this issue may be from fluid in middle ear as well. Pt would like to do [facility]. Referral pending.      Cathy Golden CMA ([facility])

## 2019-04-29 NOTE — PROGRESS NOTES
04/29/19 1139   Quick Adds   Quick Adds Vestibular Eval   Type of Visit Initial OP PT Evaluation       Present No   General Information   Start of Care Date 04/29/19   Referring Physician Dr. Fabio Stovall   Orders Evaluate and Treat as Indicated   Order Date 04/29/19   Medical Diagnosis BPPV   Onset of illness/injury or Date of Surgery 04/25/19   Precautions/Limitations no known precautions/limitations   Surgical/Medical history reviewed Yes   Pertinent history of current vestibular problem (include personal factors and/or comorbidities that impact the POC)  Hearing loss   Hearing Loss Comments wears hearing aids, L more than R hearing loss   Pertinent history of current problem (include personal factors and/or comorbidities that impact the POC) Pt woke quickly from a dead sleep 4/25 during the night, sat up and the room started spinning. Had difficulty walking to the bathroom and lay on R side then rolled to his back and the ceiling started spinning. He went back to sleep,  woke next  morning and it did it again. Went to ED and  Dr. Colindres Dx BPPV  and did the Epley maneuver.  (L BPPV)  which appeared to correct a lot of it and now no more saccades but still feels really off.   Feels like he has impaired balance when he is standing or sitting still. Feels wobbly when he stops quick.   Balance issue has been going on for years.  That is a more light headed sensation- always feels kind of groggy. chronic neck issue gets chronic headaches from that.  Also has a hx of fluid in L ear, ringing in (B) ears.  Hears it a lot when he goes to bed and it is quiet.  Diagnosed recently with moderate COPD,  About when this all started has an irregular heart beat at times.    Pertinent Visual History  wears glasses and hearing aids   Prior level of function comment indep   Previous/Current Treatment Medication(s)   Improvement after medication Mild   Current Community Support Family/friend caregiver    Patient role/Employment history Retired   Living environment House[facility]   Home/Community Accessibility Comments no concerns   Patient/Family Goals Statement to decrease dizziness   Fall Risk Screen   Fall screen completed by PT   Have you fallen 2 or more times in the past year? No   Have you fallen and had an injury in the past year? No   Is patient a fall risk? No   System Outcome Measures   Outcome Measures BPPV   Dizziness Handicap Inventory (score out of 100) A decrease in score by 17.18 or greater indicates a clinically significant change in symptoms. 40   Pain   Patient currently in pain No   Cognitive Status Examination   Orientation orientation to person, place and time   Level of Consciousness alert   Follows Commands and Answers Questions 100% of the time   Personal Safety and Judgment intact   Memory intact   Observation   Observation NAD   Integumentary   Integumentary No deficits were identified   Posture   Posture Normal   Range of Motion (ROM)   ROM Comment Grossly WNL   Strength   Manual Muscle Testing Quick Adds No deficits were identified   Bed Mobility   Bed Mobility Comments Indep   Transfer Skills   Transfer Comments indep   Gait   Gait Comments Indep   Balance   Balance Comments No LOB during first session.  Will continue to test next session   Sensory Examination   Sensory Perception Comments Hearing loss   Coordination   Coordination no deficits were identified   Muscle Tone   Muscle Tone no deficits were identified   Cervicogenic Screen   Vertebral Artery Test Normal   Alar Ligament Test Normal   Transverse Ligament Test Normal   Oculomotor Exam   Smooth Pursuit Normal   Saccades Normal   VOR Normal   Rapid Head Thrust Normal   Infrared Goggle Exam or Frenzel Lense Exam   Vestibular Suppressant in Last 24 Hours? Yes   Exam completed with Frenzel Lenses   Spontaneous Nystagmus Negative   Gaze Evoked Nystagmus Horizontal L   Gaze Evoked Nystagmus comments with left gaze, mild   Head  Shake Horizontal Nystagmus Negative   [place]-Hallpike (right) Negative   Maia-Hallpike (Left) Negative;Other   [place]-Hallpike (left) comments negative for nystagmus, did have some lightheadedness feeling   HSCC Supine Roll Test (Right) Negative   HSCC Supine Roll Test (Left) Negative   BPPV Canal(s) L Posterior   BPPV Type Canalithasis   Planned Therapy Interventions   Planned Therapy Interventions balance training;neuromuscular re-education   Planned Therapy Interventions Comment CRP PRN   Clinical Impression   Criteria for Skilled Therapeutic Interventions Met yes, treatment indicated   PT Diagnosis Dizziness, impaired balance  consistent with resolving BPPV and possible middle ear and or vascular involvment.    Influenced by the following impairments dizziness/ lightheadedness,  impaired balance   Functional limitations due to impairments fear of falling, dizziness limiting driving and ADLS   Clinical Presentation Evolving/Changing   Clinical Presentation Rationale Cardiac HX ,  Clinical judgment   Clinical Decision Making (Complexity) Moderate complexity   Therapy Frequency 2 times/Week   Predicted Duration of Therapy Intervention (days/wks) 30 days   Risk & Benefits of therapy have been explained Yes   Patient, Family & other staff in agreement with plan of care Yes   Clinical Impression Comments PPW Dizziness, impaired balance  consistent with resolving BPPV and possible middle ear and or vascular involvment.   BPPV testing today did not show nystagmus.  will continue to test balance , cervicogenic and possible vascular involvement.   Pt will benefit from skilled PT for CRP as needed, balance testing and training PRN and instruction in HEP as indicated.   Education Assessment   Preferred Learning Style Listening   Barriers to Learning No barriers   GOALS   PT Eval Goals 1;2;3   Goal 1   Goal Identifier Dizziness   Goal Description PT to report no room spinning type and minimal lightheaded type dizziness x 1 week.  "  Target Date 07/28/19   Goal 2   Goal Identifier Balance   Goal Description Pt able to peform BBS scoring 46 or better and or FGA  scoring 23 or better with no increase in sx.  SLS x 30\" and no reported falls or LOB x 1 week.   Target Date 07/28/19   Goal 3   Goal Identifier Driving   Goal Description Pt able to drive with out difficulty and check his blind spots with no spinning sensation.   Target Date 07/28/19   Total Evaluation Time   PT Eval, Moderate Complexity Minutes (24024) 45     "

## 2019-04-29 NOTE — TELEPHONE ENCOUNTER
[facility] Call Center    Phone Message    May a detailed message be left on voicemail: yes    Reason for Call: Other: Patient scheduled an appointment with Dr. Sepulveda and Dr. Pitts on 05/14 for inner ear issues. Patient believe he has fluid in ear. Patient  states he had an audiology test earlier this year and is insistent on not having another. Patient would like to discuss if audiology is needed. Please advise.      Action Taken: Message routed to:  Adult Clinics: ENT p [phone]

## 2019-04-29 NOTE — TELEPHONE ENCOUNTER
Reason for Call: Request for an order or referral:    Order or referral being requested: order    Date needed: as soon as possible    Has the patient been seen by the PCP for this problem? NO    Additional comments: patient was seen in the ER for vertigo and it was suggested that her have therapy done patient is wanting to know if you can place an order for him to have this done.     Phone number Patient can be reached at:  Home number on file [phone] (home) or Cell number on file:    Telephone Information:   Mobile [phone]       Best Time:  anytime    Can we leave a detailed message on this number?  YES    Call taken on 4/29/2019 at 9:20 AM by Chastity Magaña

## 2019-04-29 NOTE — TELEPHONE ENCOUNTER
Pt informed and was transferred to schedule appt.      Cathy Golden CMA ([facility])

## 2019-04-29 NOTE — TELEPHONE ENCOUNTER
Reason for Call: Request for an order or referral:    Order or referral being requested: ENT    Date needed: at your convenience    Has the patient been seen by the PCP for this problem? YES    Additional comments: Would like referral to ENT    Phone number Patient can be reached at:  Home number on file [phone] (home)    Best Time:  anytime    Can we leave a detailed message on this number?  YES    Call taken on 4/29/2019 at 1:25 PM by Disha Mccarthy

## 2019-04-30 RX ORDER — TOLTERODINE 2 MG/1
CAPSULE, EXTENDED RELEASE ORAL
Qty: 90 CAPSULE | Refills: 1 | Status: SHIPPED | OUTPATIENT
Start: 2019-04-30 | End: 2019-10-23

## 2019-04-30 RX ORDER — TAMSULOSIN HYDROCHLORIDE 0.4 MG/1
CAPSULE ORAL
Qty: 90 CAPSULE | Refills: 1 | Status: SHIPPED | OUTPATIENT
Start: 2019-04-30 | End: 2019-10-23

## 2019-04-30 RX ORDER — ATORVASTATIN CALCIUM 80 MG/1
TABLET, FILM COATED ORAL
Qty: 90 TABLET | Refills: 3 | Status: SHIPPED | OUTPATIENT
Start: 2019-04-30 | End: 2020-04-29

## 2019-04-30 NOTE — TELEPHONE ENCOUNTER
Atorvastatin    Prescription approved per [facility] Refill Protocol.    Omeprazole    Prescription approved per [facility] Refill Protocol.    tolterodine    Prescription approved per [facility] Refill Protocol.    tamsulosin    BP Readings from Last 3 Encounters:   04/25/19 (!) 136/92   04/05/19 138/80   11/30/18 132/78     Routing refill request to provider for review/approval because:  Labs out of range:  B/P  LOV 4/25/2019    Amber Lopez RN, BSN

## 2019-05-02 ENCOUNTER — ALLIED HEALTH/NURSE VISIT (OUTPATIENT)
Dept: FAMILY MEDICINE | Facility: OTHER | Age: 64
End: 2019-05-02
Payer: COMMERCIAL

## 2019-05-02 VITALS — DIASTOLIC BLOOD PRESSURE: 86 MMHG | HEART RATE: 74 BPM | SYSTOLIC BLOOD PRESSURE: 122 MMHG

## 2019-05-02 DIAGNOSIS — Z01.30 BLOOD PRESSURE CHECK: Primary | ICD-10-CM

## 2019-05-02 PROCEDURE — 99207 ZZC NO CHARGE NURSE ONLY: CPT

## 2019-05-02 NOTE — PROGRESS NOTES
Keegan Buchanan is a 63 year old patient who comes in today for a Blood Pressure check.  Initial BP:  /86   Pulse 74      Data Unavailable  Disposition: results routed to provider    Rose Hubbard MA

## 2019-05-06 ENCOUNTER — HOSPITAL ENCOUNTER (OUTPATIENT)
Dept: PHYSICAL THERAPY | Facility: CLINIC | Age: 64
Setting detail: THERAPIES SERIES
End: 2019-05-06
Attending: FAMILY MEDICINE
Payer: COMMERCIAL

## 2019-05-06 PROCEDURE — 95992 CANALITH REPOSITIONING PROC: CPT | Mod: GP | Performed by: PHYSICAL THERAPIST

## 2019-05-08 NOTE — TELEPHONE ENCOUNTER
Phone call to pt, left message to call back with any questions he may have regarding upcoming Audiology and ENT appointments. Clinic phone number provided.  Shelley Yeboah RN

## 2019-05-14 ENCOUNTER — OFFICE VISIT (OUTPATIENT)
Dept: OTOLARYNGOLOGY | Facility: CLINIC | Age: 64
End: 2019-05-14
Payer: COMMERCIAL

## 2019-05-14 ENCOUNTER — OFFICE VISIT (OUTPATIENT)
Dept: AUDIOLOGY | Facility: CLINIC | Age: 64
End: 2019-05-14
Payer: COMMERCIAL

## 2019-05-14 ENCOUNTER — HOSPITAL ENCOUNTER (OUTPATIENT)
Dept: PHYSICAL THERAPY | Facility: CLINIC | Age: 64
Setting detail: THERAPIES SERIES
End: 2019-05-14
Attending: FAMILY MEDICINE
Payer: COMMERCIAL

## 2019-05-14 ENCOUNTER — TELEPHONE (OUTPATIENT)
Dept: FAMILY MEDICINE | Facility: OTHER | Age: 64
End: 2019-05-14

## 2019-05-14 ENCOUNTER — ANCILLARY PROCEDURE (OUTPATIENT)
Dept: GENERAL RADIOLOGY | Facility: CLINIC | Age: 64
End: 2019-05-14
Attending: PREVENTIVE MEDICINE
Payer: COMMERCIAL

## 2019-05-14 ENCOUNTER — OFFICE VISIT (OUTPATIENT)
Dept: ORTHOPEDICS | Facility: CLINIC | Age: 64
End: 2019-05-14
Payer: COMMERCIAL

## 2019-05-14 VITALS — BODY MASS INDEX: 28.2 KG/M2 | HEIGHT: 70 IN | WEIGHT: 197 LBS

## 2019-05-14 VITALS — HEART RATE: 82 BPM | OXYGEN SATURATION: 96 % | SYSTOLIC BLOOD PRESSURE: 163 MMHG | DIASTOLIC BLOOD PRESSURE: 96 MMHG

## 2019-05-14 DIAGNOSIS — Z86.79 PERSONAL HISTORY OF CARDIOVASCULAR DISEASE: ICD-10-CM

## 2019-05-14 DIAGNOSIS — M50.30 DEGENERATIVE DISC DISEASE, CERVICAL: ICD-10-CM

## 2019-05-14 DIAGNOSIS — M47.9 VERTIGO OF CERVICAL ARTHROSIS SYNDROME: Primary | ICD-10-CM

## 2019-05-14 DIAGNOSIS — I10 ESSENTIAL HYPERTENSION: ICD-10-CM

## 2019-05-14 DIAGNOSIS — M50.30 DEGENERATIVE DISC DISEASE, CERVICAL: Primary | ICD-10-CM

## 2019-05-14 DIAGNOSIS — R42 VERTIGO: ICD-10-CM

## 2019-05-14 DIAGNOSIS — H90.3 SENSORINEURAL HEARING LOSS (SNHL) OF BOTH EARS: Primary | ICD-10-CM

## 2019-05-14 DIAGNOSIS — R42 VERTIGO OF CERVICAL ARTHROSIS SYNDROME: Primary | ICD-10-CM

## 2019-05-14 PROCEDURE — 72040 X-RAY EXAM NECK SPINE 2-3 VW: CPT | Performed by: RADIOLOGY

## 2019-05-14 PROCEDURE — 99203 OFFICE O/P NEW LOW 30 MIN: CPT | Performed by: OTOLARYNGOLOGY

## 2019-05-14 PROCEDURE — 99214 OFFICE O/P EST MOD 30 MIN: CPT | Performed by: PREVENTIVE MEDICINE

## 2019-05-14 PROCEDURE — 92550 TYMPANOMETRY & REFLEX THRESH: CPT | Performed by: AUDIOLOGIST

## 2019-05-14 PROCEDURE — 92557 COMPREHENSIVE HEARING TEST: CPT | Performed by: AUDIOLOGIST

## 2019-05-14 PROCEDURE — 97110 THERAPEUTIC EXERCISES: CPT | Mod: GP | Performed by: PHYSICAL THERAPIST

## 2019-05-14 ASSESSMENT — ENCOUNTER SYMPTOMS
CONSTITUTIONAL NEGATIVE: 1
BLURRED VISION: 0
SINUS PAIN: 0
COUGH: 0
HEARTBURN: 0
DOUBLE VISION: 0
HEMOPTYSIS: 0
STRIDOR: 0
BRUISES/BLEEDS EASILY: 0
TINGLING: 0
VOMITING: 0
NAUSEA: 0
SPUTUM PRODUCTION: 0
HEADACHES: 0
DIZZINESS: 0
SORE THROAT: 0

## 2019-05-14 ASSESSMENT — MIFFLIN-ST. JEOR: SCORE: 1694.84

## 2019-05-14 NOTE — PROGRESS NOTES
AUDIOLOGY REPORT    SUMMARY: Audiology visit completed. See audiogram for results.    RECOMMENDATIONS: Follow-up with ENT.    Elsie Caldwell  Doctor of Audiology  [place] License # [id number]

## 2019-05-14 NOTE — LETTER
5/14/2019         RE: Keegan Buchanan  [address] [address] [place] [id number]        Dear Colleague,    Thank you for referring your patient, Keegan Buchanan, to the [facility]. Please see a copy of my visit note below.    HPI    This is a 63 year old patient who has been having spinning sensation when he moves quickly and turns around or rolls over in the bed. Started several years ago but became significant recently. States facial tingling from time to time with no specific location otherwise no weakness, numbness or tingling in his extremities. No vision issues. No headache or falling.   3 weeks ago, Pt woke quickly from a sleep 4/25 during the night, sat up and the room started spinning. Had difficulty walking to the bathroom and lay on R side then rolled to his back and the ceiling started spinning. He went back to sleep,  woke next  morning and it did it again. Went to ED and  Dr. Colindres Dx BPPV  and did the Epley maneuver.  (L BPPV)  which appeared to correct a lot of it and now no more saccades but still feels really off.   Feels like he has impaired balance when he is standing or sitting still. Feels wobbly when he stops quick.   Balance issue has been going on for years.  That is a more light headed sensation- always feels kind of groggy. chronic neck issue gets chronic headaches from that.  Also has a hx of fluid in L ear, ringing in (B) ears.  Hears it a lot when he goes to bed and it is quiet.  Diagnosed with moderate COPD, GERD, cervicalgia, hypertension, CAD, and prostatic hyperplasia .     Review of Systems   Constitutional: Negative.    HENT: Positive for hearing loss and tinnitus. Negative for congestion, ear discharge, ear pain, nosebleeds, sinus pain and sore throat.    Eyes: Negative for blurred vision and double vision.   Respiratory: Negative for cough, hemoptysis, sputum production and stridor.    Gastrointestinal: Negative for heartburn, nausea and vomiting.   Skin:  Negative.    Neurological: Negative for dizziness, tingling and headaches.   Endo/Heme/Allergies: Negative for environmental allergies. Does not bruise/bleed easily.         Physical Exam   Constitutional: He appears well-developed and well-nourished.   HENT:   Head: Normocephalic and atraumatic.   Right Ear: Tympanic membrane, external ear and ear canal normal. No drainage, swelling or tenderness. No middle ear effusion. Decreased hearing is noted.   Left Ear: Tympanic membrane, external ear and ear canal normal. No drainage, swelling or tenderness.  No middle ear effusion. Decreased hearing is noted.   Nose: Nose normal. No mucosal edema, rhinorrhea or nose lacerations.   Mouth/Throat: Uvula is midline and oropharynx is clear and moist.   Eyes: Pupils are equal, round, and reactive to light. EOM are normal.   Neck: Normal range of motion.     S/p tonsillectomy appearance.    A/P  This is a 63 year old patient who has been having vertigo episodes that lasts several seconds and in between lightheadedness. He has sloping to severe SNHL bilaterally with slight asymmetry, which is stable for years. He will continue with vestibular rehab for Maia Hallpike and Epley maneuver. In the meantime, we will refer him to his primary physician because his BP is still high and to r/o any cervical arthrosis and vertebrobasilar vessel issues. His questions were answered.        Again, thank you for allowing me to participate in the care of your patient.        Sincerely,        Ruthie Pitts MD

## 2019-05-14 NOTE — PROGRESS NOTES
HPI    This is a 63 year old patient who has been having spinning sensation when he moves quickly and turns around or rolls over in the bed. Started several years ago but became significant recently. States facial tingling from time to time with no specific location otherwise no weakness, numbness or tingling in his extremities. No vision issues. No headache or falling.   3 weeks ago, Pt woke quickly from a sleep 4/25 during the night, sat up and the room started spinning. Had difficulty walking to the bathroom and lay on R side then rolled to his back and the ceiling started spinning. He went back to sleep,  woke next  morning and it did it again. Went to ED and  Dr. Colindres Dx BPPV  and did the Epley maneuver.  (L BPPV)  which appeared to correct a lot of it and now no more saccades but still feels really off.   Feels like he has impaired balance when he is standing or sitting still. Feels wobbly when he stops quick.   Balance issue has been going on for years.  That is a more light headed sensation- always feels kind of groggy. chronic neck issue gets chronic headaches from that.  Also has a hx of fluid in L ear, ringing in (B) ears.  Hears it a lot when he goes to bed and it is quiet.  Diagnosed with moderate COPD, GERD, cervicalgia, hypertension, CAD, and prostatic hyperplasia .     Review of Systems   Constitutional: Negative.    HENT: Positive for hearing loss and tinnitus. Negative for congestion, ear discharge, ear pain, nosebleeds, sinus pain and sore throat.    Eyes: Negative for blurred vision and double vision.   Respiratory: Negative for cough, hemoptysis, sputum production and stridor.    Gastrointestinal: Negative for heartburn, nausea and vomiting.   Skin: Negative.    Neurological: Negative for dizziness, tingling and headaches.   Endo/Heme/Allergies: Negative for environmental allergies. Does not bruise/bleed easily.         Physical Exam   Constitutional: He appears well-developed and  well-nourished.   HENT:   Head: Normocephalic and atraumatic.   Right Ear: Tympanic membrane, external ear and ear canal normal. No drainage, swelling or tenderness. No middle ear effusion. Decreased hearing is noted.   Left Ear: Tympanic membrane, external ear and ear canal normal. No drainage, swelling or tenderness.  No middle ear effusion. Decreased hearing is noted.   Nose: Nose normal. No mucosal edema, rhinorrhea or nose lacerations.   Mouth/Throat: Uvula is midline and oropharynx is clear and moist.   Eyes: Pupils are equal, round, and reactive to light. EOM are normal.   Neck: Normal range of motion.     S/p tonsillectomy appearance.    A/P  This is a 63 year old patient who has been having vertigo episodes that lasts several seconds and in between lightheadedness. He has sloping to severe SNHL bilaterally with slight asymmetry, which is stable for years. He will continue with vestibular rehab for Maia Hallpike and Epley maneuver. In the meantime, we will refer him to his primary physician because his BP is still high and to r/o any cervical arthrosis and vertebrobasilar vessel issues. His questions were answered.

## 2019-05-14 NOTE — PATIENT INSTRUCTIONS
Thanks for coming today.  [facility]  [address] [place] [address]    To schedule future appointments in Ortho Clinic, you may call [phone].    To schedule ordered imaging by your provider:   Call [facility] [phone]    To schedule an injection ordered by your provider:  Call [facility] Injection scheduling line: [phone]  [employer] available online at:  [email or web address]    Please call if any further questions or concerns ([phone]).  Clinic hours 8 am to 5 pm.    Return to clinic (call) if symptoms worsen or fail to improve.

## 2019-05-14 NOTE — TELEPHONE ENCOUNTER
"Pt walked into clinic today to see if he could be scheduled for appt sooner than his 6/5/19 appt for recheck on vertigo. Pt states it is getting worse but is not currently having symptoms. Pt stated he did have some dizziness this morning. This is an ongoing issue for pt and is currently doing PT for this and saw ENT this morning. ENT suggested that it is \"motion caused vertigo with crystals\" per the pt. He also saw a orthopedic surgeon today and did a neck xray that was normal per the provider per the pt but that provider stated it could possibly be his heart. Pt does have an est cardiologist but is not scheduled to see them till this fall and declines seeing them earlier as of this morning when talking with the pt. He states the dizziness seems to come more when he is sitting, lying, twisting neck or any head movement. States he does have occasionally face tingling when he is dizzy but not always. He also states he can feel his irregular heart beat so much so that it sometimes stops him in his tracks. Offered to schedule with cardiology again but still declines. Offered to schedule pt with another provider sooner but pt declined stating he wants to see only Dr Stovall. Did schedule an appt with PCP on 5/24/19 and told pt to call if symptoms worsen, has shortness of breath or chest pain to speak with triage RN or call 911. Pt states understanding. Will route to provider as patient is wondering if he can be worked in sooner. Provider please review and advise.    aCthy Golden CMA ([facility])    "

## 2019-05-14 NOTE — PROGRESS NOTES
HISTORY OF PRESENT ILLNESS  Mr. Bucahnan is a pleasant 63 year old year old male who presents to clinic today with neck pain and vertigo  Keegan explains that he has had this occur for over a year, and its worsened recently  Has a history of HTN and cardiac disease  Location: neck  Quality:  achy pain    Severity: 5/10    Duration: see above  Timing: occurs intermittently  Context: occurs daily  Modifying factors:  resting and non-use makes it better, movement and use makes it worse  Associated signs & symptoms: radiation of pain in back of head and headaches  Previous similar pain: yes    Additional history: as documented    MEDICAL HISTORY  Patient Active Problem List   Diagnosis     Hypertension goal BP (blood pressure) < 140/90     Trigger finger, acquired     Impotence of organic origin     Hyperlipidemia LDL goal <100     Arthritis of elbow, right     Cubital tunnel syndrome - right     Acute coronary syndrome (H)     CAD (coronary artery disease)     Benign prostatic hyperplasia with urinary obstruction     GERD (gastroesophageal reflux disease)     Moderate chronic obstructive pulmonary disease (H)     Piriformis syndrome of left side     Nonallopathic lesion of sacral region     Nonallopathic lesion of thoracic region     Nonallopathic lesion of cervical region     Cervicalgia     Left inguinal hernia     Congestion of paranasal sinus     PVC's (premature ventricular contractions)       Current Outpatient Medications   Medication Sig Dispense Refill     albuterol (PROAIR HFA/PROVENTIL HFA/VENTOLIN HFA) 108 (90 Base) MCG/ACT inhaler Inhale 2 puffs into the lungs every 6 hours as needed for shortness of breath / dyspnea or wheezing 18 g 1     aspirin 81 MG tablet Take 1 tablet by mouth daily. 90 tablet 3     atorvastatin (LIPITOR) 80 MG tablet TAKE ONE TABLET BY MOUTH EVERY DAY 90 tablet 3     atorvastatin (LIPITOR) 80 MG tablet TAKE ONE TABLET BY MOUTH ONCE DAILY **NEED TO BE SEEN FOR FUTHER REFILLS** 90  tablet 1     beclomethasone HFA (QVAR REDIHALER) 80 MCG/ACT inhaler Inhale 1 puff into the lungs 2 times daily 3 Inhaler 1     cetirizine (ZYRTEC) 10 MG tablet Take 1 tablet (10 mg) by mouth every evening 90 tablet 1     fluticasone (FLONASE) 50 MCG/ACT spray Spray 1-2 sprays into both nostrils daily 3 Bottle 1     fluticasone (FLONASE) 50 MCG/ACT spray USE 1-2 SPRAYS IN EACH NOSTRIL ONCE DAILY 16 g 2     lisinopril (PRINIVIL/ZESTRIL) 20 MG tablet Take 1 tablet (20 mg) by mouth daily 90 tablet 1     meclizine (ANTIVERT) 25 MG tablet Take 1 tablet (25 mg) by mouth 3 times daily as needed for dizziness 30 tablet 0     nitroglycerin (NITROSTAT) 0.4 MG SL tablet Place 1 tablet (0.4 mg) under the tongue every 5 minutes as needed for chest pain 25 tablet 0     omeprazole (PRILOSEC) 20 MG DR capsule TAKE ONE CAPSULE BY MOUTH EVERY DAY 90 capsule 3     omeprazole (PRILOSEC) 20 MG DR capsule Take 1 capsule (20 mg) by mouth daily 90 capsule 0     tamsulosin (FLOMAX) 0.4 MG capsule TAKE ONE CAPSULE BY MOUTH EVERY DAY 90 capsule 1     tamsulosin (FLOMAX) 0.4 MG capsule Take 1 capsule (0.4 mg) by mouth daily 90 capsule 0     tolterodine ER (DETROL LA) 2 MG 24 hr capsule TAKE ONE CAPSULE BY MOUTH EVERY DAY 90 capsule 1     tolterodine ER (DETROL LA) 2 MG 24 hr capsule Take 1 capsule (2 mg) by mouth daily 90 capsule 0       Allergies   Allergen Reactions     Augmentin Diarrhea and GI Disturbance     Codeine Nausea     significant     Penicillins      GI upset and diarrhea       Family History   Problem Relation Age of Onset     Diabetes Mother         adult onset diabetes     Cancer Mother         kidney dx age 75     Cancer Father         prostate cancer/ at age 73     Hypertension Brother      Cancer Paternal Grandfather         prostat ca     Lung Cancer Brother        Additional medical/Social/Surgical histories reviewed in [facility] and updated as appropriate.     REVIEW OF SYSTEMS (2019)  10 point ROS of systems  "including Constitutional, Eyes, Respiratory, Cardiovascular, Gastroenterology, Genitourinary, Integumentary, Musculoskeletal, Psychiatric were all negative except for pertinent positives noted in my HPI.     PHYSICAL EXAM  Vitals:    05/14/19 0942   Weight: 89.4 kg (197 lb)   Height: 1.778 m (5' 10\")     Vital Signs: Ht 1.778 m (5' 10\")   Wt 89.4 kg (197 lb)   BMI 28.27 kg/m   Patient declined being weighed. Body mass index is 28.27 kg/m .    General  - normal appearance, in no obvious distress  CV  - normal radial pulse  Pulm  - normal respiratory pattern, non-labored  Musculoskeletal - neck  - inspection: normal bone and joint alignment, no obvious deformity, no scapular winging, no AC step-off  - palpation: no bony or soft tissue tenderness, except along bilateral cervical paraspinal muscles, normal clavicle, non-tender AC  - ROM:  Some limited ROM of neck due to pain with extension and flexion  - strength: 5/5  strength, 5/5 in all shoulder planes  - special tests:  Positive spurlings  Neuro  - no sensory or motor deficit, grossly normal coordination, normal muscle tone  Skin  - no ecchymosis, erythema, warmth, or induration, no obvious rash  Psych  - interactive, appropriate, normal mood and affect    Carotids: no bruits on auscultation  ASSESSMENT & PLAN  62 yo male with cervical ddd, neck pain and headaches, and vertigo  Ordered carotid us due to Cardiac history, HTN and vertigo, will followup with PCP  xrays neck; show ddd  Consider cervical MRI   Given HEP  F/u after seeing pcp    Thierry Tarango MD, CAQSM  "

## 2019-05-14 NOTE — LETTER
"    5/14/2019         RE: Keegan Buchanan  [address] [address] [place] [id number]        Dear Colleague,    Thank you for referring your patient, Keegan Buchanan, to the [facility]. Please see a copy of my visit note below.    HISTORY OF PRESENT ILLNESS  Mr. Buchanan is a pleasant 63 year old year old male who presents to clinic today with   Keegan explains that   Location:   Quality:  achy pain    Severity: {BlankBase Single Select.:[id number]::\"1\",\"2\",\"3\",\"4\",\"5\",\"6\",\"7\",\"8\",\"    Duration:   Timing: occurs intermittently  Context: occurs while exercising and lifting  Modifying factors:  resting and non-use makes it better, movement and use makes it worse  Associated signs & symptoms: none  Previous similar pain: yes  Exercise: lifting weights and cardiovascular on machine  Additional history: as documented    MEDICAL HISTORY  Patient Active Problem List   Diagnosis     Hypertension goal BP (blood pressure) < 140/90     Trigger finger, acquired     Impotence of organic origin     Hyperlipidemia LDL goal <100     Arthritis of elbow, right     Cubital tunnel syndrome - right     Acute coronary syndrome (H)     CAD (coronary artery disease)     Benign prostatic hyperplasia with urinary obstruction     GERD (gastroesophageal reflux disease)     Moderate chronic obstructive pulmonary disease (H)     Piriformis syndrome of left side     Nonallopathic lesion of sacral region     Nonallopathic lesion of thoracic region     Nonallopathic lesion of cervical region     Cervicalgia     Left inguinal hernia     Congestion of paranasal sinus     PVC's (premature ventricular contractions)       Current Outpatient Medications   Medication Sig Dispense Refill     albuterol (PROAIR HFA/PROVENTIL HFA/VENTOLIN HFA) 108 (90 Base) MCG/ACT inhaler Inhale 2 puffs into the lungs every 6 hours as needed for shortness of breath / dyspnea or wheezing 18 g 1     aspirin 81 MG tablet Take 1 tablet by mouth daily. 90 tablet 3     " atorvastatin (LIPITOR) 80 MG tablet TAKE ONE TABLET BY MOUTH EVERY DAY 90 tablet 3     atorvastatin (LIPITOR) 80 MG tablet TAKE ONE TABLET BY MOUTH ONCE DAILY **NEED TO BE SEEN FOR FUTHER REFILLS** 90 tablet 1     beclomethasone HFA (QVAR REDIHALER) 80 MCG/ACT inhaler Inhale 1 puff into the lungs 2 times daily 3 Inhaler 1     cetirizine (ZYRTEC) 10 MG tablet Take 1 tablet (10 mg) by mouth every evening 90 tablet 1     fluticasone (FLONASE) 50 MCG/ACT spray Spray 1-2 sprays into both nostrils daily 3 Bottle 1     fluticasone (FLONASE) 50 MCG/ACT spray USE 1-2 SPRAYS IN EACH NOSTRIL ONCE DAILY 16 g 2     lisinopril (PRINIVIL/ZESTRIL) 20 MG tablet Take 1 tablet (20 mg) by mouth daily 90 tablet 1     meclizine (ANTIVERT) 25 MG tablet Take 1 tablet (25 mg) by mouth 3 times daily as needed for dizziness 30 tablet 0     nitroglycerin (NITROSTAT) 0.4 MG SL tablet Place 1 tablet (0.4 mg) under the tongue every 5 minutes as needed for chest pain 25 tablet 0     omeprazole (PRILOSEC) 20 MG DR capsule TAKE ONE CAPSULE BY MOUTH EVERY DAY 90 capsule 3     omeprazole (PRILOSEC) 20 MG DR capsule Take 1 capsule (20 mg) by mouth daily 90 capsule 0     tamsulosin (FLOMAX) 0.4 MG capsule TAKE ONE CAPSULE BY MOUTH EVERY DAY 90 capsule 1     tamsulosin (FLOMAX) 0.4 MG capsule Take 1 capsule (0.4 mg) by mouth daily 90 capsule 0     tolterodine ER (DETROL LA) 2 MG 24 hr capsule TAKE ONE CAPSULE BY MOUTH EVERY DAY 90 capsule 1     tolterodine ER (DETROL LA) 2 MG 24 hr capsule Take 1 capsule (2 mg) by mouth daily 90 capsule 0       Allergies   Allergen Reactions     Augmentin Diarrhea and GI Disturbance     Codeine Nausea     significant     Penicillins      GI upset and diarrhea       Family History   Problem Relation Age of Onset     Diabetes Mother         adult onset diabetes     Cancer Mother         kidney dx age 75     Cancer Father         prostate cancer/ at age 73     Hypertension Brother      Cancer Paternal Grandfather   "       prostat ca     Lung Cancer Brother        Additional medical/Social/Surgical histories reviewed in [facility] and updated as appropriate.     REVIEW OF SYSTEMS (5/14/2019)  10 point ROS of systems including Constitutional, Eyes, Respiratory, Cardiovascular, Gastroenterology, Genitourinary, Integumentary, Musculoskeletal, Psychiatric were all negative except for pertinent positives noted in my HPI.     PHYSICAL EXAM  Vitals:    05/14/19 0942   Weight: 89.4 kg (197 lb)   Height: 1.778 m (5' 10\")     Vital Signs: Ht 1.778 m (5' 10\")   Wt 89.4 kg (197 lb)   BMI 28.27 kg/m    Patient declined being weighed. Body mass index is 28.27 kg/m .    General  - normal appearance, in no obvious distress  CV  - normal radial pulse  Pulm  - normal respiratory pattern, non-labored  Musculoskeletal - shoulder  - inspection: normal bone and joint alignment, no obvious deformity, no scapular winging, no AC step-off  - palpation: no bony or soft tissue tenderness, normal clavicle, non-tender AC  - ROM:  180 deg flexion   45 deg extension   150 deg abduction   90 deg ER   70 deg IR  - strength: 5/5  strength, 5/5 in all shoulder planes  - special tests:  (-) Speed's  (-) Neer  (-) Hawkin's  (-) Airam  (-) [place]catur's  (-) apprehension  (-) subscap lift-off  Neuro  - no sensory or motor deficit, grossly normal coordination, normal muscle tone  Skin  - no ecchymosis, erythema, warmth, or induration, no obvious rash  Psych  - interactive, appropriate, normal mood and affect      ASSESSMENT & PLAN      Thierry Tarango MD, CAQSM    Again, thank you for allowing me to participate in the care of your patient.        Sincerely,        Thierry Tarango MD    "

## 2019-05-15 NOTE — TELEPHONE ENCOUNTER
Spoke with patient appointment scheduled     Next 5 appointments (look out 90 days)    May 16, 2019 10:30 AM CDT  Office Visit with Fabio Stovall MD  [facility] ([facility]) [address] [facility] [id number] [phone]        Closing encounter  Zulma Hernández RT (R)

## 2019-05-16 ENCOUNTER — OFFICE VISIT (OUTPATIENT)
Dept: FAMILY MEDICINE | Facility: OTHER | Age: 64
End: 2019-05-16
Payer: COMMERCIAL

## 2019-05-16 VITALS
DIASTOLIC BLOOD PRESSURE: 78 MMHG | OXYGEN SATURATION: 95 % | HEART RATE: 90 BPM | TEMPERATURE: 97.5 F | HEIGHT: 70 IN | WEIGHT: 192 LBS | SYSTOLIC BLOOD PRESSURE: 130 MMHG | BODY MASS INDEX: 27.49 KG/M2 | RESPIRATION RATE: 16 BRPM

## 2019-05-16 DIAGNOSIS — M54.2 CERVICALGIA: ICD-10-CM

## 2019-05-16 DIAGNOSIS — I10 HYPERTENSION GOAL BP (BLOOD PRESSURE) < 140/90: ICD-10-CM

## 2019-05-16 DIAGNOSIS — R20.0 NUMBNESS AND TINGLING OF LEFT SIDE OF FACE: Primary | ICD-10-CM

## 2019-05-16 DIAGNOSIS — R20.2 NUMBNESS AND TINGLING OF LEFT SIDE OF FACE: Primary | ICD-10-CM

## 2019-05-16 DIAGNOSIS — I49.3 PVC'S (PREMATURE VENTRICULAR CONTRACTIONS): ICD-10-CM

## 2019-05-16 DIAGNOSIS — R42 DIZZINESS: ICD-10-CM

## 2019-05-16 PROCEDURE — 99214 OFFICE O/P EST MOD 30 MIN: CPT | Performed by: FAMILY MEDICINE

## 2019-05-16 ASSESSMENT — MIFFLIN-ST. JEOR: SCORE: 1672.16

## 2019-05-16 ASSESSMENT — PAIN SCALES - GENERAL: PAINLEVEL: NO PAIN (0)

## 2019-05-16 NOTE — PROGRESS NOTES
SUBJECTIVE:   Keegan Buchanan is a 63 year old male who presents to clinic today for the following health issues:      HPI  Dizziness  Onset: off and on for 3 weeks    Description:   Do you feel faint:  YES  Does it feel like the surroundings (bed, room) are moving: no   Unsteady/off balance: YES  Have you passed out or fallen: no     Intensity: moderate    Progression of Symptoms:  improving    Accompanying Signs & Symptoms:  Heart palpitations: YES  Nausea, vomiting: YES- slight nausea   Weakness in arms or legs: no   Fatigue: YES  Vision or speech changes: no   Ringing in ears (Tinnitus): no   Hearing Loss: no     History:   Head trauma/concussion hx: YES- one concussion 30 years ago   Previous similar symptoms: YES  Recent bleeding history: no     Precipitating factors:   Worse with activity or head movement: YES  Any new medications (BP?): no   Alcohol/drug abuse/withdrawal: no     Alleviating factors:   Does staying in a fixed position give relief:  YES    Therapies Tried and outcome:   Additional history: woke up 3 weeks ago with dizziness.  It was about 4:10 AM.  Was able to get to the bathroom, then laid back down in bed.  Turned his head and had sudden vertigo.  Woke up about an hour later with the same symptoms.      He called his wife, who sent an ambulance.  Went to the ER.  Dr. Caballero did an Epley maneuver and had recurrent symptoms.      A few times in the last few years, had been told that he had fluid in his ear.  Went to ENT earlier this week, who determined that there was no fluid in his middle ear.      He's trying to figure out the lightheadedness.  Wonders if it's related to vertigo or not.    Also gets some face tingling.  He then went to orthopedics per ENT who did an X-ray and didn't find anything concerning.    They both felt that he should see me to determine if he needs a carotid us.      Has been going to PT for vertigo treatments.  She last time did a neck stretch.  Now he feels a  snapping sensation in his neck.    He continues to have a sense of being off balance, especially after standing for very long.  Feels a bit like the sensation you get before jerking awake when trying to fall asleep.    Does have a h/o irregular heartbeart, but doesn't clearly correlate with his dizziness.      His dizziness had been going on for about a year, but much worse and more frequent since getting vertigo.        Reviewed and updated as needed this visit by clinical staff  Tobacco  Allergies  Meds         Reviewed and updated as needed this visit by Provider           Current Outpatient Medications   Medication Sig Dispense Refill     albuterol (PROAIR HFA/PROVENTIL HFA/VENTOLIN HFA) 108 (90 Base) MCG/ACT inhaler Inhale 2 puffs into the lungs every 6 hours as needed for shortness of breath / dyspnea or wheezing 18 g 1     aspirin 81 MG tablet Take 1 tablet by mouth daily. 90 tablet 3     atorvastatin (LIPITOR) 80 MG tablet TAKE ONE TABLET BY MOUTH EVERY DAY 90 tablet 3     beclomethasone HFA (QVAR REDIHALER) 80 MCG/ACT inhaler Inhale 1 puff into the lungs 2 times daily 3 Inhaler 1     fluticasone (FLONASE) 50 MCG/ACT spray Spray 1-2 sprays into both nostrils daily 3 Bottle 1     lisinopril (PRINIVIL/ZESTRIL) 20 MG tablet Take 1 tablet (20 mg) by mouth daily 90 tablet 1     nitroglycerin (NITROSTAT) 0.4 MG SL tablet Place 1 tablet (0.4 mg) under the tongue every 5 minutes as needed for chest pain 25 tablet 0     omeprazole (PRILOSEC) 20 MG DR capsule TAKE ONE CAPSULE BY MOUTH EVERY DAY 90 capsule 3     tamsulosin (FLOMAX) 0.4 MG capsule TAKE ONE CAPSULE BY MOUTH EVERY DAY 90 capsule 1     tolterodine ER (DETROL LA) 2 MG 24 hr capsule TAKE ONE CAPSULE BY MOUTH EVERY DAY 90 capsule 1     Allergies   Allergen Reactions     Augmentin Diarrhea and GI Disturbance     Codeine Nausea     significant     Penicillins      GI upset and diarrhea     Recent Labs   Lab Test 04/05/19  0759 11/23/18  1021 11/15/18  0838   "10/12/17  0830  12/27/12  0838   A1C  --   --   --   --   --   --  5.6   LDL 73  --  66  --  58   < > 83   HDL 43  --  36*  --  40   < > 37*   TRIG 134  --  124  --  213*   < > 196*   ALT 35 35 43  --  39   < >  --    CR 0.90 0.93 0.99   < > 0.82   < >  --    GFRESTIMATED 90 82 77   < > >90   < >  --    GFRESTBLACK >90 >90 >90   < > >90   < >  --    POTASSIUM 4.4 3.9 4.1   < > 4.4   < >  --     < > = values in this interval not displayed.      BP Readings from Last 3 Encounters:   05/16/19 130/78   05/14/19 (!) 163/96   05/02/19 122/86    Wt Readings from Last 3 Encounters:   05/16/19 87.1 kg (192 lb)   05/14/19 89.4 kg (197 lb)   04/25/19 89.4 kg (197 lb)                    ROS:  Constitutional, HEENT, cardiovascular, pulmonary, gi and gu systems are negative, except as otherwise noted.    OBJECTIVE:     /78   Pulse 90   Temp 97.5  F (36.4  C) (Temporal)   Resp 16   Ht 1.778 m (5' 10\")   Wt 87.1 kg (192 lb)   SpO2 95%   BMI 27.55 kg/m    Body mass index is 27.55 kg/m .  GENERAL: healthy, alert and no distress  NECK: no adenopathy, no asymmetry, masses, or scars and thyroid normal to palpation  RESP: lungs clear to auscultation - no rales, rhonchi or wheezes  CV: regular rate and rhythm, normal S1 S2, no S3 or S4, no murmur, click or rub, no peripheral edema and peripheral pulses strong  ABDOMEN: soft, nontender, no hepatosplenomegaly, no masses and bowel sounds normal  MS: no gross musculoskeletal defects noted, no edema    Diagnostic Test Results:  Results for orders placed or performed in visit on 05/14/19   XR Cervical Spine 2/3 Views    Narrative    Exam: XR CERVICAL SPINE 2/3 VWS, 5/14/2019 10:15 AM    Indication: Degenerative disc disease, cervical    Comparison: None    Findings:   Vertebral bodies are well seen to the C6 level. C7 is obscured by  overlying soft tissues on the lateral view. Normal alignment of the  cervical spine. No acute fracture or aggressive appearing bone lesion.  Mild " multilevel degenerative changes throughout the spine including  mild disc space narrowing from C2-5. No prevertebral soft tissue  swelling.      Impression    Impression:   1. No acute osseous abnormality.  2. Mild multilevel cervical spondylosis.    PETE HAQUE MD       ASSESSMENT/PLAN:       ICD-10-CM    1. Numbness and tingling of left side of face R20.0 US Carotid Bilateral    R20.2    2. Dizziness R42    3. Cervicalgia M54.2    4. Hypertension goal BP (blood pressure) < 140/90 I10    5. PVC's (premature ventricular contractions) I49.3      1.  I suspect this is actually not pathologic, but will obtain an ultrasound of his carotids to be certain.  Certainly would not want to miss cerebral ischemia.  2.  I believe his dizziness is multifactorial, likely related to his neck irritation as well as his PVCs.  We will treat the underlying causes.  Also obtain imaging noted above.  3.  Since patient is already had imaging done on this, will simply give supportive cares with physical therapy and/or massage at this time.  Follow-up as needed.  4.  Currently controlled.  Will continue current regimen.  5.  Currently under control.  Discussed that if these become more symptomatic or frequent, we could consider a beta-blocker to help decrease the frequency and severity.    Portions of this note were completed using Dragon dictation software.  Although reviewed, there may be typographical and other inadvertent errors that remain.     Spent over 50% of a 30 minute visit counseling patient on options and coordinating patient's care.  See above for additional details.           Patient Instructions   Thank you for visiting [facility]    Let's get an ultrasound of your neck to be sure everything is OK.    Continue neck exercises to help with this.      Keep an eye on your BP, but the repeat level was good today.      Of course, if your symptoms correlate with your heart symptoms, come back.    Contact us or  "return if questions or concerns.     Have a nice day!    Dr. Stovall     Please Follow Up when indicated on the section below this one on your After-Visit Summary.    If you had imaging scheduled please refer to your radiology prep sheet.      If you need medication refills, please contact your pharmacy 3 days before your prescriptions runs out or download the [facility] negrito for your smart phone.   If you are out of refills, your pharmacy will contact contact the clinic.    Contact us or return if questions or concerns.     -Your [facility] Care Team:    MD Margie Dewey PA-C Joel De Haan, PA-C Anna Niesen, PA-C Elizabeth \"Jenelle\" DAMIAN Arenas CNP, APRN, DAMIAN Mireles, CNP  Dorian Toscano, RN, BSN       General information about your   [facility]      Clinic hours:     Lab hours:  Phone [phone]  Monday 7:30 am-7 pm    Monday 8:30 am-6:30 pm  Tuesday-Friday 7:30 am-5 pm   Tuesday-Friday 8:30 am-4:30 pm    Pharmacy hours:  Phone [phone]  Monday 8:30 am-7pm  Tuesday-Friday 8:30am-6 pm                                     [facility] assistance [phone]    We would like to hear from you, how was your visit today?    Aleah Chapin  Patient Information Supervisor   Patient Care Supervisor  [facility], and [facility], and [facility]  [phone]          Fabio Stovall MD, MD  [facility]  "

## 2019-05-16 NOTE — PATIENT INSTRUCTIONS
"Thank you for visiting [facility]    Let's get an ultrasound of your neck to be sure everything is OK.    Continue neck exercises to help with this.      Keep an eye on your BP, but the repeat level was good today.      Of course, if your symptoms correlate with your heart symptoms, come back.    Contact us or return if questions or concerns.     Have a nice day!    Dr. Stovall     Please Follow Up when indicated on the section below this one on your After-Visit Summary.    If you had imaging scheduled please refer to your radiology prep sheet.      If you need medication refills, please contact your pharmacy 3 days before your prescriptions runs out or download the [facility] negrito for your smart phone.   If you are out of refills, your pharmacy will contact contact the clinic.    Contact us or return if questions or concerns.     -Your [facility] Care Team:    MD Margie Dewey, NORBERT Miller PA-C Elizabeth \"Jenelle\" DAMIAN Arenas CNP, APRN, DAMIAN Mireles, YANDEL Toscano, RN, BSN       General information about your   [facility]      Clinic hours:     Lab hours:  Phone [phone]  Monday 7:30 am-7 pm    Monday 8:30 am-6:30 pm  Tuesday-Friday 7:30 am-5 pm   Tuesday-Friday 8:30 am-4:30 pm    Pharmacy hours:  Phone [phone]  Monday 8:30 am-7pm  Tuesday-Friday 8:30am-6 pm                                     [facility] assistance [phone]    We would like to hear from you, how was your visit today?    Aleah Chapin  Patient Information Supervisor   Patient Care Supervisor  [facility], and [facility], and [facility]  [phone]      "

## 2019-05-17 ENCOUNTER — ANCILLARY PROCEDURE (OUTPATIENT)
Dept: ULTRASOUND IMAGING | Facility: OTHER | Age: 64
End: 2019-05-17
Attending: FAMILY MEDICINE
Payer: COMMERCIAL

## 2019-05-17 DIAGNOSIS — R20.0 NUMBNESS AND TINGLING OF LEFT SIDE OF FACE: ICD-10-CM

## 2019-05-17 DIAGNOSIS — R20.2 NUMBNESS AND TINGLING OF LEFT SIDE OF FACE: ICD-10-CM

## 2019-05-17 PROCEDURE — 93880 EXTRACRANIAL BILAT STUDY: CPT

## 2019-05-21 ENCOUNTER — TELEPHONE (OUTPATIENT)
Dept: FAMILY MEDICINE | Facility: OTHER | Age: 64
End: 2019-05-21

## 2019-05-21 NOTE — TELEPHONE ENCOUNTER
At his appointment, we felt there were a few issues contributing to his dizziness.  That would depend on what's making it worse.  If it's primarily related to his head position, PT and chiropractic would help the most.  This can take some time to fully resolve.    If it's associated with skipped or rapid heartbeats, we could add a beta blocker like we discussed at his visit.      If it's primarily upon standing up (and very brief), we should adjust his blood pressure medications.    Finally, if there's no pattern, we can try some meclizine to help with overall dizziness.

## 2019-05-21 NOTE — TELEPHONE ENCOUNTER
Spoke with patient informed him of message below, he states he will try doing PT and chiropractor longer and see if that helps, he does feel like it is worse when he is standing, but does not feel like there is a correlation to heartbeats and vertigo. He also states that he does have some meclizine he was given this when he was in the hospital and he feels like this has not helped in the past.   He will contact us if he feels he is getting worse  Closing encounter  Zulma Hernández RT (R)

## 2019-05-21 NOTE — TELEPHONE ENCOUNTER
Reason for Call:  Other Vertigo    Detailed comments: Patient would like to know what else can he be doing about his vertigo.  He states he went to a chiropractic office and that helped but then got worse.    Phone Number Patient can be reached at: Home number on file [phone] (home)    Best Time: anytime    Can we leave a detailed message on this number? YES    Call taken on 5/21/2019 at 8:01 AM by Michael Bowen

## 2019-05-22 ENCOUNTER — HOSPITAL ENCOUNTER (OUTPATIENT)
Dept: PHYSICAL THERAPY | Facility: CLINIC | Age: 64
Setting detail: THERAPIES SERIES
End: 2019-05-22
Attending: FAMILY MEDICINE
Payer: COMMERCIAL

## 2019-05-22 PROCEDURE — 97112 NEUROMUSCULAR REEDUCATION: CPT | Mod: GP | Performed by: PHYSICAL THERAPIST

## 2019-05-22 PROCEDURE — 97140 MANUAL THERAPY 1/> REGIONS: CPT | Mod: GP | Performed by: PHYSICAL THERAPIST

## 2019-05-29 ENCOUNTER — HOSPITAL ENCOUNTER (OUTPATIENT)
Dept: PHYSICAL THERAPY | Facility: CLINIC | Age: 64
Setting detail: THERAPIES SERIES
End: 2019-05-29
Attending: FAMILY MEDICINE
Payer: COMMERCIAL

## 2019-05-29 PROCEDURE — 97112 NEUROMUSCULAR REEDUCATION: CPT | Mod: GP | Performed by: PHYSICAL THERAPIST

## 2019-05-29 PROCEDURE — 97110 THERAPEUTIC EXERCISES: CPT | Mod: GP | Performed by: PHYSICAL THERAPIST

## 2019-05-29 PROCEDURE — 97140 MANUAL THERAPY 1/> REGIONS: CPT | Mod: GP | Performed by: PHYSICAL THERAPIST

## 2019-05-29 NOTE — PROGRESS NOTES
"Outpatient Physical Therapy Progress Note     Patient: Keegan Buchanan  : 1955    Beginning/End Dates of Reporting Period:  19 to 2019    Referring Provider: Dr. Fabio Stovall    Therapy Diagnosis:Dizziness, impaired balance  consistent with resolving BPPV and possible middle ear and or vascular involvment.      Client Self Report: No spinning recently. Just the head rush type feeling but not strong. - light headed.   Has happened about 15- 20 x per day since last here. Had some fence to move and had no problems while working but had lots of problems on  (the day after).       Goals:  Goal Identifier Dizziness   Goal Description PT to report no room spinning type and minimal lightheaded type dizziness x 1 week.   Target Date 19   Date Met      Progress: Room spinning is much better but the light headed feeling is 20x/day     Goal Identifier Balance   Goal Description Pt able to peform BBS scoring 46 or better and or FGA  scoring 23 or better with no increase in sx.  SLS x 30\" and no reported falls or LOB x 1 week.   Target Date 19   Date Met      Progress: Not retested yet     Goal Identifier Driving   Goal Description Pt able to drive with out difficulty and check his blind spots with no spinning sensation.   Target Date 19   Date Met      Progress: This is improved         Progress Toward Goals:   Progress this reporting period: Mixed. The room spinning is better. However he still has these moments of light headedness which are random.  He notes that it is typically worse with his head forward.  Episodes are frequent but pass quickly. He notes that while his is moving he feels better than if he is still.     Plan:  Changes to therapy plan of care: Pt to return to MD. Would further work up by cardiology be appropriate?  Pt notes this may have started after he was taken off Metropalol almost 2 years ago.   Pt will continue HEP.     Discharge:  No: will check in with pt in 1 " month. If he does not plan to return to therapy at that time this will serve as discharge note.

## 2019-06-10 ENCOUNTER — ALLIED HEALTH/NURSE VISIT (OUTPATIENT)
Dept: FAMILY MEDICINE | Facility: OTHER | Age: 64
End: 2019-06-10
Payer: COMMERCIAL

## 2019-06-10 VITALS — DIASTOLIC BLOOD PRESSURE: 82 MMHG | SYSTOLIC BLOOD PRESSURE: 130 MMHG

## 2019-06-10 DIAGNOSIS — I10 HYPERTENSION GOAL BP (BLOOD PRESSURE) < 140/90: Primary | ICD-10-CM

## 2019-06-10 DIAGNOSIS — M54.2 CERVICALGIA: ICD-10-CM

## 2019-06-10 DIAGNOSIS — I49.3 PVC'S (PREMATURE VENTRICULAR CONTRACTIONS): ICD-10-CM

## 2019-06-10 DIAGNOSIS — R42 DIZZINESS: ICD-10-CM

## 2019-06-10 PROCEDURE — 99207 ZZC NO CHARGE NURSE ONLY: CPT | Performed by: FAMILY MEDICINE

## 2019-06-10 RX ORDER — LISINOPRIL 10 MG/1
10 TABLET ORAL DAILY
Qty: 90 TABLET | Refills: 1 | Status: SHIPPED | OUTPATIENT
Start: 2019-06-10 | End: 2019-10-23

## 2019-06-10 RX ORDER — METOPROLOL TARTRATE 25 MG/1
25 TABLET, FILM COATED ORAL 2 TIMES DAILY
Qty: 180 TABLET | Refills: 0 | Status: SHIPPED | OUTPATIENT
Start: 2019-06-10 | End: 2019-08-16

## 2019-06-10 NOTE — PROGRESS NOTES
Keegan Buchanan was evaluated at [facility] on Belle 10, 2019 at which time his blood pressure was:    BP Readings from Last 3 Encounters:   06/10/19 130/82   05/16/19 130/78   05/14/19 (!) 163/96     Pulse Readings from Last 3 Encounters:   05/16/19 90   05/14/19 82   05/02/19 74       Reviewed lifestyle modifications for blood pressure control and reduction: including making healthy food choices, managing weight, getting regular exercise, smoking cessation, reducing alcohol consumption, monitoring blood pressure regularly.     Symptoms: Dizziness    BP Goal:< 140/90 mmHg    BP Assessment:  BP at goal    Potential Reasons for BP too high: NA - Not applicable    BP Follow-Up Plan: Recheck BP in 6 months at pharmacy    Recommendation to Provider: Patient in still experiencing his dizziness and we discussed possible connection to his PVC's. Patient would like to discuss change to a beta blocker to see if that helps.     Note completed by: Giovana Vargas RPh  [facility]  [phone]

## 2019-06-10 NOTE — TELEPHONE ENCOUNTER
Rx sent.  We could have him go to the [facility] dizzy and balance center if desired.

## 2019-06-10 NOTE — TELEPHONE ENCOUNTER
Spoke to patient who is willing to give this a try and he is almost out of lisinopril. So he would like to have the lisinopril filled at half his dose currently.     Patient would like Dr. Stovall to know the dizziness he is experiencing is at its worst in the morning upon waking and now sounds are also triggering the dizziness. Patient states sounds such as a car hitting the rumble strips on the road outside his home.   Della Barrientos CMA ([facility])

## 2019-06-10 NOTE — TELEPHONE ENCOUNTER
Pt discussed starting beta blocker with the pharmacist.  We can try metoprolol 25 mg bid and cut his dose of lisinopril in half if pt is open to that.  Then, please recheck BP in a week or two.  Can be at the pharmacy.      Let me know if pt's happy with this plan.

## 2019-06-27 ENCOUNTER — TELEPHONE (OUTPATIENT)
Dept: FAMILY MEDICINE | Facility: OTHER | Age: 64
End: 2019-06-27

## 2019-06-27 ENCOUNTER — ALLIED HEALTH/NURSE VISIT (OUTPATIENT)
Dept: FAMILY MEDICINE | Facility: OTHER | Age: 64
End: 2019-06-27
Payer: COMMERCIAL

## 2019-06-27 VITALS — SYSTOLIC BLOOD PRESSURE: 116 MMHG | HEART RATE: 68 BPM | DIASTOLIC BLOOD PRESSURE: 70 MMHG

## 2019-06-27 DIAGNOSIS — I10 HYPERTENSION GOAL BP (BLOOD PRESSURE) < 140/90: Primary | ICD-10-CM

## 2019-06-27 PROCEDURE — 99207 ZZC NO CHARGE NURSE ONLY: CPT

## 2019-06-27 NOTE — NURSING NOTE
Keegan Buchanan is a 63 year old patient who comes in today for a Blood Pressure check.  Initial BP:  /70 (BP Location: Right arm, Patient Position: Sitting, Cuff Size: Adult Large)   Pulse 68      Data Unavailable  Disposition: follow-up as previously indicated by provider.       Patient was seen on 06/10/2019 for a follow up BP check. Today's follow up was from the 6/10/19 visit. Patient had updates for provider. Please review and advise.   Patient stated that he is still taking his prescribed medications. (Metoprolol and the 10 mg Lisinopril tablets) Patient feels the the Metoprolol isn't working well for his PCV symptoms. Patient states that he can still feel his heart skip beats at least five times per day. He notices these episodes after he has been active. For instance, he put new windows in on his house yesterday. When he was done and sat down to relax the patient noticed the PCV symptoms.   Patient stated that he was seen for Vertigo on 05/16/2019. Patient states that he doesn't feel like he is going to faint or pass out. He experiences episodes lasting for a half of second to a second. He describes the the episodes of feeling dizzy and foggy like he is just about to fall asleep. These episodes usually occur when he is laying down or standing still (like in a line at a grocery store). The episodes are worse in the AM and when he is going to bed.   Alberto Rose MA  June 27, 2019

## 2019-06-27 NOTE — TELEPHONE ENCOUNTER
Keegan Buchanan is a 63 year old patient who comes in today for a Blood Pressure check.  Initial BP:  /70 (BP Location: Right arm, Patient Position: Sitting, Cuff Size: Adult Large)   Pulse 68      Disposition: follow-up as previously indicated by provider.       Patient was seen on 06/10/2019 for a follow up BP check. Today's follow up was from the 6/10/19 visit. Patient had updates for provider. Please review and advise. Patient was informed we will call if there are changes that need to be made. If there are no changes please close the encounter.   Patient stated that he is still taking his prescribed medications. (Metoprolol and the 10 mg Lisinopril tablets) Patient feels the the Metoprolol isn't working well for his PCV symptoms. Patient states that he can still feel his heart skip beats at least five times per day. He notices these episodes after he has been active. For instance, he put new windows in on his house yesterday. When he was done and sat down to relax the patient noticed the PCV symptoms. He was told by Cardiology that this is normal and will schedule his f/u this coming Sept.   Patient stated that he was seen for Vertigo on 05/16/2019. Patient states that these symptoms are still present, but  he doesn't feel like he is going to faint or pass out. He experiences episodes lasting for a half of second to a second. He describes the the episodes of feeling dizzy and foggy like he is just about to fall asleep. These episodes usually occur when he is laying down or standing still (like in a line at a grocery store). The episodes are worse in the AM and when he is going to bed.   Alberto Rose MA  June 27, 2019

## 2019-07-02 NOTE — TELEPHONE ENCOUNTER
Pt had called back and was given the message. He is going to try the first suggestion the magnesium or fish oil.

## 2019-07-02 NOTE — TELEPHONE ENCOUNTER
BP is good.  Metoprolol doesn't completely eliminate PVCs, just reduces frequency and severity.  These are not dangerous typically, and if they don't bother him, he doesn't have to do anything.    His dizzy symptoms don't sound like they're related to blood pressure.  They might be a very mild form of a sleep disorder similar to narcolepsy.  We could certainly have him visit with a sleep specialist if he'd like.    If not bothered by these things, OK to follow up as planned.

## 2019-07-02 NOTE — TELEPHONE ENCOUNTER
OTC, he can try magnesium or fish oil supplements.  These will occasionally help with these.      There are different Rx medications that work similarly to the metoprolol that could be tried, but we'd need to discuss possible side effects and monitoring together.

## 2019-07-02 NOTE — TELEPHONE ENCOUNTER
Spoke with pt. He is not noticing a lot relief of the PVC's. He thinks that maybe they are less frequent but the severity is the same of possibly even a little worse. Pt is able to be active like walking and running but once he adds any weight, like lifting he will have them.     He will wait on the sleep specialist because he feels like the dizziness is improving.     Pt is wondering if there is anything else he could do or try for the PVCs as they are quiet bothersome.

## 2019-07-18 ENCOUNTER — ALLIED HEALTH/NURSE VISIT (OUTPATIENT)
Dept: FAMILY MEDICINE | Facility: OTHER | Age: 64
End: 2019-07-18
Payer: COMMERCIAL

## 2019-07-18 VITALS — SYSTOLIC BLOOD PRESSURE: 128 MMHG | DIASTOLIC BLOOD PRESSURE: 76 MMHG

## 2019-07-18 DIAGNOSIS — I10 HYPERTENSION GOAL BP (BLOOD PRESSURE) < 140/90: Primary | ICD-10-CM

## 2019-07-18 PROCEDURE — 99207 ZZC NO CHARGE NURSE ONLY: CPT | Performed by: FAMILY MEDICINE

## 2019-07-18 NOTE — PROGRESS NOTES
Keegan Buchanan was evaluated at [facility] on July 18, 2019 at which time his blood pressure was:    BP Readings from Last 3 Encounters:   07/18/19 128/76   06/27/19 116/70   06/10/19 130/82     Pulse Readings from Last 3 Encounters:   06/27/19 68   05/16/19 90   05/14/19 82       Reviewed lifestyle modifications for blood pressure control and reduction: including making healthy food choices, managing weight, getting regular exercise, smoking cessation, reducing alcohol consumption, monitoring blood pressure regularly.     Symptoms:      BP Goal:< 140/90 mmHg    BP Assessment:  BP at goal    Potential Reasons for BP too high: NA - Not applicable    BP Follow-Up Plan: Referral to PCP    Recommendation to Provider: patient complains of occasional dizziness, when standing for periods of time and sometimes when lying in bed.  Explained not to stand with 'knees locked' when standing.  Review meds for dizziness.     Note completed by:

## 2019-08-01 DIAGNOSIS — I49.3 PVC'S (PREMATURE VENTRICULAR CONTRACTIONS): ICD-10-CM

## 2019-08-01 DIAGNOSIS — I10 HYPERTENSION GOAL BP (BLOOD PRESSURE) < 140/90: ICD-10-CM

## 2019-08-01 DIAGNOSIS — R42 DIZZINESS: ICD-10-CM

## 2019-08-01 RX ORDER — METOPROLOL TARTRATE 25 MG/1
TABLET, FILM COATED ORAL
Qty: 180 TABLET | Refills: 0 | OUTPATIENT
Start: 2019-08-01

## 2019-08-05 DIAGNOSIS — R42 DIZZINESS: ICD-10-CM

## 2019-08-05 DIAGNOSIS — I10 HYPERTENSION GOAL BP (BLOOD PRESSURE) < 140/90: ICD-10-CM

## 2019-08-05 DIAGNOSIS — I49.3 PVC'S (PREMATURE VENTRICULAR CONTRACTIONS): ICD-10-CM

## 2019-08-07 RX ORDER — METOPROLOL TARTRATE 25 MG/1
TABLET, FILM COATED ORAL
Qty: 180 TABLET | Refills: 0 | OUTPATIENT
Start: 2019-08-07

## 2019-08-07 NOTE — TELEPHONE ENCOUNTER
Metoprolol  Sent 6/10/19 with 3 month supply. Refill not appropriate at this time.     Joann Vila, RN, BSN

## 2019-08-16 DIAGNOSIS — I49.3 PVC'S (PREMATURE VENTRICULAR CONTRACTIONS): ICD-10-CM

## 2019-08-16 DIAGNOSIS — I10 HYPERTENSION GOAL BP (BLOOD PRESSURE) < 140/90: ICD-10-CM

## 2019-08-16 DIAGNOSIS — R42 DIZZINESS: ICD-10-CM

## 2019-08-16 RX ORDER — METOPROLOL TARTRATE 25 MG/1
TABLET, FILM COATED ORAL
Qty: 180 TABLET | Refills: 1 | Status: SHIPPED | OUTPATIENT
Start: 2019-08-16 | End: 2019-10-23

## 2019-08-16 NOTE — TELEPHONE ENCOUNTER
Prescription approved per [facility]ill Protocol.  Dorian Toscano, RN, BSN

## 2019-09-27 ENCOUNTER — HEALTH MAINTENANCE LETTER (OUTPATIENT)
Age: 64
End: 2019-09-27

## 2019-10-16 NOTE — PROGRESS NOTES
SUBJECTIVE:   CC: Keegan Buchanan is an 63 year old male who presents for preventative health visit.       Healthy Habits:     Getting at least 3 servings of Calcium per day:  NO    Bi-annual eye exam:  Yes    Dental care twice a year:  NO    Sleep apnea or symptoms of sleep apnea:  None    Diet:  Low salt    Frequency of exercise:  4-5 days/week    Duration of exercise:  30-45 minutes    Taking medications regularly:  Yes    Barriers to taking medications:  None    Medication side effects:  None    PHQ-2 Total Score: 1    Additional concerns today:  No      Still dealing with vertigo.  Went to the [facility].  Doing a bit better now.  They also feel it's BPPV.  It's coming from his left ear.  May be related to chronic effusion.      Today's PHQ-2 Score:   PHQ-2 ( 1999 [employer]) 10/23/2019   Q1: Little interest or pleasure in doing things 0   Q2: Feeling down, depressed or hopeless 1   PHQ-2 Score 1   Q1: Little interest or pleasure in doing things Not at all   Q2: Feeling down, depressed or hopeless Several days   PHQ-2 Score 1       Abuse: Current or Past(Physical, Sexual or Emotional)- No  Do you feel safe in your environment? Yes    Social History     Tobacco Use     Smoking status: Former Smoker     Packs/day: 1.00     Years: 15.00     Pack years: 15.00     Smokeless tobacco: Former User     Types: Chew     Quit date: 1/20/1987     Tobacco comment: quit 25 years ago.   Substance Use Topics     Alcohol use: Yes     Comment: 1 -2 monthly     If you drink alcohol do you typically have >3 drinks per day or >7 drinks per week? No    Alcohol Use 10/23/2019   Prescreen: >3 drinks/day or >7 drinks/week? No   Prescreen: >3 drinks/day or >7 drinks/week? -       Last PSA:   PSA   Date Value Ref Range Status   10/04/2018 0.53 0 - 4 ug/L Final     Comment:     Assay Method:  Chemiluminescence using Siemens Vista analyzer       Reviewed orders with patient. Reviewed health maintenance and updated orders accordingly - Yes  BP  Readings from Last 3 Encounters:   10/23/19 118/70   07/18/19 128/76   06/27/19 116/70    Wt Readings from Last 3 Encounters:   10/23/19 88.5 kg (195 lb)   05/16/19 87.1 kg (192 lb)   05/14/19 89.4 kg (197 lb)                  Patient Active Problem List   Diagnosis     Hypertension goal BP (blood pressure) < 140/90     Trigger finger, acquired     Impotence of organic origin     Hyperlipidemia LDL goal <100     Arthritis of elbow, right     Cubital tunnel syndrome - right     Acute coronary syndrome (H)     CAD (coronary artery disease)     Benign prostatic hyperplasia with urinary obstruction     GERD (gastroesophageal reflux disease)     Moderate chronic obstructive pulmonary disease (H)     Piriformis syndrome of left side     Nonallopathic lesion of sacral region     Nonallopathic lesion of thoracic region     Nonallopathic lesion of cervical region     Cervicalgia     Left inguinal hernia     Congestion of paranasal sinus     PVC's (premature ventricular contractions)     Past Surgical History:   Procedure Laterality Date     COLONOSCOPY N/A 6/12/2015    Procedure: COMBINED COLONOSCOPY, SINGLE OR MULTIPLE BIOPSY/POLYPECTOMY BY BIOPSY;  Surgeon: Herman Rebollar MD;  Location: [facility]     COLONOSCOPY N/A 11/12/2018    Procedure: colonoscopy;  Surgeon: Thiago Robles MD;  Location: [facility]     H STENT PROCED  2012     HC REMOVE TONSILS/ADENOIDS,<13 Y/O      T & A <12y.o.     LAPAROSCOPIC HERNIORRHAPHY INGUINAL Left 8/3/2018    Procedure: LAPAROSCOPIC HERNIORRHAPHY INGUINAL;  Laparoscopic left inguinal hernia repair;  Surgeon: Huber Ireland MD;  Location: [facility]     REMOVAL OF SPERM DUCT(S)  05/06/88    Vasectomy       Social History     Tobacco Use     Smoking status: Former Smoker     Packs/day: 1.00     Years: 15.00     Pack years: 15.00     Smokeless tobacco: Former User     Types: Chew     Quit date: 1/20/1987     Tobacco comment: quit 25 years ago.   Substance Use Topics     Alcohol use: Yes      Comment: 1 -2 monthly     Family History   Problem Relation Age of Onset     Diabetes Mother         adult onset diabetes     Cancer Mother         kidney dx age 75     Cancer Father         prostate cancer/ at age 73     Hypertension Brother      Cancer Paternal Grandfather         prostat ca     Lung Cancer Brother          Current Outpatient Medications   Medication Sig Dispense Refill     albuterol (PROAIR RESPICLICK) 108 (90 Base) MCG/ACT inhaler Inhale 2 puffs into the lungs every 6 hours as needed for shortness of breath / dyspnea or wheezing 1 each 3     aspirin 81 MG tablet Take 1 tablet by mouth daily. 90 tablet 3     atorvastatin (LIPITOR) 80 MG tablet TAKE ONE TABLET BY MOUTH EVERY DAY 90 tablet 3     beclomethasone HFA (QVAR REDIHALER) 80 MCG/ACT inhaler Inhale 1 puff into the lungs 2 times daily 3 Inhaler 1     fluticasone (FLONASE) 50 MCG/ACT spray Spray 1-2 sprays into both nostrils daily 3 Bottle 1     lisinopril (PRINIVIL/ZESTRIL) 10 MG tablet Take 1 tablet (10 mg) by mouth daily 90 tablet 3     metoprolol tartrate (LOPRESSOR) 25 MG tablet Take 1 tablet (25 mg) by mouth 2 times daily 180 tablet 3     nitroglycerin (NITROSTAT) 0.4 MG SL tablet Place 1 tablet (0.4 mg) under the tongue every 5 minutes as needed for chest pain 25 tablet 0     omeprazole (PRILOSEC) 20 MG DR capsule TAKE ONE CAPSULE BY MOUTH EVERY DAY 90 capsule 3     tamsulosin (FLOMAX) 0.4 MG capsule Take 1 capsule (0.4 mg) by mouth daily 90 capsule 3     tolterodine ER (DETROL LA) 2 MG 24 hr capsule Take 1 capsule (2 mg) by mouth daily 90 capsule 1     Allergies   Allergen Reactions     Augmentin Diarrhea and GI Disturbance     Codeine Nausea     significant     Penicillins      GI upset and diarrhea     Recent Labs   Lab Test 19  0759 18  1021 11/15/18  0838  10/12/17  0830  12  0838   A1C  --   --   --   --   --   --  5.6   LDL 73  --  66  --  58   < > 83   HDL 43  --  36*  --  40   < > 37*   TRIG 134  --   "124  --  213*   < > 196*   ALT 35 35 43  --  39   < >  --    CR 0.90 0.93 0.99   < > 0.82   < >  --    GFRESTIMATED 90 82 77   < > >90   < >  --    GFRESTBLACK >90 >90 >90   < > >90   < >  --    POTASSIUM 4.4 3.9 4.1   < > 4.4   < >  --     < > = values in this interval not displayed.        Reviewed and updated as needed this visit by clinical staff  Tobacco  Allergies  Meds  Med Hx  Surg Hx  Fam Hx  Soc Hx        Reviewed and updated as needed this visit by Provider            Review of Systems   Constitutional: Negative for chills and fever.   HENT: Positive for ear pain. Negative for congestion.    Eyes: Negative for pain.   Respiratory: Negative for cough.    Cardiovascular: Negative for chest pain.   Gastrointestinal: Negative for abdominal pain, constipation, diarrhea and hematochezia.   Genitourinary: Positive for frequency. Negative for hematuria.   Neurological: Positive for dizziness.   Psychiatric/Behavioral: The patient is not nervous/anxious.            OBJECTIVE:   /70   Pulse 64   Temp 97.4  F (36.3  C)   Resp 16   Ht 1.775 m (5' 9.88\")   Wt 88.5 kg (195 lb)   SpO2 98%   BMI 28.07 kg/m      Physical Exam  GENERAL: healthy, alert and no distress  EYES: Eyes grossly normal to inspection, PERRL and conjunctivae and sclerae normal  HENT: ear canals and TM's normal, nose and mouth without ulcers or lesions  NECK: no adenopathy, no asymmetry, masses, or scars and thyroid normal to palpation  RESP: lungs clear to auscultation - no rales, rhonchi or wheezes  CV: regular rate and rhythm, normal S1 S2, no S3 or S4, no murmur, click or rub, no peripheral edema and peripheral pulses strong  ABDOMEN: soft, nontender, no hepatosplenomegaly, no masses and bowel sounds normal  MS: no gross musculoskeletal defects noted, no edema  SKIN: no suspicious lesions or rashes  NEURO: Normal strength and tone, mentation intact and speech normal  PSYCH: mentation appears normal, affect " normal/bright    Diagnostic Test Results:  Labs reviewed in Epic  Results for orders placed or performed in visit on 05/17/19   US Carotid Bilateral    Narrative    BILATERAL DUPLEX CAROTID ULTRASOUND 5/17/2019 9:36 AM     HISTORY: Numbness and tingling of left side of face    COMPARISON: None.    FINDINGS:  There is mild atherosclerotic plaque in the carotid  bifurcations.  Flow velocities and waveforms show less than 50%  diameter stenosis in both the right and left internal carotid arteries  as assessed by each ICA PSV and EDV and ICA/DCCA ratio.  Antegrade  flow is present in both vertebral and external carotid arteries.      Impression    IMPRESSION:  Less than 50% diameter stenosis of both internal carotid  arteries relative to the distal ICA diameters.       VIVIENNE LEDBETTER MD       ASSESSMENT/PLAN:       ICD-10-CM    1. Routine general medical examination at a health care facility Z00.00 C RIV4 (FLUBLOK) VACCINE RECOMBINANT DNA PRSRV ANTIBIO FREE, IM [[id number]]     Lipid panel reflex to direct LDL Fasting     PSA, screen     Lyme Disease Franny with reflex to WB Serum     **Glucose FUTURE 2mo   2. Multiple joint pain M25.50 Lyme Disease Franny with reflex to WB Serum   3. Moderate chronic obstructive pulmonary disease (H) J44.9 beclomethasone HFA (QVAR REDIHALER) 80 MCG/ACT inhaler   4. Benign prostatic hyperplasia with urinary obstruction N40.1 tamsulosin (FLOMAX) 0.4 MG capsule    N13.8    5. Urinary frequency R35.0 tolterodine ER (DETROL LA) 2 MG 24 hr capsule   6. Hypertension goal BP (blood pressure) < 140/90 I10 lisinopril (PRINIVIL/ZESTRIL) 10 MG tablet     metoprolol tartrate (LOPRESSOR) 25 MG tablet   7. Dizziness R42 lisinopril (PRINIVIL/ZESTRIL) 10 MG tablet     metoprolol tartrate (LOPRESSOR) 25 MG tablet   8. Benign paroxysmal positional vertigo, unspecified laterality H81.10    9. PVC's (premature ventricular contractions) I49.3 lisinopril (PRINIVIL/ZESTRIL) 10 MG tablet     metoprolol tartrate  "(LOPRESSOR) 25 MG tablet     1.  Reviewed recommended screenings and ordered appropriate testing for pt's risks and per pt's request(s).  2.  Unclear etiology.  Patient's request, will check Lyme titer.  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  Consider other treatment options as needed.  3.  Currently controlled.  Will continue current regimen.  4, 5.  Patient reports fair control.  Interested in trying off of 1 of his medications.  Discussed a short-term trial with resumption of medication if he has any clinical worsening.  Continued challenges, we can certainly refer to urology.  6.  Currently under good control.  Will continue current antihypertensive regimen.  7, 8.  Finally improving.  Will continue canalith repositioning exercises.  Follow-up as needed.  9.  Currently controlled.  Will continue current dose of metoprolol.    Portions of this note were completed using Dragon dictation software.  Although reviewed, there may be typographical and other inadvertent errors that remain.           COUNSELING:   Reviewed preventive health counseling, as reflected in patient instructions       Regular exercise       Healthy diet/nutrition       Immunizations    Vaccinated for: Influenza             Aspirin Prophylaxsis       Alcohol Use       Prostate cancer screening       Osteoporosis Prevention/Bone Health       The 10-year ASCVD risk score ([place] MICHAEL Jr., et al., 2013) is: 9.4%    Values used to calculate the score:      Age: 63 years      Sex: Male      Is Non- : No      Diabetic: No      Tobacco smoker: No      Systolic Blood Pressure: 118 mmHg      Is BP treated: Yes      HDL Cholesterol: 43 mg/dL      Total Cholesterol: 143 mg/dL    Estimated body mass index is 28.07 kg/m  as calculated from the following:    Height as of this encounter: 1.775 m (5' 9.88\").    Weight as of this encounter: 88.5 kg (195 lb).     Weight management plan: Discussed healthy " diet and exercise guidelines     reports that he has quit smoking. He has a 15.00 pack-year smoking history. He quit smokeless tobacco use about 32 years ago.  His smokeless tobacco use included chew.      Counseling Resources:  ATP IV Guidelines  Pooled Cohorts Equation Calculator  FRAX Risk Assessment  ICSI Preventive Guidelines  Dietary Guidelines for Americans, 2010  USDA's MyPlate  ASA Prophylaxis  Lung CA Screening    Fabio Stovall MD, MD  [facility]

## 2019-10-16 NOTE — PATIENT INSTRUCTIONS
If you want to see how things are off Flomax, just hold it for a few days.  If you get worse, resume.  If symptoms are bothersome enough, we can get you in with urology.      Get your fasting labs done.    We'll let you know your lab results as soon as we can.     Contact us or return if questions or concerns.     Have a nice day!    Dr. Stovall       Preventive Health Recommendations  Male Ages 50 - 64    Yearly exam:             See your health care provider every year in order to  o   Review health changes.   o   Discuss preventive care.    o   Review your medicines if your doctor has prescribed any.     Have a cholesterol test every 5 years, or more frequently if you are at risk for high cholesterol/heart disease.     Have a diabetes test (fasting glucose) every three years. If you are at risk for diabetes, you should have this test more often.     Have a colonoscopy at age 50, or have a yearly FIT test (stool test). These exams will check for colon cancer.      Talk with your health care provider about whether or not a prostate cancer screening test (PSA) is right for you.    You should be tested each year for STDs (sexually transmitted diseases), if you re at risk.     Shots: Get a flu shot each year. Get a tetanus shot every 10 years.     Nutrition:    Eat at least 5 servings of fruits and vegetables daily.     Eat whole-grain bread, whole-wheat pasta and brown rice instead of white grains and rice.     Get adequate Calcium and Vitamin D.     Lifestyle    Exercise for at least 150 minutes a week (30 minutes a day, 5 days a week). This will help you control your weight and prevent disease.     Limit alcohol to one drink per day.     No smoking.     Wear sunscreen to prevent skin cancer.     See your dentist every six months for an exam and cleaning.     See your eye doctor every 1 to 2 years.

## 2019-10-23 ENCOUNTER — OFFICE VISIT (OUTPATIENT)
Dept: FAMILY MEDICINE | Facility: OTHER | Age: 64
End: 2019-10-23
Payer: COMMERCIAL

## 2019-10-23 VITALS
DIASTOLIC BLOOD PRESSURE: 70 MMHG | OXYGEN SATURATION: 98 % | TEMPERATURE: 97.4 F | WEIGHT: 195 LBS | RESPIRATION RATE: 16 BRPM | BODY MASS INDEX: 27.92 KG/M2 | HEART RATE: 64 BPM | SYSTOLIC BLOOD PRESSURE: 118 MMHG | HEIGHT: 70 IN

## 2019-10-23 DIAGNOSIS — I49.3 PVC'S (PREMATURE VENTRICULAR CONTRACTIONS): ICD-10-CM

## 2019-10-23 DIAGNOSIS — H81.10 BENIGN PAROXYSMAL POSITIONAL VERTIGO, UNSPECIFIED LATERALITY: ICD-10-CM

## 2019-10-23 DIAGNOSIS — M25.50 MULTIPLE JOINT PAIN: ICD-10-CM

## 2019-10-23 DIAGNOSIS — J44.9 MODERATE CHRONIC OBSTRUCTIVE PULMONARY DISEASE (H): ICD-10-CM

## 2019-10-23 DIAGNOSIS — N13.8 BENIGN PROSTATIC HYPERPLASIA WITH URINARY OBSTRUCTION: ICD-10-CM

## 2019-10-23 DIAGNOSIS — R35.0 URINARY FREQUENCY: ICD-10-CM

## 2019-10-23 DIAGNOSIS — Z00.00 ROUTINE GENERAL MEDICAL EXAMINATION AT A HEALTH CARE FACILITY: Primary | ICD-10-CM

## 2019-10-23 DIAGNOSIS — R42 DIZZINESS: ICD-10-CM

## 2019-10-23 DIAGNOSIS — N40.1 BENIGN PROSTATIC HYPERPLASIA WITH URINARY OBSTRUCTION: ICD-10-CM

## 2019-10-23 DIAGNOSIS — I10 HYPERTENSION GOAL BP (BLOOD PRESSURE) < 140/90: ICD-10-CM

## 2019-10-23 PROCEDURE — 90471 IMMUNIZATION ADMIN: CPT | Performed by: FAMILY MEDICINE

## 2019-10-23 PROCEDURE — 90682 RIV4 VACC RECOMBINANT DNA IM: CPT | Performed by: FAMILY MEDICINE

## 2019-10-23 PROCEDURE — 99213 OFFICE O/P EST LOW 20 MIN: CPT | Mod: 25 | Performed by: FAMILY MEDICINE

## 2019-10-23 PROCEDURE — 99396 PREV VISIT EST AGE 40-64: CPT | Mod: 25 | Performed by: FAMILY MEDICINE

## 2019-10-23 RX ORDER — TAMSULOSIN HYDROCHLORIDE 0.4 MG/1
0.4 CAPSULE ORAL DAILY
Qty: 90 CAPSULE | Refills: 3 | Status: SHIPPED | OUTPATIENT
Start: 2019-10-23 | End: 2020-07-22

## 2019-10-23 RX ORDER — LISINOPRIL 10 MG/1
10 TABLET ORAL DAILY
Qty: 90 TABLET | Refills: 3 | Status: SHIPPED | OUTPATIENT
Start: 2019-10-23 | End: 2020-07-22

## 2019-10-23 RX ORDER — TOLTERODINE 2 MG/1
2 CAPSULE, EXTENDED RELEASE ORAL DAILY
Qty: 90 CAPSULE | Refills: 1 | Status: SHIPPED | OUTPATIENT
Start: 2019-10-23 | End: 2020-04-27

## 2019-10-23 RX ORDER — METOPROLOL TARTRATE 25 MG/1
25 TABLET, FILM COATED ORAL 2 TIMES DAILY
Qty: 180 TABLET | Refills: 3 | Status: SHIPPED | OUTPATIENT
Start: 2019-10-23 | End: 2020-01-27 | Stop reason: ALTCHOICE

## 2019-10-23 ASSESSMENT — ENCOUNTER SYMPTOMS
DIZZINESS: 1
HEMATURIA: 0
FREQUENCY: 1
HEMATOCHEZIA: 0
ABDOMINAL PAIN: 0
COUGH: 0
CONSTIPATION: 0
FEVER: 0
EYE PAIN: 0
DIARRHEA: 0
NERVOUS/ANXIOUS: 0
CHILLS: 0

## 2019-10-23 ASSESSMENT — MIFFLIN-ST. JEOR: SCORE: 1683.89

## 2019-10-23 ASSESSMENT — PAIN SCALES - GENERAL: PAINLEVEL: NO PAIN (0)

## 2019-10-24 DIAGNOSIS — Z00.00 ROUTINE GENERAL MEDICAL EXAMINATION AT A HEALTH CARE FACILITY: ICD-10-CM

## 2019-10-24 DIAGNOSIS — M25.50 MULTIPLE JOINT PAIN: ICD-10-CM

## 2019-10-24 LAB
CHOLEST SERPL-MCNC: 161 MG/DL
GLUCOSE SERPL-MCNC: 106 MG/DL (ref 70–99)
HDLC SERPL-MCNC: 48 MG/DL
LDLC SERPL CALC-MCNC: 72 MG/DL
NONHDLC SERPL-MCNC: 113 MG/DL
PSA SERPL-ACNC: 0.5 UG/L (ref 0–4)
TRIGL SERPL-MCNC: 205 MG/DL

## 2019-10-24 PROCEDURE — 86618 LYME DISEASE ANTIBODY: CPT | Performed by: FAMILY MEDICINE

## 2019-10-24 PROCEDURE — 82947 ASSAY GLUCOSE BLOOD QUANT: CPT | Performed by: FAMILY MEDICINE

## 2019-10-24 PROCEDURE — 36415 COLL VENOUS BLD VENIPUNCTURE: CPT | Performed by: FAMILY MEDICINE

## 2019-10-24 PROCEDURE — 80061 LIPID PANEL: CPT | Performed by: FAMILY MEDICINE

## 2019-10-24 PROCEDURE — G0103 PSA SCREENING: HCPCS | Performed by: FAMILY MEDICINE

## 2019-10-25 LAB — B BURGDOR IGG+IGM SER QL: 0.06 (ref 0–0.89)

## 2019-10-25 NOTE — RESULT ENCOUNTER NOTE
Keegan,    All of your labs were normal for you except your blood sugar.    Your blood sugar is in the prediabetic range.  Can try to prevent this from getting worse by exercising regularly and controlling weight and diet.    Have a nice day!    Dr. Stovall

## 2019-11-18 DIAGNOSIS — J30.89 SEASONAL ALLERGIC RHINITIS DUE TO OTHER ALLERGIC TRIGGER: ICD-10-CM

## 2019-11-19 RX ORDER — FLUTICASONE PROPIONATE 50 MCG
SPRAY, SUSPENSION (ML) NASAL
Qty: 48 G | Refills: 3 | Status: SHIPPED | OUTPATIENT
Start: 2019-11-19 | End: 2020-07-21

## 2019-12-05 ENCOUNTER — TELEPHONE (OUTPATIENT)
Dept: CARDIOLOGY | Facility: CLINIC | Age: 64
End: 2019-12-05

## 2019-12-05 NOTE — TELEPHONE ENCOUNTER
Patient calling states he called last week to try to get worked in to Dr. Millan schedule before he leaves for the winter (leaves 12/26/19) and he has not heard anything back.  Can you call him back to talk to him regarding if he should be seen and if he can be worked in.    Rae Morin RN

## 2019-12-11 ENCOUNTER — OFFICE VISIT (OUTPATIENT)
Dept: CARDIOLOGY | Facility: CLINIC | Age: 64
End: 2019-12-11
Payer: COMMERCIAL

## 2019-12-11 VITALS
DIASTOLIC BLOOD PRESSURE: 84 MMHG | WEIGHT: 197.6 LBS | SYSTOLIC BLOOD PRESSURE: 130 MMHG | BODY MASS INDEX: 28.29 KG/M2 | OXYGEN SATURATION: 95 % | HEART RATE: 66 BPM | HEIGHT: 70 IN

## 2019-12-11 DIAGNOSIS — I25.10 CORONARY ARTERY DISEASE INVOLVING NATIVE CORONARY ARTERY OF NATIVE HEART, ANGINA PRESENCE UNSPECIFIED: ICD-10-CM

## 2019-12-11 DIAGNOSIS — R00.2 PALPITATIONS: Primary | ICD-10-CM

## 2019-12-11 DIAGNOSIS — I10 BENIGN ESSENTIAL HYPERTENSION: ICD-10-CM

## 2019-12-11 PROCEDURE — 99214 OFFICE O/P EST MOD 30 MIN: CPT | Performed by: NURSE PRACTITIONER

## 2019-12-11 ASSESSMENT — MIFFLIN-ST. JEOR: SCORE: 1692.56

## 2019-12-11 NOTE — PROGRESS NOTES
Cardiology Clinic Progress Note  Keegan Buchanan MRN# [id number]   YOB: 1955 Age: 64 year old     Primary cardiologist: Dr. Millan    Reason for visit: Annual follow-up    History of presenting illness:    Keegan Buchanan, a pleasant 64 year old patient who has a cardiac history significant for coronary artery disease status post overlapping drug-eluting stents to the mid LAD in 2012.  There is no significant residual coronary artery disease at the time with coronary angiogram.  A subsequent nuclear stress test in 2013 showed preserved LV function with no evidence of ischemia.     At his last visit with Dr. Millan in July 2018 he had noted occasional palpitations.  ZIO Patch monitor was ordered to exclude atrial fibrillation and noted only rare PVCs.  He has been currently maintained on metoprolol tartrate 25 mg twice daily.  A stress echocardiogram was completed in July 2018 that did not reveal any resting or stress-induced wall motion abnormalities with occasional PVCs and an LVEF greater than 70%.  There were no symptoms during the study.    This past spring 2019 he was diagnosed with vertigo that took most of the summer to resolve [facility].  During that time he states his palpitations were very minimal but over the last month they have escalated.  He has tried to determine any trigger or pattern to his symptoms, but has not been able to do so at this time.  He does note however, that his brother recently was diagnosed and passed away with cancer and the span of 1 month and that he was under a significant amount of stress during this time.  Also, he had his wife along with her 2 horses [place] and stone as well as their dog Tuff ([place]rier) will be leaving for [place] the day after [place].  He is very stressed with preparing for their travels.    Discussed options of repeat monitor given the escalation of his symptoms and/or changing him to  "metoprolol succinate 25 mg twice daily for better beta-blocker coverage.  At this time he request to maintain his current metoprolol tartrate 25 mg twice daily as he just recently received a 90-day supply.    Past medical history :    1. Coronary artery disease status post drug-eluting stents to the mid LAD in 2012  2. Hypertension  3. Dyslipidemia  4. BPH  5. Vertigo         Assessment and Plan:     ASSESSMENT:    1. Palpitations    Previously noted to be secondary to PVCs on cardiac monitoring    Currently maintained on 25 mg of metoprolol tartrate twice daily    2. Coronary artery disease     Status post drug-eluting stents x2 to the mid LAD in 2012    Aspirin and high intensity statin (atorvastatin 80 mg daily)    3. Hypertension    Well-controlled    Metoprolol 25 mg twice daily and lisinopril 10 mg daily    PLAN:     1. Continue to monitor symptoms and if do not improve consider repeat cardiac monitoring and or changing from metoprolol tartrate to metoprolol succinate 25 mg twice daily  2. Follow-up with Dr. Millan in the fall 2020          Review of Systems:     Review of Systems:  Skin:  Negative     Eyes:  Positive for glasses  ENT:  Positive for hearing loss;vertigo  Respiratory:  Negative    Cardiovascular:  Negative for;chest pain;edema;lightheadedness;dizziness Positive for;palpitations  Gastroenterology: Negative    Genitourinary:  Negative    Musculoskeletal:  Negative    Neurologic:  Negative    Psychiatric:  Positive for sleep disturbances  Heme/Lymph/Imm:  Negative    Endocrine:  Negative              Physical Exam:     Vitals: /84 (BP Location: Right arm, Patient Position: Fowlers, Cuff Size: Adult Regular)   Pulse 66   Ht 1.778 m (5' 10\")   Wt 89.6 kg (197 lb 9.6 oz)   SpO2 95%   BMI 28.35 kg/m    Constitutional:  cooperative, alert and oriented, well developed, well nourished, in no acute distress        Skin:  warm and dry to the touch, no apparent skin lesions or masses noted    "     Head:  normocephalic, no masses or lesions        Eyes:  pupils equal and round, conjunctivae and lids unremarkable, sclera white, no xanthalasma, EOMS intact, no nystagmus        ENT:  no pallor or cyanosis, dentition good        Neck:  carotid pulses are full and equal bilaterally, JVP normal, no carotid bruit        Chest:  normal breath sounds, clear to auscultation, normal A-P diameter, normal symmetry, normal respiratory excursion, no use of accessory muscles        Cardiac: regular rhythm, normal S1/S2, no S3 or S4, apical impulse not displaced, no murmurs, gallops or rubs                  Abdomen:  abdomen soft, non-tender, BS normoactive, no mass, no HSM, no bruits        Vascular: pulses full and equal, no bruits auscultated                                      Extremities and Back:  no deformities, clubbing, cyanosis, erythema observed        Neurological:  no gross motor deficits               Medications:     Current Outpatient Medications   Medication Sig Dispense Refill     albuterol (PROAIR RESPICLICK) 108 (90 Base) MCG/ACT inhaler Inhale 2 puffs into the lungs every 6 hours as needed for shortness of breath / dyspnea or wheezing 1 each 3     aspirin 81 MG tablet Take 1 tablet by mouth daily. 90 tablet 3     atorvastatin (LIPITOR) 80 MG tablet TAKE ONE TABLET BY MOUTH EVERY DAY 90 tablet 3     beclomethasone HFA (QVAR REDIHALER) 80 MCG/ACT inhaler Inhale 1 puff into the lungs 2 times daily 3 Inhaler 1     fluticasone (FLONASE) 50 MCG/ACT nasal spray SPRAY 1-2 SPRAYS IN EACH NOSTRIL DAILY 48 g 3     lisinopril (PRINIVIL/ZESTRIL) 10 MG tablet Take 1 tablet (10 mg) by mouth daily 90 tablet 3     metoprolol tartrate (LOPRESSOR) 25 MG tablet Take 1 tablet (25 mg) by mouth 2 times daily 180 tablet 3     omeprazole (PRILOSEC) 20 MG DR capsule TAKE ONE CAPSULE BY MOUTH EVERY DAY 90 capsule 3     tamsulosin (FLOMAX) 0.4 MG capsule Take 1 capsule (0.4 mg) by mouth daily 90 capsule 3     tolterodine ER  (DETROL LA) 2 MG 24 hr capsule Take 1 capsule (2 mg) by mouth daily 90 capsule 1     nitroglycerin (NITROSTAT) 0.4 MG SL tablet Place 1 tablet (0.4 mg) under the tongue every 5 minutes as needed for chest pain (Patient not taking: Reported on 2019) 25 tablet 0       Family History   Problem Relation Age of Onset     Diabetes Mother         adult onset diabetes     Cancer Mother         kidney dx age 75     Cancer Father         prostate cancer/ at age 73     Hypertension Brother      Cancer Paternal Grandfather         prostat ca     Lung Cancer Brother        Social History     Socioeconomic History     Marital status:      Spouse name: Not on file     Number of children: Not on file     Years of education: Not on file     Highest education level: Not on file   Occupational History     Not on file   Social Needs     Financial resource strain: Not on file     Food insecurity:     Worry: Not on file     Inability: Not on file     Transportation needs:     Medical: Not on file     Non-medical: Not on file   Tobacco Use     Smoking status: Former Smoker     Packs/day: 1.00     Years: 15.00     Pack years: 15.00     Smokeless tobacco: Former User     Types: Chew     Quit date: 1987     Tobacco comment: quit 25 years ago.   Substance and Sexual Activity     Alcohol use: Yes     Comment: 1 -2 monthly     Drug use: No     Sexual activity: Yes     Partners: Female     Birth control/protection: Surgical, Male Surgical     Comment: vasectomy   Lifestyle     Physical activity:     Days per week: Not on file     Minutes per session: Not on file     Stress: Not on file   Relationships     Social connections:     Talks on phone: Not on file     Gets together: Not on file     Attends Confucianist service: Not on file     Active member of club or organization: Not on file     Attends meetings of clubs or organizations: Not on file     Relationship status: Not on file     Intimate partner violence:     Fear of  current or ex partner: Not on file     Emotionally abused: Not on file     Physically abused: Not on file     Forced sexual activity: Not on file   Other Topics Concern     Parent/sibling w/ CABG, MI or angioplasty before 65F 55M? Not Asked   Social History Narrative     Not on file            Past Medical History:     Past Medical History:   Diagnosis Date     Hypercholesteremia      Pain in joint, shoulder region     right shoulder separation     Unspecified essential hypertension               Past Surgical History:     Past Surgical History:   Procedure Laterality Date     COLONOSCOPY N/A 6/12/2015    Procedure: COMBINED COLONOSCOPY, SINGLE OR MULTIPLE BIOPSY/POLYPECTOMY BY BIOPSY;  Surgeon: Herman Rebollar MD;  [facility]     COLONOSCOPY N/A 11/12/2018    Procedure: colonoscopy;  Surgeon: Thiago Robles MD;  [facility]     H STENT PROCED  2012     HC REMOVE TONSILS/ADENOIDS,<13 Y/O      T & A <12y.o.     LAPAROSCOPIC HERNIORRHAPHY INGUINAL Left 8/3/2018    Procedure: LAPAROSCOPIC HERNIORRHAPHY INGUINAL;  Laparoscopic left inguinal hernia repair;  Surgeon: Huber Ireland MD;  [facility]     REMOVAL OF SPERM DUCT(S)  05/06/88    Vasectomy              Allergies:   Augmentin; Codeine; and Penicillins       Data:   All laboratory data reviewed:    Recent Labs   Lab Test 10/24/19  0828 04/05/19  0759 11/15/18  0838   LDL 72 73 66   HDL 48 43 36*   NHDL 113 100 91   CHOL 161 143 127   TRIG 205* 134 124       Lab Results   Component Value Date    WBC 10.7 12/06/2018    RBC 5.59 12/06/2018    HGB 17.0 04/05/2019    HCT 47.4 12/06/2018    MCV 85 12/06/2018    MCH 29.0 12/06/2018    MCHC 34.2 12/06/2018    RDW 13.5 12/06/2018     12/06/2018       Lab Results   Component Value Date     04/05/2019    POTASSIUM 4.4 04/05/2019    CHLORIDE 105 04/05/2019    CO2 26 04/05/2019    ANIONGAP 10 04/05/2019     (H) 10/24/2019    BUN 12 04/05/2019    CR 0.90 04/05/2019     GFRESTIMATED 90 04/05/2019    GFRESTBLACK >90 04/05/2019    ALFREDA 9.1 04/05/2019      Lab Results   Component Value Date    AST 20 04/05/2019    ALT 35 04/05/2019       Lab Results   Component Value Date    A1C 5.6 12/27/2012       Lab Results   Component Value Date    INR 0.97 11/14/2012         DAMIAN MORATAYA [facility]  Pager: [phone]  RN phone: [phone]

## 2019-12-11 NOTE — PATIENT INSTRUCTIONS
TODAY'S RECOMMENDATIONS    1. Continue present metoprolol at 25 mg twice a day  2. If symptoms persistent of progress please call and can change to metoprolol XL 25 mg twice a day  3. Follow up with Dr. Millan in the fall     If you have questions or concerns please call clinic at [phone].    Please call [phone] for scheduling.      It was a pleasure seeing you today!

## 2019-12-11 NOTE — LETTER
12/11/2019    [place]  [address] [place] [address]    RE: Keegan Buchanan       Dear Colleague,    I had the pleasure of seeing Keegan Buchanan in the [facility].    Cardiology Clinic Progress Note  Keegan Buchanan MRN# [id number]   YOB: 1955 Age: 64 year old     Primary cardiologist: Dr. Millan    Reason for visit: Annual follow-up    History of presenting illness:    Keegan Buchanan, a pleasant 64 year old patient who has a cardiac history significant for coronary artery disease status post overlapping drug-eluting stents to the mid LAD in 2012.  There is no significant residual coronary artery disease at the time with coronary angiogram.  A subsequent nuclear stress test in 2013 showed preserved LV function with no evidence of ischemia.     At his last visit with Dr. Millan in July 2018 he had noted occasional palpitations.  ZIO Patch monitor was ordered to exclude atrial fibrillation and noted only rare PVCs.  He has been currently maintained on metoprolol tartrate 25 mg twice daily.  A stress echocardiogram was completed in July 2018 that did not reveal any resting or stress-induced wall motion abnormalities with occasional PVCs and an LVEF greater than 70%.  There were no symptoms during the study.    This past spring 2019 he was diagnosed with vertigo that took most of the summer to resolve [facility].  During that time he states his palpitations were very minimal but over the last month they have escalated.  He has tried to determine any trigger or pattern to his symptoms, but has not been able to do so at this time.  He does note however, that his brother recently was diagnosed and passed away with cancer and the span of 1 month and that he was under a significant amount of stress during this time.  Also, he had his wife along with her 2 horses [place] and stone as well as their dog Tuff  "(Dina Bettencourt) will be leaving for [place] the day after Christmas.  He is very stressed with preparing for their travels.    Discussed options of repeat monitor given the escalation of his symptoms and/or changing him to metoprolol succinate 25 mg twice daily for better beta-blocker coverage.  At this time he request to maintain his current metoprolol tartrate 25 mg twice daily as he just recently received a 90-day supply.    Past medical history :    1. Coronary artery disease status post drug-eluting stents to the mid LAD in 2012  2. Hypertension  3. Dyslipidemia  4. BPH  5. Vertigo         Assessment and Plan:     ASSESSMENT:    1. Palpitations    Previously noted to be secondary to PVCs on cardiac monitoring    Currently maintained on 25 mg of metoprolol tartrate twice daily    2. Coronary artery disease     Status post drug-eluting stents x2 to the mid LAD in 2012    Aspirin and high intensity statin (atorvastatin 80 mg daily)    3. Hypertension    Well-controlled    Metoprolol 25 mg twice daily and lisinopril 10 mg daily    PLAN:     1. Continue to monitor symptoms and if do not improve consider repeat cardiac monitoring and or changing from metoprolol tartrate to metoprolol succinate 25 mg twice daily  2. Follow-up with Dr. Millan in the fall 2020          Review of Systems:     Review of Systems:  Skin:  Negative     Eyes:  Positive for glasses  ENT:  Positive for hearing loss;vertigo  Respiratory:  Negative    Cardiovascular:  Negative for;chest pain;edema;lightheadedness;dizziness Positive for;palpitations  Gastroenterology: Negative    Genitourinary:  Negative    Musculoskeletal:  Negative    Neurologic:  Negative    Psychiatric:  Positive for sleep disturbances  Heme/Lymph/Imm:  Negative    Endocrine:  Negative              Physical Exam:     Vitals: /84 (BP Location: Right arm, Patient Position: Fowlers, Cuff Size: Adult Regular)   Pulse 66   Ht 1.778 m (5' 10\")   Wt 89.6 kg (197 lb 9.6 " oz)   SpO2 95%   BMI 28.35 kg/m     Constitutional:  cooperative, alert and oriented, well developed, well nourished, in no acute distress        Skin:  warm and dry to the touch, no apparent skin lesions or masses noted        Head:  normocephalic, no masses or lesions        Eyes:  pupils equal and round, conjunctivae and lids unremarkable, sclera white, no xanthalasma, EOMS intact, no nystagmus        ENT:  no pallor or cyanosis, dentition good        Neck:  carotid pulses are full and equal bilaterally, JVP normal, no carotid bruit        Chest:  normal breath sounds, clear to auscultation, normal A-P diameter, normal symmetry, normal respiratory excursion, no use of accessory muscles        Cardiac: regular rhythm, normal S1/S2, no S3 or S4, apical impulse not displaced, no murmurs, gallops or rubs                  Abdomen:  abdomen soft, non-tender, BS normoactive, no mass, no HSM, no bruits        Vascular: pulses full and equal, no bruits auscultated                                      Extremities and Back:  no deformities, clubbing, cyanosis, erythema observed        Neurological:  no gross motor deficits               Medications:     Current Outpatient Medications   Medication Sig Dispense Refill     albuterol (PROAIR RESPICLICK) 108 (90 Base) MCG/ACT inhaler Inhale 2 puffs into the lungs every 6 hours as needed for shortness of breath / dyspnea or wheezing 1 each 3     aspirin 81 MG tablet Take 1 tablet by mouth daily. 90 tablet 3     atorvastatin (LIPITOR) 80 MG tablet TAKE ONE TABLET BY MOUTH EVERY DAY 90 tablet 3     beclomethasone HFA (QVAR REDIHALER) 80 MCG/ACT inhaler Inhale 1 puff into the lungs 2 times daily 3 Inhaler 1     fluticasone (FLONASE) 50 MCG/ACT nasal spray SPRAY 1-2 SPRAYS IN EACH NOSTRIL DAILY 48 g 3     lisinopril (PRINIVIL/ZESTRIL) 10 MG tablet Take 1 tablet (10 mg) by mouth daily 90 tablet 3     metoprolol tartrate (LOPRESSOR) 25 MG tablet Take 1 tablet (25 mg) by mouth 2 times  daily 180 tablet 3     omeprazole (PRILOSEC) 20 MG DR capsule TAKE ONE CAPSULE BY MOUTH EVERY DAY 90 capsule 3     tamsulosin (FLOMAX) 0.4 MG capsule Take 1 capsule (0.4 mg) by mouth daily 90 capsule 3     tolterodine ER (DETROL LA) 2 MG 24 hr capsule Take 1 capsule (2 mg) by mouth daily 90 capsule 1     nitroglycerin (NITROSTAT) 0.4 MG SL tablet Place 1 tablet (0.4 mg) under the tongue every 5 minutes as needed for chest pain (Patient not taking: Reported on 2019) 25 tablet 0       Family History   Problem Relation Age of Onset     Diabetes Mother         adult onset diabetes     Cancer Mother         kidney dx age 75     Cancer Father         prostate cancer/ at age 73     Hypertension Brother      Cancer Paternal Grandfather         prostat ca     Lung Cancer Brother        Social History     Socioeconomic History     Marital status:      Spouse name: Not on file     Number of children: Not on file     Years of education: Not on file     Highest education level: Not on file   Occupational History     Not on file   Social Needs     Financial resource strain: Not on file     Food insecurity:     Worry: Not on file     Inability: Not on file     Transportation needs:     Medical: Not on file     Non-medical: Not on file   Tobacco Use     Smoking status: Former Smoker     Packs/day: 1.00     Years: 15.00     Pack years: 15.00     Smokeless tobacco: Former User     Types: Chew     Quit date: 1987     Tobacco comment: quit 25 years ago.   Substance and Sexual Activity     Alcohol use: Yes     Comment: 1 -2 monthly     Drug use: No     Sexual activity: Yes     Partners: Female     Birth control/protection: Surgical, Male Surgical     Comment: vasectomy   Lifestyle     Physical activity:     Days per week: Not on file     Minutes per session: Not on file     Stress: Not on file   Relationships     Social connections:     Talks on phone: Not on file     Gets together: Not on file     Attends  Caodaism service: Not on file     Active member of club or organization: Not on file     Attends meetings of clubs or organizations: Not on file     Relationship status: Not on file     Intimate partner violence:     Fear of current or ex partner: Not on file     Emotionally abused: Not on file     Physically abused: Not on file     Forced sexual activity: Not on file   Other Topics Concern     Parent/sibling w/ CABG, MI or angioplasty before 65F 55M? Not Asked   Social History Narrative     Not on file            Past Medical History:     Past Medical History:   Diagnosis Date     Hypercholesteremia      Pain in joint, shoulder region     right shoulder separation     Unspecified essential hypertension               Past Surgical History:     Past Surgical History:   Procedure Laterality Date     COLONOSCOPY N/A 6/12/2015    Procedure: COMBINED COLONOSCOPY, SINGLE OR MULTIPLE BIOPSY/POLYPECTOMY BY BIOPSY;  Surgeon: Herman Rebollar MD;  [facility]     COLONOSCOPY N/A 11/12/2018    Procedure: colonoscopy;  Surgeon: Thiago Robles MD;  Location:  GI     H STENT PROCED  2012     HC REMOVE TONSILS/ADENOIDS,<13 Y/O      T & A <12y.o.     LAPAROSCOPIC HERNIORRHAPHY INGUINAL Left 8/3/2018    Procedure: LAPAROSCOPIC HERNIORRHAPHY INGUINAL;  Laparoscopic left inguinal hernia repair;  Surgeon: Huber Ireland MD;  [facility]     REMOVAL OF SPERM DUCT(S)  05/06/88    Vasectomy              Allergies:   Augmentin; Codeine; and Penicillins       Data:   All laboratory data reviewed:    Recent Labs   Lab Test 10/24/19  0828 04/05/19  0759 11/15/18  0838   LDL 72 73 66   HDL 48 43 36*   NHDL 113 100 91   CHOL 161 143 127   TRIG 205* 134 124       Lab Results   Component Value Date    WBC 10.7 12/06/2018    RBC 5.59 12/06/2018    HGB 17.0 04/05/2019    HCT 47.4 12/06/2018    MCV 85 12/06/2018    MCH 29.0 12/06/2018    MCHC 34.2 12/06/2018    RDW 13.5 12/06/2018     12/06/2018       Lab Results    Component Value Date     04/05/2019    POTASSIUM 4.4 04/05/2019    CHLORIDE 105 04/05/2019    CO2 26 04/05/2019    ANIONGAP 10 04/05/2019     (H) 10/24/2019    BUN 12 04/05/2019    CR 0.90 04/05/2019    GFRESTIMATED 90 04/05/2019    GFRESTBLACK >90 04/05/2019    ALFREDA 9.1 04/05/2019      Lab Results   Component Value Date    AST 20 04/05/2019    ALT 35 04/05/2019       Lab Results   Component Value Date    A1C 5.6 12/27/2012       Lab Results   Component Value Date    INR 0.97 11/14/2012         DAIMAN MORATAYA CNP  [facility]  Pager: [phone]  RN phone: [phone]    Thank you for allowing me to participate in the care of your patient.    Sincerely,     DAMIAN MORATAYA CNP     [facility]

## 2019-12-20 ENCOUNTER — ALLIED HEALTH/NURSE VISIT (OUTPATIENT)
Dept: FAMILY MEDICINE | Facility: OTHER | Age: 64
End: 2019-12-20
Payer: COMMERCIAL

## 2019-12-20 VITALS — HEART RATE: 60 BPM | SYSTOLIC BLOOD PRESSURE: 132 MMHG | DIASTOLIC BLOOD PRESSURE: 74 MMHG | OXYGEN SATURATION: 97 %

## 2019-12-20 DIAGNOSIS — I49.3 PVC'S (PREMATURE VENTRICULAR CONTRACTIONS): Primary | ICD-10-CM

## 2019-12-20 PROCEDURE — 99207 ZZC NO CHARGE NURSE ONLY: CPT

## 2020-01-27 ENCOUNTER — TELEPHONE (OUTPATIENT)
Dept: CARDIOLOGY | Facility: CLINIC | Age: 65
End: 2020-01-27

## 2020-01-27 DIAGNOSIS — R00.2 PALPITATIONS: Primary | ICD-10-CM

## 2020-01-27 RX ORDER — METOPROLOL SUCCINATE 25 MG/1
25 TABLET, EXTENDED RELEASE ORAL 2 TIMES DAILY
Qty: 60 TABLET | Refills: 11 | Status: SHIPPED | OUTPATIENT
Start: 2020-01-27 | End: 2020-03-10

## 2020-01-27 NOTE — TELEPHONE ENCOUNTER
Patient calling stating he would like to switch his metoprolol tartrate 25mg twice daily to metoprolol succinate 25mg twice daily. He is still having symptoms associated with PVC's so he is wanting to try the metoprolol succinate.      Patient last saw Christine Lockhart NP on 12/11/19 and in her OV note she states:    PLAN:     1. Continue to monitor symptoms and if do not improve consider repeat cardiac monitoring and or changing from metoprolol tartrate to metoprolol succinate 25 mg twice daily  2. Follow-up with Dr. Millan in the fall 2020      Script sent to patient's pharmacy.

## 2020-03-09 ENCOUNTER — TELEPHONE (OUTPATIENT)
Dept: CARDIOLOGY | Facility: CLINIC | Age: 65
End: 2020-03-09

## 2020-03-09 DIAGNOSIS — R00.2 PALPITATIONS: ICD-10-CM

## 2020-03-09 NOTE — TELEPHONE ENCOUNTER
Patient calling stating after he switched to metoprolol succinate 25mg twice daily on 1/27/20 his PVC's were well controlled for the first month. But now his PVC's are getting more frequent again with the afternoon/evenings being the worst. Patient is wondering if he needs to have a medication change to help better control his PVC's as they are quiet bothersome. Will route to patient's cardiology team.     Patient is still in [place] and is planning on returning to [place] around the first of April.

## 2020-03-10 RX ORDER — METOPROLOL SUCCINATE 50 MG/1
50 TABLET, EXTENDED RELEASE ORAL 2 TIMES DAILY
Qty: 180 TABLET | Refills: 3 | COMMUNITY
Start: 2020-03-10 | End: 2020-03-10

## 2020-03-10 RX ORDER — METOPROLOL SUCCINATE 50 MG/1
50 TABLET, EXTENDED RELEASE ORAL 2 TIMES DAILY
Qty: 180 TABLET | Refills: 3 | Status: SHIPPED | OUTPATIENT
Start: 2020-03-10 | End: 2021-09-16

## 2020-03-10 NOTE — TELEPHONE ENCOUNTER
Patient returned call. Instructed to increase Metoprolol XL to 50mg BID. Patient will call back if PVC's symptoms do not improve or report to the ER with worsening symptoms. Could possible try diltiazem in the future per Christine Lockhart NP. Patient verbalized understanding.

## 2020-03-10 NOTE — TELEPHONE ENCOUNTER
Please increase metoprolol XL to 50 mg BID for symptomatic PVCs. If this does not control symptoms, we can try diltiazem in the future.     DAMIAN Waldrop, CNP

## 2020-03-17 ENCOUNTER — TELEPHONE (OUTPATIENT)
Dept: FAMILY MEDICINE | Facility: OTHER | Age: 65
End: 2020-03-17

## 2020-03-17 NOTE — TELEPHONE ENCOUNTER
It's up to him.  As long as he can stay 6 feet away from most individuals, it should be OK to travel, but if that would be easier to do down there, he should stay.

## 2020-03-17 NOTE — TELEPHONE ENCOUNTER
Spoke to patient, message below was given.  He states he is still not sure what his plan is but, was appreciative of the advice.  Della Barrientos CMA ([facility])

## 2020-03-17 NOTE — TELEPHONE ENCOUNTER
Reason for Call:  Other call back    Detailed comments: Patient is calling they are in [place] have been there since January 1st.  He has COPD so they are wondering if it is safe for him to travel home now or if they should stay put.  They are staying in a camper.  Please call     Phone Number Patient can be reached at: Cell number on file:    Telephone Information:   Mobile [phone]       Best Time: any    Can we leave a detailed message on this number? YES    Call taken on 3/17/2020 at 12:06 PM by Shelley Vidal

## 2020-03-31 ENCOUNTER — TELEPHONE (OUTPATIENT)
Dept: FAMILY MEDICINE | Facility: OTHER | Age: 65
End: 2020-03-31

## 2020-03-31 NOTE — TELEPHONE ENCOUNTER
Reason for Call:   Taking baby aspirin     Detailed comments: patient calling.  He takes a daily dose of 81 mg aspirin for his heart.  He is wondering if this increases his chances to the Covid 19 or will make it worse if he catches it?   He thought he heard that somewhere and is calling to check. Thank you    Phone Number Patient can be reached at: Cell number on file:    Telephone Information:   Mobile [phone]       Best Time: any    Can we leave a detailed message on this number? YES    Call taken on 3/31/2020 at 8:53 AM by Lauren Roberson

## 2020-04-01 NOTE — TELEPHONE ENCOUNTER
Attempted to contact patient, left detailed message regarding message below for patient. Closing encounter.

## 2020-04-01 NOTE — TELEPHONE ENCOUNTER
There is no evidence that I'm aware of that would make aspirin more of a problem for him.  Even if it did, he could stop the medication once he developed symptoms as it would be out of his system before the COVID-19 infection got severe.    Other NSAIDs have possibly been implicated in making things worse, but the data for this is very weak.

## 2020-04-26 ENCOUNTER — NURSE TRIAGE (OUTPATIENT)
Dept: NURSING | Facility: CLINIC | Age: 65
End: 2020-04-26

## 2020-04-26 NOTE — TELEPHONE ENCOUNTER
Patient says he wears ear aids and thinks the rubber piece on the end of it has fallen off the hearing aid into his ear canal.  Patient says he does feel like there's something in his ear.  Patient denies any pain right now.  Reviewed care advice with caller per RN triage protocol.  FNA advised patient should be evaluated within 24 hours.  FNA advised to call back with any concerns.   Caller verbalized understanding and agrees with plan.    COVID 19 Nurse Triage Plan/Patient Instructions    Please be aware that novel coronavirus (COVID-19) may be circulating in the community. If you develop symptoms such as fever, cough, or SOB or if you have concerns about the presence of another infection including coronavirus (COVID-19), please contact your health care provider or visit [email or web address].     Disposition/Instructions    Patient to have scheduled Telephone Visit with a provider. Follow System Ambulatory Workflow for COVID 19.     The clinic staff will assist you to schedule an appointment to complete the Telephone Visit with a provider during normal clinic hours.       Call Back If: Your symptoms worsen before you are able to complete your Telephone Visit with a provider.    Thank you for limiting contact with others, wearing a simple mask to cover your cough, practice good hand hygiene habits and accessing our virtual services where possible to limit the spread of this virus.    For more information about COVID19 and options for caring for yourself at home, please visit the CDC website at https://www.cdc.gov/coronavirus/2019-ncov/about/steps-when-sick.html  For more options for care at [facility], please visit our website at [email or web address]    For more information, please use the [place] Department of Health COVID-19 Website: https://www.health.state.[place]/diseases/coronavirus/index.html  [place] Department of Health ([facility]) COVID-19 Hotlines (Interpreters available):       Health questions: Phone Number: [phone] or [phone] and Hours: 7 a.m. to 7 p.m.    Schools and  questions: Phone Number: [phone] or [phone] and Hours 7 a.m. to 7 p.m.                      Reason for Disposition    [1] Foreign body AND [2] can't remove at home    Additional Information    Negative: MODERATE-SEVERE ear pain    Negative: Button battery    Negative: Sharp foreign body (e.g., glass, pin)    Negative: Bleeding from ear canal    Negative: Caller is unable to remove live insect    Protocols used: EAR - FOREIGN BODY-A-AH

## 2020-04-27 ENCOUNTER — OFFICE VISIT (OUTPATIENT)
Dept: FAMILY MEDICINE | Facility: CLINIC | Age: 65
End: 2020-04-27
Payer: COMMERCIAL

## 2020-04-27 VITALS
HEART RATE: 102 BPM | RESPIRATION RATE: 16 BRPM | SYSTOLIC BLOOD PRESSURE: 136 MMHG | WEIGHT: 192.4 LBS | BODY MASS INDEX: 27.54 KG/M2 | HEIGHT: 70 IN | DIASTOLIC BLOOD PRESSURE: 80 MMHG | OXYGEN SATURATION: 97 % | TEMPERATURE: 98.9 F

## 2020-04-27 DIAGNOSIS — H66.003 NON-RECURRENT ACUTE SUPPURATIVE OTITIS MEDIA OF BOTH EARS WITHOUT SPONTANEOUS RUPTURE OF TYMPANIC MEMBRANES: Primary | ICD-10-CM

## 2020-04-27 PROCEDURE — 99213 OFFICE O/P EST LOW 20 MIN: CPT | Performed by: INTERNAL MEDICINE

## 2020-04-27 RX ORDER — AZITHROMYCIN 250 MG/1
TABLET, FILM COATED ORAL
Qty: 6 TABLET | Refills: 0 | Status: SHIPPED | OUTPATIENT
Start: 2020-04-27 | End: 2020-07-29

## 2020-04-27 ASSESSMENT — MIFFLIN-ST. JEOR: SCORE: 1668.97

## 2020-04-27 ASSESSMENT — PAIN SCALES - GENERAL: PAINLEVEL: MILD PAIN (2)

## 2020-04-27 NOTE — PROGRESS NOTES
"Subjective     Keegan Buchanan is a 64 year old male who presents to clinic today for the following health issues:    HPI   Chief Complaint   Patient presents with     Ear Problem     Possible hearing aid part in left ear                       Chief Complaint         The patient is a pleasant 64-year-old gentleman who was recently taking off his \"corona virus intake protection device\" when the loop caught on his ear and sent his hearing aid flying.  Since this time, he has had decreased hearing in that ear and is questioning the possibility of a retained hearing aid piece.  Upon examining the far flung hearing aid he noticed the tip was missing.  He also notes that it is that he has pressure and pain in his left ear as well.  He has had this for the last couple days and has not had any amplifier mishaps in that ear.  He denies fevers or chills.  He denies vertigo at this time.                         PAST, FAMILY,SOCIAL HISTORY:     Medical  History:   has a past medical history of Hypercholesteremia, Pain in joint, shoulder region, and Unspecified essential hypertension.     Surgical History:   has a past surgical history that includes REMOVE TONSILS/ADENOIDS,<11 Y/O; removal of sperm duct(s) (05/06/88); Colonoscopy (N/A, 6/12/2015); Heart Cath Stent procedure (2012); Laparoscopic herniorrhaphy inguinal (Left, 8/3/2018); and Colonoscopy (N/A, 11/12/2018).     Social History:   reports that he has quit smoking. He has a 15.00 pack-year smoking history. He quit smokeless tobacco use about 33 years ago.  His smokeless tobacco use included chew. He reports current alcohol use. He reports that he does not use drugs.     Family History:  family history includes Cancer in his father, mother, and paternal grandfather; Diabetes in his mother; Hypertension in his brother; Lung Cancer in his brother.            MEDICATIONS  Current Outpatient Medications   Medication Sig Dispense Refill     albuterol (PROAIR RESPICLICK) 108 " (90 Base) MCG/ACT inhaler Inhale 2 puffs into the lungs every 6 hours as needed for shortness of breath / dyspnea or wheezing 1 each 3     aspirin 81 MG tablet Take 1 tablet by mouth daily. 90 tablet 3     atorvastatin (LIPITOR) 80 MG tablet TAKE ONE TABLET BY MOUTH EVERY DAY 90 tablet 3     azithromycin (ZITHROMAX) 250 MG tablet Take 2 tablets (500 mg) by mouth daily for 1 day, THEN 1 tablet (250 mg) daily for 4 days. 6 tablet 0     beclomethasone HFA (QVAR REDIHALER) 80 MCG/ACT inhaler Inhale 1 puff into the lungs 2 times daily 3 Inhaler 1     fluticasone (FLONASE) 50 MCG/ACT nasal spray SPRAY 1-2 SPRAYS IN EACH NOSTRIL DAILY 48 g 3     lisinopril (PRINIVIL/ZESTRIL) 10 MG tablet Take 1 tablet (10 mg) by mouth daily 90 tablet 3     metoprolol succinate ER (TOPROL-XL) 50 MG 24 hr tablet Take 1 tablet (50 mg) by mouth 2 times daily 180 tablet 3     omeprazole (PRILOSEC) 20 MG DR capsule TAKE ONE CAPSULE BY MOUTH EVERY DAY 90 capsule 3     tamsulosin (FLOMAX) 0.4 MG capsule Take 1 capsule (0.4 mg) by mouth daily 90 capsule 3     nitroglycerin (NITROSTAT) 0.4 MG SL tablet Place 1 tablet (0.4 mg) under the tongue every 5 minutes as needed for chest pain (Patient not taking: Reported on 12/11/2019) 25 tablet 0         --------------------------------------------------------------------------------------------------------------------                              Review of Systems       LUNGS: Pt denies: cough, excess sputum, hemoptysis, or shortness of breath.   HEART: Pt denies: chest pain, arrhythmia, syncope, tachy or bradyarrhythmia.   GI: Pt denies: nausea, vomiting, diarrhea, constipation, melena, or hematochezia.   NEURO: Pt denies: seizures, strokes, diplopia, weakness, paraesthesias, or paralysis.   SKIN: Pt denies: itching, rashes, discoloration, or specific lesions of concern. Denies recent hair loss.   PSYCH: The patient denies significant depression, anxiety, mood imbalance. Specifically denies any suicidal  "ideation.        Examination    /80 (BP Location: Right arm, Patient Position: Sitting, Cuff Size: Adult Regular)   Pulse 102   Temp 98.9  F (37.2  C) (Temporal)   Resp 16   Ht 1.778 m (5' 10\")   Wt 87.3 kg (192 lb 6.4 oz)   SpO2 97%   BMI 27.61 kg/m      Constitutional: The patient appears to be in no acute distress. The patient appears to be adequately hydrated. No acute respiratory or hemodynamic distress is noted at this time.   LUNGS: clear bilaterally, airflow is brisk, no intercostal retraction or stridor is noted. No coughing is noted during visit.   HEART:  regular without rubs, clicks, gallops, or murmurs. PMI is nondisplaced. Upstrokes are brisk. S1,S2 are heard.   GI: Abdomen is soft, without rebound, guarding or tenderness. Bowel sounds are appropriate. No renal bruits are heard.   NEURO: Pt is alert and appropriate. No neurologic lateralization is noted. Cranial nerves 2-12 are intact. Peripheral sensory and motor function are grossly normal.    SKIN:  warm and dry. No erythema, or rashes are noted. No specific lesions of concern are noted.    PSYCH: The patient appears grossly appropriate. Maintains good eye contact, does not have any jittery or atypical motion. Displays appropriate affect.   ENT: Pharynx is non-erythemous, minimal PND, no significant nasal obstruction, the right tympanic membrane was initially obstructed by a small black piece of rubber which I assume is the tip of a hearing aid.  It is removed in its entirety and the underlying tympanic membrane is dusky and somewhat red.  This matches the one on the left which is also somewhat dusky and red.         Decision Making       1. Non-recurrent acute suppurative otitis media of both ears without spontaneous rupture of tympanic membranes  We will start the patient on antibiotic, recommend Tylenol, rest, and fluids.  - azithromycin (ZITHROMAX) 250 MG tablet; Take 2 tablets (500 mg) by mouth daily for 1 day, THEN 1 tablet (250 " mg) daily for 4 days.  Dispense: 6 tablet; Refill: 0     Have suggested that some of your better hearing aids do not come from [facility].                             FOLLOW UP   I have asked the patient to make an appointment for followup with me as needed.  He will follow-up with his primary care provider for his routine scheduled health needs and maintenance.            I have carefully explained the diagnosis and treatment options to the patient.  The patient has displayed an understanding of the above, and all subsequent questions were answered.      DO JOAN Montague    Portions of this note were produced using [employer]  Although every attempt at real-time proof reading has been made, occasional grammar/syntax errors may have been missed.

## 2020-04-28 DIAGNOSIS — E78.5 HYPERLIPIDEMIA LDL GOAL <100: ICD-10-CM

## 2020-04-28 DIAGNOSIS — I10 HYPERTENSION GOAL BP (BLOOD PRESSURE) < 140/90: ICD-10-CM

## 2020-04-28 DIAGNOSIS — I49.3 PVC'S (PREMATURE VENTRICULAR CONTRACTIONS): ICD-10-CM

## 2020-04-28 DIAGNOSIS — N13.8 BENIGN PROSTATIC HYPERPLASIA WITH URINARY OBSTRUCTION: ICD-10-CM

## 2020-04-28 DIAGNOSIS — R42 DIZZINESS: ICD-10-CM

## 2020-04-28 DIAGNOSIS — N40.1 BENIGN PROSTATIC HYPERPLASIA WITH URINARY OBSTRUCTION: ICD-10-CM

## 2020-04-28 DIAGNOSIS — K21.9 GASTROESOPHAGEAL REFLUX DISEASE WITHOUT ESOPHAGITIS: ICD-10-CM

## 2020-04-29 RX ORDER — ATORVASTATIN CALCIUM 80 MG/1
TABLET, FILM COATED ORAL
Qty: 90 TABLET | Refills: 1 | Status: SHIPPED | OUTPATIENT
Start: 2020-04-29 | End: 2020-10-19

## 2020-04-29 RX ORDER — TAMSULOSIN HYDROCHLORIDE 0.4 MG/1
CAPSULE ORAL
Qty: 90 CAPSULE | Refills: 3 | OUTPATIENT
Start: 2020-04-29

## 2020-04-29 RX ORDER — LISINOPRIL 10 MG/1
TABLET ORAL
Qty: 90 TABLET | Refills: 3 | OUTPATIENT
Start: 2020-04-29

## 2020-04-29 NOTE — TELEPHONE ENCOUNTER
Pending Prescriptions:                       Disp   Refills    tamsulosin (FLOMAX) 0.4 MG capsule [Pharm*90 cap*3            Sig: TAKE ONE CAPSULE BY MOUTH ONCE DAILY    Sent 10/23/2019 with 1 year supply. Refill not appropriate at this time.         lisinopril (ZESTRIL) 10 MG tablet [Pharma*90 tab*3            Sig: TAKE ONE TABLET BY MOUTH ONCE DAILY    Sent 10/23/2019 with 1 year supply. Refill not appropriate at this time.     Amber Lopez, MSN, RN

## 2020-04-29 NOTE — TELEPHONE ENCOUNTER
Pending Prescriptions:                       Disp   Refills    atorvastatin (LIPITOR) 80 MG tablet [Phar*90 tab*3            Sig: TAKE ONE TABLET BY MOUTH ONCE DAILY    omeprazole (PRILOSEC) 20 MG DR capsule [P*90 cap*3            Sig: TAKE ONE CAPSULE BY MOUTH ONCE DAILY    Prescription approved per [facility] Refill Protocol.    Amber Lopez, MSN, RN

## 2020-07-20 DIAGNOSIS — J30.89 SEASONAL ALLERGIC RHINITIS DUE TO OTHER ALLERGIC TRIGGER: ICD-10-CM

## 2020-07-20 DIAGNOSIS — I24.9 ACS (ACUTE CORONARY SYNDROME) (H): ICD-10-CM

## 2020-07-20 NOTE — TELEPHONE ENCOUNTER
"Reason for Call:  Medication or medication refill:    Do you use a [facility]?  Name of the pharmacy and phone number for the current request:  [facility] - [phone]    Name of the medication requested: \"emergency bottle of glycerin\" ( nitroglycerin? )    Other request: Patient had stents place back in 2012 and was told to always carry an emergency bottle of glycerin which he lost over the weekend. Checked with pharmacy and they do not have it on file. Please refill if appropriate    Can we leave a detailed message on this number? YES    Phone number patient can be reached at: Home number on file [phone] (home)    Best Time: any    Call taken on 7/20/2020 at 10:02 AM by Jayy Palmer            "

## 2020-07-21 RX ORDER — FLUTICASONE PROPIONATE 50 MCG
SPRAY, SUSPENSION (ML) NASAL
Qty: 48 G | Refills: 2 | Status: SHIPPED | OUTPATIENT
Start: 2020-07-21 | End: 2022-05-26

## 2020-07-21 NOTE — TELEPHONE ENCOUNTER
Routing refill request to provider for review/approval because:  Script is from 9/2016    Joann Vila, RN, BSN

## 2020-07-21 NOTE — TELEPHONE ENCOUNTER
Pending Prescriptions:                       Disp   Refills    fluticasone (FLONASE) 50 MCG/ACT nasal sp*48 g   2            Sig: SPRAY 1-2 SPRAYS IN EACH NOSTRIL DAILY    Prescription approved per [facility] Refill Protocol.    Amber Lopez, MSN, RN

## 2020-07-22 RX ORDER — NITROGLYCERIN 0.4 MG/1
0.4 TABLET SUBLINGUAL EVERY 5 MIN PRN
Qty: 25 TABLET | Refills: 0 | Status: SHIPPED | OUTPATIENT
Start: 2020-07-22 | End: 2023-05-02

## 2020-07-29 ENCOUNTER — OFFICE VISIT (OUTPATIENT)
Dept: FAMILY MEDICINE | Facility: OTHER | Age: 65
End: 2020-07-29
Payer: COMMERCIAL

## 2020-07-29 VITALS
SYSTOLIC BLOOD PRESSURE: 126 MMHG | HEIGHT: 70 IN | BODY MASS INDEX: 28.09 KG/M2 | DIASTOLIC BLOOD PRESSURE: 82 MMHG | HEART RATE: 67 BPM | WEIGHT: 196.2 LBS | TEMPERATURE: 98.2 F | RESPIRATION RATE: 14 BRPM | OXYGEN SATURATION: 99 %

## 2020-07-29 DIAGNOSIS — Z12.5 SCREENING FOR PROSTATE CANCER: ICD-10-CM

## 2020-07-29 DIAGNOSIS — R19.7 DIARRHEA OF PRESUMED INFECTIOUS ORIGIN: Primary | ICD-10-CM

## 2020-07-29 DIAGNOSIS — I10 HYPERTENSION GOAL BP (BLOOD PRESSURE) < 140/90: ICD-10-CM

## 2020-07-29 LAB
ALBUMIN SERPL-MCNC: 4.3 G/DL (ref 3.4–5)
ALP SERPL-CCNC: 104 U/L (ref 40–150)
ALT SERPL W P-5'-P-CCNC: 42 U/L (ref 0–70)
ANION GAP SERPL CALCULATED.3IONS-SCNC: 3 MMOL/L (ref 3–14)
AST SERPL W P-5'-P-CCNC: 24 U/L (ref 0–45)
BILIRUB SERPL-MCNC: 0.8 MG/DL (ref 0.2–1.3)
BUN SERPL-MCNC: 13 MG/DL (ref 7–30)
CALCIUM SERPL-MCNC: 9.1 MG/DL (ref 8.5–10.1)
CHLORIDE SERPL-SCNC: 108 MMOL/L (ref 94–109)
CO2 SERPL-SCNC: 28 MMOL/L (ref 20–32)
CREAT SERPL-MCNC: 1 MG/DL (ref 0.66–1.25)
GFR SERPL CREATININE-BSD FRML MDRD: 79 ML/MIN/{1.73_M2}
GLUCOSE SERPL-MCNC: 101 MG/DL (ref 70–99)
POTASSIUM SERPL-SCNC: 5 MMOL/L (ref 3.4–5.3)
PROT SERPL-MCNC: 7.7 G/DL (ref 6.8–8.8)
PSA SERPL-ACNC: 0.53 UG/L (ref 0–4)
SODIUM SERPL-SCNC: 139 MMOL/L (ref 133–144)

## 2020-07-29 PROCEDURE — 36415 COLL VENOUS BLD VENIPUNCTURE: CPT | Performed by: FAMILY MEDICINE

## 2020-07-29 PROCEDURE — 99214 OFFICE O/P EST MOD 30 MIN: CPT | Performed by: FAMILY MEDICINE

## 2020-07-29 PROCEDURE — 80053 COMPREHEN METABOLIC PANEL: CPT | Performed by: FAMILY MEDICINE

## 2020-07-29 PROCEDURE — G0103 PSA SCREENING: HCPCS | Performed by: FAMILY MEDICINE

## 2020-07-29 ASSESSMENT — MIFFLIN-ST. JEOR: SCORE: 1686.21

## 2020-07-29 NOTE — PATIENT INSTRUCTIONS
Important Takeaway Points From This Visit:    I will call with your lab results when available.    You can use imodium if needed, but try not to for now and let it run its course      As always, please call with any questions or concerns. I look forward to seeing you again soon!    Take care,  Dr. Rust    Your current medication list is printed. Please keep this with you - it is helpful to bring this current list to any other medical appointments. It can also be helpful if you ever go to the emergency room or hospital.    If you had lab testing today we will call you with the results. The phone number we will call with your results is # [phone] (home) . If this is not the best number please call our clinic and change the number.    If you need any refills, please call your pharmacy and they will contact us.    If you have any further concerns or wish to schedule another appointment, please call our office at [phone].    If you have a medical emergency, please call [phone].    Thank you for coming to [facility]!          Patient Education     Treating Diarrhea    Diarrhea happens when you have loose, watery, or frequent bowel movements. It is a common problem with many causes. Most cases of diarrhea clear up on their own. But certain cases may need treatment. Be sure to see your healthcare provider if your symptoms do not improve within a few days.  Getting relief  Treatment of diarrhea depends on its cause. Diarrhea caused by bacterial or parasite infection is often treated with antibiotics. Diarrhea caused by other factors, such as a stomach virus, often improves with simple home treatment. The tips below may also help relieve your symptoms.    Drink plenty of fluids. This helps prevent too much fluid loss (dehydration). Water, clear soups, and electrolyte solutions are good choices. Avoid alcohol, coffee, tea, and milk. These can irritate your intestines and make symptoms  worse.    Suck on ice chips if drinking makes you queasy.    Return to your normal diet slowly. You may want to eat bland foods at first, such as rice and toast. Also, you may need to avoid certain foods for a while, such as dairy products. These can make symptoms worse. Ask your healthcare provider if there are any other foods you should avoid.    If you were prescribed antibiotics, take them as directed.    Do not take anti-diarrhea medicines without asking your healthcare provider first.  Call your healthcare provider   Call your healthcare provider if you have any of the following:     A fever of 100.4 F (38.0 C) or higher, or as directed by your healthcare provider    Severe pain    Worsening diarrhea or diarrhea for more than 2 days    Bloody vomit or stool    Signs of dehydration (dizziness, dry mouth and tongue, rapid pulse, dark urine)  Date Last Reviewed: 7/1/2016 2000-2019 [employer]. [address] [place] [address], [place] [address]. All rights reserved. This information is not intended as a substitute for professional medical care. Always follow your healthcare professional's instructions.

## 2020-07-29 NOTE — PROGRESS NOTES
"Subjective     Keegan Buchanan is a 64 year old male who presents to clinic today for the following health issues:    HPI     Diarrhea  Onset: 6 days     Description: \" I feel like I'm full of air.\"       No sharp pains.   Consistency of stool: watery and runny  Blood in stool: no   Number of loose stools in past 24 hours: 7- 8    Progression of Symptoms:  same    Accompanying Signs & Symptoms:  Fever: no   Nausea or vomiting; Yes Nausea. No vomiting.   Abdominal pain: no    Episodes of constipation: no   Weight loss: no     History:   Ill contacts: no   Recent use of antibiotics: no    Recent travels: no          Recent medication-new or changes(Rx or OTC): no     Precipitating factors:   none    Alleviating factors:   none    Therapies Tried and outcome: Imodium      Symptoms going on since last Thursday after eating reheated chinese. Wife also had 1-2 days of symptoms, but his is longer. Felt bloated for a couple months now. No blood or melena. Some nausea if taking imodium, but no vomiting. No other new symptoms.    Has history of C. Diff ~ 1 year ago. Had a Z-pack earlier this spring, but otherwise no recent antibiotic use. No recent travel.    Patient would also like to have his PSA done today. Due for CMP.    Patient Active Problem List   Diagnosis     Hypertension goal BP (blood pressure) < 140/90     Trigger finger, acquired     Impotence of organic origin     Hyperlipidemia LDL goal <100     Arthritis of elbow, right     Cubital tunnel syndrome - right     Acute coronary syndrome (H)     CAD (coronary artery disease)     Benign prostatic hyperplasia with urinary obstruction     GERD (gastroesophageal reflux disease)     Moderate chronic obstructive pulmonary disease (H)     Piriformis syndrome of left side     Nonallopathic lesion of sacral region     Nonallopathic lesion of thoracic region     Nonallopathic lesion of cervical region     Cervicalgia     Left inguinal hernia     Congestion of paranasal " sinus     PVC's (premature ventricular contractions)     Past Surgical History:   Procedure Laterality Date     COLONOSCOPY N/A 2015    Procedure: COMBINED COLONOSCOPY, SINGLE OR MULTIPLE BIOPSY/POLYPECTOMY BY BIOPSY;  Surgeon: Herman Rebollar MD;  Location: [facility]     COLONOSCOPY N/A 2018    Procedure: colonoscopy;  Surgeon: Thiago Robles MD;  Location: [facility]     H STENT PROCED  2012     HC REMOVE TONSILS/ADENOIDS,<11 Y/O      T & A <12y.o.     LAPAROSCOPIC HERNIORRHAPHY INGUINAL Left 8/3/2018    Procedure: LAPAROSCOPIC HERNIORRHAPHY INGUINAL;  Laparoscopic left inguinal hernia repair;  Surgeon: Huber Ireland MD;  Location: [facility]     REMOVAL OF SPERM DUCT(S)  88    Vasectomy       Social History     Tobacco Use     Smoking status: Former Smoker     Packs/day: 1.00     Years: 15.00     Pack years: 15.00     Smokeless tobacco: Former User     Types: Chew     Quit date: 1987     Tobacco comment: quit 25 years ago.   Substance Use Topics     Alcohol use: Yes     Comment: 1 -2 monthly     Family History   Problem Relation Age of Onset     Diabetes Mother         adult onset diabetes     Cancer Mother         kidney dx age 75     Cancer Father         prostate cancer/ at age 73     Hypertension Brother      Cancer Paternal Grandfather         prostat ca     Lung Cancer Brother          Current Outpatient Medications   Medication Sig Dispense Refill     albuterol (PROAIR RESPICLICK) 108 (90 Base) MCG/ACT inhaler Inhale 2 puffs into the lungs every 6 hours as needed for shortness of breath / dyspnea or wheezing 1 each 3     aspirin 81 MG tablet Take 1 tablet by mouth daily. 90 tablet 3     atorvastatin (LIPITOR) 80 MG tablet TAKE ONE TABLET BY MOUTH ONCE DAILY 90 tablet 1     beclomethasone HFA (QVAR REDIHALER) 80 MCG/ACT inhaler Inhale 1 puff into the lungs 2 times daily 3 Inhaler 1     fluticasone (FLONASE) 50 MCG/ACT nasal spray SPRAY 1-2 SPRAYS IN EACH NOSTRIL DAILY 48 g  "2     lisinopril (ZESTRIL) 10 MG tablet TAKE ONE TABLET BY MOUTH ONCE DAILY 90 tablet 0     metoprolol succinate ER (TOPROL-XL) 50 MG 24 hr tablet Take 1 tablet (50 mg) by mouth 2 times daily 180 tablet 3     nitroGLYcerin (NITROSTAT) 0.4 MG sublingual tablet Place 1 tablet (0.4 mg) under the tongue every 5 minutes as needed for chest pain 25 tablet 0     omeprazole (PRILOSEC) 20 MG DR capsule TAKE ONE CAPSULE BY MOUTH ONCE DAILY 90 capsule 1     tamsulosin (FLOMAX) 0.4 MG capsule TAKE ONE CAPSULE BY MOUTH ONCE DAILY 90 capsule 0     Allergies   Allergen Reactions     Augmentin Diarrhea and GI Disturbance     Codeine Nausea     significant     Penicillins      GI upset and diarrhea     Reviewed and updated as needed this visit by Provider  Tobacco  Allergies  Meds  Problems  Med Hx  Surg Hx  Fam Hx       Review of Systems   Constitutional, HEENT, derm, mks, cardiovascular, pulmonary, gi and gu systems are negative, except as otherwise noted.      Objective    /82   Pulse 67   Temp 98.2  F (36.8  C) (Oral)   Resp 14   Ht 1.778 m (5' 10\")   Wt 89 kg (196 lb 3.2 oz)   SpO2 99%   BMI 28.15 kg/m    Body mass index is 28.15 kg/m .  Physical Exam   General: Appears well and in no acute distress.  Cardiovascular: Regular rate and rhythm, normal S1 and S2 without murmur. No extra heartsounds or friction rub. Radial pulses present and equal bilaterally.  Respiratory: Lungs clear to auscultation bilaterally. No wheezing or crackles. No prolonged expiration. Symmetrical chest rise.  Musculoskeletal: No gross extremity deformities. No peripheral edema. Normal muscle bulk.  GI/Rectal: Soft, non-tender abdomen. No hepatosplenomegaly. Normal active bowel sounds.    Diagnostic Test Results:    Labs pending    Assessment & Plan   1. Diarrhea of presumed infectious origin: Likely gastroenteritis that will self resolve. Does have c. Diff history so will check for this. If persistent symptoms may consider colonoscopy " looking for other cause such as microscopic colitis etc.  - Clostridium difficile Toxin B PCR; Future  - Enteric Bacteria and Virus Panel by [facility] Stool; Future    2. Screening for prostate cancer  - PSA, screen    3. Hypertension goal BP (blood pressure) < 140/90: Due for labs. Controlled.  - Comprehensive metabolic panel (BMP + Alb, Alk Phos, ALT, AST, Total. Bili, TP)      Return in about 1 week (around 8/5/2020), or if symptoms worsen or fail to improve.    Basim Rust MD  [facility]    This chart is completed utilizing dictation software; typos and/or incorrect word substitutions may unintentionally occur.

## 2020-07-30 DIAGNOSIS — R19.7 DIARRHEA OF PRESUMED INFECTIOUS ORIGIN: ICD-10-CM

## 2020-07-30 PROCEDURE — 87506 IADNA-DNA/RNA PROBE TQ 6-11: CPT | Performed by: FAMILY MEDICINE

## 2020-09-28 ENCOUNTER — TELEPHONE (OUTPATIENT)
Dept: CARDIOLOGY | Facility: CLINIC | Age: 65
End: 2020-09-28

## 2020-09-28 NOTE — TELEPHONE ENCOUNTER
Sobia King,     When I spoke w/ him this morning he did not have a visit w/  scheduled yet. I encouraged him to make an OV after our conversation. He seemed stable enough to wait until he could see . I will call him and let him know we can make an OV with you earlier if needed. He can also call if sxs worsen prior to  OV next month. Thank you!    JOSE F Chau

## 2020-09-28 NOTE — TELEPHONE ENCOUNTER
"Pt called wanting to discuss his PVCs. States in the past couple of weeks his PVCs have gotten worse. Feels palpitations sporadically throughout the day and they are very aggravating. Last a couple mins at a time.     3/10/20 per Christine HUMPHRIES, \"Please increase metoprolol XL to 50 mg BID for symptomatic PVCs. If this does not control symptoms, we can try diltiazem in the future.\"    Pt states he did feel the increase improved his sxs significantly. Now worsened. Pt wonders if it could be allergies (hx COPD) that is aggravating his PVCs.     Denies increase caffeine intake. Denies SOB, lightheadedness or dizziness w/ episodes of palpitations.     Increase stress last couple of wks. Does not check BP/HRs at home. Pt is due for f/u in Oct w/  (not scheduled, advised pt to schedule this). Will route to YANDEL King for review. JOSE F Chau    "

## 2020-09-28 NOTE — TELEPHONE ENCOUNTER
Freedom Nelson-      Thanks for your message.  It looks like Keegan has an office visit coming up with Dr. Millan on 10/29/2020.  Do think he is comfortable waiting to discuss his symptoms at that visit?  If not and I have availability I am happy to see him sooner.    Thanks for your help    DAMIAN Waldrop, CNP

## 2020-09-28 NOTE — PROGRESS NOTES
"Keegan Buchanan is a 64 year old male who is being evaluated via a billable telephone visit.      The patient has been notified of following:     \"This telephone visit will be conducted via a call between you and your physician/provider. We have found that certain health care needs can be provided without the need for a physical exam.  This service lets us provide the care you need with a short phone conversation.  If a prescription is necessary we can send it directly to your pharmacy.  If lab work is needed we can place an order for that and you can then stop by our lab to have the test done at a later time.    Telephone visits are billed at different rates depending on your insurance coverage. During this emergency period, for some insurers they may be billed the same as an in-person visit.  Please reach out to your insurance provider with any questions.    If during the course of the call the physician/provider feels a telephone visit is not appropriate, you will not be charged for this service.\"    Patient has given verbal consent for Telephone visit?  {YES-NO  Default Yes:4444::\"Yes\"}    What phone number would you like to be contacted at? ***    How would you like to obtain your AVS? {AVS Preference:861803}    Subjective     Keegan Buchanan is a 64 year old male who presents via phone visit today for the following health issues:    HPI    {SUPERLIST (Optional):648761}  {PEDS Chronic and Acute Problems (Optional):391617}     {additonal problems for provider to add (Optional):396428}    Review of Systems   {ROS COMP (Optional):025032}       Objective          Vitals:  No vitals were obtained today due to virtual visit.    {GENERAL APPEARANCE:50::\"healthy\",\"alert\",\"no distress\"}  PSYCH: Alert and oriented times 3; coherent speech, normal   rate and volume, able to articulate logical thoughts, able   to abstract reason, no tangential thoughts, no hallucinations   or delusions  His affect is { " ":2463847::\"normal\"}  RESP: No cough, no audible wheezing, able to talk in full sentences  Remainder of exam unable to be completed due to telephone visits    {Diagnostic Test Results (Optional):115923}        Assessment/Plan:    {PROVIDER CHARTING PREFERENCE SOAPO:250093}    Phone call duration:  *** minutes              "

## 2020-09-30 ENCOUNTER — VIRTUAL VISIT (OUTPATIENT)
Dept: FAMILY MEDICINE | Facility: OTHER | Age: 65
End: 2020-09-30
Payer: COMMERCIAL

## 2020-09-30 DIAGNOSIS — J44.9 MODERATE CHRONIC OBSTRUCTIVE PULMONARY DISEASE (H): ICD-10-CM

## 2020-09-30 DIAGNOSIS — N40.1 BENIGN PROSTATIC HYPERPLASIA WITH URINARY OBSTRUCTION: ICD-10-CM

## 2020-09-30 DIAGNOSIS — I49.3 PVC'S (PREMATURE VENTRICULAR CONTRACTIONS): ICD-10-CM

## 2020-09-30 DIAGNOSIS — F32.0 MILD MAJOR DEPRESSION (H): Primary | ICD-10-CM

## 2020-09-30 DIAGNOSIS — L98.9 SKIN LESION: ICD-10-CM

## 2020-09-30 DIAGNOSIS — K21.9 GASTROESOPHAGEAL REFLUX DISEASE WITHOUT ESOPHAGITIS: ICD-10-CM

## 2020-09-30 DIAGNOSIS — N13.8 BENIGN PROSTATIC HYPERPLASIA WITH URINARY OBSTRUCTION: ICD-10-CM

## 2020-09-30 DIAGNOSIS — R42 DIZZINESS: ICD-10-CM

## 2020-09-30 DIAGNOSIS — E78.5 HYPERLIPIDEMIA LDL GOAL <100: ICD-10-CM

## 2020-09-30 DIAGNOSIS — I10 HYPERTENSION GOAL BP (BLOOD PRESSURE) < 140/90: ICD-10-CM

## 2020-09-30 PROCEDURE — 99214 OFFICE O/P EST MOD 30 MIN: CPT | Mod: 95 | Performed by: FAMILY MEDICINE

## 2020-09-30 RX ORDER — LISINOPRIL 10 MG/1
10 TABLET ORAL DAILY
Qty: 90 TABLET | Refills: 1 | Status: SHIPPED | OUTPATIENT
Start: 2020-09-30 | End: 2021-05-14

## 2020-09-30 RX ORDER — ALBUTEROL SULFATE 90 UG/1
POWDER, METERED RESPIRATORY (INHALATION)
Qty: 1 EACH | Refills: 3 | Status: SHIPPED | OUTPATIENT
Start: 2020-09-30 | End: 2020-11-03 | Stop reason: ALTCHOICE

## 2020-09-30 RX ORDER — TAMSULOSIN HYDROCHLORIDE 0.4 MG/1
CAPSULE ORAL
Qty: 90 CAPSULE | Refills: 1 | Status: SHIPPED | OUTPATIENT
Start: 2020-09-30 | End: 2021-08-17

## 2020-09-30 ASSESSMENT — PATIENT HEALTH QUESTIONNAIRE - PHQ9: SUM OF ALL RESPONSES TO PHQ QUESTIONS 1-9: 6

## 2020-09-30 NOTE — PATIENT INSTRUCTIONS
"Thank you for visiting [facility]    Good luck with counseling.  If it's not helping with your mood, let us know.      Let's also check some labs.  We'll let you know your lab results as soon as we can.     You're also due for immunizations:  Flu, tetanus, and shingles.    I'll be in touch with you about your mole when I get the picture.    Contact us or return if questions or concerns.     Have a nice day!    Dr. Stovall     No follow-ups on file.      If you had imaging scheduled please refer to your radiology prep sheet.      If you need medication refills, please contact your pharmacy 3 days before your prescriptions runs out or download the [facility] negrito for your smart phone.   If you are out of refills, your pharmacy will contact contact the clinic.    Contact us or return if questions or concerns.     [facility]:    MD Margie Dewey PA-C Joel De Haan, PA-C Anna Niesen, PA-C Elizabeth \"Jenelle\" DAMIAN Arenas CNP, APRN, DAMIAN Mireles, YANDEL Toscano, MONEN, RN, PHN                                         [facility] assistance [phone]    We would like to hear from you, how was your visit today?    Aleah Mccarthy       Patient Information Supervisor     [facility] [place]             "

## 2020-09-30 NOTE — PROGRESS NOTES
"Keegan Buchanan is a 64 year old male who is being evaluated via a billable telephone visit.      The patient has been notified of following:     \"This telephone visit will be conducted via a call between you and your physician/provider. We have found that certain health care needs can be provided without the need for a physical exam.  This service lets us provide the care you need with a short phone conversation.  If a prescription is necessary we can send it directly to your pharmacy.  If lab work is needed we can place an order for that and you can then stop by our lab to have the test done at a later time.    Telephone visits are billed at different rates depending on your insurance coverage. During this emergency period, for some insurers they may be billed the same as an in-person visit.  Please reach out to your insurance provider with any questions.    If during the course of the call the physician/provider feels a telephone visit is not appropriate, you will not be charged for this service.\"    Patient has given verbal consent for Telephone visit?  Yes    What phone number would you like to be contacted at? [phone]    How would you like to obtain your AVS? MyChart    Subjective     Keegan Buchanan is a 64 year old male who presents via phone visit today for the following health issues:    HPI    He needs a referral to see a therapist for insurance.  For the past 1-2 years, he's been a little down.      This seemed to start when he developed PVCs.  He \"flies off the handle\" more than he should.  Always feels he' in a funk.      PHQ 9/30/2020   PHQ-9 Total Score 6   Q9: Thoughts of better off dead/self-harm past 2 weeks Not at all       Concern - MOLE  Onset: unknown   Description: massage therapist found and said to have it check out  Progression of Symptoms:  same    Has been present for many years.  He thought it was dead skin.  Has scraped  It off with his fingernail several times, will go away, but " then recurs.  He has been going to a massage therapist who said it should be checked out.       Pt has a cough every fall, which is attributed to allergies.  Goes away after fall finishes.          Review of Systems   Constitutional, HEENT, cardiovascular, pulmonary, gi and gu systems are negative, except as otherwise noted.       Objective          Vitals:  No vitals were obtained today due to virtual visit.      PSYCH: Alert and oriented times 3; coherent speech, normal   rate and volume, able to articulate logical thoughts, able   to abstract reason, no tangential thoughts, no hallucinations   or delusions  His affect is normal  RESP: No cough, no audible wheezing, able to talk in full sentences  Remainder of exam unable to be completed due to telephone visits    Orders Only on 07/30/2020   Component Date Value Ref Range Status     Campylobacter group by HU 07/30/2020 Not Detected  NDET^Not Detected Final     Salmonella species by HU 07/30/2020 Not Detected  NDET^Not Detected Final     Shigella species by HU 07/30/2020 Not Detected  NDET^Not Detected Final     Vibrio group by HU 07/30/2020 Not Detected  NDET^Not Detected Final     Rotavirus A by HU 07/30/2020 Not Detected  NDET^Not Detected Final     Shiga toxin 1 gene by HU 07/30/2020 Not Detected  NDET^Not Detected Final     Shiga toxin 2 gene by HU 07/30/2020 Not Detected  NDET^Not Detected Final     Norovirus I and II by HU 07/30/2020 Not Detected  NDET^Not Detected Final     Yersinia enterocolitica by HU 07/30/2020 Not Detected  NDET^Not Detected Final     Enteric pathogen comment 07/30/2020    Final                    Value:Testing performed by multiplexed, qualitative PCR using the [employer] Enteric   Pathogens Nucleic Acid Test. Results should not be used as the sole basis for diagnosis,   treatment, or other patient management decisions.      Comment: Positive results do not rule out co-infection with other organisms that are   not  detected by this test, and may not be the sole or definitive cause of   patient illness.   Negative results in the setting of clinical illness compatible with   gastroenteritis may be due to infection by pathogens that are not detected by   this test or non-infectious causes such as ulcerative colitis, irritable bowel   syndrome, or Crohn's disease.   Note: Shiga toxin producing E. coli (STEC) typically harbor one or both genes   that encode for Shiga toxins 1 and 2.               Assessment/Plan:    Assessment & Plan       ICD-10-CM    1. Mild major depression (H)  F32.0 MENTAL HEALTH REFERRAL  - Adult; Outpatient Treatment; Individual/Couples/Family/Group Therapy/Health Psychology; [facility] [place] [phone]; We will contact you to schedule the appointment or please call with any questions   2. PVC's (premature ventricular contractions)  I49.3 TSH with free T4 reflex   3. Skin lesion  L98.9    4. Hypertension goal BP (blood pressure) < 140/90  I10 TSH with free T4 reflex   5. Hyperlipidemia LDL goal <100  E78.5 Lipid panel reflex to direct LDL Fasting   6. Moderate chronic obstructive pulmonary disease (H)  J44.9       1.  Patient would like to try counseling to help with this.  I think this is very appropriate.  Referral was placed.  I have asked him to let me know if he is not improving over time.  We could consider medication treatment at the same time.  2.  Previously diagnosed, but since this coincides with his depression, and he has not had a TSH, will check this on upcoming lab work.  3.  Unclear etiology.  Somewhat suspicious for seborrheic keratosis, but awaiting pictures from patient since he was unable to be seen in clinic today due to his cough.  4.  Clinically controlled.  Will continue current regimen and check monitoring labs routinely.  5.  Due for follow-up.  Will check labs today.  Continue atorvastatin.  6.  Likely a contributing cause to his cough as well as allergies.   "Continue current management approach.    Portions of this note were completed using Dragon dictation software.  Although reviewed, there may be typographical and other inadvertent errors that remain.          BMI:   Estimated body mass index is 28.15 kg/m  as calculated from the following:    Height as of 7/29/20: 1.778 m (5' 10\").    Weight as of 7/29/20: 89 kg (196 lb 3.2 oz).   Weight management plan: Discussed healthy diet and exercise guidelines        Patient Instructions   [facility]    Good luck with counseling.  If it's not helping with your mood, let us know.      Let's also check some labs.  We'll let you know your lab results as soon as we can.     You're also due for immunizations:  Flu, tetanus, and shingles.    I'll be in touch with you about your mole when I get the picture.    Contact us or return if questions or concerns.     Have a nice day!    Dr. Stovall     No follow-ups on file.      If you had imaging scheduled please refer to your radiology prep sheet.      If you need medication refills, please contact your pharmacy 3 days before your prescriptions runs out or download the [facility] negrito for your smart phone.   If you are out of refills, your pharmacy will contact contact the clinic.    Contact us or return if questions or concerns.     -[facility] Care Team:    MD Margie Dewey PA-C Joel De Haan, PA-C Anna Niesen, PA-C Elizabeth \"Jenelle\" DAMIAN Arenas CNP, APRN, DAMIAN Mireles, YANDEL Toscano, MONEN, RN, PHN                                         [facility] assistance [phone]    We would like to hear from you, how was your visit today?    Aleah LORENZO'Christopher       Patient Information Supervisor     [facility] [place]                 Return in about 6 months (around 3/30/2021) for Recheck, E-visit, video, or phone visit.    Fabio Stovall MD, " MD  [facility]    Phone call duration:  16 minutes

## 2020-10-01 ENCOUNTER — TELEPHONE (OUTPATIENT)
Dept: FAMILY MEDICINE | Facility: OTHER | Age: 65
End: 2020-10-01

## 2020-10-01 NOTE — TELEPHONE ENCOUNTER
There are fasting labs and tsh already place.  Please call and schedule a lab only.    Dorian Toscano, MONEN, RN, PHN

## 2020-10-01 NOTE — TELEPHONE ENCOUNTER
Reason for Call: Request for an order or referral:    Order or referral being requested: fasting labs    Date needed: as soon as possible    Has the patient been seen by the PCP for this problem? YES    Additional comments: Pt called and wanted to schedule an apt for fasting labs due to his apt per patient,  but there are no orders can we get order to get him scheduled thank you    Phone number Patient can be reached at:  Home number on file [phone] (home)    Best Time:  anytime    Can we leave a detailed message on this number?  YES    Call taken on 10/1/2020 at 2:19 PM by Lila Delgadillo

## 2020-10-02 ENCOUNTER — ALLIED HEALTH/NURSE VISIT (OUTPATIENT)
Dept: FAMILY MEDICINE | Facility: OTHER | Age: 65
End: 2020-10-02
Payer: COMMERCIAL

## 2020-10-02 VITALS — DIASTOLIC BLOOD PRESSURE: 80 MMHG | OXYGEN SATURATION: 99 % | SYSTOLIC BLOOD PRESSURE: 124 MMHG | HEART RATE: 76 BPM

## 2020-10-02 DIAGNOSIS — Z23 NEED FOR PROPHYLACTIC VACCINATION AND INOCULATION AGAINST INFLUENZA: Primary | ICD-10-CM

## 2020-10-02 DIAGNOSIS — E78.5 HYPERLIPIDEMIA LDL GOAL <100: ICD-10-CM

## 2020-10-02 DIAGNOSIS — I10 HYPERTENSION GOAL BP (BLOOD PRESSURE) < 140/90: ICD-10-CM

## 2020-10-02 DIAGNOSIS — I49.3 PVC'S (PREMATURE VENTRICULAR CONTRACTIONS): ICD-10-CM

## 2020-10-02 LAB
CHOLEST SERPL-MCNC: 148 MG/DL
HDLC SERPL-MCNC: 41 MG/DL
LDLC SERPL CALC-MCNC: 75 MG/DL
NONHDLC SERPL-MCNC: 107 MG/DL
TRIGL SERPL-MCNC: 162 MG/DL
TSH SERPL DL<=0.005 MIU/L-ACNC: 1.44 MU/L (ref 0.4–4)

## 2020-10-02 PROCEDURE — 90682 RIV4 VACC RECOMBINANT DNA IM: CPT

## 2020-10-02 PROCEDURE — 80061 LIPID PANEL: CPT | Performed by: FAMILY MEDICINE

## 2020-10-02 PROCEDURE — 99207 PR NO CHARGE NURSE ONLY: CPT

## 2020-10-02 PROCEDURE — 36415 COLL VENOUS BLD VENIPUNCTURE: CPT | Performed by: FAMILY MEDICINE

## 2020-10-02 PROCEDURE — 84443 ASSAY THYROID STIM HORMONE: CPT | Performed by: FAMILY MEDICINE

## 2020-10-02 NOTE — PROGRESS NOTES
Keegan Buchanan is a 64 year old patient who comes in today for a Blood Pressure check.  Initial BP:  /80 (BP Location: Left arm, Patient Position: Chair, Cuff Size: Adult Regular)   Pulse 76   SpO2 99%      Data Unavailable  Disposition: results routed to provider      Beckie DIOP

## 2020-10-04 ENCOUNTER — MYC MEDICAL ADVICE (OUTPATIENT)
Dept: FAMILY MEDICINE | Facility: OTHER | Age: 65
End: 2020-10-04

## 2020-10-04 NOTE — TELEPHONE ENCOUNTER
Responded to [employer].    please review pictures from the two encounters sent on 10/04/20.   Alberto Rose RN  October 4, 2020

## 2020-10-19 DIAGNOSIS — E78.5 HYPERLIPIDEMIA LDL GOAL <100: ICD-10-CM

## 2020-10-20 ENCOUNTER — VIRTUAL VISIT (OUTPATIENT)
Dept: PSYCHOLOGY | Facility: CLINIC | Age: 65
End: 2020-10-20
Payer: COMMERCIAL

## 2020-10-20 ENCOUNTER — FCC EXTENDED DOCUMENTATION (OUTPATIENT)
Dept: PSYCHOLOGY | Facility: CLINIC | Age: 65
End: 2020-10-20

## 2020-10-20 DIAGNOSIS — F41.1 GENERALIZED ANXIETY DISORDER: Primary | ICD-10-CM

## 2020-10-20 PROCEDURE — 99207 PR NO BILLABLE SERVICE THIS VISIT: CPT | Mod: 95 | Performed by: PSYCHOLOGIST

## 2020-10-20 RX ORDER — ATORVASTATIN CALCIUM 80 MG/1
80 TABLET, FILM COATED ORAL DAILY
Qty: 90 TABLET | Refills: 3 | Status: SHIPPED | OUTPATIENT
Start: 2020-10-20 | End: 2021-08-19

## 2020-10-20 ASSESSMENT — COLUMBIA-SUICIDE SEVERITY RATING SCALE - C-SSRS
ATTEMPT LIFETIME: NO
LETHALITY/MEDICAL DAMAGE CODE MOST LETHAL ACTUAL ATTEMPT: NO PHYSICAL DAMAGE OR VERY MINOR PHYSICAL DAMAGE
TOTAL  NUMBER OF ABORTED OR SELF INTERRUPTED ATTEMPTS PAST 3 MONTHS: NO
LETHALITY/MEDICAL DAMAGE CODE FIRST PROTENTIAL ATTEMPT: BEHAVIOR NOT LIKELY TO RESULT IN INJURY
REASONS FOR IDEATION PAST MONTH: DOES NOT APPLY
1. IN THE PAST MONTH, HAVE YOU WISHED YOU WERE DEAD OR WISHED YOU COULD GO TO SLEEP AND NOT WAKE UP?: YES
ATTEMPT PAST THREE MONTHS: NO
TOTAL  NUMBER OF ABORTED OR SELF INTERRUPTED ATTEMPTS PAST LIFETIME: NO
3. HAVE YOU BEEN THINKING ABOUT HOW YOU MIGHT KILL YOURSELF?: NO
5. HAVE YOU STARTED TO WORK OUT OR WORKED OUT THE DETAILS OF HOW TO KILL YOURSELF? DO YOU INTEND TO CARRY OUT THIS PLAN?: NO
1. IN THE PAST MONTH, HAVE YOU WISHED YOU WERE DEAD OR WISHED YOU COULD GO TO SLEEP AND NOT WAKE UP?: NO
2. HAVE YOU ACTUALLY HAD ANY THOUGHTS OF KILLING YOURSELF?: ONCE OR TWICE
6. HAVE YOU EVER DONE ANYTHING, STARTED TO DO ANYTHING, OR PREPARED TO DO ANYTHING TO END YOUR LIFE?: NO
5. HAVE YOU STARTED TO WORK OUT OR WORKED OUT THE DETAILS OF HOW TO KILL YOURSELF? DO YOU INTEND TO CARRY OUT THIS PLAN?: NO
TOTAL  NUMBER OF INTERRUPTED ATTEMPTS PAST 3 MONTHS: NO
2. HAVE YOU ACTUALLY HAD ANY THOUGHTS OF KILLING YOURSELF LIFETIME?: NO
LETHALITY/MEDICAL DAMAGE CODE FIRST POTENTIAL ATTEMPT: BEHAVIOR NOT LIKELY TO RESULT IN INJURY
2. HAVE YOU ACTUALLY HAD ANY THOUGHTS OF KILLING YOURSELF?: YES
4. HAVE YOU HAD THESE THOUGHTS AND HAD SOME INTENTION OF ACTING ON THEM?: NO
TOTAL  NUMBER OF INTERRUPTED ATTEMPTS LIFETIME: NO
LETHALITY/MEDICAL DAMAGE CODE FIRST ACTUAL ATTEMPT: NO PHYSICAL DAMAGE OR VERY MINOR PHYSICAL DAMAGE
6. HAVE YOU EVER DONE ANYTHING, STARTED TO DO ANYTHING, OR PREPARED TO DO ANYTHING TO END YOUR LIFE?: NO
4. HAVE YOU HAD THESE THOUGHTS AND HAD SOME INTENTION OF ACTING ON THEM?: NO
REASONS FOR IDEATION LIFETIME: DOES NOT APPLY

## 2020-10-20 ASSESSMENT — PATIENT HEALTH QUESTIONNAIRE - PHQ9
SUM OF ALL RESPONSES TO PHQ QUESTIONS 1-9: 7
5. POOR APPETITE OR OVEREATING: MORE THAN HALF THE DAYS

## 2020-10-20 ASSESSMENT — ANXIETY QUESTIONNAIRES
6. BECOMING EASILY ANNOYED OR IRRITABLE: NEARLY EVERY DAY
IF YOU CHECKED OFF ANY PROBLEMS ON THIS QUESTIONNAIRE, HOW DIFFICULT HAVE THESE PROBLEMS MADE IT FOR YOU TO DO YOUR WORK, TAKE CARE OF THINGS AT HOME, OR GET ALONG WITH OTHER PEOPLE: SOMEWHAT DIFFICULT
3. WORRYING TOO MUCH ABOUT DIFFERENT THINGS: NEARLY EVERY DAY
1. FEELING NERVOUS, ANXIOUS, OR ON EDGE: MORE THAN HALF THE DAYS
2. NOT BEING ABLE TO STOP OR CONTROL WORRYING: NEARLY EVERY DAY
5. BEING SO RESTLESS THAT IT IS HARD TO SIT STILL: NOT AT ALL
7. FEELING AFRAID AS IF SOMETHING AWFUL MIGHT HAPPEN: SEVERAL DAYS
GAD7 TOTAL SCORE: 14

## 2020-10-20 NOTE — PROGRESS NOTES
"                 Progress Note - Initial Session    Client Name:  Keegan Buchanan Date: 10/20/20         Service Type: Individual     Session Start Time: 09:00 AM Session End Time: 09:52 AM     Session Length: 1    Session #: 1    Attendees: Client attended alone    Service Modality:  Phone Visit:    The patient has been notified of the following:      \"We have found that certain health care needs can be provided without the need for a face to face visit.  This service lets us provide the care you need with a phone conversation.       I will have full access to your [facility] [place] medical record during this entire phone call.   I will be taking notes for your medical record.      Since this is like an office visit, we will bill your insurance company for this service.       There are potential benefits and risks of telephone visits (e.g. limits to patient confidentiality) that differ from in-person visits.?  Confidentiality still applies for telephone services, and nobody will record the visit.  It is important to be in a quiet, private space that is free of distractions (including cell phone or other devices) during the visit.??      If during the course of the call I believe a telephone visit is not appropriate, you will not be charged for this service\"     Consent has been obtained for this service by care team member: Yes        DATA:  Diagnostic Assessment in progress.  Unable to complete documentation at the conclusion of the first session due to completion of assessments and gathering of pertinent information necessary to make clinical assessment    Interactive Complexity: No  Crisis: No    Intervention:  CBT: Help patient identify irrational biases, emotions and thought patterns that contribute into negative symptoms.     ASSESSMENT:  Mental Status Assessment:  Appearance:   unable to assess   Eye Contact:   unable to assess   Psychomotor Behavior: unable to assess   Attitude:   Cooperative  " Friendly  Orientation:   All  Speech   Rate / Production: Normal/ Responsive   Volume:  Normal   Mood:    unable to assess  Affect:    unable to assess   Thought Content:  Clear   Thought Form:  Goal Directed  Logical   Insight:    Fair       Safety Issues and Plan for Safety and Risk Management:     [place] Suicide Severity Rating Scale (Lifetime/Recent)  [place] Suicide Severity Rating (Lifetime/Recent) 10/20/2020   1. Wish to be Dead (Lifetime) No   1. Wish to be Dead (Recent) Yes   2. Non-Specific Active Suicidal Thoughts (Lifetime) No   2. Non-Specific Active Suicidal Thoughts (Recent) Yes   Non-Specific Active Suicidal Thought Description (Recent) once or twice   3. Active Suicidal Ideation with any Methods (Not Plan) Without Intent to Act (Lifetime) No   3. Active Sucidal Ideation with any Methods (Not Plan) Without Intent to Act (Recent) No   4. Active Suicidal Ideation with Some Intent to Act, Without Specific Plan (Lifetime) No   4. Active Suicidal Ideation with Some Intent to Act, Without Specific Plan (Recent) No   5. Active Suicidal Ideation with Specific Plan and Intent (Lifetime) No   5. Active Suicidal Ideation with Specific Plan and Intent (Recent) No   Most Severe Ideation Rating (Lifetime) NA   Frequency (Lifetime) NA   Duration (Lifetime) 1   Controllability (Lifetime) NA   Protective Factors  (Lifetime) NA   Reasons for Ideation (Lifetime) 0   Most Severe Ideation Rating (Past Month) 1   Frequency (Past Month) NA   Duration (Past Month) 1   Controllability (Past Month) 1   Protective Factors (Past Month) 1   Reasons for Ideation (Past Month) 0   Actual Attempt (Lifetime) No   Actual Attempt (Past 3 Months) No   Has subject engaged in non-suicidal self-injurious behavior? (Lifetime) No   Has subject engaged in non-suicidal self-injurious behavior? (Past 3 Months) No   Interrupted Attempts (Lifetime) No   Interrupted Attempts (Past 3 Months) No   Aborted or Self-Interrupted Attempt (Lifetime) No    Aborted or Self-Interrupted Attempt (Past 3 Months) No   Preparatory Acts or Behavior (Lifetime) No   Preparatory Acts or Behavior (Past 3 Months) No   Most Recent Attempt Actual Lethality Code NA   Most Lethal Attempt Actual Lethality Code 0   Most Lethal Attemplt Potential Lethality Code 0   Initial/First Attempt Actual Lethality Code 0   Initial/First Attempt Potential Lethality Code 0     Patient denies current fears or concerns for personal safety.  Patient reports the following current or recent suicidal ideation or behaviors: reports having a thought regarding suicidal ideation but no plan or follow through.  No concerns related to suicide..  Patient denies current or recent homicidal ideation or behaviors.  Patient denies current or recent self injurious behavior or ideation.  Patient denies other safety concerns.  Recommended that patient call 911 or go to the local ED should there be a change in any of these risk factors.  Patient reports there are firearms in the house. The firearms are secured in a locked space.     Diagnostic Criteria:  A. Excessive anxiety and worry about a number of events or activities (such as work or school performance).   B. The person finds it difficult to control the worry.  C. Select 3 or more symptoms (required for diagnosis). Only one item is required in children.   - Restlessness or feeling keyed up or on edge.    - Being easily fatigued.    - Difficulty concentrating or mind going blank.    - Irritability.    - Muscle tension.   D. The focus of the anxiety and worry is not confined to features of an Axis I disorder.  E. The anxiety, worry, or physical symptoms cause clinically significant distress or impairment in social, occupational, or other important areas of functioning.   F. The disturbance is not due to the direct physiological effects of a substance (e.g., a drug of abuse, a medication) or a general medical condition (e.g., hyperthyroidism) and does not occur  exclusively during a Mood Disorder, a Psychotic Disorder, or a Pervasive Developmental Disorder.      DSM5 Diagnoses: (Sustained by DSM5 Criteria Listed Above)  Diagnoses: 300.02 (F41.1) Generalized Anxiety Disorder  Psychosocial & Contextual Factors:   [facility]  Health  Societal  WHODAS 2.0 (12 item):   WHODAS 2.0 Total Score 10/14/2020 10/20/2020   Total Score - 23   Total Score MyChart 14 -   Some encounter information is confidential and restricted. Go to Review Flowsheets activity to see all data.       Collateral Reports Completed:  Routed note to PCP      PLAN: (Homework, other):  Patient stated that he may follow up for ongoing services with [facility].  Patient will follow up with intake to reschedule another session.       CHEN Mcelroy  October 20, 2020

## 2020-10-20 NOTE — TELEPHONE ENCOUNTER
Pending Prescriptions:                       Disp   Refills    atorvastatin (LIPITOR) 80 MG tablet       90 tab*3            Sig: Take 1 tablet (80 mg) by mouth daily    Prescription approved per [facility] [place] [facility].    Gela Jain RN BSN

## 2020-10-20 NOTE — PROGRESS NOTES
"  [facility]  Evaluator Name:  Jose Nagy     Credentials:  [facility]    PATIENT'S NAME: Keegan Buchanan  PREFERRED NAME: Keegan  PRONOUNS:       MRN:   [id number]  :   1955   ACCT. NUMBER: [id number]  DATE OF SERVICE: 20  START TIME: 07:00 AM  END TIME: 07:52 AM  PREFERRED PHONE: [phone]  May we leave a program related message: Yes  SERVICE MODALITY:  Phone Visit:    The patient has been notified of the following:      \"We have found that certain health care needs can be provided without the need for a face to face visit.  This service lets us provide the care you need with a phone conversation.       I will have full access to your [place] medical record during this entire phone call.   I will be taking notes for your medical record.      Since this is like an office visit, we will bill your insurance company for this service.       There are potential benefits and risks of telephone visits (e.g. limits to patient confidentiality) that differ from in-person visits.?  Confidentiality still applies for telephone services, and nobody will record the visit.  It is important to be in a quiet, private space that is free of distractions (including cell phone or other devices) during the visit.??      If during the course of the call I believe a telephone visit is not appropriate, you will not be charged for this service\"     Consent has been obtained for this service by care team member: Yes     UNIVERSAL ADULT DIAGNOSTIC ASSESSMENT      Identifying Information:  Patient is a 64 year old, .  The pronoun use throughout this assessment reflects the patient's chosen pronoun.  Patient was referred for an assessment by primary care provider.  Patient attended the session alone.     Chief Complaint:   The reason for seeking services at this time is: \" Difficulty with low mood, hopelessness, helplessness, anxiety, constant worry \"   The problem(s) began around the time of his first heart " attack.  Patient had corrective heart surgery and is in good physical health.  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  This can cause the heart to make the patient feel as if they are re-experiencing another heart attack.  Patient has not attempted to resolve these concerns in the past.    Social/Family History:  Patient reported they grew up in [place], [place].  They were raised by biological parents.  Parents stayed ..   Patient reported that their childhood was a good childhood.  Patient described their current relationships with family of origin as really close.  Patient stated that their parents passed away over 10 years ago.  Also, stated that their brother passed about 3 years ago due to cancer. Patient stated they have three brothers and are close with them today.      The patient describes their cultural background as that of dominant race and culture in their area.  Cultural influences and impact on patient's life structure, values, norms, and healthcare: none reported.  Contextual influences on patient's health include: Individual Factors low mood, depression and Health- Seeking Factors-cardiology difficulties with heart.    These factors will be addressed in the Preliminary Treatment plan.  Patient identified their preferred language to be English. Patient reported they does need the assistance of an  or other support involved in therapy.     Patient reported had no significant delays in developmental tasks.   Patient's highest education level was high school graduate. Patient identified the following learning problems: none reported.  Modifications will not be used to assist communication in therapy.  Patient reports they are  able to understand written materials.    Patient reported the following relationship history as that of normal dating.  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  They  shortly met their significant other and .  Patient's current relationship status is  for 44 years.   Patient identified their sexual orientation as heterosexual.  Patient reported having two child(srikanth). Patient identified spouse as part of their support system.  Patient identified the quality of these relationships as stable and meaningful.  Patient described that societal and political factors and circumstances are impacting their relationship with their spouse.     Patient's current living/housing situation involves staying in own home/apartment.  They live with wife Suzan and they report that housing is stable.     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  Patient reports their finances are obtained through employment, spouse and snf.  Patient does not identify finances as a current stressor.      Patient reported that they have not been involved with the legal system. Patient denies being on probation / parole / under the jurisdiction of the court.    Patient's Strengths and Limitations:  Patient identified the following strengths or resources that will help them succeed in treatment: commitment to health and well being, exercise routine, friends / good social support, family support, insight, intelligence, motivation and work ethic. Things that may interfere with the patient's success in treatment include: none identified.     Personal and Family Medical History:   Patient does report a family history of mental health concerns, however reported some concerns regarding depression in mother due to circumstantial issues for her. Patient reports family history includes Cancer in his father, mother, and paternal grandfather; Diabetes in his mother; Hypertension in his brother; Lung Cancer in his brother..     Patient does not report Mental Health Diagnosis or Treatment.      Patient has had a physical exam to rule out  medical causes for current symptoms.  Date of last physical exam was within the past year. Client was encouraged to follow up with PCP if symptoms were to develop. The patient has a [facility] Primary Care Provider, who is named Fabio Stovall..  Patient reports the following current medical concerns: cardiology difficulties.  There are not significant appetite / nutritional concerns / weight changes.   Patient does not report a history of head injury / trauma / cognitive impairment.     Patient reports current meds as:   No outpatient medications have been marked as taking for the 10/20/20 encounter ([facility] Extended Documentation) with Micah Nagy [facility].       Medication Adherence:  Patient reports taking prescribed medications as prescribed.    Patient Allergies:    Allergies   Allergen Reactions     Augmentin Diarrhea and GI Disturbance     Codeine Nausea     significant     Penicillins      GI upset and diarrhea       Medical History:    Past Medical History:   Diagnosis Date     Hypercholesteremia      Pain in joint, shoulder region     right shoulder separation     Unspecified essential hypertension          Current Mental Status Exam:   Appearance:  unable to assess    Eye Contact:  unable to assess   Psychomotor:  unable to assess       Gait / station:  Unable to assess  Attitude / Demeanor: Cooperative   Speech      Rate / Production: Normal/ Responsive      Volume:  Normal  volume      Language:  intact  Mood:   unable to assess  Affect:   unable to assess    Thought Content: Clear   Thought Process: Logical       Associations: None  Insight:   Good   Judgment:  Intact   Orientation:  All  Attention/concentration: Good    Rating Scales:    PHQ9:    PHQ-9 SCORE 9/30/2020 10/14/2020 10/20/2020   PHQ-9 Total Score MyChart - 4 (Minimal depression) -   PHQ-9 Total Score 6 - 7   Some encounter information is confidential and restricted. Go to Review Flowsheets activity to see all data.   ;    GAD7:    SERA-7  SCORE 10/14/2020 10/20/2020   Total Score 4 (minimal anxiety) -   Total Score - 14   Some encounter information is confidential and restricted. Go to Review Flowsheets activity to see all data.     CGI:     First:No data recorded;    Most recentNo data recorded    Substance Use:  Patient did report a family history of substance use concerns; see medical history section for details, discussed his brother was an alcoholic.  Patient has not received chemical dependency treatment in the past.  Patient has not ever been to detox.      Patient is not currently receiving any chemical dependency treatment. Patient reported the following problems as a result of their substance use: none reported.    Patient reports using alcohol 1 times per month and has 1 beers at a time. Patient first started drinking at age 17.  Patient reported date of last use was a couple of months ago.  Patient reports heaviest use was never, patient discussed never stated they used consistently.  Patient denies using tobacco.  Patient denies using marijuana.  Patient reports using caffeine 1 times per day and drinks 1 at a time. Patient started using caffeine at age around 30..  Patient reports using/abusing the following substance(s). Patient reported no other substance use.     CAGE- AID:  No flowsheet data found.    Substance Use: No symptoms    Based on the negative CAGE score and clinical interview there  are not indications of drug or alcohol abuse.      Significant Losses / Trauma / Abuse / Neglect Issues:   Patient did not serve in the .  There are indications or report of significant loss, trauma, abuse or neglect issues related to: are indications but client denies any losses, trauma, abuse, or neglect concerns and discussed difficulty when parents passed away as they are a close family unit.  Concerns for possible neglect are not present.     Safety Assessment:   Current Safety Concerns:  [place] Suicide Severity Rating Scale  (Lifetime/Recent)  [place] Suicide Severity Rating (Lifetime/Recent) 10/20/2020   1. Wish to be Dead (Lifetime) No   1. Wish to be Dead (Recent) Yes   2. Non-Specific Active Suicidal Thoughts (Lifetime) No   2. Non-Specific Active Suicidal Thoughts (Recent) Yes   Non-Specific Active Suicidal Thought Description (Recent) once or twice   3. Active Suicidal Ideation with any Methods (Not Plan) Without Intent to Act (Lifetime) No   3. Active Sucidal Ideation with any Methods (Not Plan) Without Intent to Act (Recent) No   4. Active Suicidal Ideation with Some Intent to Act, Without Specific Plan (Lifetime) No   4. Active Suicidal Ideation with Some Intent to Act, Without Specific Plan (Recent) No   5. Active Suicidal Ideation with Specific Plan and Intent (Lifetime) No   5. Active Suicidal Ideation with Specific Plan and Intent (Recent) No   Most Severe Ideation Rating (Lifetime) NA   Frequency (Lifetime) NA   Duration (Lifetime) 1   Controllability (Lifetime) NA   Protective Factors  (Lifetime) NA   Reasons for Ideation (Lifetime) 0   Most Severe Ideation Rating (Past Month) 1   Frequency (Past Month) NA   Duration (Past Month) 1   Controllability (Past Month) 1   Protective Factors (Past Month) 1   Reasons for Ideation (Past Month) 0   Actual Attempt (Lifetime) No   Actual Attempt (Past 3 Months) No   Has subject engaged in non-suicidal self-injurious behavior? (Lifetime) No   Has subject engaged in non-suicidal self-injurious behavior? (Past 3 Months) No   Interrupted Attempts (Lifetime) No   Interrupted Attempts (Past 3 Months) No   Aborted or Self-Interrupted Attempt (Lifetime) No   Aborted or Self-Interrupted Attempt (Past 3 Months) No   Preparatory Acts or Behavior (Lifetime) No   Preparatory Acts or Behavior (Past 3 Months) No   Most Recent Attempt Actual Lethality Code NA   Most Lethal Attempt Actual Lethality Code 0   Most Lethal Attemplt Potential Lethality Code 0   Initial/First Attempt Actual Lethality Code  0   Initial/First Attempt Potential Lethality Code 0     Patient denies current homicidal ideation and behaviors.  Patient denies current self-injurious ideation and behaviors.    Patient denied risk behaviors associated with substance use.  Patient denies any high risk behaviors associated with mental health symptoms.  Patient reports the following current concerns for their personal safety: None.  Patient reports there are  firearms in the house. The firearms are secured in a locked space.     History of Safety Concerns:  Patient denied a history of homicidal ideation.     Patient denied a history of personal safety concerns.    Patient denied a history of assaultive behaviors.    Patient denied a history of sexual assault behaviors.     Patient denied a history of risk behaviors associated with substance use.  Patient denies any history of high risk behaviors associated with mental health symptoms.  Patient reports the following protective factors: positive relationships positive social network and positive family connections, forward/future oriented thinking, safe and stable environment, regular sleep, regular physical activity, secure attachment, living with other people, daily obligations, structured day, committment to well-being, sense of meaning and sense of personal control or determination    Risk Plan:  See Recommendations for Safety and Risk Management Plan    Review of Symptoms per patient report:  Depression: Lack of interest, Change in energy level, Difficulties concentrating, Feelings of helplessness and Ruminations  Nidia:  No Symptoms  Psychosis: No Symptoms  Anxiety: Excessive worry, Nervousness, Physical complaints, such as headaches, stomachaches, muscle tension, Poor concentration and has improved but a month ago has difficulty  Panic:  Palpitations and difficulties with PVC-PreVentrical Contractions  Post Traumatic Stress Disorder:  No Symptoms   Eating Disorder: No Symptoms  ADD / ADHD:  No  symptoms  Conduct Disorder: No symptoms  Autism Spectrum Disorder: No symptoms  Obsessive Compulsive Disorder: No Symptoms    Patient reports the following compulsive behaviors and treatment history: none reported.      Diagnostic Criteria:   A) Single episode - symptoms have been present during the same 2-week period and represent a change from previous functioning 5 or more symptoms (required for diagnosis)   - Depressed mood. Note: In children and adolescents, can be irritable mood.     - Diminished interest or pleasure in all, or almost all, activities.    - Psychomotor activity agitation.    - Fatigue or loss of energy.    - Feelings of worthlessness or inappropriate and excessive guilt.    - Diminished ability to think or concentrate, or indecisiveness.    - Recurrent thoughts of death (not just fear of dying), recurrent suicidal ideation without a specific plan, or a suicide attempt or a specific plan for committing suicide.   B) The symptoms cause clinically significant distress or impairment in social, occupational, or other important areas of functioning  C) The episode is not attributable to the physiological effects of a substance or to another medical condition  D) The occurence of major depressive episode is not better explained by other thought / psychotic disorders    Functional Status:  Patient reports the following functional impairments: relationship(s) and self-care.     WHODAS:   WHODAS 2.0 Total Score 10/14/2020 10/20/2020   Total Score - 23   Total Score MyChart 14 -   Some encounter information is confidential and restricted. Go to Review Flowsheets activity to see all data.       Clinical Summary:  1. Reason for assessment: Secondary reasons for assessment are to discuss concerns related to symptoms of depression, anxiety, excessive worry, feelings of helplessness or hopelessness  .  2. Psychosocial, Cultural and Contextual Factors: Individual/Relational/Health  .  3. Principal DSM5  Diagnoses  (Sustained by DSM5 Criteria Listed Above):   296.25 (F32.4)  Major Depressive Disorder, Single Episode, In partial remssion _ and With anxious distress.  4. Other Diagnoses that is relevant to services:   No other Diagnosis  5. Provisional Diagnosis:  300.02 (F41.1) Generalized Anxiety Disorder as evidenced by excessive worry and rumination of thoughts of incidents that might occur .  6. Prognosis: Return to Normal Functioning.  7. Likely consequences of symptoms if not treated: Continuation of symptoms.  8. Client strengths include:  caring, empathetic, goal-focused, good listener, insightful, intelligent, open to learning, support of family, friends and providers and work history .     Recommendations:     1. Plan for Safety and Risk Management:   Recommended that patient call 911 or go to the local ED should there be a change in any of these risk factors..          Report to child / adult protection services was NA.     2. Patient's identified no cultural issues necessary or pertinent for treatment.     3. Initial Treatment will focus on:    Depressed Mood - Feelings of hoplelessness   Anxiety - excessive worry     4. Resources/Service Plan:    services are not indicated.   Modifications to assist communication are not indicated.   Additional disability accommodations are not indicated.      5. Collaboration:   Collaboration / coordination of treatment will be initiated with the following  support professionals: primary care physician.      6.  Referrals:   The following referral(s) will be initiated: no referrals necessary at time of assessment. Next Scheduled Appointment: 11/17/20 at 08:00 AM.     A Release of Information has been obtained for the following: primary care physician.    7. HUNTER:    HUNTER:  Discussed the general effects of drugs and alcohol on health and well-being. Provider gave patient printed information about the effects of chemical use on their health and well being.  Recommendations:  No recommendations necessary .     8. Records:    were reviewed at time of assessment.   Information in this assessment was obtained from the medical record and provided by patient who is a good historian.    Patient will have open access to their mental health medical record.      Eval type:  Mental Health    Provider Name/ Credentials:  Jose Nagy  November 9th, 2020

## 2020-10-21 ASSESSMENT — ANXIETY QUESTIONNAIRES: GAD7 TOTAL SCORE: 14

## 2020-10-23 ENCOUNTER — MYC MEDICAL ADVICE (OUTPATIENT)
Dept: FAMILY MEDICINE | Facility: OTHER | Age: 65
End: 2020-10-23

## 2020-10-29 ENCOUNTER — OFFICE VISIT (OUTPATIENT)
Dept: CARDIOLOGY | Facility: CLINIC | Age: 65
End: 2020-10-29
Payer: COMMERCIAL

## 2020-10-29 VITALS
HEART RATE: 72 BPM | HEIGHT: 71 IN | BODY MASS INDEX: 27.83 KG/M2 | RESPIRATION RATE: 12 BRPM | OXYGEN SATURATION: 97 % | WEIGHT: 198.8 LBS | DIASTOLIC BLOOD PRESSURE: 72 MMHG | SYSTOLIC BLOOD PRESSURE: 124 MMHG

## 2020-10-29 DIAGNOSIS — I49.3 PVC'S (PREMATURE VENTRICULAR CONTRACTIONS): ICD-10-CM

## 2020-10-29 DIAGNOSIS — I10 BENIGN ESSENTIAL HYPERTENSION: ICD-10-CM

## 2020-10-29 DIAGNOSIS — I25.10 CORONARY ARTERY DISEASE INVOLVING NATIVE CORONARY ARTERY OF NATIVE HEART, ANGINA PRESENCE UNSPECIFIED: Primary | ICD-10-CM

## 2020-10-29 PROCEDURE — 99214 OFFICE O/P EST MOD 30 MIN: CPT | Performed by: INTERNAL MEDICINE

## 2020-10-29 ASSESSMENT — PAIN SCALES - GENERAL: PAINLEVEL: NO PAIN (0)

## 2020-10-29 ASSESSMENT — MIFFLIN-ST. JEOR: SCORE: 1705.94

## 2020-10-29 NOTE — LETTER
10/29/2020      Fabio Stovall MD, MD   [address] [place] [address]      RE: Keegan Buchanan       Dear Colleague,    I had the pleasure of seeing Keegan Buchanan in the [facility].    Service Date: 10/29/2020      HISTORY OF PRESENT ILLNESS:  Keegan Buchanan, a 64-year-old man with coronary artery disease, hypertension, dyslipidemia, benign prostatic hypertrophy and symptomatic premature ventricular contractions, was seen today at your request for followup.      Since seen last year, the patient denies any new symptoms.  He is still subject to occasional symptomatic premature ventricular contractions, but in general his symptoms are well controlled on beta blocker therapy.  The patient and his wife plan to return to [place] this winter.  The patient specifically denies chest, arm, neck, jaw or back discomfort with exertion.      PAST MEDICAL HISTORY:   1.  Coronary artery disease.   a.  History of unstable angina.  Status post implantation of 2 drug-eluting stents in the mid LAD in 2012.  Negative nuclear stress test 2013.   2.  Hypertension.   3.  Dyslipidemia.   4.  Occasional symptomatic premature ventricular contractions.   5.  Osteoarthritis.   6.  Benign prostatic hypertrophy.      PHYSICAL EXAMINATION:   GENERAL:  Exam today demonstrates a very pleasant, cooperative, intelligent 64-year-old man.   VITAL SIGNS:  His blood pressure was 124/72, his heart rate 72 and regular.  His height is 1.8 meters, his weight is 90.2 kg.  His BMI is 28.   LUNGS:  Clear to percussion and auscultation.   CARDIOVASCULAR:  Shows a normal S1 with a normal S2.  There is no S3.  There is no murmur, rub or click.      LABORATORY STUDIES:  His LDL cholesterol is 75.  His creatinine is 1.0.      MEDICATIONS:   1.  Aspirin 81 daily.   2.  Lisinopril 10 mg daily.   3.  Metoprolol ER 50 mg twice a day.   4.  Tamsulosin 0.4 mg daily.   5.  Omeprazole 20 mg b.i.d.      ASSESSMENT:    Chanel is asymptomatic on guideline-directed medical therapy with optimal systolic blood pressure and LDL cholesterol levels.  The patient will take metoprolol at this time for management of chronic symptomatic premature ventricular contractions.      RECOMMENDATIONS:   1.  Continue present medical therapy.   2.  Regular exercise program.   3.  Mediterranean-style diet.   4.  Followup visit with me in about 1 year.      We greatly appreciate the opportunity to care of your patient.  Mr. Jazzy Buchanan.      cc:      [place] [address]         MERLIN ESTRADA MD             D: 10/29/2020   T: 10/29/2020   MT: GILBERTO      Name:     JAZZY BUCHANAN   MRN:      [id number]        Account:      [id number]   :      1955           Service Date: 10/29/2020      Document: [id number]        Outpatient Encounter Medications as of 10/29/2020   Medication Sig Dispense Refill     albuterol (PROAIR RESPICLICK) 108 (90 Base) MCG/ACT inhaler Inhale 2 puffs into the lungs every 6 hours as needed for shortness of breath / dyspnea or wheezing 1 each 3     aspirin 81 MG tablet Take 1 tablet by mouth daily. 90 tablet 3     atorvastatin (LIPITOR) 80 MG tablet Take 1 tablet (80 mg) by mouth daily 90 tablet 3     beclomethasone HFA (QVAR REDIHALER) 80 MCG/ACT inhaler Inhale 1 puff into the lungs 2 times daily 3 Inhaler 1     fluticasone (FLONASE) 50 MCG/ACT nasal spray SPRAY 1-2 SPRAYS IN EACH NOSTRIL DAILY 48 g 2     lisinopril (ZESTRIL) 10 MG tablet Take 1 tablet (10 mg) by mouth daily 90 tablet 1     metoprolol succinate ER (TOPROL-XL) 50 MG 24 hr tablet Take 1 tablet (50 mg) by mouth 2 times daily 180 tablet 3     omeprazole (PRILOSEC) 20 MG DR capsule TAKE ONE CAPSULE BY MOUTH ONCE DAILY 90 capsule 1     tamsulosin (FLOMAX) 0.4 MG capsule TAKE ONE CAPSULE BY MOUTH ONCE DAILY 90 capsule 1     nitroGLYcerin (NITROSTAT) 0.4 MG sublingual tablet Place 1  tablet (0.4 mg) under the tongue every 5 minutes as needed for chest pain (Patient not taking: Reported on 10/29/2020) 25 tablet 0     No facility-administered encounter medications on file as of 10/29/2020.        Again, thank you for allowing me to participate in the care of your patient.      Sincerely,    Rasta Millan MD     [facility]

## 2020-10-29 NOTE — LETTER
10/29/2020    [place]  [address] [place] [address]    RE: Keegan Buchanan       Dear Colleague,    I had the pleasure of seeing Keegan Buchanan in the [facility].    HISTORY OF PRESENT ILLNESS:  PVCs in general well controlled occasional episodes when they are more symptomatic. Exercises  Regularly plans to travel  To [place] this year    Orders this Visit:  No orders of the defined types were placed in this encounter.    No orders of the defined types were placed in this encounter.    There are no discontinued medications.    No diagnosis found.    CURRENT MEDICATIONS:  Current Outpatient Medications   Medication Sig Dispense Refill     albuterol (PROAIR RESPICLICK) 108 (90 Base) MCG/ACT inhaler Inhale 2 puffs into the lungs every 6 hours as needed for shortness of breath / dyspnea or wheezing 1 each 3     aspirin 81 MG tablet Take 1 tablet by mouth daily. 90 tablet 3     atorvastatin (LIPITOR) 80 MG tablet Take 1 tablet (80 mg) by mouth daily 90 tablet 3     beclomethasone HFA (QVAR REDIHALER) 80 MCG/ACT inhaler Inhale 1 puff into the lungs 2 times daily 3 Inhaler 1     fluticasone (FLONASE) 50 MCG/ACT nasal spray SPRAY 1-2 SPRAYS IN EACH NOSTRIL DAILY 48 g 2     lisinopril (ZESTRIL) 10 MG tablet Take 1 tablet (10 mg) by mouth daily 90 tablet 1     metoprolol succinate ER (TOPROL-XL) 50 MG 24 hr tablet Take 1 tablet (50 mg) by mouth 2 times daily 180 tablet 3     omeprazole (PRILOSEC) 20 MG DR capsule TAKE ONE CAPSULE BY MOUTH ONCE DAILY 90 capsule 1     tamsulosin (FLOMAX) 0.4 MG capsule TAKE ONE CAPSULE BY MOUTH ONCE DAILY 90 capsule 1     nitroGLYcerin (NITROSTAT) 0.4 MG sublingual tablet Place 1 tablet (0.4 mg) under the tongue every 5 minutes as needed for chest pain (Patient not taking: Reported on 10/29/2020) 25 tablet 0       ALLERGIES     Allergies   Allergen Reactions     Augmentin Diarrhea and GI Disturbance     Codeine Nausea     significant      "Penicillins      GI upset and diarrhea       PAST MEDICAL, SURGICAL, FAMILY, SOCIAL HISTORY:  History was reviewed and updated as needed, see medical record.    Review of Systems:  A 12-point review of systems was completed, see medical record for detailed review of systems information.    Physical Exam:  Vitals: /72 (BP Location: Right arm, Cuff Size: Adult Regular)   Pulse 72   Resp 12   Ht 1.791 m (5' 10.5\")   Wt 90.2 kg (198 lb 12.8 oz)   SpO2 97%   BMI 28.12 kg/m      Constitutional:           Skin:           Head:           Eyes:           ENT:           Neck:           Chest:           Cardiac:                    Abdomen:           Vascular:                                        Extremities and Back:           Neurological:           ASSESSMENT: symptomatic pvcs in general controlled. NO CAD symptos       RECOMMENDATIONS:   1) exercise Mediterranean diet  2) prn beta blockers      Recent Lab Results:  LIPID RESULTS:  Lab Results   Component Value Date    CHOL 148 10/02/2020    HDL 41 10/02/2020    LDL 75 10/02/2020    TRIG 162 (H) 10/02/2020    CHOLHDLRATIO 4.6 11/16/2015       LIVER ENZYME RESULTS:  Lab Results   Component Value Date    AST 24 07/29/2020    ALT 42 07/29/2020       CBC RESULTS:  Lab Results   Component Value Date    WBC 10.7 12/06/2018    RBC 5.59 12/06/2018    HGB 17.0 04/05/2019    HCT 47.4 12/06/2018    MCV 85 12/06/2018    MCH 29.0 12/06/2018    MCHC 34.2 12/06/2018    RDW 13.5 12/06/2018     12/06/2018       BMP RESULTS:  Lab Results   Component Value Date     07/29/2020    POTASSIUM 5.0 07/29/2020    CHLORIDE 108 07/29/2020    CO2 28 07/29/2020    ANIONGAP 3 07/29/2020     (H) 07/29/2020    BUN 13 07/29/2020    CR 1.00 07/29/2020    GFRESTIMATED 79 07/29/2020    GFRESTBLACK >90 07/29/2020    ALFREDA 9.1 07/29/2020        A1C RESULTS:  Lab Results   Component Value Date    A1C 5.6 12/27/2012       INR RESULTS:  Lab Results   Component Value Date    INR 0.97 " 11/14/2012       We greatly appreciate the opportunity to be involved in the care of your patient, Keegan Buchanan.    Sincerely,  Rasta Millan MD      CC  No referring provider defined for this encounter.                                                                         Thank you for allowing me to participate in the care of your patient.      Sincerely,     Rasta Millan MD     [facility]    cc:   No referring provider defined for this encounter.

## 2020-10-29 NOTE — PROGRESS NOTES
HISTORY OF PRESENT ILLNESS:  PVCs in general well controlled occasional episodes when they are more symptomatic. Exercises  Regularly plans to travel  To [place] this year    Orders this Visit:  No orders of the defined types were placed in this encounter.    No orders of the defined types were placed in this encounter.    There are no discontinued medications.    No diagnosis found.    CURRENT MEDICATIONS:  Current Outpatient Medications   Medication Sig Dispense Refill     albuterol (PROAIR RESPICLICK) 108 (90 Base) MCG/ACT inhaler Inhale 2 puffs into the lungs every 6 hours as needed for shortness of breath / dyspnea or wheezing 1 each 3     aspirin 81 MG tablet Take 1 tablet by mouth daily. 90 tablet 3     atorvastatin (LIPITOR) 80 MG tablet Take 1 tablet (80 mg) by mouth daily 90 tablet 3     beclomethasone HFA (QVAR REDIHALER) 80 MCG/ACT inhaler Inhale 1 puff into the lungs 2 times daily 3 Inhaler 1     fluticasone (FLONASE) 50 MCG/ACT nasal spray SPRAY 1-2 SPRAYS IN EACH NOSTRIL DAILY 48 g 2     lisinopril (ZESTRIL) 10 MG tablet Take 1 tablet (10 mg) by mouth daily 90 tablet 1     metoprolol succinate ER (TOPROL-XL) 50 MG 24 hr tablet Take 1 tablet (50 mg) by mouth 2 times daily 180 tablet 3     omeprazole (PRILOSEC) 20 MG DR capsule TAKE ONE CAPSULE BY MOUTH ONCE DAILY 90 capsule 1     tamsulosin (FLOMAX) 0.4 MG capsule TAKE ONE CAPSULE BY MOUTH ONCE DAILY 90 capsule 1     nitroGLYcerin (NITROSTAT) 0.4 MG sublingual tablet Place 1 tablet (0.4 mg) under the tongue every 5 minutes as needed for chest pain (Patient not taking: Reported on 10/29/2020) 25 tablet 0       ALLERGIES     Allergies   Allergen Reactions     Augmentin Diarrhea and GI Disturbance     Codeine Nausea     significant     Penicillins      GI upset and diarrhea       PAST MEDICAL, SURGICAL, FAMILY, SOCIAL HISTORY:  History was reviewed and updated as needed, see medical record.    Review of Systems:  A 12-point review of systems was completed, see  "medical record for detailed review of systems information.    Physical Exam:  Vitals: /72 (BP Location: Right arm, Cuff Size: Adult Regular)   Pulse 72   Resp 12   Ht 1.791 m (5' 10.5\")   Wt 90.2 kg (198 lb 12.8 oz)   SpO2 97%   BMI 28.12 kg/m      Constitutional:           Skin:           Head:           Eyes:           ENT:           Neck:           Chest:           Cardiac:                    Abdomen:           Vascular:                                        Extremities and Back:           Neurological:           ASSESSMENT: symptomatic pvcs in general controlled. NO CAD symptos       RECOMMENDATIONS:   1) exercise Mediterranean diet  2) prn beta blockers      Recent Lab Results:  LIPID RESULTS:  Lab Results   Component Value Date    CHOL 148 10/02/2020    HDL 41 10/02/2020    LDL 75 10/02/2020    TRIG 162 (H) 10/02/2020    CHOLHDLRATIO 4.6 11/16/2015       LIVER ENZYME RESULTS:  Lab Results   Component Value Date    AST 24 07/29/2020    ALT 42 07/29/2020       CBC RESULTS:  Lab Results   Component Value Date    WBC 10.7 12/06/2018    RBC 5.59 12/06/2018    HGB 17.0 04/05/2019    HCT 47.4 12/06/2018    MCV 85 12/06/2018    MCH 29.0 12/06/2018    MCHC 34.2 12/06/2018    RDW 13.5 12/06/2018     12/06/2018       BMP RESULTS:  Lab Results   Component Value Date     07/29/2020    POTASSIUM 5.0 07/29/2020    CHLORIDE 108 07/29/2020    CO2 28 07/29/2020    ANIONGAP 3 07/29/2020     (H) 07/29/2020    BUN 13 07/29/2020    CR 1.00 07/29/2020    GFRESTIMATED 79 07/29/2020    GFRESTBLACK >90 07/29/2020    ALFREDA 9.1 07/29/2020        A1C RESULTS:  Lab Results   Component Value Date    A1C 5.6 12/27/2012       INR RESULTS:  Lab Results   Component Value Date    INR 0.97 11/14/2012       We greatly appreciate the opportunity to be involved in the care of your patient, Keegan Buchanan.    Sincerely,  Rasta Millan MD      CC  No referring provider defined for this " encounter.

## 2020-10-29 NOTE — PROGRESS NOTES
Service Date: 10/29/2020      HISTORY OF PRESENT ILLNESS:  Keegan Buchanan, a 64-year-old man with coronary artery disease, hypertension, dyslipidemia, benign prostatic hypertrophy and symptomatic premature ventricular contractions, was seen today at your request for followup.      Since seen last year, the patient denies any new symptoms.  He is still subject to occasional symptomatic premature ventricular contractions, but in general his symptoms are well controlled on beta blocker therapy.  The patient and his wife plan to return to [place] this winter.  The patient specifically denies chest, arm, neck, jaw or back discomfort with exertion.      PAST MEDICAL HISTORY:   1.  Coronary artery disease.   a.  History of unstable angina.  Status post implantation of 2 drug-eluting stents in the mid LAD in 2012.  Negative nuclear stress test 2013.   2.  Hypertension.   3.  Dyslipidemia.   4.  Occasional symptomatic premature ventricular contractions.   5.  Osteoarthritis.   6.  Benign prostatic hypertrophy.      PHYSICAL EXAMINATION:   GENERAL:  Exam today demonstrates a very pleasant, cooperative, intelligent 64-year-old man.   VITAL SIGNS:  His blood pressure was 124/72, his heart rate 72 and regular.  His height is 1.8 meters, his weight is 90.2 kg.  His BMI is 28.   LUNGS:  Clear to percussion and auscultation.   CARDIOVASCULAR:  Shows a normal S1 with a normal S2.  There is no S3.  There is no murmur, rub or click.      LABORATORY STUDIES:  His LDL cholesterol is 75.  His creatinine is 1.0.      MEDICATIONS:   1.  Aspirin 81 daily.   2.  Lisinopril 10 mg daily.   3.  Metoprolol ER 50 mg twice a day.   4.  Tamsulosin 0.4 mg daily.   5.  Omeprazole 20 mg b.i.d.      ASSESSMENT:  Mr. Buchanan is asymptomatic on guideline-directed medical therapy with optimal systolic blood pressure and LDL cholesterol levels.  The patient will take metoprolol at this time for management of chronic symptomatic premature ventricular  contractions.      RECOMMENDATIONS:   1.  Continue present medical therapy.   2.  Regular exercise program.   3.  Mediterranean-style diet.   4.  Followup visit with me in about 1 year.      We greatly appreciate the opportunity to care of your patient.  Mr. Jazzy Buchanan.      cc:      [place] [address]         MERLIN ESTRADA MD             D: 10/29/2020   T: 10/29/2020   MT:       Name:     JAZZY BUCHANAN   MRN:      [id number]        Account:      [id number]   :      1955           Service Date: 10/29/2020      Document: [id number]

## 2020-11-03 ENCOUNTER — TELEPHONE (OUTPATIENT)
Dept: FAMILY MEDICINE | Facility: OTHER | Age: 65
End: 2020-11-03

## 2020-11-03 DIAGNOSIS — J44.9 MODERATE CHRONIC OBSTRUCTIVE PULMONARY DISEASE (H): ICD-10-CM

## 2020-11-03 RX ORDER — ALBUTEROL SULFATE 90 UG/1
2 AEROSOL, METERED RESPIRATORY (INHALATION) EVERY 6 HOURS
Qty: 1 INHALER | Refills: 3 | Status: SHIPPED | OUTPATIENT
Start: 2020-11-03 | End: 2022-05-26

## 2020-11-06 ENCOUNTER — VIRTUAL VISIT (OUTPATIENT)
Dept: PSYCHOLOGY | Facility: CLINIC | Age: 65
End: 2020-11-06
Payer: MEDICARE

## 2020-11-06 DIAGNOSIS — Z03.89 NO DIAGNOSIS ON AXIS I: Primary | ICD-10-CM

## 2020-11-09 ENCOUNTER — VIRTUAL VISIT (OUTPATIENT)
Dept: PSYCHOLOGY | Facility: CLINIC | Age: 65
End: 2020-11-09
Payer: MEDICARE

## 2020-11-09 DIAGNOSIS — F32.4 MAJOR DEPRESSIVE DISORDER WITH SINGLE EPISODE, IN PARTIAL REMISSION (H): Primary | ICD-10-CM

## 2020-11-09 PROCEDURE — 90791 PSYCH DIAGNOSTIC EVALUATION: CPT | Mod: 95 | Performed by: PSYCHOLOGIST

## 2020-11-15 NOTE — PROGRESS NOTES
"  [facility]  Evaluator Name:  Jose Nagy     Credentials:  [facility]    PATIENT'S NAME: Keegan Bucahnan  PREFERRED NAME: Keegan  PRONOUNS:       MRN:   [id number]  :   1955   ACCT. NUMBER: [id number]  DATE OF SERVICE: 20  START TIME: 07:00 AM  END TIME: 07:52 AM  PREFERRED PHONE: [phone]  May we leave a program related message: Yes  SERVICE MODALITY:  Phone Visit:    The patient has been notified of the following:      \"We have found that certain health care needs can be provided without the need for a face to face visit.  This service lets us provide the care you need with a phone conversation.       I will have full access to your [facility] medical record during this entire phone call.   I will be taking notes for your medical record.      Since this is like an office visit, we will bill your insurance company for this service.       There are potential benefits and risks of telephone visits (e.g. limits to patient confidentiality) that differ from in-person visits.?  Confidentiality still applies for telephone services, and nobody will record the visit.  It is important to be in a quiet, private space that is free of distractions (including cell phone or other devices) during the visit.??      If during the course of the call I believe a telephone visit is not appropriate, you will not be charged for this service\"     Consent has been obtained for this service by care team member: Yes     UNIVERSAL ADULT DIAGNOSTIC ASSESSMENT      Identifying Information:  Patient is a 64 year old, .  The pronoun use throughout this assessment reflects the patient's chosen pronoun.  Patient was referred for an assessment by primary care provider.  Patient attended the session alone.     Chief Complaint:   The reason for seeking services at this time is: \" Difficulty with low mood, hopelessness, helplessness, anxiety, constant worry \"   The problem(s) began around the time of his first heart " attack.  Patient had corrective heart surgery and is in good physical health.  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  This can cause the heart to make the patient feel as if they are re-experiencing another heart attack.  Patient has not attempted to resolve these concerns in the past.    Social/Family History:  Patient reported they grew up in [place], [place].  They were raised by biological parents.  Parents stayed ..   Patient reported that their childhood was a good childhood.  Patient described their current relationships with family of origin as really close.  Patient stated that their parents passed away over 10 years ago.  Also, stated that their brother passed about 3 years ago due to cancer. Patient stated they have three brothers and are close with them today.      The patient describes their cultural background as that of dominant race and culture in their area.  Cultural influences and impact on patient's life structure, values, norms, and healthcare: none reported.  Contextual influences on patient's health include: Individual Factors low mood, depression and Health- Seeking Factors-cardiology difficulties with heart.    These factors will be addressed in the Preliminary Treatment plan.  Patient identified their preferred language to be English. Patient reported they does need the assistance of an  or other support involved in therapy.     Patient reported had no significant delays in developmental tasks.   Patient's highest education level was high school graduate. Patient identified the following learning problems: none reported.  Modifications will not be used to assist communication in therapy.  Patient reports they are  able to understand written materials.    Patient reported the following relationship history as that of normal dating.  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  They  shortly met their significant other and .  Patient's current relationship status is  for 44 years.   Patient identified their sexual orientation as heterosexual.  Patient reported having two child(srikanth). Patient identified spouse as part of their support system.  Patient identified the quality of these relationships as stable and meaningful.  Patient described that societal and political factors and circumstances are impacting their relationship with their spouse.     Patient's current living/housing situation involves staying in own home/apartment.  They live with wife Suzan and they report that housing is stable.     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  Patient reports their finances are obtained through employment, spouse and detention.  Patient does not identify finances as a current stressor.      Patient reported that they have not been involved with the legal system. Patient denies being on probation / parole / under the jurisdiction of the court.    Patient's Strengths and Limitations:  Patient identified the following strengths or resources that will help them succeed in treatment: commitment to health and well being, exercise routine, friends / good social support, family support, insight, intelligence, motivation and work ethic. Things that may interfere with the patient's success in treatment include: none identified.     Personal and Family Medical History:   Patient does report a family history of mental health concerns, however reported some concerns regarding depression in mother due to circumstantial issues for her. Patient reports family history includes Cancer in his father, mother, and paternal grandfather; Diabetes in his mother; Hypertension in his brother; Lung Cancer in his brother..     Patient does not report Mental Health Diagnosis or Treatment.      Patient has had a physical exam to rule out  medical causes for current symptoms.  Date of last physical exam was within the past year. Client was encouraged to follow up with PCP if symptoms were to develop. The patient has a [facility] Primary Care Provider, who is named Fabio Stovall..  Patient reports the following current medical concerns: cardiology difficulties.  There are not significant appetite / nutritional concerns / weight changes.   Patient does not report a history of head injury / trauma / cognitive impairment.     Patient reports current meds as:   No outpatient medications have been marked as taking for the 10/20/20 encounter ([facility] Extended Documentation) with [facility].       Medication Adherence:  Patient reports taking prescribed medications as prescribed.    Patient Allergies:    Allergies   Allergen Reactions     Augmentin Diarrhea and GI Disturbance     Codeine Nausea     significant     Penicillins      GI upset and diarrhea       Medical History:    Past Medical History:   Diagnosis Date     Hypercholesteremia      Pain in joint, shoulder region     right shoulder separation     Unspecified essential hypertension          Current Mental Status Exam:   Appearance:  unable to assess    Eye Contact:  unable to assess   Psychomotor:  unable to assess       Gait / station:  Unable to assess  Attitude / Demeanor: Cooperative   Speech      Rate / Production: Normal/ Responsive      Volume:  Normal  volume      Language:  intact  Mood:   unable to assess  Affect:   unable to assess    Thought Content: Clear   Thought Process: Logical       Associations: None  Insight:   Good   Judgment:  Intact   Orientation:  All  Attention/concentration: Good    Rating Scales:    PHQ9:    PHQ-9 SCORE 9/30/2020 10/14/2020 10/20/2020   PHQ-9 Total Score MyChart - 4 (Minimal depression) -   PHQ-9 Total Score 6 - 7   Some encounter information is confidential and restricted. Go to Review Flowsheets activity to see all data.   ;    GAD7:    SERA-7  SCORE 10/14/2020 10/20/2020   Total Score 4 (minimal anxiety) -   Total Score - 14   Some encounter information is confidential and restricted. Go to Review Flowsheets activity to see all data.     CGI:     First:No data recorded;    Most recentNo data recorded    Substance Use:  Patient did report a family history of substance use concerns; see medical history section for details, discussed his brother was an alcoholic.  Patient has not received chemical dependency treatment in the past.  Patient has not ever been to detox.      Patient is not currently receiving any chemical dependency treatment. Patient reported the following problems as a result of their substance use: none reported.    Patient reports using alcohol 1 times per month and has 1 beers at a time. Patient first started drinking at age 17.  Patient reported date of last use was a couple of months ago.  Patient reports heaviest use was never, patient discussed never stated they used consistently.  Patient denies using tobacco.  Patient denies using marijuana.  Patient reports using caffeine 1 times per day and drinks 1 at a time. Patient started using caffeine at age around 30..  Patient reports using/abusing the following substance(s). Patient reported no other substance use.     CAGE- AID:  No flowsheet data found.    Substance Use: No symptoms    Based on the negative CAGE score and clinical interview there  are not indications of drug or alcohol abuse.      Significant Losses / Trauma / Abuse / Neglect Issues:   Patient did not serve in the .  There are indications or report of significant loss, trauma, abuse or neglect issues related to: are indications but client denies any losses, trauma, abuse, or neglect concerns and discussed difficulty when parents passed away as they are a close family unit.  Concerns for possible neglect are not present.     Safety Assessment:   Current Safety Concerns:  [place] Suicide Severity Rating Scale  (Lifetime/Recent)  [place] Suicide Severity Rating (Lifetime/Recent) 10/20/2020   1. Wish to be Dead (Lifetime) No   1. Wish to be Dead (Recent) Yes   2. Non-Specific Active Suicidal Thoughts (Lifetime) No   2. Non-Specific Active Suicidal Thoughts (Recent) Yes   Non-Specific Active Suicidal Thought Description (Recent) once or twice   3. Active Suicidal Ideation with any Methods (Not Plan) Without Intent to Act (Lifetime) No   3. Active Sucidal Ideation with any Methods (Not Plan) Without Intent to Act (Recent) No   4. Active Suicidal Ideation with Some Intent to Act, Without Specific Plan (Lifetime) No   4. Active Suicidal Ideation with Some Intent to Act, Without Specific Plan (Recent) No   5. Active Suicidal Ideation with Specific Plan and Intent (Lifetime) No   5. Active Suicidal Ideation with Specific Plan and Intent (Recent) No   Most Severe Ideation Rating (Lifetime) NA   Frequency (Lifetime) NA   Duration (Lifetime) 1   Controllability (Lifetime) NA   Protective Factors  (Lifetime) NA   Reasons for Ideation (Lifetime) 0   Most Severe Ideation Rating (Past Month) 1   Frequency (Past Month) NA   Duration (Past Month) 1   Controllability (Past Month) 1   Protective Factors (Past Month) 1   Reasons for Ideation (Past Month) 0   Actual Attempt (Lifetime) No   Actual Attempt (Past 3 Months) No   Has subject engaged in non-suicidal self-injurious behavior? (Lifetime) No   Has subject engaged in non-suicidal self-injurious behavior? (Past 3 Months) No   Interrupted Attempts (Lifetime) No   Interrupted Attempts (Past 3 Months) No   Aborted or Self-Interrupted Attempt (Lifetime) No   Aborted or Self-Interrupted Attempt (Past 3 Months) No   Preparatory Acts or Behavior (Lifetime) No   Preparatory Acts or Behavior (Past 3 Months) No   Most Recent Attempt Actual Lethality Code NA   Most Lethal Attempt Actual Lethality Code 0   Most Lethal Attemplt Potential Lethality Code 0   Initial/First Attempt Actual Lethality Code  0   Initial/First Attempt Potential Lethality Code 0     Patient denies current homicidal ideation and behaviors.  Patient denies current self-injurious ideation and behaviors.    Patient denied risk behaviors associated with substance use.  Patient denies any high risk behaviors associated with mental health symptoms.  Patient reports the following current concerns for their personal safety: None.  Patient reports there are  firearms in the house. The firearms are secured in a locked space.     History of Safety Concerns:  Patient denied a history of homicidal ideation.     Patient denied a history of personal safety concerns.    Patient denied a history of assaultive behaviors.    Patient denied a history of sexual assault behaviors.     Patient denied a history of risk behaviors associated with substance use.  Patient denies any history of high risk behaviors associated with mental health symptoms.  Patient reports the following protective factors: positive relationships positive social network and positive family connections, forward/future oriented thinking, safe and stable environment, regular sleep, regular physical activity, secure attachment, living with other people, daily obligations, structured day, committment to well-being, sense of meaning and sense of personal control or determination    Risk Plan:  See Recommendations for Safety and Risk Management Plan    Review of Symptoms per patient report:  Depression: Lack of interest, Change in energy level, Difficulties concentrating, Feelings of helplessness and Ruminations  Nidia:  No Symptoms  Psychosis: No Symptoms  Anxiety: Excessive worry, Nervousness, Physical complaints, such as headaches, stomachaches, muscle tension, Poor concentration and has improved but a month ago has difficulty  Panic:  Palpitations and difficulties with PVC-PreVentrical Contractions  Post Traumatic Stress Disorder:  No Symptoms   Eating Disorder: No Symptoms  ADD / ADHD:  No  symptoms  Conduct Disorder: No symptoms  Autism Spectrum Disorder: No symptoms  Obsessive Compulsive Disorder: No Symptoms    Patient reports the following compulsive behaviors and treatment history: none reported.      Diagnostic Criteria:   A) Single episode - symptoms have been present during the same 2-week period and represent a change from previous functioning 5 or more symptoms (required for diagnosis)   - Depressed mood. Note: In children and adolescents, can be irritable mood.     - Diminished interest or pleasure in all, or almost all, activities.    - Psychomotor activity agitation.    - Fatigue or loss of energy.    - Feelings of worthlessness or inappropriate and excessive guilt.    - Diminished ability to think or concentrate, or indecisiveness.    - Recurrent thoughts of death (not just fear of dying), recurrent suicidal ideation without a specific plan, or a suicide attempt or a specific plan for committing suicide.   B) The symptoms cause clinically significant distress or impairment in social, occupational, or other important areas of functioning  C) The episode is not attributable to the physiological effects of a substance or to another medical condition  D) The occurence of major depressive episode is not better explained by other thought / psychotic disorders    Functional Status:  Patient reports the following functional impairments: relationship(s) and self-care.     WHODAS:   WHODAS 2.0 Total Score 10/14/2020 10/20/2020   Total Score - 23   Total Score MyChart 14 -   Some encounter information is confidential and restricted. Go to Review Flowsheets activity to see all data.       Clinical Summary:  1. Reason for assessment: Secondary reasons for assessment are to discuss concerns related to symptoms of depression, anxiety, excessive worry, feelings of helplessness or hopelessness  .  2. Psychosocial, Cultural and Contextual Factors: Individual/Relational/Health  .  3. Principal DSM5  Diagnoses  (Sustained by DSM5 Criteria Listed Above):   296.25 (F32.4)  Major Depressive Disorder, Single Episode, In partial remssion _ and With anxious distress.  4. Other Diagnoses that is relevant to services:   No other Diagnosis  5. Provisional Diagnosis:  300.02 (F41.1) Generalized Anxiety Disorder as evidenced by excessive worry and rumination of thoughts of incidents that might occur .  6. Prognosis: Return to Normal Functioning.  7. Likely consequences of symptoms if not treated: Continuation of symptoms.  8. Client strengths include:  caring, empathetic, goal-focused, good listener, insightful, intelligent, open to learning, support of family, friends and providers and work history .     Recommendations:     1. Plan for Safety and Risk Management:   Recommended that patient call 911 or go to the local ED should there be a change in any of these risk factors..          Report to child / adult protection services was NA.     2. Patient's identified no cultural issues necessary or pertinent for treatment.     3. Initial Treatment will focus on:    Depressed Mood - Feelings of hoplelessness   Anxiety - excessive worry     4. Resources/Service Plan:    services are not indicated.   Modifications to assist communication are not indicated.   Additional disability accommodations are not indicated.      5. Collaboration:   Collaboration / coordination of treatment will be initiated with the following  support professionals: primary care physician.      6.  Referrals:   The following referral(s) will be initiated: no referrals necessary at time of assessment. Next Scheduled Appointment: 11/17/20 at 08:00 AM.     A Release of Information has been obtained for the following: primary care physician.    7. HUNTER:    HUNTER:  Discussed the general effects of drugs and alcohol on health and well-being. Provider gave patient printed information about the effects of chemical use on their health and well being.  Recommendations:  No recommendations necessary .     8. Records:    were reviewed at time of assessment.   Information in this assessment was obtained from the medical record and provided by patient who is a good historian.    Patient will have open access to their mental health medical record.      Eval type:  Mental Health    Provider Name/ Credentials:  Jose Nagy  November 9th, 2020

## 2020-11-15 NOTE — PROGRESS NOTES
There was technical difficulties contacting the patient for the session.  Patient had scam caller id blocked on his phone.  Therefore was not able to access phone call in time for session.  Rescheduled for 11/9

## 2020-11-17 ENCOUNTER — VIRTUAL VISIT (OUTPATIENT)
Dept: PSYCHOLOGY | Facility: CLINIC | Age: 65
End: 2020-11-17
Payer: MEDICARE

## 2020-11-17 DIAGNOSIS — F32.4 MAJOR DEPRESSIVE DISORDER WITH SINGLE EPISODE, IN PARTIAL REMISSION (H): Primary | ICD-10-CM

## 2020-11-17 PROCEDURE — 90834 PSYTX W PT 45 MINUTES: CPT | Mod: 95 | Performed by: PSYCHOLOGIST

## 2020-11-25 NOTE — PROGRESS NOTES
Progress Note    Patient Name: Keegan Buchanan  Date: 11/17/20         Service Type: Individual      Session Start Time: 08:00 AM  Session End Time: 08:52 AM     Session Length: 52 min    Session #: 3    Attendees: Client    Service Modality:  Video Visit:      Provider verified identity through the following two step process.  Patient provided:  Patient photo    Telemedicine Visit: The patient's condition can be safely assessed and treated via synchronous audio and visual telemedicine encounter.      Reason for Telemedicine Visit: Services only offered telehealth    Originating Site (Patient Location): Patient's home    Distant Site (Provider Location): Provider Remote Setting    Consent:  The patient/guardian has verbally consented to: the potential risks and benefits of telemedicine (video visit) versus in person care; bill my insurance or make self-payment for services provided; and responsibility for payment of non-covered services.     Patient would like the video invitation sent by: Send to e-mail at: [email or web address]    Mode of Communication:  Video Conference via [employer]    As the provider I attest to compliance with applicable laws and regulations related to telemedicine.     Treatment Plan Last Reviewed: 11/17/20  PHQ-9 / SERA-7 :     DATA  Interactive Complexity: No  Crisis: No       Progress Since Last Session (Related to Symptoms / Goals / Homework):   Symptoms: Improving Patient has exhibited and expressed improvement in symptoms    Homework: Achieved / completed to satisfaction      Episode of Care Goals: Minimal progress - MAINTENANCE (Working to maintain change, with risk of relapse); Intervened by continuing to positively reinforce healthy behavior choice      Current / Ongoing Stressors and Concerns:   Individual/Societal  Patient was on time and present for the session.  Patient expressed concerned about current employment situation as there is COVID  that has been spreading there.  Patient has been home for over a week.  Patient discussed that they had an appointment with the doctor and the doctor stated that patient is in good health and heart looks strong.  This brought patient a great relief. Patient stated that they have not experienced an attack recently.  Patient is worried about how to better manage themselves when an attack occurs and confidence in what the doctor stated is lost.      Treatment Objective(s) Addressed in This Session:   identify three distraction and diversion activities and use those activities to decrease level of anxiety         Intervention:   CBT: Utilized CBT and DBT interventions and education on the brain and how thoughts develop and function        ASSESSMENT: Current Emotional / Mental Status (status of significant symptoms):   Risk status (Self / Other harm or suicidal ideation)   Patient denies current fears or concerns for personal safety.   Patient denies current or recent suicidal ideation or behaviors.   Patient denies current or recent homicidal ideation or behaviors.   Patient denies current or recent self injurious behavior or ideation.   Patient denies other safety concerns.   Patient reports there has been no change in risk factors since their last session.     Patient reports there has been no change in protective factors since their last session.     Recommended that patient call 911 or go to the local ED should there be a change in any of these risk factors.     Appearance:   Appropriate    Eye Contact:   Good    Psychomotor Behavior: Normal    Attitude:   Cooperative    Orientation:   All   Speech    Rate / Production: Normal     Volume:  Normal    Mood:    Normal   Affect:    Appropriate    Thought Content:  Clear    Thought Form:  Coherent  Logical    Insight:    Good      Medication Review:   No current psychiatric medications prescribed     Medication Compliance:   Yes     Changes in Health Issues:   None  reported     Chemical Use Review:   Substance Use: Chemical use reviewed, no active concerns identified      Tobacco Use: No change in amount of tobacco use since last session.  Not discussed    Diagnosis:  Major Depressive Disorder Single Episode/Mild In remission    Collateral Reports Completed:   Not Applicable    PLAN: (Patient Tasks / Therapist Tasks / Other)  Continue to meet for biweekly individual sessions.         CHEN Mcelroy                                                         ______________________________________________________________________    Treatment Plan    Patient's Name: Keegan Buchanan  YOB: 1955    Date: 11/17/2020    DSM5 Diagnoses: 296.26 (F32.5) Major Depressive Disorder, Single Episode,  In full remission _ and With anxious distress  Psychosocial / Contextual Factors: Health  WHODAS:     Referral / Collaboration:  Referral to another professional/service is not indicated at this time..    Anticipated number of session or this episode of care: 90 days      MeasurableTreatment Goal(s) related to diagnosis / functional impairment(s)  Goal 1: Patient will work on utilizing positive affect regulation and coping skill    I will know I've met my goal when I am better able to carry myself through a difficult attack.      Objective #A (Patient Action)    Patient will identify specific strategies to more effectively address stressors.  Status: 90 days     Intervention(s)  Therapist will teach emotional recognition/identification. Utilizing CBT and DBT approaches and interventions.    Objective #B  Patient will identify specific stressors which contribute to feelings of depression.  Status: 90 days     Intervention(s)  Therapist will provide CBT/DBT, mindfulness approaches and interventions.      Patient has reviewed and agreed to the above plan.      CHEN Mcelroy  November 17, 2020

## 2021-01-09 ENCOUNTER — HEALTH MAINTENANCE LETTER (OUTPATIENT)
Age: 66
End: 2021-01-09

## 2021-01-14 ENCOUNTER — TELEPHONE (OUTPATIENT)
Dept: CARDIOLOGY | Facility: CLINIC | Age: 66
End: 2021-01-14

## 2021-01-14 NOTE — TELEPHONE ENCOUNTER
Reason for Call:  Other call back    Detailed comments: Pt is in [place] seasonally and saw a nutritionalist recently. The nutritionalist said he was suffering from an overdose of aspirin. Wondering if he can go down to every other day and be considered safe? Please review and consult.    Phone Number Patient can be reached at: Cell number on file:    Telephone Information:   Mobile [phone]       Best Time: any    Can we leave a detailed message on this number? YES    Call taken on 1/14/2021 at 10:30 AM by Darlene Calvillo

## 2021-01-15 NOTE — TELEPHONE ENCOUNTER
Called Pt and read him what Dr. Millan had sent. I told him if he had any further questions, the nurse could call him on Monday. After reading what Dr. Millan sent, pt stated that he will stick with the regimen that was prescribed.   Regarding: water broke   ----- Message from Hieu Alvarez sent at 5/7/2019  4:35 AM CDT -----  Patient Name: Millicent Carias  Specialist or PCP:Dr. Mckoy   Pregnant (If Yes, how long?):yes 39w   Symptoms:Water broke. No contractions   Call Back #:[phone]  Is the patient’s permanent residence located in [place], [place], or a [facility]? Yes [facility] [address]  Call Center Account #:[id number]

## 2021-02-24 ENCOUNTER — TELEPHONE (OUTPATIENT)
Dept: FAMILY MEDICINE | Facility: OTHER | Age: 66
End: 2021-02-24

## 2021-02-24 DIAGNOSIS — J44.9 MODERATE CHRONIC OBSTRUCTIVE PULMONARY DISEASE (H): Primary | ICD-10-CM

## 2021-02-24 RX ORDER — FLUTICASONE PROPIONATE 220 UG/1
1 AEROSOL, METERED RESPIRATORY (INHALATION) 2 TIMES DAILY
Qty: 12 G | Refills: 11 | Status: SHIPPED | OUTPATIENT
Start: 2021-02-24 | End: 2022-11-17

## 2021-02-24 NOTE — TELEPHONE ENCOUNTER
Reason for Call:  Form, our goal is to have forms completed with 72 hours, however, some forms may require a visit or additional information.    Type of letter, form or note:  medical    Who is the form from?: [facility] (if other please explain)    Where did the form come from: form was faxed in    What clinic location was the form placed at?: [facility] - [phone]    Where the form was placed: team B form Box/Folder    What number is listed as a contact on the form?: [phone]       Additional comments: fax [phone]    Call taken on 2/24/2021 at 2:23 PM by James Leon

## 2021-02-24 NOTE — TELEPHONE ENCOUNTER
Patient's Qvar is no longer covered by his insurance without a prior authorization.  Prior authorization will likely require him to fail a preferred agent.  I have changed his prescription to Flovent from Qvar.  If patient has problems with this, he can let me know.  Form was kept in my forms in basket pending patient notification.

## 2021-02-24 NOTE — TELEPHONE ENCOUNTER
The patient sent a Tarisa message as well that came through to [facility] Registration. I called regarding his Qvar Redihaler and they had incorrect contact info, so they are going to re-fax. It is just a prior authorization that needs to be completed. Will await new fax to come through. Note complete.

## 2021-02-24 NOTE — TELEPHONE ENCOUNTER
Reason for Call:  Other Please call the incompany [employer]    Detailed comments: They can get an exemption if the provider would call the company back. [phone] Case number [id number]   In regards to the Qvar Redihailer    Phone Number Patient can be reached at: Cell number on file:    Telephone Information:   Mobile [phone]       Best Time: As soon as possible    Can we leave a detailed message on this number? YES    Call taken on 2/24/2021 at 10:29 AM by Maryuri Gibbons

## 2021-03-01 ENCOUNTER — TELEPHONE (OUTPATIENT)
Dept: FAMILY MEDICINE | Facility: OTHER | Age: 66
End: 2021-03-01

## 2021-03-01 NOTE — TELEPHONE ENCOUNTER
This is at least the third or fourth time I received this form.  Since Qvar is not on his formulary, I prescribed an alternative.  The patient was fine with that plan.  Please let [facility] know that we do not need further requests for prior authorization for the medication that is not covered.

## 2021-03-02 NOTE — TELEPHONE ENCOUNTER
Lm for patient to call us back want to make sure he received his flovent inhaler from [facility] in [place],

## 2021-03-08 ENCOUNTER — MYC MEDICAL ADVICE (OUTPATIENT)
Dept: FAMILY MEDICINE | Facility: OTHER | Age: 66
End: 2021-03-08

## 2021-04-30 ENCOUNTER — MYC MEDICAL ADVICE (OUTPATIENT)
Dept: FAMILY MEDICINE | Facility: OTHER | Age: 66
End: 2021-04-30

## 2021-05-14 DIAGNOSIS — R42 DIZZINESS: ICD-10-CM

## 2021-05-14 DIAGNOSIS — K21.9 GASTROESOPHAGEAL REFLUX DISEASE WITHOUT ESOPHAGITIS: ICD-10-CM

## 2021-05-14 DIAGNOSIS — I49.3 PVC'S (PREMATURE VENTRICULAR CONTRACTIONS): ICD-10-CM

## 2021-05-14 DIAGNOSIS — I10 HYPERTENSION GOAL BP (BLOOD PRESSURE) < 140/90: ICD-10-CM

## 2021-05-14 RX ORDER — LISINOPRIL 10 MG/1
TABLET ORAL
Qty: 90 TABLET | Refills: 1 | Status: SHIPPED | OUTPATIENT
Start: 2021-05-14 | End: 2021-06-23

## 2021-05-14 NOTE — TELEPHONE ENCOUNTER
Prescription approved per [facility] Refill Protocol.  Alberto Rose RN, BSN  [place] [facility]  May 14, 2021

## 2021-06-17 NOTE — PROGRESS NOTES
Assessment & Plan       ICD-10-CM    1. Chronic REFUGIO (middle ear effusion), left  H65.492 predniSONE (DELTASONE) 20 MG tablet   2. Pain of right hand  M79.641    3. Tension headache  G44.209 cyclobenzaprine (FLEXERIL) 5 MG tablet   4. Chronic left-sided low back pain without sciatica  M54.5 cyclobenzaprine (FLEXERIL) 5 MG tablet    G89.29    5. Fatigue, unspecified type  R53.83    6. Need for vaccination  Z23 TDAP VACCINE (Adacel, Boostrix)  [[id number]]   7. Moderate chronic obstructive pulmonary disease (H)  J44.9    8. Mild major depression (H)  F32.0       1.  Unclear etiology.  Patient has seen ENT twice with no effusion found at that time.  We will do short course of prednisone to help relieve this.  If this recurs, will probably recommend visiting with ENT again.  We could consider nasal steroid sprays as well.  2.  Suspicious for osteoarthritis.  Discussed we can consider imaging if you would like, but it would not change initial efforts to manage this.  Discussed trial of glucosamine and/or fish oil.  Can use over-the-counter analgesics as needed.  Patient asked about a supplement that contains mydriatic acid.  I think this would be reasonable, but not certain of how much benefit is present.  If not responding to all these measures, would recommend imaging and possibly laboratory evaluation.  3, 4.  Recommended that he consider physical therapy.  Patient does not want to pursue that just yet.  Discussed that we can try occasional use of a muscle relaxant.  Patient was interested in pursuing that.  Continue to use analgesics as needed.  If this becomes progressively worse, may need to consider imaging or trigger point or other injections.  5.  I suspect this is actually low-grade hypotension causing symptoms he is reporting.  Discussed that if so, these should decrease while he is on the steroids then gradually recur.  Also discussed that we can consider reducing his dose of lisinopril if this persists.  6.   "Immunized.  7.  Not directly managed today.  Should improve with the prednisone.  We will continue current regimen.  8.  Clinically stable.  Will continue current regimen.    Portions of this note were completed using Dragon dictation software.  Although reviewed, there may be typographical and other inadvertent errors that remain.       Prescription drug management  47 minutes spent on the date of the encounter doing chart review, history and exam, documentation and further activities per the note       BMI:   Estimated body mass index is 28.01 kg/m  as calculated from the following:    Height as of 10/29/20: 1.791 m (5' 10.5\").    Weight as of this encounter: 89.8 kg (198 lb).       Patient Instructions   Thank you for visiting [facility]    Let's try a short course of prednisone to help with your symptoms.    Can try fish oil or glucosamine to help with your hand/back symptoms.      Myristin is probably safe to take.    If you'd like to do PT, let me know.    Can try flexeril for headache, muscle spasms when needed.    Let me know if you want to see ENT again.    Contact us or return if questions or concerns.     Have a nice day!    Dr. Stovall     No follow-ups on file.      If you need medication refills, please contact your pharmacy 3 days before your prescriptions runs out or download the [facility] negrito for your smart phone. If you are out of refills, your pharmacy will contact contact the clinic.                                     [employer] Assistance [phone]                       Return in about 3 months (around 9/18/2021) for wellness check..    Fabio Stovall MD, MD  [facility] SAM Allison is a 65 year old who presents for the following health issues     History of Present Illness       Migraines:   Since the patient's last clinic visit, headaches are: no change  The patient is getting headaches:  Daily  He is able to do normal daily " "activities when he has a migraine.  The patient is taking the following rescue/relief medications:  No rescue/relief medications   Patient states \"I get some relief\" from the rescue/relief medications.   The patient is taking the following medications to prevent migraines:  No medications to prevent migraines  In the past 4 weeks, the patient has gone to an Urgent Care or Emergency Room 0 times times due to headaches.    He eats 0-1 servings of fruits and vegetables daily.He consumes 0 sweetened beverage(s) daily.He exercises with enough effort to increase his heart rate 20 to 29 minutes per day.  He exercises with enough effort to increase his heart rate 3 or less days per week.   He is taking medications regularly.       Concern - ears popping and crackling  Onset: 1  month  Description: ears popping and sounds like he is under water, little discomfort  Intensity: moderate  Progression of Symptoms:  same  Accompanying Signs & Symptoms: headache   Previous history of similar problem: yes  Precipitating factors:        Worsened by: none  Alleviating factors:        Improved by: none  Therapies tried and outcome:  none     Pt has longstanding ear ringing.  Recently, has had some changes in the frequency of the sound he's hearing.  When that happens, his ears tend to feel clogged like he's been swimming.  He tries popping them, and hears what sounds like air flow in his right ear.      Has neck issues, thinks he has arthritis in his neck.  Had been seeing massage as well, but that tended to make it worse.  Not sure if this is connected to his ears.  Will have HA 5/7 days.  All are at the base of his skull.      Also has some low back pain.  Current exacerbation.  When his back was really sore, felt like he had a hernia in the front.  He thought we should check where he had his previous hernia surgery.  Doesn't feel a bulge, but had pain in the same location.  Not worse with lifting, but worse with bending over.  Has " had to take some advil with some benefit.      His right hand is getting quite stiff, painful.  Tried Voltaren topical.  This was not helpful.  He hasn't tried fish oil for this or glucosamine.      Review of Systems   Constitutional, HEENT, cardiovascular, pulmonary, gi and gu systems are negative, except as otherwise noted.      Objective    /60   Pulse 69   Temp 96.9  F (36.1  C) (Temporal)   Resp 16   Wt 89.8 kg (198 lb)   SpO2 96%   BMI 28.01 kg/m    Body mass index is 28.01 kg/m .  Physical Exam   GENERAL: healthy, alert and no distress  EYES: Eyes grossly normal to inspection  HENT: normal cephalic/atraumatic, right ear: normal: no effusions, no erythema, normal landmarks, left ear: clear effusion, nose and mouth without ulcers or lesions, oropharynx clear and oral mucous membranes moist  NECK: no adenopathy, no asymmetry, masses, or scars and thyroid normal to palpation  RESP: lungs clear to auscultation - no rales, rhonchi or wheezes  CV: regular rate and rhythm, normal S1 S2, no S3 or S4, no murmur, click or rub, no peripheral edema and peripheral pulses strong  ABDOMEN: soft, nontender, no hepatosplenomegaly, no masses and bowel sounds normal  MS: no tenderness on spine exam, but pain is in occipital region and low back.  Some discomfort in right hand with resisted finger flexion/extension.  Pain at 1st MCP joint.      Orders Only on 10/02/2020   Component Date Value Ref Range Status     TSH 10/02/2020 1.44  0.40 - 4.00 mU/L Final     Cholesterol 10/02/2020 148  <200 mg/dL Final     Triglycerides 10/02/2020 162* <150 mg/dL Final    Comment: Borderline high:  150-199 mg/dl  High:             200-499 mg/dl  Very high:       >499 mg/dl       HDL Cholesterol 10/02/2020 41  >39 mg/dL Final     LDL Cholesterol Calculated 10/02/2020 75  <100 mg/dL Final    Desirable:       <100 mg/dl     Non HDL Cholesterol 10/02/2020 107  <130 mg/dL Final

## 2021-06-18 ENCOUNTER — OFFICE VISIT (OUTPATIENT)
Dept: FAMILY MEDICINE | Facility: OTHER | Age: 66
End: 2021-06-18
Payer: MEDICARE

## 2021-06-18 VITALS
SYSTOLIC BLOOD PRESSURE: 110 MMHG | DIASTOLIC BLOOD PRESSURE: 60 MMHG | RESPIRATION RATE: 16 BRPM | BODY MASS INDEX: 28.01 KG/M2 | WEIGHT: 198 LBS | TEMPERATURE: 96.9 F | OXYGEN SATURATION: 96 % | HEART RATE: 69 BPM

## 2021-06-18 DIAGNOSIS — Z23 NEED FOR VACCINATION: ICD-10-CM

## 2021-06-18 DIAGNOSIS — G89.29 CHRONIC LEFT-SIDED LOW BACK PAIN WITHOUT SCIATICA: ICD-10-CM

## 2021-06-18 DIAGNOSIS — G44.209 TENSION HEADACHE: ICD-10-CM

## 2021-06-18 DIAGNOSIS — H65.492 CHRONIC MEE (MIDDLE EAR EFFUSION), LEFT: Primary | ICD-10-CM

## 2021-06-18 DIAGNOSIS — M79.641 PAIN OF RIGHT HAND: ICD-10-CM

## 2021-06-18 DIAGNOSIS — F32.0 MILD MAJOR DEPRESSION (H): ICD-10-CM

## 2021-06-18 DIAGNOSIS — J44.9 MODERATE CHRONIC OBSTRUCTIVE PULMONARY DISEASE (H): ICD-10-CM

## 2021-06-18 DIAGNOSIS — M54.50 CHRONIC LEFT-SIDED LOW BACK PAIN WITHOUT SCIATICA: ICD-10-CM

## 2021-06-18 DIAGNOSIS — R53.83 FATIGUE, UNSPECIFIED TYPE: ICD-10-CM

## 2021-06-18 PROCEDURE — 90715 TDAP VACCINE 7 YRS/> IM: CPT | Performed by: FAMILY MEDICINE

## 2021-06-18 PROCEDURE — 90471 IMMUNIZATION ADMIN: CPT | Performed by: FAMILY MEDICINE

## 2021-06-18 PROCEDURE — 99215 OFFICE O/P EST HI 40 MIN: CPT | Mod: 25 | Performed by: FAMILY MEDICINE

## 2021-06-18 RX ORDER — CYCLOBENZAPRINE HCL 5 MG
5 TABLET ORAL 3 TIMES DAILY PRN
Qty: 30 TABLET | Refills: 0 | Status: SHIPPED | OUTPATIENT
Start: 2021-06-18 | End: 2021-07-29

## 2021-06-18 RX ORDER — PREDNISONE 20 MG/1
40 TABLET ORAL DAILY
Qty: 10 TABLET | Refills: 0 | Status: SHIPPED | OUTPATIENT
Start: 2021-06-18 | End: 2021-06-23

## 2021-06-18 NOTE — PATIENT INSTRUCTIONS
Thank you for visiting [facility]    Let's try a short course of prednisone to help with your symptoms.    Can try fish oil or glucosamine to help with your hand/back symptoms.      Myristin is probably safe to take.    If you'd like to do PT, let me know.    Can try flexeril for headache, muscle spasms when needed.    Let me know if you want to see ENT again.    Contact us or return if questions or concerns.     Have a nice day!    Dr. Stovall     No follow-ups on file.      If you need medication refills, please contact your pharmacy 3 days before your prescriptions runs out or download the [facility] negrito for your smart phone. If you are out of refills, your pharmacy will contact contact the clinic.                                     MyChart Assistance [phone]

## 2021-06-18 NOTE — NURSING NOTE
Prior to immunization administration, verified patients identity using patient s name and date of birth. Please see Immunization Activity for additional information.     Screening Questionnaire for Adult Immunization    Are you sick today?   No   Do you have allergies to medications, food, a vaccine component or latex?   No   Have you ever had a serious reaction after receiving a vaccination?   No   Do you have a long-term health problem with heart, lung, kidney, or metabolic disease (e.g., diabetes), asthma, a blood disorder, no spleen, complement component deficiency, a cochlear implant, or a spinal fluid leak?  Are you on long-term aspirin therapy?   No   Do you have cancer, leukemia, HIV/AIDS, or any other immune system problem?   No   Do you have a parent, brother, or sister with an immune system problem?   No   In the past 3 months, have you taken medications that affect  your immune system, such as prednisone, other steroids, or anticancer drugs; drugs for the treatment of rheumatoid arthritis, Crohn s disease, or psoriasis; or have you had radiation treatments?   No   Have you had a seizure, or a brain or other nervous system problem?   No   During the past year, have you received a transfusion of blood or blood    products, or been given immune (gamma) globulin or antiviral drug?   No   For women: Are you pregnant or is there a chance you could become       pregnant during the next month?   No   Have you received any vaccinations in the past 4 weeks?   No     Immunization questionnaire answers were all negative.        Per orders of Dr. Stovall, injection of Tdap given by Lesa Mcrae MA. Patient instructed to remain in clinic for 15 minutes afterwards, and to report any adverse reaction to me immediately.       Screening performed by Lesa Mcrae MA on 6/18/2021 at 10:24 AM.    
No

## 2021-06-23 ENCOUNTER — TELEPHONE (OUTPATIENT)
Dept: FAMILY MEDICINE | Facility: OTHER | Age: 66
End: 2021-06-23

## 2021-06-23 ENCOUNTER — MYC MEDICAL ADVICE (OUTPATIENT)
Dept: FAMILY MEDICINE | Facility: OTHER | Age: 66
End: 2021-06-23

## 2021-06-23 DIAGNOSIS — I49.3 PVC'S (PREMATURE VENTRICULAR CONTRACTIONS): ICD-10-CM

## 2021-06-23 DIAGNOSIS — M54.50 CHRONIC LEFT-SIDED LOW BACK PAIN WITHOUT SCIATICA: Primary | ICD-10-CM

## 2021-06-23 DIAGNOSIS — R42 DIZZINESS: ICD-10-CM

## 2021-06-23 DIAGNOSIS — G89.29 CHRONIC LEFT-SIDED LOW BACK PAIN WITHOUT SCIATICA: Primary | ICD-10-CM

## 2021-06-23 DIAGNOSIS — I10 HYPERTENSION GOAL BP (BLOOD PRESSURE) < 140/90: ICD-10-CM

## 2021-06-23 RX ORDER — LISINOPRIL 5 MG/1
5 TABLET ORAL DAILY
Qty: 90 TABLET | Refills: 0 | Status: SHIPPED | OUTPATIENT
Start: 2021-06-23 | End: 2021-08-17

## 2021-06-23 NOTE — TELEPHONE ENCOUNTER
Pt calling in for physical therapy referral for his back pain.     Pt states he talked about this with DJ previously and has decided it's time to try PT.     Please place referral if appropriate and let pt know this is done .

## 2021-06-23 NOTE — TELEPHONE ENCOUNTER
Referral for physical therapy placed.  If not responding to this, we can proceed with MRI as he previously discussed with me.

## 2021-06-23 NOTE — TELEPHONE ENCOUNTER
Routing to provider-    This was discussed during visit on 6/18/21. Please advise    JOSE F Kaufman/[facility]

## 2021-06-28 ENCOUNTER — ALLIED HEALTH/NURSE VISIT (OUTPATIENT)
Dept: FAMILY MEDICINE | Facility: OTHER | Age: 66
End: 2021-06-28
Payer: MEDICARE

## 2021-06-28 VITALS — SYSTOLIC BLOOD PRESSURE: 120 MMHG | HEART RATE: 70 BPM | DIASTOLIC BLOOD PRESSURE: 70 MMHG

## 2021-06-28 DIAGNOSIS — Z01.30 BP CHECK: Primary | ICD-10-CM

## 2021-06-28 PROCEDURE — 99207 PR NO CHARGE NURSE ONLY: CPT

## 2021-06-28 ASSESSMENT — PATIENT HEALTH QUESTIONNAIRE - PHQ9: SUM OF ALL RESPONSES TO PHQ QUESTIONS 1-9: 0

## 2021-06-28 NOTE — PROGRESS NOTES
Keegan TIRADO Chanel is a 65 year old patient who comes in today for a Blood Pressure check.  Initial BP:  /70   Pulse 70      Data Unavailable  Disposition: follow-up as previously indicated by provider

## 2021-07-06 ENCOUNTER — HOSPITAL ENCOUNTER (OUTPATIENT)
Dept: PHYSICAL THERAPY | Facility: CLINIC | Age: 66
Setting detail: THERAPIES SERIES
End: 2021-07-06
Attending: FAMILY MEDICINE
Payer: MEDICARE

## 2021-07-06 DIAGNOSIS — M54.50 CHRONIC LEFT-SIDED LOW BACK PAIN WITHOUT SCIATICA: ICD-10-CM

## 2021-07-06 DIAGNOSIS — G89.29 CHRONIC LEFT-SIDED LOW BACK PAIN WITHOUT SCIATICA: ICD-10-CM

## 2021-07-06 PROCEDURE — 97110 THERAPEUTIC EXERCISES: CPT | Mod: GP | Performed by: PHYSICAL THERAPIST

## 2021-07-06 PROCEDURE — 97530 THERAPEUTIC ACTIVITIES: CPT | Mod: GP | Performed by: PHYSICAL THERAPIST

## 2021-07-06 PROCEDURE — 97161 PT EVAL LOW COMPLEX 20 MIN: CPT | Mod: GP | Performed by: PHYSICAL THERAPIST
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
## 2021-07-06 NOTE — PROGRESS NOTES
[facility]          OUTPATIENT PHYSICAL THERAPY ORTHOPEDIC EVALUATION  PLAN OF TREATMENT FOR OUTPATIENT REHABILITATION  (COMPLETE FOR INITIAL CLAIMS ONLY)  Patient's Last Name, First Name, M.I.  YOB: 1955  Keegan Buchanan    Provider s Name:  [facility]   Medical Record No.  [id number]   Start of Care Date:  07/06/21   Onset Date:  04/01/21   Type:     _X__PT   ___OT   ___SLP Medical Diagnosis:  Chronic left-sided low back pain without sciatica M54.5, G89.29  - Primary     PT Diagnosis:  mechanical LBP   Visits from SOC:  1      _________________________________________________________________________________  Plan of Treatment:  joint mobilization, manual therapy, neuromuscular re-education, ROM, strengthening, stretching, back care education, modalities as needed    Functional Goals:  Goal Identifier: 1 ANGELA  Goal Description: Pt will improve his score on the ANGELA (Oswestry Disability Index) to 10% or less indicating a clinically meaningful improvement in low back pain and overall function over time.  Target Date: 09/03/21    Goal Identifier: 2 HEP  Goal Description: Pt will demo independence with HEP and verbal knowledge of back care principles for future self-management of sx.  Target Date: 09/03/21    Goal Identifier: 3 Sitting  Goal Description: Pt will be able to sit for 30 min or greater with LBP less than 2/10 in order to commute to/from work with greater comfort.  Target Date: 09/03/21      Therapy Frequency:  1 time/week  Predicted Duration of Therapy Intervention:  60 days(as needed)    Anita Stephens, PT                 I CERTIFY THE NEED FOR THESE SERVICES FURNISHED UNDER        THIS PLAN OF TREATMENT AND WHILE UNDER MY CARE     (Physician co-signature of this document indicates review and certification of the therapy plan).                       Certification Date From:  07/06/21   Certification Date To:   09/03/21    Referring Provider:  Dr. Fabio Stovall    Initial Assessment        See Epic Evaluation Start of Care Date: 07/06/21

## 2021-07-15 ENCOUNTER — HOSPITAL ENCOUNTER (OUTPATIENT)
Dept: PHYSICAL THERAPY | Facility: CLINIC | Age: 66
Setting detail: THERAPIES SERIES
End: 2021-07-15
Attending: FAMILY MEDICINE
Payer: MEDICARE

## 2021-07-15 PROCEDURE — 97530 THERAPEUTIC ACTIVITIES: CPT | Mod: GP | Performed by: PHYSICAL THERAPIST

## 2021-07-15 PROCEDURE — 97110 THERAPEUTIC EXERCISES: CPT | Mod: GP | Performed by: PHYSICAL THERAPIST

## 2021-07-20 ENCOUNTER — HOSPITAL ENCOUNTER (OUTPATIENT)
Dept: PHYSICAL THERAPY | Facility: CLINIC | Age: 66
Setting detail: THERAPIES SERIES
End: 2021-07-20
Attending: FAMILY MEDICINE
Payer: MEDICARE

## 2021-07-20 PROCEDURE — 97140 MANUAL THERAPY 1/> REGIONS: CPT | Mod: GP | Performed by: PHYSICAL THERAPIST

## 2021-07-20 PROCEDURE — 97530 THERAPEUTIC ACTIVITIES: CPT | Mod: GP | Performed by: PHYSICAL THERAPIST

## 2021-07-20 PROCEDURE — 97035 APP MDLTY 1+ULTRASOUND EA 15: CPT | Mod: GP | Performed by: PHYSICAL THERAPIST

## 2021-07-20 PROCEDURE — 97110 THERAPEUTIC EXERCISES: CPT | Mod: GP | Performed by: PHYSICAL THERAPIST

## 2021-07-22 ENCOUNTER — MYC MEDICAL ADVICE (OUTPATIENT)
Dept: FAMILY MEDICINE | Facility: OTHER | Age: 66
End: 2021-07-22

## 2021-07-23 ENCOUNTER — MYC MEDICAL ADVICE (OUTPATIENT)
Dept: FAMILY MEDICINE | Facility: OTHER | Age: 66
End: 2021-07-23

## 2021-07-26 ENCOUNTER — MYC MEDICAL ADVICE (OUTPATIENT)
Dept: FAMILY MEDICINE | Facility: OTHER | Age: 66
End: 2021-07-26

## 2021-07-26 NOTE — TELEPHONE ENCOUNTER
Please help fit patient in on Dr. Stovall schedule, if MA's who work with him most can determine what time would be a good time to fit him in and notify patient please.     Ender Moe PA-C

## 2021-07-26 NOTE — TELEPHONE ENCOUNTER
, please advise.   Alberto Rose, MONEN, RN  [place]  July 26, 2021

## 2021-07-26 NOTE — TELEPHONE ENCOUNTER
Please see the other encounter as well:  Please help fit patient in on Dr. Stovall schedule, if MA's who work with him most can determine what time would be a good time to fit him in and notify patient please.      Ender Moe PA-C

## 2021-07-27 ENCOUNTER — HOSPITAL ENCOUNTER (OUTPATIENT)
Dept: PHYSICAL THERAPY | Facility: CLINIC | Age: 66
Setting detail: THERAPIES SERIES
End: 2021-07-27
Attending: FAMILY MEDICINE
Payer: MEDICARE

## 2021-07-27 PROCEDURE — 97140 MANUAL THERAPY 1/> REGIONS: CPT | Mod: GP | Performed by: PHYSICAL THERAPIST

## 2021-07-27 PROCEDURE — 97530 THERAPEUTIC ACTIVITIES: CPT | Mod: GP | Performed by: PHYSICAL THERAPIST

## 2021-07-27 PROCEDURE — 97110 THERAPEUTIC EXERCISES: CPT | Mod: GP | Performed by: PHYSICAL THERAPIST

## 2021-07-28 NOTE — TELEPHONE ENCOUNTER
Spoke to patient, added on for 11am pending DJ doesn't have to leave for rounding. Will huddle with DJ tomorrow PM in clinic, patient stated understanding.     Maeve Hobbs MA

## 2021-07-28 NOTE — PROGRESS NOTES
Assessment & Plan       ICD-10-CM    1. Chronic left-sided low back pain without sciatica  M54.5     G89.29    2. Acute pain of left knee  M25.562    3. Hypertension goal BP (blood pressure) < 140/90  I10 COMPREHENSIVE METABOLIC PANEL     CBC with platelets     COMPREHENSIVE METABOLIC PANEL     CBC with platelets   4. Benign prostatic hyperplasia with urinary obstruction  N40.1 PROSTATE SPEC ANTIGEN SCREEN    N13.8 PROSTATE SPEC ANTIGEN SCREEN   5. Encounter for screening for malignant neoplasm of prostate   Z12.5 PROSTATE SPEC ANTIGEN SCREEN     PROSTATE SPEC ANTIGEN SCREEN   6. Screening for HIV (human immunodeficiency virus)  Z11.4 HIV Antigen Antibody Combo     HIV Antigen Antibody Combo      1.  Etiology is not fully identified.  Patient notes very temporary response to physical therapy.  Unfortunately, he has not tried any anti-inflammatories.  We will try anti-inflammatory dose of ibuprofen for 1 week.  If not responding to this, would have a low threshold for MRI versus empiric antibiotics versus trigger point injections.  2.  Unclear etiology.  Suspect a temporary internal derangement.  No abnormalities noted on exam today.  But no other obvious symptoms associated with this.  If pain recurs, would recommend repeat exam and possibly steroid injection.  3.  Clinically controlled.  Will continue current regimen and check monitoring labs.  4, 5.  Will check PSA today per patient request.  Continue Flomax at this time.  6.  Screening ordered.    Portions of this note were completed using Dragon dictation software.  Although reviewed, there may be typographical and other inadvertent errors that remain.         Ordering of each unique test  Prescription drug management         Patient Instructions   Thank you for visiting [facility]    Try taking ibuprofen 400-600 mg 3 times/day for the next week.      If not improving, we can do a trigger point injection or empiric steroids or MRI to determine  next if other injection/surgery might be indicated.      Continue PT.  Do chiropractic only if helpful.    Contact us or return if questions or concerns.     Have a nice day!    Dr. Stovall     Return in about 3 months (around 10/29/2021) for Physical Exam.      If you need medication refills, please contact your pharmacy 3 days before your prescriptions runs out or download the [facility] negrito for your smart phone. If you are out of refills, your pharmacy will contact contact the clinic.                                     [facility] Assistance [phone]                       Return in about 3 months (around 10/29/2021) for Physical Exam.    Fabio Stovall MD, MD  [facility] Allison is a 65 year old who presents for the following health issues     HPI     Back Pain  Onset/Duration: off and on for years. Since April  Description:   Location of pain: low back left  Character of pain: sharp, dull ache, stabbing  Pain radiation: none  New numbness or weakness in legs, not attributed to pain: no   Intensity: Currently 1/10, At its worst 10/10  Progression of Symptoms: intermittent - depends on what he does.   History:   Specific cause:   Pain interferes with job: YES- has a part time job right now.   History of back problems: has had sore back over the years.   Any previous MRI or X-rays: Yes- at [facility].  Date 2019 - cervical spine.   Sees a specialist for back pain: has been doing physical therapy, has been going for 3 weeks.    Alleviating factors:   Improved by: Physical Therapy for a short period    Precipitating factors:  Worsened by: Bending forward, sitting for too long.   Therapies tried and outcome: Physical Therapy, flexeril     Still no pain going down his left leg.  Just pain in his lower back.  No trouble while standing.  But if he sits in a chair, pain comes back right away.      This pain has been present off an on for years.  Typically, gets better after  "a week or two.  Currently has been ongoing for 3 months.  Worse with certain activities he might do on days he's feeling better.      Had a lot of pain after trimming his nails recently.      Didn't not any improvement with flexeril.  Only modest improvement with ibuprofen, but not taking it very frequently.  Hasn't taken more frequently than once/day.      Accompanying Signs & Symptoms:  Risk of Fracture: Age >64  Risk of Cauda Equina: None  Risk of Infection: None  Risk of Cancer: None  Risk of Ankylosing Spondylitis: Onset at age <35, male, AND morning back stiffness no          Musculoskeletal problem/pain  Onset/Duration: maybe about 2 weeks ago?   Description  Location: knee - left  Joint Swelling: YES- felt like it was full of water.   Redness: no  Pain: not really   Warmth: no  Intensity:  mild  Progression of Symptoms:  Improving, almost completely healed up.   Accompanying signs and symptoms:   Fevers: no  Numbness/tingling/weakness: no  History  Trauma to the area: YES- overuse/compensation for his back.   Recent illness:  no  Previous similar problem: no  Previous evaluation:  no  Precipitating or alleviating factors:  Aggravating factors include: overuse and squatting  Therapies tried and outcome: knee pad for a few days.     Pt had a lot of swelling in his left knee a couple of weeks ago.  \"felt like a hot dog neck to his patella\".  He thinks he overdid his kneeling down while avoiding bending his back.  Does get some long-term aching in his back knee for a couple of years.  It was swollen for about a week.  Hurt to bend his knee, has some pressure superior to his kneecap when he bends it.        Review of Systems   Constitutional, HEENT, cardiovascular, pulmonary, gi and gu systems are negative, except as otherwise noted.      Objective    /64   Pulse 86   Temp 96.9  F (36.1  C) (Temporal)   Resp 16   Ht 1.78 m (5' 10.08\")   Wt 87.3 kg (192 lb 6.4 oz)   SpO2 97%   BMI 27.54 kg/m    Body " mass index is 27.54 kg/m .  Physical Exam   GENERAL: healthy, alert and no distress  MS: knee exam is basically normal.  Some reproduction of symptoms with knee flexion.  Comprehensive back pain exam:  Tenderness of paralumbar area on left, near L4-5, Pain limits the following motions: bending forward, Lower extremity strength functional and equal on both sides, Lower extremity sensation normal and equal on both sides and Straight leg raise negative bilaterally      No results found for this or any previous visit (from the past 24 hour(s)).

## 2021-07-29 ENCOUNTER — ANCILLARY PROCEDURE (OUTPATIENT)
Dept: GENERAL RADIOLOGY | Facility: OTHER | Age: 66
End: 2021-07-29
Attending: FAMILY MEDICINE
Payer: MEDICARE

## 2021-07-29 ENCOUNTER — OFFICE VISIT (OUTPATIENT)
Dept: FAMILY MEDICINE | Facility: OTHER | Age: 66
End: 2021-07-29
Payer: MEDICARE

## 2021-07-29 VITALS
SYSTOLIC BLOOD PRESSURE: 114 MMHG | HEIGHT: 70 IN | RESPIRATION RATE: 16 BRPM | HEART RATE: 86 BPM | OXYGEN SATURATION: 97 % | BODY MASS INDEX: 27.54 KG/M2 | DIASTOLIC BLOOD PRESSURE: 64 MMHG | TEMPERATURE: 96.9 F | WEIGHT: 192.4 LBS

## 2021-07-29 DIAGNOSIS — M54.50 CHRONIC LEFT-SIDED LOW BACK PAIN WITHOUT SCIATICA: Primary | ICD-10-CM

## 2021-07-29 DIAGNOSIS — Z12.5 ENCOUNTER FOR SCREENING FOR MALIGNANT NEOPLASM OF PROSTATE: ICD-10-CM

## 2021-07-29 DIAGNOSIS — N40.1 BENIGN PROSTATIC HYPERPLASIA WITH URINARY OBSTRUCTION: ICD-10-CM

## 2021-07-29 DIAGNOSIS — Z11.4 SCREENING FOR HIV (HUMAN IMMUNODEFICIENCY VIRUS): ICD-10-CM

## 2021-07-29 DIAGNOSIS — N13.8 BENIGN PROSTATIC HYPERPLASIA WITH URINARY OBSTRUCTION: ICD-10-CM

## 2021-07-29 DIAGNOSIS — G89.29 CHRONIC LEFT-SIDED LOW BACK PAIN WITHOUT SCIATICA: Primary | ICD-10-CM

## 2021-07-29 DIAGNOSIS — G89.29 CHRONIC LEFT-SIDED LOW BACK PAIN WITHOUT SCIATICA: ICD-10-CM

## 2021-07-29 DIAGNOSIS — M25.562 ACUTE PAIN OF LEFT KNEE: ICD-10-CM

## 2021-07-29 DIAGNOSIS — M54.50 CHRONIC LEFT-SIDED LOW BACK PAIN WITHOUT SCIATICA: ICD-10-CM

## 2021-07-29 DIAGNOSIS — I10 HYPERTENSION GOAL BP (BLOOD PRESSURE) < 140/90: ICD-10-CM

## 2021-07-29 LAB
ALBUMIN SERPL-MCNC: 4.4 G/DL (ref 3.4–5)
ALP SERPL-CCNC: 92 U/L (ref 40–150)
ALT SERPL W P-5'-P-CCNC: 43 U/L (ref 0–70)
ANION GAP SERPL CALCULATED.3IONS-SCNC: 5 MMOL/L (ref 3–14)
AST SERPL W P-5'-P-CCNC: 24 U/L (ref 0–45)
BILIRUB SERPL-MCNC: 1 MG/DL (ref 0.2–1.3)
BUN SERPL-MCNC: 14 MG/DL (ref 7–30)
CALCIUM SERPL-MCNC: 9.3 MG/DL (ref 8.5–10.1)
CHLORIDE BLD-SCNC: 108 MMOL/L (ref 94–109)
CO2 SERPL-SCNC: 28 MMOL/L (ref 20–32)
CREAT SERPL-MCNC: 0.98 MG/DL (ref 0.66–1.25)
ERYTHROCYTE [DISTWIDTH] IN BLOOD BY AUTOMATED COUNT: 13.9 % (ref 10–15)
GFR SERPL CREATININE-BSD FRML MDRD: 81 ML/MIN/1.73M2
GLUCOSE BLD-MCNC: 126 MG/DL (ref 70–99)
HCT VFR BLD AUTO: 47.7 % (ref 40–53)
HGB BLD-MCNC: 16.7 G/DL (ref 13.3–17.7)
MCH RBC QN AUTO: 30 PG (ref 26.5–33)
MCHC RBC AUTO-ENTMCNC: 35 G/DL (ref 31.5–36.5)
MCV RBC AUTO: 86 FL (ref 78–100)
PLATELET # BLD AUTO: 229 10E3/UL (ref 150–450)
POTASSIUM BLD-SCNC: 4 MMOL/L (ref 3.4–5.3)
PROT SERPL-MCNC: 7.8 G/DL (ref 6.8–8.8)
PSA SERPL-MCNC: 0.58 UG/L (ref 0–4)
RBC # BLD AUTO: 5.57 10E6/UL (ref 4.4–5.9)
SODIUM SERPL-SCNC: 141 MMOL/L (ref 133–144)
WBC # BLD AUTO: 6.7 10E3/UL (ref 4–11)

## 2021-07-29 PROCEDURE — 87389 HIV-1 AG W/HIV-1&-2 AB AG IA: CPT | Performed by: FAMILY MEDICINE

## 2021-07-29 PROCEDURE — 99214 OFFICE O/P EST MOD 30 MIN: CPT | Performed by: FAMILY MEDICINE

## 2021-07-29 PROCEDURE — 36415 COLL VENOUS BLD VENIPUNCTURE: CPT | Performed by: FAMILY MEDICINE

## 2021-07-29 PROCEDURE — 85027 COMPLETE CBC AUTOMATED: CPT | Performed by: FAMILY MEDICINE

## 2021-07-29 PROCEDURE — 80053 COMPREHEN METABOLIC PANEL: CPT | Performed by: FAMILY MEDICINE

## 2021-07-29 PROCEDURE — 84153 ASSAY OF PSA TOTAL: CPT | Performed by: FAMILY MEDICINE

## 2021-07-29 PROCEDURE — 72100 X-RAY EXAM L-S SPINE 2/3 VWS: CPT | Performed by: RADIOLOGY

## 2021-07-29 ASSESSMENT — MIFFLIN-ST. JEOR: SCORE: 1665.22

## 2021-07-29 ASSESSMENT — PAIN SCALES - GENERAL: PAINLEVEL: NO PAIN (1)

## 2021-07-29 NOTE — PATIENT INSTRUCTIONS
Thank you for visiting [facility]    Try taking ibuprofen 400-600 mg 3 times/day for the next week.      If not improving, we can do a trigger point injection or empiric steroids or MRI to determine next if other injection/surgery might be indicated.      Continue PT.  Do chiropractic only if helpful.    Contact us or return if questions or concerns.     Have a nice day!    Dr. Stovall     Return in about 3 months (around 10/29/2021) for Physical Exam.      If you need medication refills, please contact your pharmacy 3 days before your prescriptions runs out or download the [facility] negrito for your smart phone. If you are out of refills, your pharmacy will contact contact the clinic.                                     MyChart Assistance [phone]

## 2021-07-30 LAB — HIV 1+2 AB+HIV1 P24 AG SERPL QL IA: NONREACTIVE

## 2021-07-30 NOTE — RESULT ENCOUNTER NOTE
Keegan,    All of your labs were normal for you except your blood sugar.  This was in the diabetic range, but since you were not fasting, I think it does still represent prediabetes.  Try to decrease carbohydrates in your diet, exercise regularly, and work on maintaining a healthy weight.  If this does not improve over time or if it worsens, we may need to consider medication for this.    Have a nice day!    Dr. Stovall

## 2021-07-30 NOTE — RESULT ENCOUNTER NOTE
Keegan,    You have some degenerative changes noted in your spine that may be contributing to your symptoms.  If you would like, we can obtain an MRI to further evaluate.  Let me know.    Have a nice day!    Dr. Stovall

## 2021-08-03 ENCOUNTER — HOSPITAL ENCOUNTER (OUTPATIENT)
Dept: PHYSICAL THERAPY | Facility: CLINIC | Age: 66
Setting detail: THERAPIES SERIES
End: 2021-08-03
Attending: FAMILY MEDICINE
Payer: MEDICARE

## 2021-08-03 PROCEDURE — 97110 THERAPEUTIC EXERCISES: CPT | Mod: GP | Performed by: PHYSICAL THERAPIST

## 2021-08-03 PROCEDURE — 97035 APP MDLTY 1+ULTRASOUND EA 15: CPT | Mod: GP | Performed by: PHYSICAL THERAPIST

## 2021-08-09 ENCOUNTER — HOSPITAL ENCOUNTER (OUTPATIENT)
Dept: PHYSICAL THERAPY | Facility: CLINIC | Age: 66
Setting detail: THERAPIES SERIES
End: 2021-08-09
Attending: FAMILY MEDICINE
Payer: MEDICARE

## 2021-08-09 PROCEDURE — 97110 THERAPEUTIC EXERCISES: CPT | Mod: GP | Performed by: PHYSICAL THERAPIST

## 2021-08-09 PROCEDURE — 97035 APP MDLTY 1+ULTRASOUND EA 15: CPT | Mod: GP | Performed by: PHYSICAL THERAPIST

## 2021-08-15 ENCOUNTER — MYC MEDICAL ADVICE (OUTPATIENT)
Dept: FAMILY MEDICINE | Facility: OTHER | Age: 66
End: 2021-08-15

## 2021-08-15 DIAGNOSIS — R42 DIZZINESS: ICD-10-CM

## 2021-08-15 DIAGNOSIS — N40.1 BENIGN PROSTATIC HYPERPLASIA WITH URINARY OBSTRUCTION: ICD-10-CM

## 2021-08-15 DIAGNOSIS — N13.8 BENIGN PROSTATIC HYPERPLASIA WITH URINARY OBSTRUCTION: ICD-10-CM

## 2021-08-15 DIAGNOSIS — I49.3 PVC'S (PREMATURE VENTRICULAR CONTRACTIONS): ICD-10-CM

## 2021-08-15 DIAGNOSIS — I10 HYPERTENSION GOAL BP (BLOOD PRESSURE) < 140/90: ICD-10-CM

## 2021-08-16 ENCOUNTER — HOSPITAL ENCOUNTER (OUTPATIENT)
Dept: PHYSICAL THERAPY | Facility: CLINIC | Age: 66
Setting detail: THERAPIES SERIES
End: 2021-08-16
Attending: FAMILY MEDICINE
Payer: MEDICARE

## 2021-08-16 DIAGNOSIS — I49.3 PVC'S (PREMATURE VENTRICULAR CONTRACTIONS): ICD-10-CM

## 2021-08-16 DIAGNOSIS — R42 DIZZINESS: ICD-10-CM

## 2021-08-16 DIAGNOSIS — N13.8 BENIGN PROSTATIC HYPERPLASIA WITH URINARY OBSTRUCTION: ICD-10-CM

## 2021-08-16 DIAGNOSIS — I10 HYPERTENSION GOAL BP (BLOOD PRESSURE) < 140/90: ICD-10-CM

## 2021-08-16 DIAGNOSIS — N40.1 BENIGN PROSTATIC HYPERPLASIA WITH URINARY OBSTRUCTION: ICD-10-CM

## 2021-08-16 PROCEDURE — 97035 APP MDLTY 1+ULTRASOUND EA 15: CPT | Mod: GP | Performed by: PHYSICAL THERAPIST

## 2021-08-16 PROCEDURE — 97110 THERAPEUTIC EXERCISES: CPT | Mod: GP | Performed by: PHYSICAL THERAPIST

## 2021-08-17 RX ORDER — LISINOPRIL 5 MG/1
5 TABLET ORAL DAILY
Qty: 90 TABLET | Refills: 0 | Status: SHIPPED | OUTPATIENT
Start: 2021-08-17 | End: 2021-09-16

## 2021-08-17 RX ORDER — TAMSULOSIN HYDROCHLORIDE 0.4 MG/1
CAPSULE ORAL
Qty: 90 CAPSULE | Refills: 1 | Status: SHIPPED | OUTPATIENT
Start: 2021-08-17 | End: 2021-09-16

## 2021-08-17 NOTE — TELEPHONE ENCOUNTER
Pending Prescriptions:                       Disp   Refills    lisinopril (ZESTRIL) 5 MG tablet          90 tab*0            Sig: Take 1 tablet (5 mg) by mouth daily    tamsulosin (FLOMAX) 0.4 MG capsule        90 cap*1            Sig: TAKE ONE CAPSULE BY MOUTH ONCE DAILY    Medication is being filled for 1 time parag refill only due to:  Patient is due for physical end of Oct.     Please call and help schedule.  Thank you!  MONE CasillasN, RN, PHN  [place]  August 17, 2021

## 2021-08-18 DIAGNOSIS — K21.9 GASTROESOPHAGEAL REFLUX DISEASE WITHOUT ESOPHAGITIS: ICD-10-CM

## 2021-08-18 DIAGNOSIS — E78.5 HYPERLIPIDEMIA LDL GOAL <100: ICD-10-CM

## 2021-08-18 RX ORDER — LISINOPRIL 5 MG/1
TABLET ORAL
Qty: 90 TABLET | Refills: 0 | OUTPATIENT
Start: 2021-08-18

## 2021-08-18 RX ORDER — TAMSULOSIN HYDROCHLORIDE 0.4 MG/1
CAPSULE ORAL
Qty: 90 CAPSULE | Refills: 1 | OUTPATIENT
Start: 2021-08-18

## 2021-08-18 NOTE — TELEPHONE ENCOUNTER
Reason for Call:  Other prescription    Detailed comments: Pt is not sure if he has a full  month supply of his prescription lisinopril (ZESTRIL) 5 MG tablet. has an appointment set for 9/16/2021 with [facility] and would like a bridge prescription that will last him until his appointment on 9/16/20021. Pt states that he has around 15 tablets of his old prescription of Lisinopril 10 MG and if ok'd by [facility] he would cut in half and take those until his appointment and wouldn't need a bridge prescription    Phone Number Patient can be reached at: Home number on file [phone] (home)    Best Time: anytime or leave a message on [employer]    Can we leave a detailed message on this number? YES    Call taken on 8/18/2021 at 1:05 PM by Cristel Ceballos

## 2021-08-19 RX ORDER — ATORVASTATIN CALCIUM 80 MG/1
TABLET, FILM COATED ORAL
Qty: 90 TABLET | Refills: 0 | Status: SHIPPED | OUTPATIENT
Start: 2021-08-19 | End: 2021-11-12

## 2021-08-30 ENCOUNTER — MYC MEDICAL ADVICE (OUTPATIENT)
Dept: FAMILY MEDICINE | Facility: OTHER | Age: 66
End: 2021-08-30

## 2021-09-01 ENCOUNTER — MYC MEDICAL ADVICE (OUTPATIENT)
Dept: FAMILY MEDICINE | Facility: OTHER | Age: 66
End: 2021-09-01

## 2021-09-10 NOTE — TELEPHONE ENCOUNTER
Results given to patient over the phone. Patient was appreciative of the phone call and had no other questions or concerns at this time.    1 or 2

## 2021-09-16 ENCOUNTER — OFFICE VISIT (OUTPATIENT)
Dept: FAMILY MEDICINE | Facility: OTHER | Age: 66
End: 2021-09-16
Payer: MEDICARE

## 2021-09-16 VITALS
DIASTOLIC BLOOD PRESSURE: 70 MMHG | TEMPERATURE: 97.9 F | OXYGEN SATURATION: 96 % | HEART RATE: 76 BPM | HEIGHT: 70 IN | RESPIRATION RATE: 16 BRPM | SYSTOLIC BLOOD PRESSURE: 120 MMHG | WEIGHT: 195.4 LBS | BODY MASS INDEX: 27.97 KG/M2

## 2021-09-16 DIAGNOSIS — E78.5 HYPERLIPIDEMIA LDL GOAL <100: ICD-10-CM

## 2021-09-16 DIAGNOSIS — I49.3 PVC'S (PREMATURE VENTRICULAR CONTRACTIONS): ICD-10-CM

## 2021-09-16 DIAGNOSIS — J44.9 MODERATE CHRONIC OBSTRUCTIVE PULMONARY DISEASE (H): ICD-10-CM

## 2021-09-16 DIAGNOSIS — R00.2 PALPITATIONS: ICD-10-CM

## 2021-09-16 DIAGNOSIS — R73.9 HYPERGLYCEMIA: ICD-10-CM

## 2021-09-16 DIAGNOSIS — N13.8 BENIGN PROSTATIC HYPERPLASIA WITH URINARY OBSTRUCTION: ICD-10-CM

## 2021-09-16 DIAGNOSIS — N40.1 BENIGN PROSTATIC HYPERPLASIA WITH URINARY OBSTRUCTION: ICD-10-CM

## 2021-09-16 DIAGNOSIS — Z13.6 SCREENING FOR AAA (ABDOMINAL AORTIC ANEURYSM): ICD-10-CM

## 2021-09-16 DIAGNOSIS — I10 HYPERTENSION GOAL BP (BLOOD PRESSURE) < 140/90: Primary | ICD-10-CM

## 2021-09-16 DIAGNOSIS — K21.9 GASTROESOPHAGEAL REFLUX DISEASE WITHOUT ESOPHAGITIS: ICD-10-CM

## 2021-09-16 DIAGNOSIS — R42 DIZZINESS: ICD-10-CM

## 2021-09-16 DIAGNOSIS — Z87.891 HISTORY OF SMOKING: ICD-10-CM

## 2021-09-16 PROCEDURE — 90662 IIV NO PRSV INCREASED AG IM: CPT | Performed by: FAMILY MEDICINE

## 2021-09-16 PROCEDURE — 99214 OFFICE O/P EST MOD 30 MIN: CPT | Mod: 25 | Performed by: FAMILY MEDICINE

## 2021-09-16 PROCEDURE — G0008 ADMIN INFLUENZA VIRUS VAC: HCPCS | Performed by: FAMILY MEDICINE

## 2021-09-16 RX ORDER — FLUTICASONE PROPIONATE AND SALMETEROL XINAFOATE 230; 21 UG/1; UG/1
2 AEROSOL, METERED RESPIRATORY (INHALATION) 2 TIMES DAILY
Qty: 36 G | Refills: 1 | Status: SHIPPED | OUTPATIENT
Start: 2021-09-16 | End: 2022-04-08

## 2021-09-16 RX ORDER — TAMSULOSIN HYDROCHLORIDE 0.4 MG/1
CAPSULE ORAL
Qty: 90 CAPSULE | Refills: 1 | Status: SHIPPED | OUTPATIENT
Start: 2021-09-16 | End: 2022-05-26

## 2021-09-16 RX ORDER — METOPROLOL SUCCINATE 50 MG/1
50 TABLET, EXTENDED RELEASE ORAL 2 TIMES DAILY
Qty: 180 TABLET | Refills: 3 | Status: SHIPPED | OUTPATIENT
Start: 2021-09-16 | End: 2022-10-27

## 2021-09-16 RX ORDER — LISINOPRIL 5 MG/1
5 TABLET ORAL DAILY
Qty: 90 TABLET | Refills: 3 | Status: SHIPPED | OUTPATIENT
Start: 2021-09-16 | End: 2022-07-21

## 2021-09-16 ASSESSMENT — MIFFLIN-ST. JEOR: SCORE: 1672.58

## 2021-09-16 NOTE — PROGRESS NOTES
Assessment & Plan       ICD-10-CM    1. Hypertension goal BP (blood pressure) < 140/90  I10 lisinopril (ZESTRIL) 5 MG tablet   2. Moderate chronic obstructive pulmonary disease (H)  J44.9 fluticasone-salmeterol (ADVAIR-HFA) 230-21 MCG/ACT inhaler   3. Hyperlipidemia LDL goal <100  E78.5 Lipid panel reflex to direct LDL Fasting     Basic metabolic panel  (Ca, Cl, CO2, Creat, Gluc, K, Na, BUN)   4. Hyperglycemia  R73.9 Basic metabolic panel  (Ca, Cl, CO2, Creat, Gluc, K, Na, BUN)   5. PVC's (premature ventricular contractions)  I49.3 lisinopril (ZESTRIL) 5 MG tablet   6. Dizziness  R42 lisinopril (ZESTRIL) 5 MG tablet   7. Palpitations  R00.2 metoprolol succinate ER (TOPROL-XL) 50 MG 24 hr tablet   8. Gastroesophageal reflux disease without esophagitis  K21.9 omeprazole (PRILOSEC) 20 MG DR capsule   9. Benign prostatic hyperplasia with urinary obstruction  N40.1 tamsulosin (FLOMAX) 0.4 MG capsule    N13.8    10. History of smoking  Z87.891 Abdominal Aortic Aneurysm Screening/Tracking     US Aortic Imaging   11. Screening for AAA (abdominal aortic aneurysm)  Z13.6 Abdominal Aortic Aneurysm Screening/Tracking     US Aortic Imaging      1.  Currently controlled.  Will continue current regimen.  2.  Still having some residual symptoms.  Discussed options of adjusting his inhalers to get this under better control.  Reviewed his formulary from his company to try to ensure that this is not going to significantly worsen his finances.  It appears that Advair HFA is on his formulary.  We will start this and reassess down the road.  3.  Likely controlled.  Will continue current regimen.  Check monitoring labs.  4.  We will recheck this to ensure that is not worsening.  5, 6, 7.  Patient symptoms are under good control at this time.  We will continue current antihypertensive regimen.  8.  Clinically stable.  Will continue current regimen.  9.  Clinically doing well.  Continue current regimen.  10.  Screening ultrasound  ordered.    Portions of this note were completed using Dragon dictation software.  Although reviewed, there may be typographical and other inadvertent errors that remain.       Ordering of each unique test  Prescription drug management  35 minutes spent on the date of the encounter doing chart review, history and exam, documentation and further activities per the note       Patient Instructions   Thank you for visiting [facility]    Keep up the good work!    Let's see if your breathing is better with Advair than Flovent alone.  If cost issues, let me know.    If I can help further with your appeal about HIV test coverage, let me know.      I do recommend the screening ultrasound for Medicare.    We'll let you know your lab results as soon as we can.     Contact us or return if questions or concerns.     Have a nice day!    Dr. Stovall     Return in about 6 months (around 3/16/2022) for wellness check.      If you need medication refills, please contact your pharmacy 3 days before your prescriptions runs out or download the [facility] negrito for your smart phone. If you are out of refills, your pharmacy will contact contact the clinic.                                     [facility] [phone]                       Return in about 6 months (around 3/16/2022) for wellness check.    Fabio Stovall MD, MD  [facility] SAM Allison is a 65 year old who presents for the following health issues     History of Present Illness       Hypertension: He presents for follow up of hypertension.  He does not check blood pressure  regularly outside of the clinic. Outpatient blood pressures have not been over 140/90. He does not follow a low salt diet.     He eats 0-1 servings of fruits and vegetables daily.He consumes 0 sweetened beverage(s) daily.He exercises with enough effort to increase his heart rate 20 to 29 minutes per day.    He is taking medications  "regularly.    BP has been good when checked.  Denies side effects.       Does have chronic SOB from  COPD.      Back pain has finally improved after the NSAID regimen.      Flomax is working well for him.        Review of Systems   Constitutional, HEENT, cardiovascular, pulmonary, gi and gu systems are negative, except as otherwise noted.      Objective    /70 (BP Location: Right arm, Patient Position: Sitting, Cuff Size: Adult Large)   Pulse 76   Temp 97.9  F (36.6  C) (Temporal)   Resp 16   Ht 1.77 m (5' 9.69\")   Wt 88.6 kg (195 lb 6.4 oz)   SpO2 96%   BMI 28.29 kg/m    Body mass index is 28.29 kg/m .  Physical Exam   GENERAL: healthy, alert and no distress  NECK: no adenopathy, no asymmetry, masses, or scars and thyroid normal to palpation  RESP: lungs clear to auscultation - no rales, rhonchi or wheezes  CV: regular rate and rhythm, normal S1 S2, no S3 or S4, no murmur, click or rub, no peripheral edema and peripheral pulses strong  ABDOMEN: soft, nontender, no hepatosplenomegaly, no masses and bowel sounds normal  MS: no gross musculoskeletal defects noted, no edema    Office Visit on 07/29/2021   Component Date Value Ref Range Status     HIV Antigen Antibody Combo 07/29/2021 Nonreactive  Nonreactive Final    HIV-1 p24 Ag & HIV-1/HIV-2 Ab Not Detected     Sodium 07/29/2021 141  133 - 144 mmol/L Final     Potassium 07/29/2021 4.0  3.4 - 5.3 mmol/L Final     Chloride 07/29/2021 108  94 - 109 mmol/L Final     Carbon Dioxide (CO2) 07/29/2021 28  20 - 32 mmol/L Final     Anion Gap 07/29/2021 5  3 - 14 mmol/L Final     Urea Nitrogen 07/29/2021 14  7 - 30 mg/dL Final     Creatinine 07/29/2021 0.98  0.66 - 1.25 mg/dL Final     Calcium 07/29/2021 9.3  8.5 - 10.1 mg/dL Final     Glucose 07/29/2021 126* 70 - 99 mg/dL Final     Alkaline Phosphatase 07/29/2021 92  40 - 150 U/L Final     AST 07/29/2021 24  0 - 45 U/L Final     ALT 07/29/2021 43  0 - 70 U/L Final     Protein Total 07/29/2021 7.8  6.8 - 8.8 g/dL " Final     Albumin 07/29/2021 4.4  3.4 - 5.0 g/dL Final     Bilirubin Total 07/29/2021 1.0  0.2 - 1.3 mg/dL Final     GFR Estimate 07/29/2021 81  >60 mL/min/1.73m2 Final    As of July 11, 2021, eGFR is calculated by the CKD-EPI creatinine equation, without race adjustment. eGFR can be influenced by muscle mass, exercise, and diet. The reported eGFR is an estimation only and is only applicable if the renal function is stable.     Prostate Specific Antigen Screen 07/29/2021 0.58  0.00 - 4.00 ug/L Final     WBC Count 07/29/2021 6.7  4.0 - 11.0 10e3/uL Final     RBC Count 07/29/2021 5.57  4.40 - 5.90 10e6/uL Final     Hemoglobin 07/29/2021 16.7  13.3 - 17.7 g/dL Final     Hematocrit 07/29/2021 47.7  40.0 - 53.0 % Final     MCV 07/29/2021 86  78 - 100 fL Final     MCH 07/29/2021 30.0  26.5 - 33.0 pg Final     MCHC 07/29/2021 35.0  31.5 - 36.5 g/dL Final     RDW 07/29/2021 13.9  10.0 - 15.0 % Final     Platelet Count 07/29/2021 229  150 - 450 10e3/uL Final

## 2021-09-16 NOTE — PATIENT INSTRUCTIONS
Thank you for visiting [facility]    Keep up the good work!    Let's see if your breathing is better with Advair than Flovent alone.  If cost issues, let me know.    If I can help further with your appeal about HIV test coverage, let me know.      I do recommend the screening ultrasound for Medicare.    We'll let you know your lab results as soon as we can.     Contact us or return if questions or concerns.     Have a nice day!    Dr. Stovall     Return in about 6 months (around 3/16/2022) for wellness check.      If you need medication refills, please contact your pharmacy 3 days before your prescriptions runs out or download the [facility] negrito for your smart phone. If you are out of refills, your pharmacy will contact contact the clinic.                                     MyChart Assistance [phone]

## 2021-09-17 ENCOUNTER — MYC MEDICAL ADVICE (OUTPATIENT)
Dept: FAMILY MEDICINE | Facility: OTHER | Age: 66
End: 2021-09-17

## 2021-09-23 ENCOUNTER — LAB (OUTPATIENT)
Dept: LAB | Facility: OTHER | Age: 66
End: 2021-09-23
Payer: MEDICARE

## 2021-09-23 DIAGNOSIS — R73.9 HYPERGLYCEMIA: ICD-10-CM

## 2021-09-23 DIAGNOSIS — E78.5 HYPERLIPIDEMIA LDL GOAL <100: ICD-10-CM

## 2021-09-23 LAB
ANION GAP SERPL CALCULATED.3IONS-SCNC: 4 MMOL/L (ref 3–14)
BUN SERPL-MCNC: 15 MG/DL (ref 7–30)
CALCIUM SERPL-MCNC: 8.4 MG/DL (ref 8.5–10.1)
CHLORIDE BLD-SCNC: 109 MMOL/L (ref 94–109)
CHOLEST SERPL-MCNC: 139 MG/DL
CO2 SERPL-SCNC: 28 MMOL/L (ref 20–32)
CREAT SERPL-MCNC: 0.95 MG/DL (ref 0.66–1.25)
FASTING STATUS PATIENT QL REPORTED: YES
GFR SERPL CREATININE-BSD FRML MDRD: 84 ML/MIN/1.73M2
GLUCOSE BLD-MCNC: 112 MG/DL (ref 70–99)
HDLC SERPL-MCNC: 39 MG/DL
LDLC SERPL CALC-MCNC: 72 MG/DL
NONHDLC SERPL-MCNC: 100 MG/DL
POTASSIUM BLD-SCNC: 3.9 MMOL/L (ref 3.4–5.3)
SODIUM SERPL-SCNC: 141 MMOL/L (ref 133–144)
TRIGL SERPL-MCNC: 141 MG/DL

## 2021-09-23 PROCEDURE — 80061 LIPID PANEL: CPT

## 2021-09-23 PROCEDURE — 80048 BASIC METABOLIC PNL TOTAL CA: CPT

## 2021-09-23 PROCEDURE — 36415 COLL VENOUS BLD VENIPUNCTURE: CPT

## 2021-09-23 NOTE — RESULT ENCOUNTER NOTE
Keegan,    All of your labs were normal for you.  Keep working on reducing those blood sugars.    Have a nice day!    Dr. Stovall

## 2021-09-29 ENCOUNTER — MYC MEDICAL ADVICE (OUTPATIENT)
Dept: FAMILY MEDICINE | Facility: OTHER | Age: 66
End: 2021-09-29

## 2021-09-29 DIAGNOSIS — M54.50 CHRONIC LEFT-SIDED LOW BACK PAIN WITHOUT SCIATICA: Primary | ICD-10-CM

## 2021-09-29 DIAGNOSIS — G89.29 CHRONIC LEFT-SIDED LOW BACK PAIN WITHOUT SCIATICA: Primary | ICD-10-CM

## 2021-10-18 ENCOUNTER — ALLIED HEALTH/NURSE VISIT (OUTPATIENT)
Dept: FAMILY MEDICINE | Facility: OTHER | Age: 66
End: 2021-10-18
Payer: MEDICARE

## 2021-10-18 VITALS — SYSTOLIC BLOOD PRESSURE: 138 MMHG | DIASTOLIC BLOOD PRESSURE: 70 MMHG

## 2021-10-18 DIAGNOSIS — I10 HYPERTENSION GOAL BP (BLOOD PRESSURE) < 140/90: Primary | ICD-10-CM

## 2021-10-18 PROCEDURE — 99207 PR NO CHARGE NURSE ONLY: CPT | Performed by: FAMILY MEDICINE

## 2021-10-18 NOTE — PROGRESS NOTES
Keegan Buchanan was evaluated at [facility] on October 18, 2021 at which time his blood pressure was:    BP Readings from Last 3 Encounters:   10/18/21 138/70   09/16/21 120/70   07/29/21 114/64     Pulse Readings from Last 3 Encounters:   09/16/21 76   07/29/21 86   06/28/21 70       Reviewed lifestyle modifications for blood pressure control and reduction: including making healthy food choices, managing weight, getting regular exercise, smoking cessation, reducing alcohol consumption, monitoring blood pressure regularly.     Symptoms: Other: pvc's per patient    BP Goal:< 140/90 mmHg    BP Assessment:  BP at goal    Potential Reasons for BP too high: NA - Not applicable    BP Follow-Up Plan: Recheck BP in 6 months at pharmacy    Recommendation to Provider: pt seeing cardiology on the 28th to discuss worsening of PVC's.     Note completed by:     Lauri Shannon, PharmD  [facility]   [phone]

## 2021-10-19 PROBLEM — F32.9 MAJOR DEPRESSION: Status: ACTIVE | Noted: 2021-06-18

## 2021-10-28 ENCOUNTER — OFFICE VISIT (OUTPATIENT)
Dept: CARDIOLOGY | Facility: CLINIC | Age: 66
End: 2021-10-28
Payer: MEDICARE

## 2021-10-28 VITALS
BODY MASS INDEX: 28.43 KG/M2 | DIASTOLIC BLOOD PRESSURE: 78 MMHG | WEIGHT: 198.6 LBS | SYSTOLIC BLOOD PRESSURE: 124 MMHG | OXYGEN SATURATION: 95 % | HEIGHT: 70 IN | HEART RATE: 88 BPM

## 2021-10-28 DIAGNOSIS — I49.3 PVC'S (PREMATURE VENTRICULAR CONTRACTIONS): ICD-10-CM

## 2021-10-28 DIAGNOSIS — I25.10 CORONARY ARTERY DISEASE INVOLVING NATIVE CORONARY ARTERY OF NATIVE HEART WITHOUT ANGINA PECTORIS: ICD-10-CM

## 2021-10-28 DIAGNOSIS — I10 BENIGN ESSENTIAL HYPERTENSION: Primary | ICD-10-CM

## 2021-10-28 PROCEDURE — 99214 OFFICE O/P EST MOD 30 MIN: CPT | Performed by: INTERNAL MEDICINE

## 2021-10-28 ASSESSMENT — MIFFLIN-ST. JEOR: SCORE: 1687.17

## 2021-10-28 NOTE — PROGRESS NOTES
Service Date: 10/28/2021    [place] [address]    RE:  Keegan Buchanan  MRN:  [id number]  :  1955    Dear Dr. Stovall:    Keegan Buchanan, a 65-year-old man with coronary artery disease, hypertension, dyslipidemia, and symptomatic premature ventricular contractions, was seen today at your request for followup.    Since last seen, Mr. Buchanan reports he has  more frequent symptomatic premature ventricular contractions.  There has been no syncope, sustained tachycardia, chest discomfort, dyspnea, orthopnea or PND.  The patient has been taking metoprolol 50 mg b.i.d., but still experiences episodes of symptomatic premature contractions that he finds quite bothersome.    PAST MEDICAL HISTORY:    1.  Coronary artery disease.  a.  Old history of stent implantation in mid LAD.  Negative nuclear stress test .  2.  Hypertension.  3.  Dyslipidemia.  4.  Symptomatic premature ventricular contractions - on beta blocker therapy.  5.  Osteoarthritis.  6.  Benign prostatic hypertrophy.    PHYSICAL EXAMINATION:    GENERAL:  Exam today demonstrates a very pleasant, cooperative, and intelligent 65-year-old man.  VITAL SIGNS:  His blood pressure is 124/78, his heart rate is 88, his height is 1.8 meters, his weight is 90 kg, his BMI 28.7.  RESPIRATORY:  His lungs are clear to percussion and auscultation.  CARDIOVASCULAR:  Shows a normal S1 with a normal S2.  There is no S3.  There is no murmur, rub, or click.    MEDICATIONS:    1.  Albuterol inhaler.  2.  Aspirin 81 daily.  3.  Atorvastatin 80 daily.  4.  Flovent inhaler.  5.  Lisinopril 5 mg daily.  6.  Metoprolol XL 50 mg b.i.d.  7.  Omeprazole 20 mg daily.  8.  Tamsulosin 0.4 mg daily.    LABORATORY STUDIES:  His most recent LDL cholesterol is 70.  Most recent creatinine is 0.95.  His last Holter monitor in 2018 showed occasional symptomatic PVCs, but no sustained arrhythmias.  His most recent stress  echocardiogram in 2018 showed normal ejection fraction with no inducible ischemia.    ASSESSMENT:  Mr. Buchanan has symptomatic premature ventricular contractions despite current therapy.  He is interested in more aggressive treatment at this time as he finds the symptoms very bothersome.    I would recommend that we proceed with a stress echocardiogram to make sure there is no evidence of ischemia and to check his ejection fraction.  We will also get a Zio Patch monitor to confirm that his symptoms are due to PVCs.    I would like to get an EP consultation to discuss further therapy.    RECOMMENDATIONS:    1.  Stress echocardiogram.  2.  A 7-day Zio Patch monitor.  3.  Formal consultation regarding treatment of symptomatic PVCs.  4.  Follow up with me in about 1 year.    We greatly appreciate the opportunity to participate in the care of your patient, Mr. Jazzy Buchanan.    Sincerely,    Rasta Millan MD        D: 10/28/2021   T: 10/28/2021   MT: JAMIE    Name:     JAZZY BUCHANAN  MRN:      [id number]        Account:      [id number]   :      1955           Service Date: 10/28/2021       Document: [id number]

## 2021-10-28 NOTE — LETTER
10/28/2021    Fabio Stovall MD, MD  [address] [facility] [address]    RE: Keegan Buchanan       Dear Colleague,    I had the pleasure of seeing Keegan Buchanan in the [facility].    HISTORY OF PRESENT ILLNESS:  Planning to travel to [place] for  Horseback riding. 2 daughter5 gk. PVCs bother him despite metoprolol 50 bid.     Orders this Visit:  No orders of the defined types were placed in this encounter.    No orders of the defined types were placed in this encounter.    There are no discontinued medications.    No diagnosis found.    CURRENT MEDICATIONS:  Current Outpatient Medications   Medication Sig Dispense Refill     albuterol (PROAIR HFA/PROVENTIL HFA/VENTOLIN HFA) 108 (90 Base) MCG/ACT inhaler Inhale 2 puffs into the lungs every 6 hours 1 Inhaler 3     aspirin 81 MG tablet Take 1 tablet by mouth daily. 90 tablet 3     atorvastatin (LIPITOR) 80 MG tablet TAKE 1 TABLET(80 MG) BY MOUTH DAILY 90 tablet 0     fluticasone (FLONASE) 50 MCG/ACT nasal spray SPRAY 1-2 SPRAYS IN EACH NOSTRIL DAILY 48 g 2     fluticasone (FLOVENT HFA) 220 MCG/ACT inhaler Inhale 1 puff into the lungs 2 times daily 12 g 11     fluticasone-salmeterol (ADVAIR-HFA) 230-21 MCG/ACT inhaler Inhale 2 puffs into the lungs 2 times daily 36 g 1     lisinopril (ZESTRIL) 5 MG tablet Take 1 tablet (5 mg) by mouth daily 90 tablet 3     metoprolol succinate ER (TOPROL-XL) 50 MG 24 hr tablet Take 1 tablet (50 mg) by mouth 2 times daily 180 tablet 3     omeprazole (PRILOSEC) 20 MG DR capsule TAKE 1 CAPSULE BY MOUTH EVERY DAY 90 capsule 3     tamsulosin (FLOMAX) 0.4 MG capsule TAKE ONE CAPSULE BY MOUTH ONCE DAILY 90 capsule 1     nitroGLYcerin (NITROSTAT) 0.4 MG sublingual tablet Place 1 tablet (0.4 mg) under the tongue every 5 minutes as needed for chest pain (Patient not taking: Reported on 10/28/2021) 25 tablet 0       ALLERGIES     Allergies   Allergen Reactions     Augmentin Diarrhea and  "GI Disturbance     Codeine Nausea     significant     Penicillins      GI upset and diarrhea       PAST MEDICAL, SURGICAL, FAMILY, SOCIAL HISTORY:  History was reviewed and updated as needed, see medical record.    Review of Systems:  A 12-point review of systems was completed, see medical record for detailed review of systems information.    Physical Exam:  Vitals: /78 (BP Location: Left arm, Patient Position: Sitting, Cuff Size: Adult Large)   Pulse 88   Ht 1.77 m (5' 9.69\")   Wt 90.1 kg (198 lb 9.6 oz)   SpO2 95%   BMI 28.75 kg/m      Constitutional:           Skin:           Head:           Eyes:           ENT:           Neck:           Chest:           Cardiac:                    Abdomen:           Vascular:                                        Extremities and Back:           Neurological:           ASSESSMENT: symptomatic PVCs  Dissatisfied with symptom  control       RECOMMENDATIONS:   1)  Stress echocardiogram  2)ziopatch  Monitor  3) EP consultation  4. Follow-up in one year      Recent Lab Results:  LIPID RESULTS:  Lab Results   Component Value Date    CHOL 139 09/23/2021    CHOL 148 10/02/2020    HDL 39 (L) 09/23/2021    HDL 41 10/02/2020    LDL 72 09/23/2021    LDL 75 10/02/2020    TRIG 141 09/23/2021    TRIG 162 (H) 10/02/2020    CHOLHDLRATIO 4.6 11/16/2015       LIVER ENZYME RESULTS:  Lab Results   Component Value Date    AST 24 07/29/2021    AST 24 07/29/2020    ALT 43 07/29/2021    ALT 42 07/29/2020       CBC RESULTS:  Lab Results   Component Value Date    WBC 6.7 07/29/2021    WBC 10.7 12/06/2018    RBC 5.57 07/29/2021    RBC 5.59 12/06/2018    HGB 16.7 07/29/2021    HGB 17.0 04/05/2019    HCT 47.7 07/29/2021    HCT 47.4 12/06/2018    MCV 86 07/29/2021    MCV 85 12/06/2018    MCH 30.0 07/29/2021    MCH 29.0 12/06/2018    MCHC 35.0 07/29/2021    MCHC 34.2 12/06/2018    RDW 13.9 07/29/2021    RDW 13.5 12/06/2018     07/29/2021     12/06/2018       BMP RESULTS:  Lab Results "   Component Value Date     09/23/2021     07/29/2020    POTASSIUM 3.9 09/23/2021    POTASSIUM 5.0 07/29/2020    CHLORIDE 109 09/23/2021    CHLORIDE 108 07/29/2020    CO2 28 09/23/2021    CO2 28 07/29/2020    ANIONGAP 4 09/23/2021    ANIONGAP 3 07/29/2020     (H) 09/23/2021     (H) 07/29/2020    BUN 15 09/23/2021    BUN 13 07/29/2020    CR 0.95 09/23/2021    CR 1.00 07/29/2020    GFRESTIMATED 84 09/23/2021    GFRESTIMATED 79 07/29/2020    GFRESTBLACK >90 07/29/2020    ALFREDA 8.4 (L) 09/23/2021    ALFREDA 9.1 07/29/2020        A1C RESULTS:  Lab Results   Component Value Date    A1C 5.6 12/27/2012       INR RESULTS:  Lab Results   Component Value Date    INR 0.97 11/14/2012       We greatly appreciate the opportunity to be involved in the care of your patient, Keegan Buchanan.    Sincerely,  Rasta Millan MD      CC  No referring provider defined for this encounter.                                                                         Thank you for allowing me to participate in the care of your patient.      Sincerely,     Rasta Millan MD     [facility]  cc:   No referring provider defined for this encounter.

## 2021-10-28 NOTE — PROGRESS NOTES
HISTORY OF PRESENT ILLNESS:  Planning to travel to [place] for  Horseback riding. 2 daughter5 gk. PVCs bother him despite metoprolol 50 bid.     Orders this Visit:  No orders of the defined types were placed in this encounter.    No orders of the defined types were placed in this encounter.    There are no discontinued medications.    No diagnosis found.    CURRENT MEDICATIONS:  Current Outpatient Medications   Medication Sig Dispense Refill     albuterol (PROAIR HFA/PROVENTIL HFA/VENTOLIN HFA) 108 (90 Base) MCG/ACT inhaler Inhale 2 puffs into the lungs every 6 hours 1 Inhaler 3     aspirin 81 MG tablet Take 1 tablet by mouth daily. 90 tablet 3     atorvastatin (LIPITOR) 80 MG tablet TAKE 1 TABLET(80 MG) BY MOUTH DAILY 90 tablet 0     fluticasone (FLONASE) 50 MCG/ACT nasal spray SPRAY 1-2 SPRAYS IN EACH NOSTRIL DAILY 48 g 2     fluticasone (FLOVENT HFA) 220 MCG/ACT inhaler Inhale 1 puff into the lungs 2 times daily 12 g 11     fluticasone-salmeterol (ADVAIR-HFA) 230-21 MCG/ACT inhaler Inhale 2 puffs into the lungs 2 times daily 36 g 1     lisinopril (ZESTRIL) 5 MG tablet Take 1 tablet (5 mg) by mouth daily 90 tablet 3     metoprolol succinate ER (TOPROL-XL) 50 MG 24 hr tablet Take 1 tablet (50 mg) by mouth 2 times daily 180 tablet 3     omeprazole (PRILOSEC) 20 MG DR capsule TAKE 1 CAPSULE BY MOUTH EVERY DAY 90 capsule 3     tamsulosin (FLOMAX) 0.4 MG capsule TAKE ONE CAPSULE BY MOUTH ONCE DAILY 90 capsule 1     nitroGLYcerin (NITROSTAT) 0.4 MG sublingual tablet Place 1 tablet (0.4 mg) under the tongue every 5 minutes as needed for chest pain (Patient not taking: Reported on 10/28/2021) 25 tablet 0       ALLERGIES     Allergies   Allergen Reactions     Augmentin Diarrhea and GI Disturbance     Codeine Nausea     significant     Penicillins      GI upset and diarrhea       PAST MEDICAL, SURGICAL, FAMILY, SOCIAL HISTORY:  History was reviewed and updated as needed, see medical record.    Review of Systems:  A 12-point  "review of systems was completed, see medical record for detailed review of systems information.    Physical Exam:  Vitals: /78 (BP Location: Left arm, Patient Position: Sitting, Cuff Size: Adult Large)   Pulse 88   Ht 1.77 m (5' 9.69\")   Wt 90.1 kg (198 lb 9.6 oz)   SpO2 95%   BMI 28.75 kg/m      Constitutional:           Skin:           Head:           Eyes:           ENT:           Neck:           Chest:           Cardiac:                    Abdomen:           Vascular:                                        Extremities and Back:           Neurological:           ASSESSMENT: symptomatic PVCs  Dissatisfied with symptom  control       RECOMMENDATIONS:   1)  Stress echocardiogram  2)ziopatch  Monitor  3) EP consultation  4. Follow-up in one year      Recent Lab Results:  LIPID RESULTS:  Lab Results   Component Value Date    CHOL 139 09/23/2021    CHOL 148 10/02/2020    HDL 39 (L) 09/23/2021    HDL 41 10/02/2020    LDL 72 09/23/2021    LDL 75 10/02/2020    TRIG 141 09/23/2021    TRIG 162 (H) 10/02/2020    CHOLHDLRATIO 4.6 11/16/2015       LIVER ENZYME RESULTS:  Lab Results   Component Value Date    AST 24 07/29/2021    AST 24 07/29/2020    ALT 43 07/29/2021    ALT 42 07/29/2020       CBC RESULTS:  Lab Results   Component Value Date    WBC 6.7 07/29/2021    WBC 10.7 12/06/2018    RBC 5.57 07/29/2021    RBC 5.59 12/06/2018    HGB 16.7 07/29/2021    HGB 17.0 04/05/2019    HCT 47.7 07/29/2021    HCT 47.4 12/06/2018    MCV 86 07/29/2021    MCV 85 12/06/2018    MCH 30.0 07/29/2021    MCH 29.0 12/06/2018    MCHC 35.0 07/29/2021    MCHC 34.2 12/06/2018    RDW 13.9 07/29/2021    RDW 13.5 12/06/2018     07/29/2021     12/06/2018       BMP RESULTS:  Lab Results   Component Value Date     09/23/2021     07/29/2020    POTASSIUM 3.9 09/23/2021    POTASSIUM 5.0 07/29/2020    CHLORIDE 109 09/23/2021    CHLORIDE 108 07/29/2020    CO2 28 09/23/2021    CO2 28 07/29/2020    ANIONGAP 4 09/23/2021    " ANIONGAP 3 07/29/2020     (H) 09/23/2021     (H) 07/29/2020    BUN 15 09/23/2021    BUN 13 07/29/2020    CR 0.95 09/23/2021    CR 1.00 07/29/2020    GFRESTIMATED 84 09/23/2021    GFRESTIMATED 79 07/29/2020    GFRESTBLACK >90 07/29/2020    ALFREDA 8.4 (L) 09/23/2021    ALFREDA 9.1 07/29/2020        A1C RESULTS:  Lab Results   Component Value Date    A1C 5.6 12/27/2012       INR RESULTS:  Lab Results   Component Value Date    INR 0.97 11/14/2012       We greatly appreciate the opportunity to be involved in the care of your patient, Keegan Buchanan.    Sincerely,  Rasta Millan MD      CC  No referring provider defined for this encounter.

## 2021-11-03 ENCOUNTER — HOSPITAL ENCOUNTER (OUTPATIENT)
Dept: CARDIOLOGY | Facility: CLINIC | Age: 66
Discharge: HOME | End: 2021-11-03
Attending: INTERNAL MEDICINE | Admitting: INTERNAL MEDICINE
Payer: MEDICARE

## 2021-11-03 DIAGNOSIS — I10 BENIGN ESSENTIAL HYPERTENSION: ICD-10-CM

## 2021-11-03 DIAGNOSIS — I25.10 CORONARY ARTERY DISEASE INVOLVING NATIVE CORONARY ARTERY OF NATIVE HEART WITHOUT ANGINA PECTORIS: ICD-10-CM

## 2021-11-03 DIAGNOSIS — I49.3 PVC'S (PREMATURE VENTRICULAR CONTRACTIONS): ICD-10-CM

## 2021-11-03 PROCEDURE — 93325 DOPPLER ECHO COLOR FLOW MAPG: CPT | Mod: 26 | Performed by: INTERNAL MEDICINE

## 2021-11-03 PROCEDURE — 999N000208 ECHO STRESS ECHOCARDIOGRAM

## 2021-11-03 PROCEDURE — 255N000002 HC RX 255 OP 636: Performed by: INTERNAL MEDICINE

## 2021-11-03 PROCEDURE — 93016 CV STRESS TEST SUPVJ ONLY: CPT | Performed by: INTERNAL MEDICINE

## 2021-11-03 PROCEDURE — 93350 STRESS TTE ONLY: CPT | Mod: 26 | Performed by: INTERNAL MEDICINE

## 2021-11-03 PROCEDURE — 93321 DOPPLER ECHO F-UP/LMTD STD: CPT | Mod: 26 | Performed by: INTERNAL MEDICINE

## 2021-11-03 PROCEDURE — 93018 CV STRESS TEST I&R ONLY: CPT | Performed by: INTERNAL MEDICINE

## 2021-11-03 PROCEDURE — C8928 TTE W OR W/O FOL W/CON,STRES: HCPCS

## 2021-11-03 PROCEDURE — 93242 EXT ECG>48HR<7D RECORDING: CPT

## 2021-11-03 RX ADMIN — HUMAN ALBUMIN MICROSPHERES AND PERFLUTREN 5 ML: 10; .22 INJECTION, SOLUTION INTRAVENOUS at 09:37

## 2021-11-10 DIAGNOSIS — E78.5 HYPERLIPIDEMIA LDL GOAL <100: ICD-10-CM

## 2021-11-12 RX ORDER — ATORVASTATIN CALCIUM 80 MG/1
TABLET, FILM COATED ORAL
Qty: 90 TABLET | Refills: 0 | Status: SHIPPED | OUTPATIENT
Start: 2021-11-12 | End: 2022-02-08

## 2021-11-22 ENCOUNTER — TELEPHONE (OUTPATIENT)
Dept: CARDIOLOGY | Facility: CLINIC | Age: 66
End: 2021-11-22
Payer: MEDICARE

## 2021-11-22 NOTE — TELEPHONE ENCOUNTER
Called pt to review Zio patch from 11/3 - 11/10/21. Pt states he is leaving for [place] 12/4/21 - 4/1/22. Plan for him to see EP when he returns. Pt ask if okay to wait until then. Routing to  to clarify.     ----- Message from Rasta Millan MD sent at 11/22/2021 11:24 AM CST -----  His ambulatory  monitor demonstrates symptomatic  nonsustained SVT, but no PVCs. He can discuss with EP service at time of follow  up .

## 2021-11-22 NOTE — TELEPHONE ENCOUNTER
"Called patient and reviewed message below w/ pt. No further questions at this time. Pt will reach out once he is back from his trip.       Rasta Millan MD  You        \"I think it is safe to leave. I do not feel the SVT is dangerous and it does not need to be treated if it does not bother him. Since his stress echo was ok, I don't think he even needs to see EP if the symptoms are not disabling. We can re address whether he wants to be seen at all when he returns from [place]. Tell him to have a nice trip and we will see  him at the time of our next visit when he returns. \"        JOSE F Chau    "

## 2021-11-29 ENCOUNTER — MYC MEDICAL ADVICE (OUTPATIENT)
Dept: FAMILY MEDICINE | Facility: OTHER | Age: 66
End: 2021-11-29
Payer: MEDICARE

## 2021-11-29 NOTE — TELEPHONE ENCOUNTER
Team, please fax chiropractor referral.   MONE CasillasN, RN, PHN  [facility]  November 29, 2021

## 2021-12-14 NOTE — PROGRESS NOTES
[facility]    Outpatient Physical Therapy Discharge Note  Patient: Keegan Buchanan    : 1955    Beginning/End Dates of Reporting Period: 21 to 21    Referring Provider: Stovall    Therapy Diagnosis: mechanical LBP     Client Self Report: Overall, much better but still pain if sits in car 5-10 min.  It is less intense with sitting.    Objective Measurements:  Objective Measure: pain  Details: pain ranges from a 0-3/10    Objective Measure: Daniel  Details: 0/1/low    Objective Measure: ANGELA  Details: 14%     Goals:  Goal Identifier 1 ANGELA   Goal Description Pt will improve his score on the ANGELA (Oswestry Disability Index) to 10% or less indicating a clinically meaningful improvement in low back pain and overall function over time.   Target Date 21   Date Met      Progress (detail required for progress note): Pt has improved to 10% so goal is nearly met.     Goal Identifier 2 HEP   Goal Description Pt will demo independence with HEP and verbal knowledge of back care principles for future self-management of sx.   Target Date 21   Date Met  21   Progress (detail required for progress note):       Goal Identifier 3 Sitting   Goal Description Pt will be able to sit for 30 min or greater with LBP less than 2/10 in order to commute to/from work with greater comfort.   Target Date 21   Date Met      Progress (detail required for progress note): Progressing to that goal as pain is a 3/10 at times.     Plan: Discharge from therapy.    Reason for Discharge: goals met or sufficient for disharge    Equipment Issued: none    Discharge Plan: Patient to continue home program.

## 2022-02-08 ENCOUNTER — MYC MEDICAL ADVICE (OUTPATIENT)
Dept: FAMILY MEDICINE | Facility: OTHER | Age: 67
End: 2022-02-08
Payer: MEDICARE

## 2022-02-08 DIAGNOSIS — E78.5 HYPERLIPIDEMIA LDL GOAL <100: ICD-10-CM

## 2022-02-08 RX ORDER — ATORVASTATIN CALCIUM 80 MG/1
TABLET, FILM COATED ORAL
Qty: 90 TABLET | Refills: 0 | Status: SHIPPED | OUTPATIENT
Start: 2022-02-08 | End: 2022-03-28

## 2022-02-08 NOTE — TELEPHONE ENCOUNTER
Pending Prescriptions:                       Disp   Refills    atorvastatin (LIPITOR) 80 MG tablet [Phar*90 tab*0            Sig: TAKE 1 TABLET(80 MG) BY MOUTH DAILY    Medication is being filled for 1 time parag refill only due to:  Patient is due for wellness check around 3/16/22.    Please call and help schedule.  Thank you!

## 2022-02-12 ENCOUNTER — HEALTH MAINTENANCE LETTER (OUTPATIENT)
Age: 67
End: 2022-02-12

## 2022-03-28 RX ORDER — ATORVASTATIN CALCIUM 80 MG/1
TABLET, FILM COATED ORAL
Qty: 90 TABLET | Refills: 0 | Status: SHIPPED | OUTPATIENT
Start: 2022-03-28 | End: 2022-05-26

## 2022-03-28 NOTE — TELEPHONE ENCOUNTER
Patient called back and can't get into see you unitl 5/26/22- he only has 2 weeks left and also wondering why you need to see him again. Was in Sept 2121 and he said it's usually once a year with him.. Please advise on that as well.

## 2022-03-28 NOTE — TELEPHONE ENCOUNTER
Next appointment should be for a Medicare wellness check.  Please ensure that this is scheduled as such.  I can refill all of his meds until after that visit.    Additionally, his last blood sugar was mildly elevated, so I want to be sure that this is under control.

## 2022-04-08 DIAGNOSIS — J44.9 MODERATE CHRONIC OBSTRUCTIVE PULMONARY DISEASE (H): ICD-10-CM

## 2022-04-08 RX ORDER — FLUTICASONE PROPIONATE AND SALMETEROL XINAFOATE 230; 21 UG/1; UG/1
AEROSOL, METERED RESPIRATORY (INHALATION)
Qty: 36 G | Refills: 1 | Status: SHIPPED | OUTPATIENT
Start: 2022-04-08 | End: 2022-06-02 | Stop reason: ALTCHOICE

## 2022-04-08 RX ORDER — FLUTICASONE PROPIONATE AND SALMETEROL XINAFOATE 230; 21 UG/1; UG/1
AEROSOL, METERED RESPIRATORY (INHALATION)
Qty: 36 G | Refills: 1 | Status: SHIPPED | OUTPATIENT
Start: 2022-04-08 | End: 2022-05-26

## 2022-04-08 NOTE — TELEPHONE ENCOUNTER
"Pending Prescriptions:                       Disp   Refills    ADVAIR -21 MCG/ACT inhaler [Pharmac*36 g   1        Sig: INHALE 2 PUFFS INTO THE LUNGS TWICE DAILY    ADVAIR -21 MCG/ACT inhaler [Pharmac*36 g   1        Sig: INHALE 2 PUFFS INTO THE LUNGS TWICE DAILY    Routing refill request to provider for review/approval because:      Requested Prescriptions   Pending Prescriptions Disp Refills    ADVAIR -21 MCG/ACT inhaler [Pharmacy Med Name: ADVAIR /21MCG ORAL 'S] 36 g 1     Sig: INHALE 2 PUFFS INTO THE LUNGS TWICE DAILY        Long-Acting Beta Agonist Inhalers Protocol  Failed - 4/8/2022  9:44 AM        Failed - Order for Serevent, Striverdi, or Foradil and pt has steroid inhaler        Passed - Patient is age 12 or older        Passed - Recent (12 mo) or future (30 days) visit within the authorizing provider's specialty     Patient has had an office visit with the authorizing provider or a provider within the authorizing providers department within the previous 12 mos or has a future within next 30 days. See \"Patient Info\" tab in inbasket, or \"Choose Columns\" in Meds & Orders section of the refill encounter.              Passed - Medication is active on med list       Inhaled Steroids Protocol Passed - 4/8/2022  9:44 AM        Passed - Patient is age 12 or older        Passed - Recent (12 mo) or future (30 days) visit within the authorizing provider's specialty     Patient has had an office visit with the authorizing provider or a provider within the authorizing providers department within the previous 12 mos or has a future within next 30 days. See \"Patient Info\" tab in inbasket, or \"Choose Columns\" in Meds & Orders section of the refill encounter.              Passed - Medication is active on med list           ADVAIR -21 MCG/ACT inhaler [Pharmacy Med Name: ADVAIR /21MCG ORAL 'S] 36 g 1     Sig: INHALE 2 PUFFS INTO THE LUNGS TWICE DAILY        Long-Acting " "Beta Agonist Inhalers Protocol  Failed - 4/8/2022  9:44 AM        Failed - Order for Serevent, Striverdi, or Foradil and pt has steroid inhaler        Passed - Patient is age 12 or older        Passed - Recent (12 mo) or future (30 days) visit within the authorizing provider's specialty     Patient has had an office visit with the authorizing provider or a provider within the authorizing providers department within the previous 12 mos or has a future within next 30 days. See \"Patient Info\" tab in inbasket, or \"Choose Columns\" in Meds & Orders section of the refill encounter.              Passed - Medication is active on med list       Inhaled Steroids Protocol Passed - 4/8/2022  9:44 AM        Passed - Patient is age 12 or older        Passed - Recent (12 mo) or future (30 days) visit within the authorizing provider's specialty     Patient has had an office visit with the authorizing provider or a provider within the authorizing providers department within the previous 12 mos or has a future within next 30 days. See \"Patient Info\" tab in inbasket, or \"Choose Columns\" in Meds & Orders section of the refill encounter.              Passed - Medication is active on med list                  "

## 2022-04-18 ENCOUNTER — ALLIED HEALTH/NURSE VISIT (OUTPATIENT)
Dept: FAMILY MEDICINE | Facility: OTHER | Age: 67
End: 2022-04-18
Payer: MEDICARE

## 2022-04-18 VITALS — HEART RATE: 82 BPM | DIASTOLIC BLOOD PRESSURE: 60 MMHG | RESPIRATION RATE: 22 BRPM | SYSTOLIC BLOOD PRESSURE: 110 MMHG

## 2022-04-18 DIAGNOSIS — I10 HYPERTENSION GOAL BP (BLOOD PRESSURE) < 140/90: Primary | ICD-10-CM

## 2022-04-18 PROCEDURE — 99207 PR NO CHARGE NURSE ONLY: CPT

## 2022-04-18 NOTE — PROGRESS NOTES
Keegan Buchanan is a 66 year old patient who comes in today for a Blood Pressure check.  Initial BP:  /60   Pulse 82   Resp 22      82  Disposition: results routed to provider   English

## 2022-05-26 ENCOUNTER — OFFICE VISIT (OUTPATIENT)
Dept: FAMILY MEDICINE | Facility: OTHER | Age: 67
End: 2022-05-26
Payer: MEDICARE

## 2022-05-26 VITALS
HEIGHT: 70 IN | WEIGHT: 191.8 LBS | DIASTOLIC BLOOD PRESSURE: 64 MMHG | BODY MASS INDEX: 27.46 KG/M2 | RESPIRATION RATE: 18 BRPM | SYSTOLIC BLOOD PRESSURE: 112 MMHG | OXYGEN SATURATION: 95 % | HEART RATE: 70 BPM | TEMPERATURE: 98.3 F

## 2022-05-26 DIAGNOSIS — L82.1 SEBORRHEIC KERATOSIS: ICD-10-CM

## 2022-05-26 DIAGNOSIS — J30.89 SEASONAL ALLERGIC RHINITIS DUE TO OTHER ALLERGIC TRIGGER: ICD-10-CM

## 2022-05-26 DIAGNOSIS — J44.9 MODERATE CHRONIC OBSTRUCTIVE PULMONARY DISEASE (H): ICD-10-CM

## 2022-05-26 DIAGNOSIS — D18.01 HEMANGIOMA OF SKIN: ICD-10-CM

## 2022-05-26 DIAGNOSIS — Z00.00 ENCOUNTER FOR MEDICARE ANNUAL WELLNESS EXAM: Primary | ICD-10-CM

## 2022-05-26 DIAGNOSIS — N13.8 BENIGN PROSTATIC HYPERPLASIA WITH URINARY OBSTRUCTION: ICD-10-CM

## 2022-05-26 DIAGNOSIS — I10 HYPERTENSION GOAL BP (BLOOD PRESSURE) < 140/90: ICD-10-CM

## 2022-05-26 DIAGNOSIS — N40.1 BENIGN PROSTATIC HYPERPLASIA WITH URINARY OBSTRUCTION: ICD-10-CM

## 2022-05-26 DIAGNOSIS — E78.5 HYPERLIPIDEMIA LDL GOAL <100: ICD-10-CM

## 2022-05-26 PROCEDURE — 99214 OFFICE O/P EST MOD 30 MIN: CPT | Mod: 25 | Performed by: FAMILY MEDICINE

## 2022-05-26 PROCEDURE — G0438 PPPS, INITIAL VISIT: HCPCS | Performed by: FAMILY MEDICINE

## 2022-05-26 RX ORDER — FLUTICASONE PROPIONATE 50 MCG
SPRAY, SUSPENSION (ML) NASAL
Qty: 48 G | Refills: 2 | Status: SHIPPED | OUTPATIENT
Start: 2022-05-26 | End: 2023-06-19

## 2022-05-26 RX ORDER — FLUTICASONE PROPIONATE AND SALMETEROL XINAFOATE 230; 21 UG/1; UG/1
AEROSOL, METERED RESPIRATORY (INHALATION)
Qty: 36 G | Refills: 1 | Status: SHIPPED | OUTPATIENT
Start: 2022-05-26 | End: 2022-11-17

## 2022-05-26 RX ORDER — ATORVASTATIN CALCIUM 80 MG/1
TABLET, FILM COATED ORAL
Qty: 90 TABLET | Refills: 3 | Status: SHIPPED | OUTPATIENT
Start: 2022-05-26 | End: 2023-06-19

## 2022-05-26 RX ORDER — ALBUTEROL SULFATE 90 UG/1
2 AEROSOL, METERED RESPIRATORY (INHALATION) EVERY 6 HOURS
Qty: 18 G | Refills: 3 | Status: SHIPPED | OUTPATIENT
Start: 2022-05-26 | End: 2022-05-31

## 2022-05-26 RX ORDER — LISINOPRIL 5 MG/1
5 TABLET ORAL DAILY
Qty: 90 TABLET | Refills: 3 | Status: CANCELLED | OUTPATIENT
Start: 2022-05-26

## 2022-05-26 RX ORDER — TAMSULOSIN HYDROCHLORIDE 0.4 MG/1
CAPSULE ORAL
Qty: 90 CAPSULE | Refills: 1 | Status: SHIPPED | OUTPATIENT
Start: 2022-05-26 | End: 2022-11-17

## 2022-05-26 RX ORDER — METOPROLOL SUCCINATE 50 MG/1
50 TABLET, EXTENDED RELEASE ORAL 2 TIMES DAILY
Qty: 180 TABLET | Refills: 3 | Status: CANCELLED | OUTPATIENT
Start: 2022-05-26

## 2022-05-26 ASSESSMENT — ENCOUNTER SYMPTOMS
DIZZINESS: 0
SHORTNESS OF BREATH: 1
HEMATURIA: 0
HEADACHES: 1
HEARTBURN: 0
FREQUENCY: 1
MYALGIAS: 1
CHILLS: 0
PALPITATIONS: 0
ARTHRALGIAS: 1
PARESTHESIAS: 0
COUGH: 1
DIARRHEA: 0
FEVER: 0
NAUSEA: 0
DYSURIA: 0
ABDOMINAL PAIN: 0
CONSTIPATION: 0
NERVOUS/ANXIOUS: 0
SORE THROAT: 0
JOINT SWELLING: 0
HEMATOCHEZIA: 0
WEAKNESS: 0

## 2022-05-26 ASSESSMENT — PATIENT HEALTH QUESTIONNAIRE - PHQ9
SUM OF ALL RESPONSES TO PHQ QUESTIONS 1-9: 0
10. IF YOU CHECKED OFF ANY PROBLEMS, HOW DIFFICULT HAVE THESE PROBLEMS MADE IT FOR YOU TO DO YOUR WORK, TAKE CARE OF THINGS AT HOME, OR GET ALONG WITH OTHER PEOPLE: NOT DIFFICULT AT ALL
SUM OF ALL RESPONSES TO PHQ QUESTIONS 1-9: 0

## 2022-05-26 ASSESSMENT — PAIN SCALES - GENERAL: PAINLEVEL: NO PAIN (0)

## 2022-05-26 ASSESSMENT — ACTIVITIES OF DAILY LIVING (ADL): CURRENT_FUNCTION: NO ASSISTANCE NEEDED

## 2022-05-26 NOTE — PATIENT INSTRUCTIONS
Try to send me some pictures of your back in an E-visit if these come back.  Try to get several pictures from different angles.      If your throat gets more bothersome, we can also look at pictures of that or we could empirically try something for thrush, but right now I'm not convinced.      You could consider a chin strap while sleeping to minimize the dry air.  Or drink lots of water.      If your scalp spots are getting irritated, we can treat them, but these don't appear to be dangerous.      I do recommend shingles and Covid booster vaccination.  The risks of getting Covid greatly exceed the risks of vaccination.  Let me know what questions and concerns you have.     Contact us or return if questions or concerns.     Have a nice day!    Dr. Stovall     Patient Education   Personalized Prevention Plan  You are due for the preventive services outlined below.  Your care team is available to assist you in scheduling these services.  If you have already completed any of these items, please share that information with your care team to update in your medical record.  Health Maintenance Due   Topic Date Due    Depression Action Plan  Never done    Zoster (Shingles) Vaccine (1 of 2) Never done    Pneumococcal Vaccine (2 - PCV) 02/10/2018    COVID-19 Vaccine (4 - Booster for Moderna series) 03/19/2022     Your Health Risk Assessment indicates you feel you are not in good health    A healthy lifestyle helps keep the body fit and the mind alert. It helps protect you from disease, helps you fight disease, and helps prevent chronic disease (disease that doesn't go away) from getting worse. This is important as you get older and begin to notice twinges in muscles and joints and a decline in the strength and stamina you once took for granted. A healthy lifestyle includes good healthcare, good nutrition, weight control, recreation, and regular exercise. Avoid harmful substances and do what you can to keep safe. Another part of  a healthy lifestyle is stay mentally active and socially involved.    Good healthcare   Have a wellness visit every year.   If you have new symptoms, let us know right away. Don't wait until the next checkup.   Take medicines exactly as prescribed and keep your medicines in a safe place. Tell us if your medicine causes problems.   Healthy diet and weight control   Eat 3 or 4 small, nutritious, low-fat, high-fiber meals a day. Include a variety of fruits, vegetables, and whole-grain foods.   Make sure you get enough calcium in your diet. Calcium, vitamin D, and exercise help prevent osteoporosis (bone thinning).   If you live alone, try eating with others when you can. That way you get a good meal and have company while you eat it.   Try to keep a healthy weight. If you eat more calories than your body uses for energy, it will be stored as fat and you will gain weight.     Recreation   Recreation is not limited to sports and team events. It includes any activity that provides relaxation, interest, enjoyment, and exercise. Recreation provides an outlet for physical, mental, and social energy. It can give a sense of worth and achievement. It can help you stay healthy.    Mental Exercise and Social Involvement  Mental and emotional health is as important as physical health. Keep in touch with friends and family. Stay as active as possible. Continue to learn and challenge yourself.   Things you can do to stay mentally active are:  Learn something new, like a foreign language or musical instrument.   Play SCRABBLE or do crossword puzzles. If you cannot find people to play these games with you at home, you can play them with others on your computer through the Internet.   Join a games club--anything from card games to chess or checkers or lawn bowling.   Start a new hobby.   Go back to school.   Volunteer.   Read.   Keep up with world events.    Understanding USDA MyPlate  The USDA has guidelines to help you make healthy  food choices. These are called MyPlate. MyPlate shows the food groups that make up healthy meals using the image of a place setting. Before you eat, think about the healthiest choices for what to put on your plate or in your cup or bowl. To learn more about building a healthy plate, visit www.choosemyplate.gov.    The food groups  Fruits. Any fruit or 100% fruit juice counts as part of the Fruit Group. Fruits may be fresh, canned, frozen, or dried, and may be whole, cut-up, or pureed. Make 1/2 of your plate fruits and vegetables.  Vegetables. Any vegetable or 100% vegetable juice counts as a member of the Vegetable Group. Vegetables may be fresh, frozen, canned, or dried. They can be served raw or cooked and may be whole, cut-up, or mashed. Make 1/2 of your plate fruits and vegetables.  Grains. All foods made from grains are part of the Grains Group. These include wheat, rice, oats, cornmeal, and barley. Grains are often used to make foods such as bread, pasta, oatmeal, cereal, tortillas, and grits. Grains should be no more than 1/4 of your plate. At least half of your grains should be whole grains.  Protein. This group includes meat, poultry, seafood, beans and peas, eggs, processed soy products (such as tofu), nuts (including nut butters), and seeds. Make protein choices no more than 1/4 of your plate. Meat and poultry choices should be lean or low fat.  Dairy. The Dairy Group includes all fluid milk products and foods made from milk that contain calcium, such as yogurt and cheese. (Foods that have little calcium, such as cream, butter, and cream cheese, are not part of this group.) Most dairy choices should be low-fat or fat-free.  Oils. Oils aren't a food group, but they do contain essential nutrients. However it's important to watch your intake of oils. These are fats that are liquid at room temperature. They include canola, corn, olive, soybean, vegetable, and sunflower oil. Foods that are mainly oil include  mayonnaise, certain salad dressings, and soft margarines. You likely already get your daily oil allowance from the foods you eat.  Things to limit  Eating healthy also means limiting these things in your diet:     Salt (sodium). Many processed foods have a lot of sodium. To keep sodium intake down, eat fresh vegetables, meats, poultry, and seafood when possible. Purchase low-sodium, reduced-sodium, or no-salt-added food products at the store. And don't add salt to your meals at home. Instead, season them with herbs and spices such as dill, oregano, cumin, and paprika. Or try adding flavor with lemon or lime zest and juice.  Saturated fat. Saturated fats are most often found in animal products such as beef, pork, and chicken. They are often solid at room temperature, such as butter. To reduce your saturated fat intake, choose leaner cuts of meat and poultry. And try healthier cooking methods such as grilling, broiling, roasting, or baking. For a simple lower-fat swap, use plain nonfat yogurt instead of mayonnaise when making potato salad or macaroni salad.  Added sugars. These are sugars added to foods. They are in foods such as ice cream, candy, soda, fruit drinks, sports drinks, energy drinks, cookies, pastries, jams, and syrups. Cut down on added sugars by sharing sweet treats with a family member or friend. You can also choose fruit for dessert, and drink water or other unsweetened beverages.     [employer] last reviewed this educational content on 6/1/2020 2000-2021 [employer]. All rights reserved. This information is not intended as a substitute for professional medical care. Always follow your healthcare professional's instructions.          Signs of Hearing Loss      Hearing much better with one ear can be a sign of hearing loss.   Hearing loss is a problem shared by many people. In fact, it is one of the most common health problems, particularly as people age. Most people age 65 and older  have some hearing loss. By age 80, almost everyone does. Hearing loss often occurs slowly over the years. So you may not realize your hearing has gotten worse.  Have your hearing checked  Call your healthcare provider if you:  Have to strain to hear normal conversation  Have to watch other people s faces very carefully to follow what they re saying  Need to ask people to repeat what they ve said  Often misunderstand what people are saying  Turn the volume of the television or radio up so high that others complain  Feel that people are mumbling when they re talking to you  Find that the effort to hear leaves you feeling tired and irritated  Notice, when using the phone, that you hear better with one ear than the other  [employer] last reviewed this educational content on 1/1/2020 2000-2021 [employer]. All rights reserved. This information is not intended as a substitute for professional medical care. Always follow your healthcare professional's instructions.        Your Health Risk Assessment indicates you feel you are not in good emotional health.    Recreation   Recreation is not limited to sports and team events. It includes any activity that provides relaxation, interest, enjoyment, and exercise. Recreation provides an outlet for physical, mental, and social energy. It can give a sense of worth and achievement. It can help you stay healthy.    Mental Exercise and Social Involvement  Mental and emotional health is as important as physical health. Keep in touch with friends and family. Stay as active as possible. Continue to learn and challenge yourself.   Things you can do to stay mentally active are:  Learn something new, like a foreign language or musical instrument.   Play SCRABBLE or do crossword puzzles. If you cannot find people to play these games with you at home, you can play them with others on your computer through the Internet.   Join a games club--anything from card games to chess or  checkers or lawn bowling.   Start a new hobby.   Go back to school.   Volunteer.   Read.   Keep up with world events.

## 2022-05-26 NOTE — PROGRESS NOTES
"SUBJECTIVE:   Keegan Buchanan is a 66 year old male who presents for Preventive Visit.        Are you in the first 12 months of your Medicare coverage?  No    Healthy Habits:     In general, how would you rate your overall health?  Fair    Frequency of exercise:  2-3 days/week    Duration of exercise:  15-30 minutes    Do you usually eat at least 4 servings of fruit and vegetables a day, include whole grains    & fiber and avoid regularly eating high fat or \"junk\" foods?  No    Taking medications regularly:  Yes    Medication side effects:  None    Ability to successfully perform activities of daily living:  No assistance needed    Home Safety:  No safety concerns identified    Hearing Impairment:  Difficulty following a conversation in a noisy restaurant or crowded room, need to ask people to speak up or repeat themselves, find that men's voices are easier to understand than woman's, difficulty understanding soft or whispered speech and difficulty understanding speech on the telephone    In the past 6 months, have you been bothered by leaking of urine?  No    In general, how would you rate your overall mental or emotional health?  Fair      PHQ-2 Total Score: 0    Additional concerns today:  No    Do you feel safe in your environment? Yes    Have you ever done Advance Care Planning? (For example, a Health Directive, POLST, or a discussion with a medical provider or your loved ones about your wishes): Yes, advance care planning is on file.    Has a couple of spots on his scalp.  Wonders about them.  Left side is hemangioma  Right is SK    He thinks he got thrush in the back of his throat from Advair.  Gets dry throat as well.  Sees some speckles in his throat at times, wonders if this is thrush.      Goes shirtless a lot in the summer.  Gets tan most of the time.  Does get some spots on his back when he tans.  He's wondering what these might be.       Fall risk  Fallen 2 or more times in the past year?: No  Any fall " with injury in the past year?: No    Cognitive Screening   1) Repeat 3 items (Leader, Season, Table)    2) Clock draw: NORMAL  3) 3 item recall: Recalls 3 objects  Results: NORMAL clock, 1-2 items recalled: COGNITIVE IMPAIRMENT LESS LIKELY    Mini-CogTM Copyright HEATHER Hernandez. Licensed by the author for use in [facility]; reprinted with permission (randy@[facility]). All rights reserved.      Do you have sleep apnea, excessive snoring or daytime drowsiness?: no    Reviewed and updated as needed this visit by clinical staff   Tobacco  Allergies  Meds   Med Hx  Surg Hx  Fam Hx  Soc Hx          Reviewed and updated as needed this visit by Provider                   Social History     Tobacco Use     Smoking status: Former Smoker     Packs/day: 1.00     Years: 15.00     Pack years: 15.00     Smokeless tobacco: Former User     Types: Chew     Quit date: 1/20/1987     Tobacco comment: quit 25 years ago.   Substance Use Topics     Alcohol use: Yes     Comment: 1 -2 monthly     If you drink alcohol do you typically have >3 drinks per day or >7 drinks per week? No    Alcohol Use 5/26/2022   Prescreen: >3 drinks/day or >7 drinks/week? No   Prescreen: >3 drinks/day or >7 drinks/week? -             Current providers sharing in care for this patient include:   Patient Care Team:  Fabio Stovall MD as PCP - General (Family Practice)  Fabio Stovall MD as Assigned PCP  Rasta Millan MD as Assigned Heart and Vascular Provider    The following health maintenance items are reviewed in Epic and correct as of today:  Health Maintenance Due   Topic Date Due     DEPRESSION ACTION PLAN  Never done     ZOSTER IMMUNIZATION (1 of 2) Never done     Pneumococcal Vaccine: 65+ Years (2 - PCV) 02/10/2018     COVID-19 Vaccine (4 - Booster for Moderna series) 03/19/2022     BP Readings from Last 3 Encounters:   05/26/22 112/64   04/18/22 110/60   10/28/21 124/78    Wt Readings from Last 3 Encounters:   05/26/22 87  kg (191 lb 12.8 oz)   10/28/21 90.1 kg (198 lb 9.6 oz)   09/16/21 88.6 kg (195 lb 6.4 oz)                  Patient Active Problem List   Diagnosis     Hypertension goal BP (blood pressure) < 140/90     Trigger finger, acquired     Impotence of organic origin     Hyperlipidemia LDL goal <100     Arthritis of elbow, right     Cubital tunnel syndrome - right     Acute coronary syndrome (H)     CAD (coronary artery disease)     Benign prostatic hyperplasia with urinary obstruction     GERD (gastroesophageal reflux disease)     Moderate chronic obstructive pulmonary disease (H)     Piriformis syndrome of left side     Nonallopathic lesion of sacral region     Nonallopathic lesion of thoracic region     Nonallopathic lesion of cervical region     Cervicalgia     Left inguinal hernia     Congestion of paranasal sinus     PVC's (premature ventricular contractions)     Mild major depression (H)     Past Surgical History:   Procedure Laterality Date     COLONOSCOPY N/A 6/12/2015    Procedure: COMBINED COLONOSCOPY, SINGLE OR MULTIPLE BIOPSY/POLYPECTOMY BY BIOPSY;  Surgeon: Herman Rebollar MD;  Location: [facility]     COLONOSCOPY N/A 11/12/2018    Procedure: colonoscopy;  Surgeon: Thiago Robles MD;  Location: [facility]     H STENT PROCED  2012     HC REMOVE TONSILS/ADENOIDS,<11 Y/O      T & A <12y.o.     LAPAROSCOPIC HERNIORRHAPHY INGUINAL Left 8/3/2018    Procedure: LAPAROSCOPIC HERNIORRHAPHY INGUINAL;  Laparoscopic left inguinal hernia repair;  Surgeon: Huber Ireland MD;  Location: [facility]     REMOVAL OF SPERM DUCT(S)  05/06/88    Vasectomy       Social History     Tobacco Use     Smoking status: Former Smoker     Packs/day: 1.00     Years: 15.00     Pack years: 15.00     Smokeless tobacco: Former User     Types: Chew     Quit date: 1/20/1987     Tobacco comment: quit 25 years ago.   Substance Use Topics     Alcohol use: Yes     Comment: 1 -2 monthly     Family History   Problem Relation Age of Onset     Diabetes  Mother         adult onset diabetes     Cancer Mother         kidney dx age 75     Cancer Father         prostate cancer/ at age 73     Hypertension Brother      Cancer Paternal Grandfather         prostat ca     Lung Cancer Brother          Current Outpatient Medications   Medication Sig Dispense Refill     albuterol (PROAIR HFA/PROVENTIL HFA/VENTOLIN HFA) 108 (90 Base) MCG/ACT inhaler Inhale 2 puffs into the lungs every 6 hours 18 g 3     aspirin 81 MG tablet Take 1 tablet by mouth daily. 90 tablet 3     atorvastatin (LIPITOR) 80 MG tablet TAKE 1 TABLET(80 MG) BY MOUTH DAILY 90 tablet 3     fluticasone (FLONASE) 50 MCG/ACT nasal spray SPRAY 1-2 SPRAYS IN EACH NOSTRIL DAILY 48 g 2     fluticasone-salmeterol (ADVAIR HFA) 230-21 MCG/ACT inhaler INHALE 2 PUFFS INTO THE LUNGS TWICE DAILY 36 g 1     lisinopril (ZESTRIL) 5 MG tablet Take 1 tablet (5 mg) by mouth daily 90 tablet 3     metoprolol succinate ER (TOPROL-XL) 50 MG 24 hr tablet Take 1 tablet (50 mg) by mouth 2 times daily 180 tablet 3     omeprazole (PRILOSEC) 20 MG DR capsule TAKE 1 CAPSULE BY MOUTH EVERY DAY 90 capsule 3     tamsulosin (FLOMAX) 0.4 MG capsule TAKE ONE CAPSULE BY MOUTH ONCE DAILY 90 capsule 1     ADVAIR -21 MCG/ACT inhaler INHALE 2 PUFFS INTO THE LUNGS TWICE DAILY 36 g 1     fluticasone (FLOVENT HFA) 220 MCG/ACT inhaler Inhale 1 puff into the lungs 2 times daily (Patient not taking: No sig reported) 12 g 11     nitroGLYcerin (NITROSTAT) 0.4 MG sublingual tablet Place 1 tablet (0.4 mg) under the tongue every 5 minutes as needed for chest pain (Patient not taking: No sig reported) 25 tablet 0     Allergies   Allergen Reactions     Augmentin Diarrhea and GI Disturbance     Codeine Nausea     significant     Penicillins      GI upset and diarrhea     Recent Labs   Lab Test 21  0828 21  1110 10/02/20  0807 20  1126 10/24/19  0828 19  0759   LDL 72  --  75  --  72 73   HDL 39*  --  41  --  48 43   TRIG 141  --   "162*  --  205* 134   ALT  --  43  --  42  --  35   CR 0.95 0.98  --  1.00  --  0.90   GFRESTIMATED 84 81  --  79  --  90   GFRESTBLACK  --   --   --  >90  --  >90   POTASSIUM 3.9 4.0  --  5.0  --  4.4   TSH  --   --  1.44  --   --   --               Review of Systems   Constitutional: Negative for chills and fever.   HENT: Positive for ear pain and hearing loss. Negative for congestion and sore throat.    Eyes: Negative for visual disturbance.   Respiratory: Positive for cough and shortness of breath.    Cardiovascular: Negative for chest pain, palpitations and peripheral edema.   Gastrointestinal: Negative for abdominal pain, constipation, diarrhea, heartburn, hematochezia and nausea.   Genitourinary: Positive for frequency, impotence and urgency. Negative for dysuria, genital sores, hematuria and penile discharge.   Musculoskeletal: Positive for arthralgias and myalgias. Negative for joint swelling.   Skin: Positive for rash.   Neurological: Positive for headaches. Negative for dizziness, weakness and paresthesias.   Psychiatric/Behavioral: Negative for mood changes. The patient is not nervous/anxious.          OBJECTIVE:   /64   Pulse 70   Temp 98.3  F (36.8  C) (Temporal)   Resp 18   Ht 1.775 m (5' 9.88\")   Wt 87 kg (191 lb 12.8 oz)   SpO2 95%   BMI 27.61 kg/m   Estimated body mass index is 27.61 kg/m  as calculated from the following:    Height as of this encounter: 1.775 m (5' 9.88\").    Weight as of this encounter: 87 kg (191 lb 12.8 oz).  Physical Exam  GENERAL: healthy, alert and no distress  MS: no gross musculoskeletal defects noted, no edema  SKIN: Hemangioma on left posterior scalp, seborrheic keratosis on right posterior scalp.  Multiple hemangiomata on back.  2 small papules on left back, unclear etiology.    Diagnostic Test Results:  Labs reviewed in Epic  No results found for this or any previous visit (from the past 24 hour(s)).  No results found for any visits on " 05/26/22.    ASSESSMENT / PLAN:       ICD-10-CM    1. Encounter for Medicare annual wellness exam  Z00.00    2. Moderate chronic obstructive pulmonary disease (H)  J44.9 fluticasone-salmeterol (ADVAIR HFA) 230-21 MCG/ACT inhaler     albuterol (PROAIR HFA/PROVENTIL HFA/VENTOLIN HFA) 108 (90 Base) MCG/ACT inhaler   3. Hemangioma of skin  D18.01    4. Seborrheic keratosis  L82.1    5. Hypertension goal BP (blood pressure) < 140/90  I10    6. Hyperlipidemia LDL goal <100  E78.5 atorvastatin (LIPITOR) 80 MG tablet   7. Benign prostatic hyperplasia with urinary obstruction  N40.1 tamsulosin (FLOMAX) 0.4 MG capsule    N13.8    8. Seasonal allergic rhinitis due to other allergic trigger  J30.89 fluticasone (FLONASE) 50 MCG/ACT nasal spray     1.  Reviewed recommended screenings and ordered appropriate testing for pt's risks and per pt's request(s).   2.  Currently controlled.  Will continue current regimen.  3, 4.  Discussed benign nature of these and options for treatment.  Patient will return if he wishes to pursue this.  5.  Currently controlled.  Will continue current regimen.  6.  Clinically controlled.  Will continue current regimen.  7.  Comment controlled.  Will continue current regimen.  8.  Clinically controlled.  Will continue current regimen.    Portions of this note were completed using Dragon dictation software.  Although reviewed, there may be typographical and other inadvertent errors that remain.           COUNSELING:  Reviewed preventive health counseling, as reflected in patient instructions       Regular exercise       Healthy diet/nutrition       Fall risk prevention       The 10-year ASCVD risk score (Roland MICHAEL Jr., et al., 2013) is: 11.5%    Values used to calculate the score:      Age: 66 years      Sex: Male      Is Non- : No      Diabetic: No      Tobacco smoker: No      Systolic Blood Pressure: 112 mmHg      Is BP treated: Yes      HDL Cholesterol: 39 mg/dL      Total  "Cholesterol: 139 mg/dL    Estimated body mass index is 27.61 kg/m  as calculated from the following:    Height as of this encounter: 1.775 m (5' 9.88\").    Weight as of this encounter: 87 kg (191 lb 12.8 oz).        He reports that he has quit smoking. He has a 15.00 pack-year smoking history. He quit smokeless tobacco use about 35 years ago.  His smokeless tobacco use included chew.      Appropriate preventive services were discussed with this patient, including applicable screening as appropriate for cardiovascular disease, diabetes, osteopenia/osteoporosis, and glaucoma.  As appropriate for age/gender, discussed screening for colorectal cancer, prostate cancer, breast cancer, and cervical cancer. Checklist reviewing preventive services available has been given to the patient.    Reviewed patients plan of care and provided an AVS. The Intermediate Care Plan ( asthma action plan, low back pain action plan, and migraine action plan) for Keegan meets the Care Plan requirement. This Care Plan has been established and reviewed with the Patient.    Counseling Resources:  ATP IV Guidelines  Pooled Cohorts Equation Calculator  Breast Cancer Risk Calculator  Breast Cancer: Medication to Reduce Risk  FRAX Risk Assessment  ICSI Preventive Guidelines  Dietary Guidelines for Americans, 2010  [employer] MyPlate  ASA Prophylaxis  Lung CA Screening    Fabio Stovall MD, MD  [facility]    Identified Health Risks:  Answers for HPI/ROS submitted by the patient on 5/26/2022  If you checked off any problems, how difficult have these problems made it for you to do your work, take care of things at home, or get along with other people?: Not difficult at all  PHQ9 TOTAL SCORE: 0        The patient was provided with suggestions to help him develop a healthy physical lifestyle.  The patient was counseled and encouraged to consider modifying their diet and eating habits. He was provided with information on " recommended healthy diet options.  The patient was provided with written information regarding signs of hearing loss.  The patient was provided with suggestions to help him develop a healthy emotional lifestyle.

## 2022-05-31 RX ORDER — ALBUTEROL SULFATE 90 UG/1
2 AEROSOL, METERED RESPIRATORY (INHALATION) EVERY 6 HOURS
Qty: 54 G | Refills: 1 | Status: SHIPPED | OUTPATIENT
Start: 2022-05-31 | End: 2023-06-19

## 2022-06-02 ENCOUNTER — MYC MEDICAL ADVICE (OUTPATIENT)
Dept: FAMILY MEDICINE | Facility: OTHER | Age: 67
End: 2022-06-02
Payer: MEDICARE

## 2022-06-02 ENCOUNTER — TELEPHONE (OUTPATIENT)
Dept: FAMILY MEDICINE | Facility: OTHER | Age: 67
End: 2022-06-02
Payer: MEDICARE

## 2022-06-02 DIAGNOSIS — B37.0 THRUSH: Primary | ICD-10-CM

## 2022-06-02 DIAGNOSIS — J44.9 MODERATE CHRONIC OBSTRUCTIVE PULMONARY DISEASE (H): ICD-10-CM

## 2022-06-02 RX ORDER — BUDESONIDE AND FORMOTEROL FUMARATE DIHYDRATE 160; 4.5 UG/1; UG/1
2 AEROSOL RESPIRATORY (INHALATION) 2 TIMES DAILY
Qty: 30.6 G | Refills: 1 | Status: SHIPPED | OUTPATIENT
Start: 2022-06-02 | End: 2022-12-05

## 2022-06-02 RX ORDER — NYSTATIN 100000/ML
500000 SUSPENSION, ORAL (FINAL DOSE FORM) ORAL 4 TIMES DAILY
Qty: 200 ML | Refills: 0 | Status: SHIPPED | OUTPATIENT
Start: 2022-06-02 | End: 2022-06-12

## 2022-06-02 NOTE — TELEPHONE ENCOUNTER
Budesonide/form 160/4.5MCG (120INH)      Prior Authorization Retail Medication Request    Medication/Dose:     ICD code (if different than what is on RX):    Previously Tried and Failed:    Rationale:      Insurance Name:    Insurance ID:        Pharmacy Information (if different than what is on RX)  Name:    Phone:

## 2022-06-02 NOTE — TELEPHONE ENCOUNTER
Patient seen in clinic 5/26/22 and patient noted concerns of possible thrush in throat from Advair.     Routing photo to provider to review - does this look like possible thrush from inhaler?

## 2022-06-07 NOTE — TELEPHONE ENCOUNTER
Prior Authorization Not Needed per Insurance    Medication: budesonide-formoterol (SYMBICORT) 160-4.5 MCG/ACT Inhaler  Insurance Company:    Expected CoPay:      Pharmacy Filling the Rx: [id number] - [place], [place] - [address] AT [facility] [address]  Pharmacy Notified: Yes  Patient Notified: Yes    I called the pharmacy and they were able to get a paid claim on 6/3.

## 2022-06-23 ENCOUNTER — MYC MEDICAL ADVICE (OUTPATIENT)
Dept: FAMILY MEDICINE | Facility: OTHER | Age: 67
End: 2022-06-23

## 2022-06-23 DIAGNOSIS — N13.8 BENIGN PROSTATIC HYPERPLASIA WITH URINARY OBSTRUCTION: ICD-10-CM

## 2022-06-23 DIAGNOSIS — R73.09 ELEVATED GLUCOSE: ICD-10-CM

## 2022-06-23 DIAGNOSIS — I10 HYPERTENSION GOAL BP (BLOOD PRESSURE) < 140/90: ICD-10-CM

## 2022-06-23 DIAGNOSIS — E78.5 HYPERLIPIDEMIA LDL GOAL <100: Primary | ICD-10-CM

## 2022-06-23 DIAGNOSIS — Z12.5 SCREENING FOR PROSTATE CANCER: ICD-10-CM

## 2022-06-23 DIAGNOSIS — N40.1 BENIGN PROSTATIC HYPERPLASIA WITH URINARY OBSTRUCTION: ICD-10-CM

## 2022-06-23 NOTE — TELEPHONE ENCOUNTER
Patient had Annual Wellness Exam on 5/26/22, no labs ordered at that time.     Ludy Mejia RN on 6/23/2022 at 12:19 PM

## 2022-06-29 ENCOUNTER — LAB (OUTPATIENT)
Dept: LAB | Facility: OTHER | Age: 67
End: 2022-06-29
Attending: PHYSICIAN ASSISTANT
Payer: MEDICARE

## 2022-06-29 DIAGNOSIS — R73.09 ELEVATED GLUCOSE: ICD-10-CM

## 2022-06-29 DIAGNOSIS — I10 HYPERTENSION GOAL BP (BLOOD PRESSURE) < 140/90: ICD-10-CM

## 2022-06-29 DIAGNOSIS — Z12.5 SCREENING FOR PROSTATE CANCER: ICD-10-CM

## 2022-06-29 DIAGNOSIS — E78.5 HYPERLIPIDEMIA LDL GOAL <100: ICD-10-CM

## 2022-06-29 LAB
ALBUMIN SERPL-MCNC: 4 G/DL (ref 3.4–5)
ALP SERPL-CCNC: 87 U/L (ref 40–150)
ALT SERPL W P-5'-P-CCNC: 68 U/L (ref 0–70)
ANION GAP SERPL CALCULATED.3IONS-SCNC: 3 MMOL/L (ref 3–14)
AST SERPL W P-5'-P-CCNC: 33 U/L (ref 0–45)
BILIRUB SERPL-MCNC: 1.1 MG/DL (ref 0.2–1.3)
BUN SERPL-MCNC: 16 MG/DL (ref 7–30)
CALCIUM SERPL-MCNC: 8.8 MG/DL (ref 8.5–10.1)
CHLORIDE BLD-SCNC: 108 MMOL/L (ref 94–109)
CHOLEST SERPL-MCNC: 151 MG/DL
CO2 SERPL-SCNC: 30 MMOL/L (ref 20–32)
CREAT SERPL-MCNC: 0.94 MG/DL (ref 0.66–1.25)
FASTING STATUS PATIENT QL REPORTED: YES
GFR SERPL CREATININE-BSD FRML MDRD: 89 ML/MIN/1.73M2
GLUCOSE BLD-MCNC: 102 MG/DL (ref 70–99)
HBA1C MFR BLD: 5.8 % (ref 0–5.6)
HDLC SERPL-MCNC: 46 MG/DL
LDLC SERPL CALC-MCNC: 76 MG/DL
NONHDLC SERPL-MCNC: 105 MG/DL
POTASSIUM BLD-SCNC: 4.4 MMOL/L (ref 3.4–5.3)
PROT SERPL-MCNC: 7.2 G/DL (ref 6.8–8.8)
PSA SERPL-MCNC: 0.8 UG/L (ref 0–4)
SODIUM SERPL-SCNC: 141 MMOL/L (ref 133–144)
TRIGL SERPL-MCNC: 143 MG/DL

## 2022-06-29 PROCEDURE — 80061 LIPID PANEL: CPT

## 2022-06-29 PROCEDURE — G0103 PSA SCREENING: HCPCS

## 2022-06-29 PROCEDURE — 80053 COMPREHEN METABOLIC PANEL: CPT

## 2022-06-29 PROCEDURE — 83036 HEMOGLOBIN GLYCOSYLATED A1C: CPT

## 2022-06-29 PROCEDURE — 36415 COLL VENOUS BLD VENIPUNCTURE: CPT

## 2022-06-30 NOTE — RESULT ENCOUNTER NOTE
Keegan,    All of your labs were normal for you except your blood sugar is in the prediabetic range.  Work on eating healthy and staying active to help prevent this from worsening.  We should recheck this at least annually.    Have a nice day!    Dr. Stovall

## 2022-07-07 ENCOUNTER — MYC MEDICAL ADVICE (OUTPATIENT)
Dept: FAMILY MEDICINE | Facility: OTHER | Age: 67
End: 2022-07-07

## 2022-07-21 ENCOUNTER — HOSPITAL ENCOUNTER (OUTPATIENT)
Dept: CARDIOLOGY | Facility: CLINIC | Age: 67
Discharge: HOME | End: 2022-07-21
Attending: INTERNAL MEDICINE | Admitting: INTERNAL MEDICINE
Payer: MEDICARE

## 2022-07-21 ENCOUNTER — OFFICE VISIT (OUTPATIENT)
Dept: CARDIOLOGY | Facility: CLINIC | Age: 67
End: 2022-07-21
Attending: INTERNAL MEDICINE
Payer: MEDICARE

## 2022-07-21 VITALS
BODY MASS INDEX: 28.24 KG/M2 | DIASTOLIC BLOOD PRESSURE: 68 MMHG | WEIGHT: 197.3 LBS | SYSTOLIC BLOOD PRESSURE: 112 MMHG | OXYGEN SATURATION: 97 % | HEIGHT: 70 IN | HEART RATE: 66 BPM

## 2022-07-21 DIAGNOSIS — I10 BENIGN ESSENTIAL HYPERTENSION: ICD-10-CM

## 2022-07-21 DIAGNOSIS — I25.10 CORONARY ARTERY DISEASE INVOLVING NATIVE CORONARY ARTERY OF NATIVE HEART WITHOUT ANGINA PECTORIS: ICD-10-CM

## 2022-07-21 DIAGNOSIS — I49.3 PVC'S (PREMATURE VENTRICULAR CONTRACTIONS): ICD-10-CM

## 2022-07-21 PROCEDURE — 93005 ELECTROCARDIOGRAM TRACING: CPT | Performed by: REHABILITATION PRACTITIONER

## 2022-07-21 PROCEDURE — 99213 OFFICE O/P EST LOW 20 MIN: CPT | Performed by: INTERNAL MEDICINE

## 2022-07-21 PROCEDURE — 93010 ELECTROCARDIOGRAM REPORT: CPT | Performed by: INTERNAL MEDICINE

## 2022-07-21 NOTE — LETTER
7/21/2022    Fabio Stovall MD, MD  [address] [facility] [address]    RE: Keegan CELESTINO Buchanan       Dear Colleague,     I had the pleasure of seeing Keegan CELESTINO Buchanan in the [facility].  HPI and Plan:   See dictation  [id number]  Today's clinic visit entailed:  The following tests were independently interpreted by me as noted in my documentation: ecg, zio  15 minutes spent on the date of the encounter doing chart review   Provider  Link to MDM Help Grid     The level of medical decision making during this visit was of moderate complexity.      Orders Placed This Encounter   Procedures     EKG 12-LEAD CLINIC READ       No orders of the defined types were placed in this encounter.      Medications Discontinued During This Encounter   Medication Reason     lisinopril (ZESTRIL) 5 MG tablet          Encounter Diagnoses   Name Primary?     Benign essential hypertension      PVC's (premature ventricular contractions)      Coronary artery disease involving native coronary artery of native heart without angina pectoris        CURRENT MEDICATIONS:  Current Outpatient Medications   Medication Sig Dispense Refill     albuterol (PROAIR HFA/PROVENTIL HFA/VENTOLIN HFA) 108 (90 Base) MCG/ACT inhaler INHALE 2 PUFFS INTO THE LUNGS EVERY 6 HOURS 54 g 1     aspirin 81 MG tablet Take 1 tablet by mouth daily. 90 tablet 3     atorvastatin (LIPITOR) 80 MG tablet TAKE 1 TABLET(80 MG) BY MOUTH DAILY 90 tablet 3     budesonide-formoterol (SYMBICORT) 160-4.5 MCG/ACT Inhaler Inhale 2 puffs into the lungs 2 times daily 30.6 g 1     fluticasone (FLONASE) 50 MCG/ACT nasal spray SPRAY 1-2 SPRAYS IN EACH NOSTRIL DAILY 48 g 2     fluticasone-salmeterol (ADVAIR HFA) 230-21 MCG/ACT inhaler INHALE 2 PUFFS INTO THE LUNGS TWICE DAILY 36 g 1     metoprolol succinate ER (TOPROL-XL) 50 MG 24 hr tablet Take 1 tablet (50 mg) by mouth 2 times daily 180 tablet 3     omeprazole (PRILOSEC) 20 MG DR capsule TAKE 1 CAPSULE BY MOUTH EVERY DAY  90 capsule 3     tamsulosin (FLOMAX) 0.4 MG capsule TAKE ONE CAPSULE BY MOUTH ONCE DAILY 90 capsule 1     fluticasone (FLOVENT HFA) 220 MCG/ACT inhaler Inhale 1 puff into the lungs 2 times daily (Patient not taking: No sig reported) 12 g 11     nitroGLYcerin (NITROSTAT) 0.4 MG sublingual tablet Place 1 tablet (0.4 mg) under the tongue every 5 minutes as needed for chest pain (Patient not taking: No sig reported) 25 tablet 0       ALLERGIES     Allergies   Allergen Reactions     Augmentin Diarrhea and GI Disturbance     Codeine Nausea     significant     Penicillins      GI upset and diarrhea       PAST MEDICAL HISTORY:  Past Medical History:   Diagnosis Date     Hypercholesteremia      Pain in joint, shoulder region     right shoulder separation     Unspecified essential hypertension        PAST SURGICAL HISTORY:  Past Surgical History:   Procedure Laterality Date     COLONOSCOPY N/A 2015    Procedure: COMBINED COLONOSCOPY, SINGLE OR MULTIPLE BIOPSY/POLYPECTOMY BY BIOPSY;  Surgeon: Herman Rebollar MD;  Location:  GI     COLONOSCOPY N/A 2018    Procedure: colonoscopy;  Surgeon: Thiago Robles MD;  Location:  GI     H STENT PROCED  2012     HC REMOVE TONSILS/ADENOIDS,<11 Y/O      T & A <12y.o.     LAPAROSCOPIC HERNIORRHAPHY INGUINAL Left 8/3/2018    Procedure: LAPAROSCOPIC HERNIORRHAPHY INGUINAL;  Laparoscopic left inguinal hernia repair;  Surgeon: Huber Ireland MD;  Location:  OR     REMOVAL OF SPERM DUCT(S)  88    Vasectomy       FAMILY HISTORY:  Family History   Problem Relation Age of Onset     Diabetes Mother         adult onset diabetes     Cancer Mother         kidney dx age 75     Cancer Father         prostate cancer/ at age 73     Hypertension Brother      Cancer Paternal Grandfather         prostat ca     Lung Cancer Brother        SOCIAL HISTORY:  Social History     Socioeconomic History     Marital status:    Tobacco Use     Smoking status: Former  "Smoker     Packs/day: 1.00     Years: 15.00     Pack years: 15.00     Smokeless tobacco: Former User     Types: Chew     Quit date: 1/20/1987     Tobacco comment: quit 25 years ago.   Vaping Use     Vaping Use: Never used   Substance and Sexual Activity     Alcohol use: Yes     Comment: 1 -2 monthly     Drug use: No     Sexual activity: Yes     Partners: Female     Birth control/protection: Surgical, Male Surgical     Comment: vasectomy       Review of Systems:  Skin:  Negative       Eyes:  Positive for glasses    ENT:  Negative      Respiratory:  Negative       Cardiovascular:  Negative for;chest pain;edema;lightheadedness;dizziness Positive for;palpitations    Gastroenterology: Negative      Genitourinary:  Negative      Musculoskeletal:  Negative      Neurologic:  Negative      Psychiatric:  Negative      Heme/Lymph/Imm:  Positive for allergies    Endocrine:  Negative        Physical Exam:  Vitals: /68 (BP Location: Right arm, Patient Position: Sitting, Cuff Size: Adult Large)   Pulse 66   Ht 1.775 m (5' 9.88\")   Wt 89.5 kg (197 lb 4.8 oz)   SpO2 97%   BMI 28.41 kg/m      Constitutional:  cooperative, alert and oriented, well developed, well nourished, in no acute distress        Skin:  warm and dry to the touch          Head:  normocephalic, no masses or lesions        Eyes:  pupils equal and round, conjunctivae and lids unremarkable, sclera white, no xanthalasma, EOMS intact, no nystagmus        Lymph:No Cervical lymphadenopathy present     ENT:  no pallor or cyanosis        Neck:  carotid pulses are full and equal bilaterally, JVP normal, no carotid bruit        Respiratory:  normal breath sounds, clear to auscultation, normal A-P diameter, normal symmetry, normal respiratory excursion, no use of accessory muscles         Cardiac: regular rhythm, normal S1/S2, no S3 or S4, apical impulse not displaced, no murmurs, gallops or rubs                pulses full and equal, no bruits auscultated            "                             GI:  abdomen soft, non-tender, BS normoactive, no mass, no HSM, no bruits        Extremities and Muscular Skeletal:  no deformities, clubbing, cyanosis, erythema observed              Neurological:  no gross motor deficits        Psych:  Alert and Oriented x 3        CC  Rasta Millan MD  [address]  [place] [id number]                Service Date: 07/21/2022    Thank you for allowing me to participate in the care of your delightful patient.  As you know, Keegan is a 66-year-old gentleman with a history of symptomatic PVCs, even though the burden is less than 1% of the time in the background of preserved LV systolic function and coronary disease as he does have a history of a stent implanted in the mid-LAD in the past.    The patient has been on a beta blocker, both for her CAD and the PVCs, and most of the time it worked quite well, but on the other hand around the fall and springtime, the patient would have more PVCs.  He does have seasonal allergies and there may be a correlation between that and the increased burden of PVCs around that time.    I reassured Keegan that the PVC is not considered serious, given that he has normal cardiac structure and function and that should the PVC become more prevalent in the future, we may have him trial for sotalol or amiodarone as ablation success rate could be indeterminate given the burden is less than 1% of the time.    Keegan mentioned that ever since he has been on Symbicort for his allergy, he notes he bruises quite easily and he is wanting to know whether he should take a baby aspirin every other day.  When asking him about his coronary disease, the patient denies having a previous MI, but he forgot to mention that he had a history of PCI in the past and, therefore, I would agree that he would continue with the baby aspirin rather than taking it every other day.  If the patient is doing quite well, I can see him back in a year's  time for  PVCs as CAD has been quite stable.    Cameron Marcus MD        D: 2022   T: 2022   MT: al    Name:     JAZZY KELLEY  MRN:      [id number]        Account:      [id number]   :      1955           Service Date: 2022       Document: [id number]      Thank you for allowing me to participate in the care of your patient.      Sincerely,     Cameron Jorge MD     [facility]  cc:   Rasta Millan MD  [address]  [place] [address]

## 2022-07-21 NOTE — PROGRESS NOTES
Service Date: 2022    Thank you for allowing me to participate in the care of your delightful patient.  As you know, Jazzy is a 66-year-old gentleman with a history of symptomatic PVCs, even though the burden is less than 1% of the time in the background of preserved LV systolic function and coronary disease as he does have a history of a stent implanted in the mid-LAD in the past.    The patient has been on a beta blocker, both for her CAD and the PVCs, and most of the time it worked quite well, but on the other hand around the fall and springtime, the patient would have more PVCs.  He does have seasonal allergies and there may be a correlation between that and the increased burden of PVCs around that time.    I reassured Jazzy that the PVC is not considered serious, given that he has normal cardiac structure and function and that should the PVC become more prevalent in the future, we may have him trial for sotalol or amiodarone as ablation success rate could be indeterminate given the burden is less than 1% of the time.    Jazzy mentioned that ever since he has been on Symbicort for his allergy, he notes he bruises quite easily and he is wanting to know whether he should take a baby aspirin every other day.  When asking him about his coronary disease, the patient denies having a previous MI, but he forgot to mention that he had a history of PCI in the past and, therefore, I would agree that he would continue with the baby aspirin rather than taking it every other day.  If the patient is doing quite well, I can see him back in a year's time for  PVCs as CAD has been quite stable.    Cameron Marcus MD        D: 2022   T: 2022   MT: al    Name:     JAZZY KELLEY  MRN:      [id number]        Account:      [id number]   :      1955           Service Date: 2022       Document: [id number]

## 2022-07-21 NOTE — PROGRESS NOTES
HPI and Plan:   See dictation  [id number]  Today's clinic visit entailed:  The following tests were independently interpreted by me as noted in my documentation: ecg, mónica  15 minutes spent on the date of the encounter doing chart review   Provider  Link to MDM Help Grid     The level of medical decision making during this visit was of moderate complexity.      Orders Placed This Encounter   Procedures     EKG 12-LEAD CLINIC READ       No orders of the defined types were placed in this encounter.      Medications Discontinued During This Encounter   Medication Reason     lisinopril (ZESTRIL) 5 MG tablet          Encounter Diagnoses   Name Primary?     Benign essential hypertension      PVC's (premature ventricular contractions)      Coronary artery disease involving native coronary artery of native heart without angina pectoris        CURRENT MEDICATIONS:  Current Outpatient Medications   Medication Sig Dispense Refill     albuterol (PROAIR HFA/PROVENTIL HFA/VENTOLIN HFA) 108 (90 Base) MCG/ACT inhaler INHALE 2 PUFFS INTO THE LUNGS EVERY 6 HOURS 54 g 1     aspirin 81 MG tablet Take 1 tablet by mouth daily. 90 tablet 3     atorvastatin (LIPITOR) 80 MG tablet TAKE 1 TABLET(80 MG) BY MOUTH DAILY 90 tablet 3     budesonide-formoterol (SYMBICORT) 160-4.5 MCG/ACT Inhaler Inhale 2 puffs into the lungs 2 times daily 30.6 g 1     fluticasone (FLONASE) 50 MCG/ACT nasal spray SPRAY 1-2 SPRAYS IN EACH NOSTRIL DAILY 48 g 2     fluticasone-salmeterol (ADVAIR HFA) 230-21 MCG/ACT inhaler INHALE 2 PUFFS INTO THE LUNGS TWICE DAILY 36 g 1     metoprolol succinate ER (TOPROL-XL) 50 MG 24 hr tablet Take 1 tablet (50 mg) by mouth 2 times daily 180 tablet 3     omeprazole (PRILOSEC) 20 MG DR capsule TAKE 1 CAPSULE BY MOUTH EVERY DAY 90 capsule 3     tamsulosin (FLOMAX) 0.4 MG capsule TAKE ONE CAPSULE BY MOUTH ONCE DAILY 90 capsule 1     fluticasone (FLOVENT HFA) 220 MCG/ACT inhaler Inhale 1 puff into the lungs 2 times daily (Patient not  taking: No sig reported) 12 g 11     nitroGLYcerin (NITROSTAT) 0.4 MG sublingual tablet Place 1 tablet (0.4 mg) under the tongue every 5 minutes as needed for chest pain (Patient not taking: No sig reported) 25 tablet 0       ALLERGIES     Allergies   Allergen Reactions     Augmentin Diarrhea and GI Disturbance     Codeine Nausea     significant     Penicillins      GI upset and diarrhea       PAST MEDICAL HISTORY:  Past Medical History:   Diagnosis Date     Hypercholesteremia      Pain in joint, shoulder region     right shoulder separation     Unspecified essential hypertension        PAST SURGICAL HISTORY:  Past Surgical History:   Procedure Laterality Date     COLONOSCOPY N/A 2015    Procedure: COMBINED COLONOSCOPY, SINGLE OR MULTIPLE BIOPSY/POLYPECTOMY BY BIOPSY;  Surgeon: Herman Rebollar MD;  Location: [facility]     COLONOSCOPY N/A 2018    Procedure: colonoscopy;  Surgeon: Thiago Robles MD;  Location:  GI     H STENT PROCED       HC REMOVE TONSILS/ADENOIDS,<13 Y/O      T & A <12y.o.     LAPAROSCOPIC HERNIORRHAPHY INGUINAL Left 8/3/2018    Procedure: LAPAROSCOPIC HERNIORRHAPHY INGUINAL;  Laparoscopic left inguinal hernia repair;  Surgeon: Huber Ireland MD;  Location: [facility]     REMOVAL OF SPERM DUCT(S)  88    Vasectomy       FAMILY HISTORY:  Family History   Problem Relation Age of Onset     Diabetes Mother         adult onset diabetes     Cancer Mother         kidney dx age 75     Cancer Father         prostate cancer/ at age 73     Hypertension Brother      Cancer Paternal Grandfather         prostat ca     Lung Cancer Brother        SOCIAL HISTORY:  Social History     Socioeconomic History     Marital status:    Tobacco Use     Smoking status: Former Smoker     Packs/day: 1.00     Years: 15.00     Pack years: 15.00     Smokeless tobacco: Former User     Types: Chew     Quit date: 1987     Tobacco comment: quit 25 years ago.   Vaping Use     Vaping Use:  "Never used   Substance and Sexual Activity     Alcohol use: Yes     Comment: 1 -2 monthly     Drug use: No     Sexual activity: Yes     Partners: Female     Birth control/protection: Surgical, Male Surgical     Comment: vasectomy       Review of Systems:  Skin:  Negative       Eyes:  Positive for glasses    ENT:  Negative      Respiratory:  Negative       Cardiovascular:  Negative for;chest pain;edema;lightheadedness;dizziness Positive for;palpitations    Gastroenterology: Negative      Genitourinary:  Negative      Musculoskeletal:  Negative      Neurologic:  Negative      Psychiatric:  Negative      Heme/Lymph/Imm:  Positive for allergies    Endocrine:  Negative        Physical Exam:  Vitals: /68 (BP Location: Right arm, Patient Position: Sitting, Cuff Size: Adult Large)   Pulse 66   Ht 1.775 m (5' 9.88\")   Wt 89.5 kg (197 lb 4.8 oz)   SpO2 97%   BMI 28.41 kg/m      Constitutional:  cooperative, alert and oriented, well developed, well nourished, in no acute distress        Skin:  warm and dry to the touch          Head:  normocephalic, no masses or lesions        Eyes:  pupils equal and round, conjunctivae and lids unremarkable, sclera white, no xanthalasma, EOMS intact, no nystagmus        Lymph:No Cervical lymphadenopathy present     ENT:  no pallor or cyanosis        Neck:  carotid pulses are full and equal bilaterally, JVP normal, no carotid bruit        Respiratory:  normal breath sounds, clear to auscultation, normal A-P diameter, normal symmetry, normal respiratory excursion, no use of accessory muscles         Cardiac: regular rhythm, normal S1/S2, no S3 or S4, apical impulse not displaced, no murmurs, gallops or rubs                pulses full and equal, no bruits auscultated                                        GI:  abdomen soft, non-tender, BS normoactive, no mass, no HSM, no bruits        Extremities and Muscular Skeletal:  no deformities, clubbing, cyanosis, erythema observed          "     Neurological:  no gross motor deficits        Psych:  Alert and Oriented x 3        CC  Rasta Millan MD  [address]  [place] [id number]

## 2022-10-04 ENCOUNTER — MYC MEDICAL ADVICE (OUTPATIENT)
Dept: FAMILY MEDICINE | Facility: OTHER | Age: 67
End: 2022-10-04

## 2022-10-09 ENCOUNTER — HEALTH MAINTENANCE LETTER (OUTPATIENT)
Age: 67
End: 2022-10-09

## 2022-10-19 ENCOUNTER — MYC MEDICAL ADVICE (OUTPATIENT)
Dept: FAMILY MEDICINE | Facility: OTHER | Age: 67
End: 2022-10-19

## 2022-10-19 NOTE — TELEPHONE ENCOUNTER
6/2/22 nystatin was prescribed for possible thrush - inhaler changed from advair to symbicort to decrease this from happening again.   Thrush has returned.   Advised e visit or virtual visit.     Maria C SHOREN, RN

## 2022-10-27 DIAGNOSIS — R00.2 PALPITATIONS: ICD-10-CM

## 2022-10-27 DIAGNOSIS — K21.9 GASTROESOPHAGEAL REFLUX DISEASE WITHOUT ESOPHAGITIS: ICD-10-CM

## 2022-10-27 RX ORDER — METOPROLOL SUCCINATE 50 MG/1
TABLET, EXTENDED RELEASE ORAL
Qty: 180 TABLET | Refills: 1 | Status: SHIPPED | OUTPATIENT
Start: 2022-10-27 | End: 2023-04-28

## 2022-10-28 ENCOUNTER — MYC MEDICAL ADVICE (OUTPATIENT)
Dept: FAMILY MEDICINE | Facility: OTHER | Age: 67
End: 2022-10-28

## 2022-10-28 DIAGNOSIS — B37.0 THRUSH: Primary | ICD-10-CM

## 2022-10-29 RX ORDER — NYSTATIN 100000/ML
500000 SUSPENSION, ORAL (FINAL DOSE FORM) ORAL 4 TIMES DAILY
Qty: 140 ML | Refills: 0 | Status: SHIPPED | OUTPATIENT
Start: 2022-10-29 | End: 2022-11-05

## 2022-11-17 ENCOUNTER — OFFICE VISIT (OUTPATIENT)
Dept: FAMILY MEDICINE | Facility: OTHER | Age: 67
End: 2022-11-17
Payer: MEDICARE

## 2022-11-17 VITALS
HEART RATE: 90 BPM | BODY MASS INDEX: 28.8 KG/M2 | DIASTOLIC BLOOD PRESSURE: 86 MMHG | RESPIRATION RATE: 16 BRPM | WEIGHT: 200 LBS | TEMPERATURE: 96.9 F | OXYGEN SATURATION: 97 % | SYSTOLIC BLOOD PRESSURE: 132 MMHG

## 2022-11-17 DIAGNOSIS — M54.2 CERVICALGIA: ICD-10-CM

## 2022-11-17 DIAGNOSIS — R51.9 RECURRENT OCCIPITAL HEADACHE: Primary | ICD-10-CM

## 2022-11-17 DIAGNOSIS — N13.8 BENIGN PROSTATIC HYPERPLASIA WITH URINARY OBSTRUCTION: ICD-10-CM

## 2022-11-17 DIAGNOSIS — G44.209 TENSION HEADACHE: ICD-10-CM

## 2022-11-17 DIAGNOSIS — Z23 HIGH PRIORITY FOR 2019-NCOV VACCINE: ICD-10-CM

## 2022-11-17 DIAGNOSIS — N40.1 BENIGN PROSTATIC HYPERPLASIA WITH URINARY OBSTRUCTION: ICD-10-CM

## 2022-11-17 DIAGNOSIS — M50.30 DDD (DEGENERATIVE DISC DISEASE), CERVICAL: ICD-10-CM

## 2022-11-17 PROCEDURE — 91313 COVID-19,PF,MODERNA BIVALENT: CPT | Performed by: FAMILY MEDICINE

## 2022-11-17 PROCEDURE — 99214 OFFICE O/P EST MOD 30 MIN: CPT | Performed by: FAMILY MEDICINE

## 2022-11-17 PROCEDURE — 0134A COVID-19,PF,MODERNA BIVALENT: CPT | Performed by: FAMILY MEDICINE

## 2022-11-17 RX ORDER — CYCLOBENZAPRINE HCL 10 MG
5-10 TABLET ORAL
Qty: 30 TABLET | Refills: 1 | Status: SHIPPED | OUTPATIENT
Start: 2022-11-17 | End: 2023-02-17

## 2022-11-17 RX ORDER — TAMSULOSIN HYDROCHLORIDE 0.4 MG/1
CAPSULE ORAL
Qty: 90 CAPSULE | Refills: 1 | Status: SHIPPED | OUTPATIENT
Start: 2022-11-17 | End: 2023-04-05

## 2022-11-17 ASSESSMENT — ENCOUNTER SYMPTOMS
FACIAL ASYMMETRY: 0
RESPIRATORY NEGATIVE: 1
PALPITATIONS: 1
LIGHT-HEADEDNESS: 0
PARESTHESIAS: 0
CONSTITUTIONAL NEGATIVE: 1
HEADACHES: 1
WEAKNESS: 0
DIZZINESS: 0
SPEECH DIFFICULTY: 0
NUMBNESS: 0
TREMORS: 0

## 2022-11-17 ASSESSMENT — PATIENT HEALTH QUESTIONNAIRE - PHQ9
10. IF YOU CHECKED OFF ANY PROBLEMS, HOW DIFFICULT HAVE THESE PROBLEMS MADE IT FOR YOU TO DO YOUR WORK, TAKE CARE OF THINGS AT HOME, OR GET ALONG WITH OTHER PEOPLE: NOT DIFFICULT AT ALL
SUM OF ALL RESPONSES TO PHQ QUESTIONS 1-9: 0
SUM OF ALL RESPONSES TO PHQ QUESTIONS 1-9: 0

## 2022-11-17 ASSESSMENT — PAIN SCALES - GENERAL: PAINLEVEL: NO PAIN (0)

## 2022-11-17 NOTE — PATIENT INSTRUCTIONS
Thank you for visiting [facility]    Let's get an MRI of your neck to further evaluate.    Can try flexeril at night to help with your headache and neck pain.      See your eye doctor!     We can also consider PT or referral if desired.      Contact us or return if questions or concerns.     Have a nice day!    Dr. Stovall     Return in about 6 months (around 5/17/2023) for Well check.      If you need medication refills, please contact your pharmacy 3 days before your prescriptions runs out or download the [facility] negrito for your smart phone. If you are out of refills, your pharmacy will contact contact the clinic.                                     MyChart Assistance [phone]

## 2022-11-17 NOTE — LETTER
My Depression Action Plan  Name: Keegan Buchanan   Date of Birth 1955  Date: 11/17/2022    My doctor: Fabio Stovall   My clinic: [address]  [facility] [id number] [phone]          GREEN    ZONE   Good Control    What it looks like:     Things are going generally well. You have normal ups and downs. You may even feel depressed from time to time, but bad moods usually last less than a day.   What you need to do:  1. Continue to care for yourself (see self care plan)  2. Check your depression survival kit and update it as needed  3. Follow your physician s recommendations including any medication.  4. Do not stop taking medication unless you consult with your physician first.           YELLOW         ZONE Getting Worse    What it looks like:     Depression is starting to interfere with your life.     It may be hard to get out of bed; you may be starting to isolate yourself from others.    Symptoms of depression are starting to last most all day and this has happened for several days.     You may have suicidal thoughts but they are not constant.   What you need to do:     1. Call your care team. Your response to treatment will improve if you keep your care team informed of your progress. Yellow periods are signs an adjustment may need to be made.     2. Continue your self-care.  Just get dressed and ready for the day.  Don't give yourself time to talk yourself out of it.    3. Talk to someone in your support network.    4. Open up your Depression Self-Care Plan/Wellness Kit.           RED    ZONE Medical Alert - Get Help    What it looks like:     Depression is seriously interfering with your life.     You may experience these or other symptoms: You can t get out of bed most days, can t work or engage in other necessary activities, you have trouble taking care of basic hygiene, or basic responsibilities, thoughts of suicide or death  that will not go away, self-injurious behavior.     What you need to do:  1. Call your care team and request a same-day appointment. If they are not available (weekends or after hours) call your local crisis line, emergency room or 911.          Depression Self-Care Plan / Wellness Kit    Many people find that medication and therapy are helpful treatments for managing depression. In addition, making small changes to your everyday life can help to boost your mood and improve your wellbeing. Below are some tips for you to consider. Be sure to talk with your medical provider and/or behavioral health consultant if your symptoms are worsening or not improving.     Sleep   Sleep hygiene  means all of the habits that support good, restful sleep. It includes maintaining a consistent bedtime and wake time, using your bedroom only for sleeping or sex, and keeping the bedroom dark and free of distractions like a computer, smartphone, or television.     Develop a Healthy Routine  Maintain good hygiene. Get out of bed in the morning, make your bed, brush your teeth, take a shower, and get dressed. Don t spend too much time viewing media that makes you feel stressed. Find time to relax each day.    Exercise  Get some form of exercise every day. This will help reduce pain and release endorphins, the  feel good  chemicals in your brain. It can be as simple as just going for a walk or doing some gardening, anything that will get you moving.      Diet  Strive to eat healthy foods, including fruits and vegetables. Drink plenty of water. Avoid excessive sugar, caffeine, alcohol, and other mood-altering substances.     Stay Connected with Others  Stay in touch with friends and family members.    Manage Your Mood  Try deep breathing, massage therapy, biofeedback, or meditation. Take part in fun activities when you can. Try to find something to smile about each day.     Psychotherapy  Be open to working with a therapist if your provider  recommends it.     Medication  Be sure to take your medication as prescribed. Most anti-depressants need to be taken every day. It usually takes several weeks for medications to work. Not all medicines work for all people. It is important to follow-up with your provider to make sure you have a treatment plan that is working for you. Do not stop your medication abruptly without first discussing it with your provider.    Crisis Resources   These hotlines are for both adults and children. They and are open 24 hours a day, 7 days a week unless noted otherwise.      National Suicide Prevention Lifeline   988 or 3-376-332-VKEJ (3952)      Crisis Text Line    www.crisistextline.org  Text HOME to 186327 from anywhere in the [place], anytime, about any type of crisis. A live, trained crisis counselor will receive the text and respond quickly.      Jd Lifeline for LGBTQ Youth  A national crisis intervention and suicide lifeline for LGBTQ youth under 25. Provides a safe place to talk without judgement. Call 1-964.117.2229; text START to 563673 or visit www.thetrevorproject.org to talk to a trained counselor.      For [facility] crisis numbers, visit the [facility] website at:  https://[place].gov/dhs/people-we-serve/adults/health-care/mental-health/resources/crisis-contacts.jsp

## 2022-11-17 NOTE — PROGRESS NOTES
Assessment & Plan      ICD-10-CM    1. Recurrent occipital headache  R51.9 MR Cervical Spine w/o Contrast     cyclobenzaprine (FLEXERIL) 10 MG tablet      2. DDD (degenerative disc disease), cervical  M50.30 MR Cervical Spine w/o Contrast     cyclobenzaprine (FLEXERIL) 10 MG tablet      3. Tension headache  G44.209 MR Cervical Spine w/o Contrast      4. Cervicalgia  M54.2 MR Cervical Spine w/o Contrast     cyclobenzaprine (FLEXERIL) 10 MG tablet      5. Benign prostatic hyperplasia with urinary obstruction  N40.1 tamsulosin (FLOMAX) 0.4 MG capsule    N13.8       6. High priority for 2019-nCoV vaccine  Z23 COVID-19,PF,MODERNA BIVALENT 18+Yrs        1.-4.  Patient has long history of headache and DDD and a long career that involves repetitive movments and straining of his neck. This is likely tension type headache related to this. However, given the worsening of these symptoms and endorsement of vision changes with headaches worth further work up. Symptoms most fitting with musculoskeletal process and tension type headache so will start with flexeril while waiting for MRI. Offered PT as well. Patient also encouraged to update his eyeglass prescription.     Endorsement of unilateral pain and seeing spots fits with migraine, however patient denies photophobia and improvement with low sensory input.      Patient is high risk for a cerebral vascular event, however, there was no sudden onset of these symptoms and no neuro changes were noted on history or physical.     Considered NSAID overuse or rebound but patient takes 600mg total and only a few days per week so this is less likely.     5.  Not improving with use of tamsulosin, still urinating frequently, incomplete emptying, and nocturia. Offered to increase this dose or place referral to urology but patient declines.     6. Will update today.    Portions of this note were completed using Dragon dictation software.  Although reviewed, there may be typographical and  "other inadvertent errors that remain.     Ordering of each unique test  Prescription drug management         BMI:   Estimated body mass index is 28.8 kg/m  as calculated from the following:    Height as of 7/21/22: 1.775 m (5' 9.88\").    Weight as of this encounter: 90.7 kg (200 lb).       Patient Instructions   Thank you for visiting [facility]    Let's get an MRI of your neck to further evaluate.    Can try flexeril at night to help with your headache and neck pain.      See your eye doctor!     We can also consider PT or referral if desired.      Contact us or return if questions or concerns.     Have a nice day!    Dr. Stovall     Return in about 6 months (around 5/17/2023) for Well check.      If you need medication refills, please contact your pharmacy 3 days before your prescriptions runs out or download the [facility] negrito for your smart phone. If you are out of refills, your pharmacy will contact contact the clinic.                                     [facility] [phone]                    Return in about 6 months (around 5/17/2023) for Well check.    Fabio Stovall MD, MD  [facility] SAM Allison is a 67 year old, presenting for the following health issues:  Migraine and Imm/Inj (COVID-19 VACCINE)      Has long history of headaches and neck pain for which he has been seeing his chiropractor for. These adjusments used to help for several days to weeks now only relieve pain for a few hours. This headache is occipital and he states that 9 times out of ten it starts on the left. These headaches are usually 2/10 dull headaches but occasionally is 10/10 severe but these last for a second or two and resolve. He has these headaches about 5 days a week. He has treated these headaches with  3 200mg ibuprofen tabs which gives him relief and he notes sleeping without a pillow helps his headaches the next day. Only takes when he feel really need " "to which he thinks is about 3 days per week. Associated symptoms include sometimes sees a bluish purple dot, \"rice crispy feeling\" in his neck with movement, and vision worsening. States he has never had imaging of his neck. Been a few years since he has seen the eye doctor. Denies numbness or tingling in arms or hands, LOC, unilateral weakness, or changes in strength or weakness. He also endorses falling off a horse onto his occiput years ago but is unsure if this is related.    History of Present Illness       Headaches:   Since the patient's last clinic visit, headaches are: worsened  The patient is getting headaches:  Daily  He is able to do normal daily activities when he has a migraine.  The patient is taking the following rescue/relief medications:  Ibuprofen (Advil, Motrin)   Patient states \"The relief is inconsistent\" from the rescue/relief medications.   The patient is taking the following medications to prevent migraines:  No medications to prevent migraines  In the past 4 weeks, the patient has gone to an Urgent Care or Emergency Room 0 times times due to headaches.    He eats 0-1 servings of fruits and vegetables daily.He consumes 0 sweetened beverage(s) daily.He exercises with enough effort to increase his heart rate 20 to 29 minutes per day.    He is taking medications regularly.    Today's PHQ-9         PHQ-9 Total Score: 0    PHQ-9 Q9 Thoughts of better off dead/self-harm past 2 weeks :   Not at all    How difficult have these problems made it for you to do your work, take care of things at home, or get along with other people: Not difficult at all     Shooting pain is right into his occiput.      He notes vision is \"weird\" when he has a HA.      Chiropractic is no longer working very well.  He recommended some imaging.      He's still dealing with a lot of urinary frequency.  Also notes incomplete voiding, especially in the mornings.      Review of Systems   Constitutional: Negative.  "   Respiratory: Negative.    Cardiovascular: Positive for palpitations.        Has known PVCs which he can feel.    Neurological: Positive for headaches. Negative for dizziness, tremors, syncope, facial asymmetry, speech difficulty, weakness, light-headedness, numbness and paresthesias.            Objective    /86 (BP Location: Right arm, Patient Position: Sitting, Cuff Size: Adult Regular)   Pulse 90   Temp 96.9  F (36.1  C) (Temporal)   Resp 16   Wt 90.7 kg (200 lb)   SpO2 97%   BMI 28.80 kg/m    Body mass index is 28.8 kg/m .  Physical Exam  Musculoskeletal:      Comments: Patient endorses tenderness on palpation of occipital notch bilaterally.         GENERAL: healthy, alert and no distress  EYES: Eyes grossly normal to inspection, PERRL and conjunctivae and sclerae normal  NECK: no adenopathy, no asymmetry, masses, or scars and thyroid normal to palpation  MS: no gross musculoskeletal defects noted, no edema  SKIN: no suspicious lesions or rashes  NEURO: Normal strength and tone, mentation intact and speech normal  PSYCH: mentation appears normal, affect normal/bright    Lab on 06/29/2022   Component Date Value Ref Range Status     Cholesterol 06/29/2022 151  <200 mg/dL Final     Triglycerides 06/29/2022 143  <150 mg/dL Final     Direct Measure HDL 06/29/2022 46  >=40 mg/dL Final     LDL Cholesterol Calculated 06/29/2022 76  <=100 mg/dL Final     Non HDL Cholesterol 06/29/2022 105  <130 mg/dL Final     Patient Fasting > 8hrs? 06/29/2022 Yes   Final     Sodium 06/29/2022 141  133 - 144 mmol/L Final     Potassium 06/29/2022 4.4  3.4 - 5.3 mmol/L Final     Chloride 06/29/2022 108  94 - 109 mmol/L Final     Carbon Dioxide (CO2) 06/29/2022 30  20 - 32 mmol/L Final     Anion Gap 06/29/2022 3  3 - 14 mmol/L Final     Urea Nitrogen 06/29/2022 16  7 - 30 mg/dL Final     Creatinine 06/29/2022 0.94  0.66 - 1.25 mg/dL Final     Calcium 06/29/2022 8.8  8.5 - 10.1 mg/dL Final     Glucose 06/29/2022 102 (H)  70 -  99 mg/dL Final     Alkaline Phosphatase 06/29/2022 87  40 - 150 U/L Final     AST 06/29/2022 33  0 - 45 U/L Final     ALT 06/29/2022 68  0 - 70 U/L Final     Protein Total 06/29/2022 7.2  6.8 - 8.8 g/dL Final     Albumin 06/29/2022 4.0  3.4 - 5.0 g/dL Final     Bilirubin Total 06/29/2022 1.1  0.2 - 1.3 mg/dL Final     GFR Estimate 06/29/2022 89  >60 mL/min/1.73m2 Final    Effective December 21, 2021 eGFRcr in adults is calculated using the 2021 CKD-EPI creatinine equation which includes age and gender (Miladis et al., NE, DOI: 10.1056/CQTPso4429328)     Hemoglobin A1C 06/29/2022 5.8 (H)  0.0 - 5.6 % Final    Normal <5.7%   Prediabetes 5.7-6.4%    Diabetes 6.5% or higher     Note: Adopted from ADA consensus guidelines.     Prostate Specific Antigen Screen 06/29/2022 0.80  0.00 - 4.00 ug/L Final     No results found for any visits on 11/17/22.  No results found for this or any previous visit (from the past 24 hour(s)).    Physician Attestation   I, Fabio Stovall MD, MD, was present with the medical/TIFFANIE student who participated in the service and in the documentation of the note.  I have verified the history and personally performed the physical exam and medical decision making.  I agree with the assessment and plan of care as documented in the note.      Items personally reviewed: vitals, labs and exam and agree with the interpretation documented in the note.    Fabio Stovall MD, MD

## 2022-11-21 ENCOUNTER — MYC MEDICAL ADVICE (OUTPATIENT)
Dept: FAMILY MEDICINE | Facility: OTHER | Age: 67
End: 2022-11-21

## 2022-11-22 ENCOUNTER — ALLIED HEALTH/NURSE VISIT (OUTPATIENT)
Dept: FAMILY MEDICINE | Facility: OTHER | Age: 67
End: 2022-11-22
Payer: MEDICARE

## 2022-11-22 VITALS — SYSTOLIC BLOOD PRESSURE: 132 MMHG | DIASTOLIC BLOOD PRESSURE: 86 MMHG

## 2022-11-22 DIAGNOSIS — I10 ESSENTIAL HYPERTENSION: Primary | ICD-10-CM

## 2022-11-22 PROCEDURE — 99207 PR NO CHARGE NURSE ONLY: CPT | Performed by: FAMILY MEDICINE

## 2022-11-22 NOTE — PROGRESS NOTES
Keegan's blood past 6 months of blood pressure readings were reviewed by pharmacist at [facility] on 11/21/2022. Recent blood pressure history listed below:    BP Readings from Last 3 Encounters:   11/17/22 132/86   11/17/22 132/86   07/21/22 112/68     Pulse Readings from Last 3 Encounters:   11/17/22 90   07/21/22 66   05/26/22 70     Due to recent blood pressure readings at goal, blood pressure check at pharmacy not needed today. Pharmacy will continue to follow in [facility]PGAP program. Next blood pressure check due 5/17/2023.     Note completed by: CHRISTINE CARLIN RPH

## 2022-11-28 ENCOUNTER — HOSPITAL ENCOUNTER (OUTPATIENT)
Dept: MRI IMAGING | Facility: CLINIC | Age: 67
Discharge: HOME | End: 2022-11-28
Attending: FAMILY MEDICINE | Admitting: FAMILY MEDICINE
Payer: MEDICARE

## 2022-11-28 DIAGNOSIS — M50.30 DDD (DEGENERATIVE DISC DISEASE), CERVICAL: ICD-10-CM

## 2022-11-28 DIAGNOSIS — G44.209 TENSION HEADACHE: ICD-10-CM

## 2022-11-28 DIAGNOSIS — M54.2 CERVICALGIA: ICD-10-CM

## 2022-11-28 DIAGNOSIS — R51.9 RECURRENT OCCIPITAL HEADACHE: ICD-10-CM

## 2022-11-28 PROCEDURE — G1010 CDSM STANSON: HCPCS

## 2022-11-29 ENCOUNTER — MYC MEDICAL ADVICE (OUTPATIENT)
Dept: FAMILY MEDICINE | Facility: OTHER | Age: 67
End: 2022-11-29

## 2022-11-29 DIAGNOSIS — M48.02 NEURAL FORAMINAL STENOSIS OF CERVICAL SPINE: ICD-10-CM

## 2022-11-29 DIAGNOSIS — M50.30 DDD (DEGENERATIVE DISC DISEASE), CERVICAL: ICD-10-CM

## 2022-11-29 DIAGNOSIS — M54.2 CERVICALGIA: ICD-10-CM

## 2022-11-29 DIAGNOSIS — G44.209 TENSION HEADACHE: ICD-10-CM

## 2022-11-29 DIAGNOSIS — R51.9 RECURRENT OCCIPITAL HEADACHE: Primary | ICD-10-CM

## 2022-11-29 NOTE — RESULT ENCOUNTER NOTE
Keegan,    MRI shows arthritis and disc degeneration.  There are also several levels of your spine where nerves are being impinged as they leave the vertebral column.  If you are having arm weakness or pain radiating down your arms, then I would recommend we have a visit with a spine specialist.  Otherwise, these problems can usually be managed by physical therapy.    Have a nice day!    Dr. Stovall

## 2022-12-01 DIAGNOSIS — J44.9 MODERATE CHRONIC OBSTRUCTIVE PULMONARY DISEASE (H): ICD-10-CM

## 2022-12-04 NOTE — TELEPHONE ENCOUNTER
"Pending Prescriptions:                       Disp   Refills    SYMBICORT 160-4.5 MCG/ACT Inhaler [Pharmac*30.6 g 1        Sig: INHALE 2 PUFFS INTO THE LUNGS TWICE DAILY      Routing refill request to provider for review/approval because:    Requested Prescriptions   Pending Prescriptions Disp Refills    SYMBICORT 160-4.5 MCG/ACT Inhaler [Pharmacy Med Name: SYMBICORT 160/4.5MCG (120 ORAL INH)] 30.6 g 1     Sig: INHALE 2 PUFFS INTO THE LUNGS TWICE DAILY       Long-Acting Beta Agonist Inhalers Protocol  Failed - 12/1/2022  5:55 PM        Failed - Order for Serevent, Striverdi, or Foradil and pt has steroid inhaler        Passed - Patient is age 12 or older        Passed - Recent (12 mo) or future (30 days) visit within the authorizing provider's specialty     Patient has had an office visit with the authorizing provider or a provider within the authorizing providers department within the previous 12 mos or has a future within next 30 days. See \"Patient Info\" tab in inbasket, or \"Choose Columns\" in Meds & Orders section of the refill encounter.              Passed - Medication is active on med list       Inhaled Steroids Protocol Passed - 12/1/2022  5:55 PM        Passed - Patient is age 12 or older        Passed - Recent (12 mo) or future (30 days) visit within the authorizing provider's specialty     Patient has had an office visit with the authorizing provider or a provider within the authorizing providers department within the previous 12 mos or has a future within next 30 days. See \"Patient Info\" tab in inbasket, or \"Choose Columns\" in Meds & Orders section of the refill encounter.              Passed - Medication is active on med list             Rajinder Brownlee RN    "

## 2022-12-05 RX ORDER — BUDESONIDE AND FORMOTEROL FUMARATE DIHYDRATE 160; 4.5 UG/1; UG/1
AEROSOL RESPIRATORY (INHALATION)
Qty: 30.6 G | Refills: 1 | Status: SHIPPED | OUTPATIENT
Start: 2022-12-05 | End: 2023-06-08

## 2022-12-05 NOTE — TELEPHONE ENCOUNTER
Pending Prescriptions:                       Disp   Refills    SYMBICORT 160-4.5 MCG/ACT Inhaler [Pharmac*30.6 g 1        Sig: INHALE 2 PUFFS INTO THE LUNGS TWICE DAILY    Routing refill request to provider for review/approval because:  Long-Acting Beta Agonist Inhalers Protocol  Failed 12/05/2022 11:46 AM   Protocol Details  Order for Serevent, Striverdi, or Foradil and pt has steroid inhaler

## 2022-12-07 ENCOUNTER — MYC MEDICAL ADVICE (OUTPATIENT)
Dept: FAMILY MEDICINE | Facility: OTHER | Age: 67
End: 2022-12-07

## 2022-12-07 NOTE — TELEPHONE ENCOUNTER
From: Keegan Buchanan   Sent: 12/2/2022   8:14 AM CST   To: [facility]  Pool   Subject: Symbicort                                          Topic: Non-Medical Question.      Symbicort I called in yesterday to get my prescription refilled on Symbicort and they said they had no refills I kind of waited too long I only have about a day and a half left of the product so can you contact [facility] ASAP and get that prescription filled for me please so I don t run out

## 2022-12-07 NOTE — TELEPHONE ENCOUNTER
This was sent 12/05/2022.  Should not be out.    Dorian Toscano, BSN, RN, PHN  [facility] ~ Registered Nurse  Clinic Triage ~ [place] [facility] [place]  December 7, 2022

## 2022-12-07 NOTE — TELEPHONE ENCOUNTER
Contacted pt. Advised him that if this is what is going on, pt should be seen immediately as this is an emergency. He states that he normally just goes to optometrist for his glasses and he does not have an eye doctor. Recommended that pt go to ER. Pt is at his part time job in [place]. Recommended that he go to [facility]. He states that he is able to see to drive, it is just when he moves his eyes he continues to see this flashing light. Recommended that he pull over and call 911 if his vision ever becomes impaired. Pt asked if Medicare will cover this? Advised him that this RN does not know what all insurances will cover but this is an emergency and, if it is retinal detachment and goes untreated, he could lose his vision. Pt verbalized understanding and is in agreement with plan. He is going to go to [facility].     Honey Powell, MONEN, RN, PHN  Registered Nurse-Clinic Triage  [facility]/[place]  12/7/2022 at 11:32 AM

## 2022-12-08 ENCOUNTER — THERAPY VISIT (OUTPATIENT)
Dept: PHYSICAL THERAPY | Facility: CLINIC | Age: 67
End: 2022-12-08
Attending: FAMILY MEDICINE
Payer: MEDICARE

## 2022-12-08 DIAGNOSIS — G44.209 TENSION HEADACHE: ICD-10-CM

## 2022-12-08 DIAGNOSIS — M54.2 CERVICALGIA: ICD-10-CM

## 2022-12-08 DIAGNOSIS — M48.02 NEURAL FORAMINAL STENOSIS OF CERVICAL SPINE: ICD-10-CM

## 2022-12-08 DIAGNOSIS — M50.30 DDD (DEGENERATIVE DISC DISEASE), CERVICAL: ICD-10-CM

## 2022-12-08 DIAGNOSIS — R51.9 RECURRENT OCCIPITAL HEADACHE: ICD-10-CM

## 2022-12-08 PROCEDURE — 97110 THERAPEUTIC EXERCISES: CPT | Mod: GP | Performed by: PHYSICAL THERAPIST

## 2022-12-08 PROCEDURE — 97161 PT EVAL LOW COMPLEX 20 MIN: CPT | Mod: GP | Performed by: PHYSICAL THERAPIST

## 2022-12-08 PROCEDURE — 97140 MANUAL THERAPY 1/> REGIONS: CPT | Mod: GP | Performed by: PHYSICAL THERAPIST

## 2022-12-08 NOTE — PROGRESS NOTES
Physical Therapy Initial Evaluation  Subjective:  The history is provided by the patient. No  was used.   Patient Health History  Keegan Buchanan being seen for Neck issues.     Problem began: 12/8/2022 (MD note).   Problem occurred: Trauma   Pain is reported as 1/10 on pain scale.  General health as reported by patient is good.  Pertinent medical history includes: none.   Red flags:  None as reported by patient.  Medical allergies: other. Other medical allergies details: Problems with penicillin.   Surgeries include:  Heart surgery and other. Other surgery history details: Hernia.    Current medications:  High blood pressure medication and steroids.    Current occupation is Retired.   Primary job tasks include:  Prolonged sitting.                  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 Started getting a headache recently and feels very stiff after sitting for extended periods of time. Had MRI done with results of DDD and mild disk bulge at C2-3. L side headache 80% of time but can get it on the R side. Has been going to a chiropractor and gets relief for 1-2 days after, then feels stiff again. .         Type of problem:  Cervical spine.    This is a chronic condition.  Condition occurred with:  Insidious onset.  Where condition occurred: at work.  Patient reports pain:  Upper cervical spine, cervical left side, mid cervical spine and cervical right side.  Pain is described as aching (HA = throbbing ) and is intermittent.  Pain radiates to:  Head. Pain is the same all the time.  Since onset symptoms are unchanged.  Associated symptoms:  Visual disturbances and headache. Symptoms are exacerbated by sitting  and relieved by activity/movement, NSAID's, ice and heat.  Special tests included:  MRI.  Previous treatment includes chiropractic. There was moderate  improvement following previous treatment.  Restrictions due to condition include:  Working in normal job without restrictions.  Barriers include:  None as reported by patient.                        Objective:  System    Physical Exam    General     ROS  Keegan Buchanan , : 1955, MRN: [id number]    Physical Therapy Objective Findings  Subjective information, goals, clinical impression, daily documentation and other information found in EPISODES tab.    Cervical Objective Findings  Objective:  Posture: rounded shoulders, forward head posture  Range of Motion:  Cervical Flexion                              wnl                                                                                                                     Cervical Extension wnl   Cervical Side Bending R     50% L    75%   Cervical Rotation R     75% L     100%   Thoracic Flexion    Thoracic Extension 50%   Thoracic Side Bending R L   Thoracic Rotation R L   Other:    (in degrees)  Shoulder Screen:   AROM Findings                                                                            wnl                                                                                              Special Tests:                                                                                                                    Positive                    Negative                         Facet: Cervical rotation & SB X pain on L with R rotation C2-3    Uncinate: SB with rotation X L 2-3 pain with L SB    Transverse Ligament Test     Spurling s Test     Cervical Distraction Test  Increase suboccipital tension   Neck Flexor Endurance Test (normal 39 sec)     Cervical Rotation/Lateral Flexion Test                     Flexibility                                                                                                                         Right                          Left                                  Upper Trap Mod tight Mod tight   Levator Scap  Mod tight Mod tight    Scalene     Pec Minor         Segmental Mobility:     Cervical:  C1-4 hypomobile SB & Rotation, R greater deficit than L   Thoracic:    Palpation: pain with palpation to suboccipitals L > R        -------------------------------------------  Symptoms beyond the shoulder attach: .CERVICALRADICULAR  Assessment/Plan:    Patient is a 67 year old male with cervical complaints.    Patient has the following significant findings with corresponding treatment plan.                Diagnosis 1:  Neck pain with HA consistent with mobility deficits    Pain -  hot/cold therapy, US, mechanical traction, manual therapy, splint/taping/bracing/orthotics, education and home program  Decreased ROM/flexibility - manual therapy, therapeutic exercise and home program  Decreased joint mobility - manual therapy, therapeutic exercise and home program  Decreased strength - therapeutic exercise, therapeutic activities and home program  Decreased proprioception - neuro re-education, therapeutic activities and home program  Impaired posture - neuro re-education and home program    Therapy Evaluation Codes:   1) History comprised of:   Personal factors that impact the plan of care:      Work status.    Comorbidity factors that impact the plan of care are:      None, Heart problems and High blood pressure.     Medications impacting care: High blood pressure and Steroids.  2) Examination of Body Systems comprised of:   Body structures and functions that impact the plan of care:      Cervical spine and Thoracic Spine.   Activity limitations that impact the plan of care are:      Driving, Reading/Computer work and Sitting.  3) Clinical presentation characteristics are:   Stable/Uncomplicated.  4) Decision-Making    Low complexity using standardized patient assessment instrument and/or measureable assessment of functional outcome.  Cumulative Therapy Evaluation is: Low complexity.    Previous and current functional limitations:   (See Goal Flow Sheet for this information)    Short term and Long term goals: (See Goal Flow Sheet for this information)     Communication ability:  Patient appears to be able to clearly communicate and understand verbal and written communication and follow directions correctly.  Treatment Explanation - The following has been discussed with the patient:   RX ordered/plan of care  Anticipated outcomes  Possible risks and side effects  This patient would benefit from PT intervention to resume normal activities.   Rehab potential is good.    Frequency:  1 X week, once daily  Duration:  for 7 weeks  Discharge Plan:  Achieve all LTG.  Independent in home treatment program.  Reach maximal therapeutic benefit.    Please refer to the daily flowsheet for treatment today, total treatment time and time spent performing 1:1 timed codes.     Sharri Peralta, SPT; Musa Matias, PT, OCS

## 2022-12-09 NOTE — PROGRESS NOTES
[facility]    OUTPATIENT Physical Therapy ORTHOPEDIC EVALUATION  PLAN OF TREATMENT FOR OUTPATIENT REHABILITATION  (COMPLETE FOR INITIAL CLAIMS ONLY)  Patient's Last Name, First Name, M.I.  YOB: 1955  Keegan Buchanan    Provider s Name:  [facility]   Medical Record No.  [id number]   Start of Care Date:  12/08/22   Onset Date:   12/08/22 (MD note)   Treatment Diagnosis:  Neck pain w/ headaches Medical Diagnosis:     Recurrent occipital headache  DDD (degenerative disc disease), cervical  Tension headache  Cervicalgia  Neural foraminal stenosis of cervical spine       Goals:     12/08/22 0500   Body Part   Goals listed below are for neck pain & HA   Goal #1   Goal #1 headaches   Previous Functional Level No headaches   Performance Level Able to sit for extended periods of time w/o HA's   Current Functional Level Headache frequency per day is   Performance Level Having HA's every day, after sitting for greater than 15 min at a time   STG Target Performance Reduce frequency of headaches in a week to   Performance Level HA's 3x a week, intensity 7/10   Rationale to improve quality of life and resume normal social activities;to establish restorative sleep pattern;for full and safe concentration   Due Date 12/29/22   LTG Target Performance Reduce frequency of headaches in a week to   Performance Level HA's 1x a week, intensity 3/10   Rationale for full and safe concentration;to establish restorative sleep pattern;to improve quality of life and resume normal social activities   Due Date 01/26/23       Therapy Frequency:  1x week  Predicted Duration of Therapy Intervention:  7 weeks    Musa Matias, PT                 I CERTIFY THE NEED FOR THESE SERVICES FURNISHED UNDER        THIS PLAN OF TREATMENT AND WHILE UNDER MY CARE     (Physician co-signature of this document  indicates review and certification of the therapy plan).                     Certification Date From:  12/08/22   Certification Date To:  02/16/23    Referring Provider:  Fabio Stovall    Initial Assessment        See Epic Evaluation SOC Date: 12/08/22

## 2022-12-16 ENCOUNTER — THERAPY VISIT (OUTPATIENT)
Dept: PHYSICAL THERAPY | Facility: CLINIC | Age: 67
End: 2022-12-16
Payer: MEDICARE

## 2022-12-16 DIAGNOSIS — M48.02 NEURAL FORAMINAL STENOSIS OF CERVICAL SPINE: ICD-10-CM

## 2022-12-16 DIAGNOSIS — R51.9 RECURRENT OCCIPITAL HEADACHE: Primary | ICD-10-CM

## 2022-12-16 DIAGNOSIS — M50.30 DDD (DEGENERATIVE DISC DISEASE), CERVICAL: ICD-10-CM

## 2022-12-16 PROCEDURE — 97110 THERAPEUTIC EXERCISES: CPT | Mod: CQ | Performed by: PHYSICAL THERAPY ASSISTANT

## 2022-12-16 PROCEDURE — 97140 MANUAL THERAPY 1/> REGIONS: CPT | Mod: CQ | Performed by: PHYSICAL THERAPY ASSISTANT

## 2022-12-22 ENCOUNTER — THERAPY VISIT (OUTPATIENT)
Dept: PHYSICAL THERAPY | Facility: CLINIC | Age: 67
End: 2022-12-22
Payer: MEDICARE

## 2022-12-22 DIAGNOSIS — R51.9 RECURRENT OCCIPITAL HEADACHE: Primary | ICD-10-CM

## 2022-12-22 DIAGNOSIS — M50.30 DDD (DEGENERATIVE DISC DISEASE), CERVICAL: ICD-10-CM

## 2022-12-22 DIAGNOSIS — M48.02 NEURAL FORAMINAL STENOSIS OF CERVICAL SPINE: ICD-10-CM

## 2022-12-22 PROCEDURE — 97110 THERAPEUTIC EXERCISES: CPT | Mod: GP | Performed by: PHYSICAL THERAPIST

## 2022-12-22 PROCEDURE — 97112 NEUROMUSCULAR REEDUCATION: CPT | Mod: GP | Performed by: PHYSICAL THERAPIST

## 2022-12-22 PROCEDURE — 97140 MANUAL THERAPY 1/> REGIONS: CPT | Mod: GP | Performed by: PHYSICAL THERAPIST

## 2022-12-30 ENCOUNTER — THERAPY VISIT (OUTPATIENT)
Dept: PHYSICAL THERAPY | Facility: CLINIC | Age: 67
End: 2022-12-30
Payer: MEDICARE

## 2022-12-30 DIAGNOSIS — R51.9 RECURRENT OCCIPITAL HEADACHE: Primary | ICD-10-CM

## 2022-12-30 DIAGNOSIS — M50.30 DDD (DEGENERATIVE DISC DISEASE), CERVICAL: ICD-10-CM

## 2022-12-30 DIAGNOSIS — M48.02 NEURAL FORAMINAL STENOSIS OF CERVICAL SPINE: ICD-10-CM

## 2022-12-30 PROCEDURE — 97112 NEUROMUSCULAR REEDUCATION: CPT | Mod: GP | Performed by: PHYSICAL THERAPIST

## 2022-12-30 PROCEDURE — 97140 MANUAL THERAPY 1/> REGIONS: CPT | Mod: GP | Performed by: PHYSICAL THERAPIST

## 2022-12-30 PROCEDURE — 97110 THERAPEUTIC EXERCISES: CPT | Mod: GP | Performed by: PHYSICAL THERAPIST

## 2022-12-30 NOTE — PROGRESS NOTES
Subjective:  HPI  Physical Exam                    Objective:  System    Physical Exam    General     ROS    Assessment/Plan:    DISCHARGE REPORT    Progress reporting period is from 12/8/22 to 12/30/22.       SUBJECTIVE  Subjective changes noted by patient:  he is doing well, he is able to keep the HAs at bay with his HEP, having HAs 1-2x/week    Current Pain level: 0/10 (worst 2-3/10).     Previous pain level was  NA Initial Pain level: 10/10.   Changes in function:  Yes (See Goal flowsheet attached for changes in current functional level)  Adverse reaction to treatment or activity: None    OBJECTIVE  Changes noted in objective findings:    Objective: cervical ROM: flex 45, ext 50, R SB 20, L SB 25, R rot 62, L rot 65, deep neck flexor endurance: 17/40 sec, mild hypomobile post capsule R C0-C1, mod hypomobile L C0-C1     ASSESSMENT/PLAN  Updated problem list and treatment plan: Diagnosis 1:  Neck pain with HA consistent with mobility deficits     Pain -  home program  Decreased joint mobility - home program  Decreased strength - home program  Decreased function - home program  STG/LTGs have been met or progress has been made towards goals:  Yes (See Goal flow sheet completed today.)  Assessment of Progress: The patient's condition is improving.  Patient is meeting short term goals and is progressing towards long term goals.  Self Management Plans:  Patient is independent in a home treatment program.  Patient is independent in self management of symptoms.  I have re-evaluated this patient and find that the nature, scope, duration and intensity of the therapy is appropriate for the medical condition of the patient.  Keegan continues to require the following intervention to meet STG and LTG's:  PT intervention is no longer required to meet STG/LTG.    Recommendations:  This patient is ready to be discharged from therapy and continue their home treatment program.    Please refer to the daily flowsheet for treatment  today, total treatment time and time spent performing 1:1 timed codes.      Musa Matias,PT, DPT, OCS

## 2023-02-03 ENCOUNTER — MYC MEDICAL ADVICE (OUTPATIENT)
Dept: FAMILY MEDICINE | Facility: OTHER | Age: 68
End: 2023-02-03
Payer: MEDICARE

## 2023-02-03 DIAGNOSIS — R35.0 BENIGN PROSTATIC HYPERPLASIA WITH URINARY FREQUENCY: Primary | ICD-10-CM

## 2023-02-03 DIAGNOSIS — N40.1 BENIGN PROSTATIC HYPERPLASIA WITH URINARY FREQUENCY: Primary | ICD-10-CM

## 2023-02-07 PROBLEM — M50.30 DDD (DEGENERATIVE DISC DISEASE), CERVICAL: Status: RESOLVED | Noted: 2022-11-17 | Resolved: 2023-02-07

## 2023-02-07 PROBLEM — R51.9 RECURRENT OCCIPITAL HEADACHE: Status: RESOLVED | Noted: 2022-11-17 | Resolved: 2023-02-07

## 2023-02-07 PROBLEM — M48.02 NEURAL FORAMINAL STENOSIS OF CERVICAL SPINE: Status: RESOLVED | Noted: 2022-11-29 | Resolved: 2023-02-07

## 2023-02-17 ENCOUNTER — E-VISIT (OUTPATIENT)
Dept: FAMILY MEDICINE | Facility: OTHER | Age: 68
End: 2023-02-17
Payer: MEDICARE

## 2023-02-17 DIAGNOSIS — J01.90 ACUTE SINUSITIS WITH SYMPTOMS > 10 DAYS: Primary | ICD-10-CM

## 2023-02-17 PROCEDURE — 99421 OL DIG E/M SVC 5-10 MIN: CPT | Mod: 95 | Performed by: FAMILY MEDICINE

## 2023-02-17 RX ORDER — DOXYCYCLINE HYCLATE 100 MG
100 TABLET ORAL 2 TIMES DAILY
Qty: 14 TABLET | Refills: 0 | Status: SHIPPED | OUTPATIENT
Start: 2023-02-17 | End: 2023-02-24

## 2023-02-17 NOTE — PATIENT INSTRUCTIONS
When to Use Antibiotics    Antibiotics are medicines used to treat infections caused by bacteria. They don t work for an illness caused by a virus. And they don't work for an allergic reaction. In fact, taking antibiotics for reasons other than an infection by bacteria can cause problems. You may have side effects from the medicine. And if you need an antibiotic in the future, it may not work well. This is because the bacteria can become immune to the medicine. You can also get a type of diarrhea that's hard to treat. This diarrhea is called C. diff.   When antibiotics likely won t help  Your healthcare provider won t usually give you antibiotics for the conditions listed below. You can help by not asking for them if you have:     A cold. This type of illness is caused by a virus. It can cause a runny nose, stuffed-up nose, sneezing, coughing, and headache. You may also have mild body aches and low fever. A cold gets better on its own in a few days to a week.    The flu (influenza). This is a respiratory illness caused by a virus. The flu usually goes away on its own in a week or so. It can cause fever, body aches, sore throat, and tiredness.    Bronchitis. This is an infection in the lungs. It is most often caused by a virus. You may have coughing, phlegm, body aches, and a low fever. A common type of bronchitis is known as a chest cold. This is called acute bronchitis. This often happens after you have a respiratory infection like a cold. Bronchitis can take weeks to go away. Antibiotics often don t help.    Most sore throats. Sore throats are most often caused by viruses. Your throat may feel scratchy or achy. It may hurt to swallow. You may also have a low fever and body aches. A sore throat usually gets better in a few days.    Most outer ear infections. An ear infection may be caused by a virus or bacteria. It causes pain in the ear. Antibiotics by mouth usually don t help. Low-dose antibiotic ear drops  work much better.    Some inner ear infections. An inner ear infection (otitis media) can be caused by a virus in the ear. It can also cause pain and a high fever. Most older children with low-grade fever don't need to be treated with antibiotics.    Most sinus infections. This is also known as sinusitis. This kind of infection causes sinus pain and swelling, and a runny nose. In most cases, it goes away on its own. Antibiotics don t make recovery quicker.    Allergic rhinitis. This is a set of symptoms caused by an allergic reaction. You may have sneezing, a runny nose, itchy or watery eyes, or a sore throat. Allergies are not treated with antibiotics.    Low fever. A mild fever that s less than 100.4 F (38 C) most likely doesn t need to be treated with antibiotics.   When antibiotics can help  Antibiotics can be used to treat:                                                       Strep throat. This is a throat infection caused by a certain type of bacteria. Symptoms of strep throat include a sore throat, white patches on the tonsils, red spots on the roof of the mouth, fever, body aches, and nausea and vomiting. Strep throat almost never causes a cough.    Urinary tract infection (UTI). This is an infection of the bladder and the tube that takes urine out of the body. It is caused by bacteria. It can cause burning pain and urine that s cloudy or tinted with blood. UTIs are very common. Antibiotics usually help treat them.    Some outer ear infections. In some cases, a healthcare provider may prescribe antibiotics by mouth for an ear infection. You may need a test to show the cause of the ear infection.    Some sinus infections. In some cases, your healthcare provider may give you antibiotics. He or she may first need to make sure your symptoms aren t caused by something else. This may be a virus, fungus, allergies, or air pollutants such as smoke.   Your healthcare provider may give you antibiotics if you have a  condition that can affect your immune system. This includes diabetes or cancer.  Self-care at home  If your infection can t be treated with antibiotics, you can take other steps to feel better. Try the remedies below. In general:     Rest and sleep as much as needed.    Drink water and other clear fluids.    Don t smoke. Stay away from smoke from other people.    Use over-the-counter medicine such as acetaminophen or ibuprofen to ease pain or fever, as directed by your healthcare provider.  To treat sinus pain or nasal stuffiness:    Put a warm, moist cloth on your face where you feel sinus pain or pressure.    Try a nasal spray with medicine or saline. Use as directed by your healthcare provider.    Breathe in steam from a hot shower.    Use a humidifier or cool mist vaporizer.   To quiet a cough:     Use a humidifier or cool mist vaporizer.    Breathe in steam from a hot shower.    Suck on cough lozenges.   To sooth a sore throat:     Suck on ice chips, frozen ice pops, or lozenges.    Use a sore throat spray.    Use a humidifier or cool mist vaporizer.    Gargle with saltwater.    Drink warm liquids.    Take ibuprofen to reduce swelling and pain.  To ease ear pain:     Hold a warm, moist washcloth on the ear for 10 minutes at a time.  [employer] last reviewed this educational content on 12/1/2019 2000-2021 [employer]. All rights reserved. This information is not intended as a substitute for professional medical care. Always follow your healthcare professional's instructions.          Sinusitis (Antibiotic Treatment)    The sinuses are air-filled spaces within the bones of the face. They connect to the inside of the nose. Sinusitis is an inflammation of the tissue that lines the sinuses. Sinusitis can occur during a cold. It can also happen due to allergies to pollens and other particles in the air. Sinusitis can cause symptoms of sinus congestion and a feeling of fullness. A sinus infection causes  fever, headache, and facial pain. There is often green or yellow fluid draining from the nose or into the back of the throat (post-nasal drip). You have been given antibiotics to treat this condition.   Home care    Take the full course of antibiotics as instructed. Don't stop taking them, even when you feel better.    Drink plenty of water, hot tea, and other liquids as directed by the healthcare provider. This may help thin nasal mucus. It also may help your sinuses drain fluids.    Heat may help soothe painful areas of your face. Use a towel soaked in hot water. Or,  the shower and direct the warm spray onto your face. Using a vaporizer along with a menthol rub at night may also help soothe symptoms.     An expectorant with guaifenesin may help thin nasal mucus and help your sinuses drain fluids. Talk with your provider or pharmacists before taking an over-the-counter (OTC) medicine if you have any questions about it or its side effects..    You can use an OTC decongestant, unless a similar medicine was prescribed to you. Nasal sprays work the fastest. Use one that contains phenylephrine or oxymetazoline. First blow your nose gently. Then use the spray. Don't use these medicines more often than directed on the label. If you do, your symptoms may get worse. You may also take pills that contain pseudoephedrine. Don t use products that combine multiple medicines. This is because side effects may be increased. Read labels. You can also ask the pharmacist for help. (People with high blood pressure should not use decongestants. They can raise blood pressure.) Talk with your provider or pharmacist if you have any questions about the medicine..    OTC antihistamines may help if allergies contributed to your sinusitis. Talk with your provider or pharmacist if you have any questions about the medicine..    Don't use nasal rinses or irrigation during an acute sinus infection, unless your healthcare provider tells  you to. Rinsing may spread the infection to other areas in your sinuses.    Use acetaminophen or ibuprofen to control pain, unless another pain medicine was prescribed to you. If you have chronic liver or kidney disease or ever had a stomach ulcer, talk with your healthcare provider before using these medicines. Never give aspirin to anyone under age 18 who is ill with a fever. It may cause severe liver damage.    Don't smoke. This can make symptoms worse.    Follow-up care  Follow up with your healthcare provider, or as advised.   When to seek medical advice  Call your healthcare provider if any of these occur:     Facial pain or headache that gets worse    Stiff neck    Unusual drowsiness or confusion    Swelling of your forehead or eyelids    Symptoms don't go away in 10 days    Vision problems, such as blurred or double vision    Fever of 100.4 F (38 C) or higher, or as directed by your healthcare provider  Call 911  Call 911 if any of these occur:     Seizure    Trouble breathing    Feeling dizzy or faint    Fingernails, skin or lips look blue, purple , or gray  Prevention  Here are steps you can take to help prevent an infection:     Keep good hand washing habits.    Don t have close contact with people who have sore throats, colds, or other upper respiratory infections.    Don t smoke, and stay away from secondhand smoke.    Stay up to date with of your vaccines.  [employer] last reviewed this educational content on 12/1/2019 2000-2021 [employer]. All rights reserved. This information is not intended as a substitute for professional medical care. Always follow your healthcare professional's instructions.        Dear Keegan Buchanan    After reviewing your responses, I've been able to diagnose you with Acute sinusitis with symptoms > 10 days.      Based on your responses and diagnosis, I have prescribed   Orders Placed This Encounter     doxycycline hyclate (VIBRA-TABS) 100 MG tablet    to  treat your symptoms. I have sent this to your pharmacy.?     It is also important to stay well hydrated, get lots of rest and take over-the-counter decongestants,?tylenol?or ibuprofen if you?are able to?take those medications per your primary care provider to help relieve discomfort.?     It is important that you take?all of?your prescribed medication even if your symptoms are improving after a few doses.? Taking?all of?your medicine helps prevent the symptoms from returning.?     If your symptoms worsen, you develop severe headache, vomiting, high fever (>102), or are not improving in 7 days, please contact your primary care provider for an appointment or visit any of our convenient Walk-in Care or Urgent Care Centers to be seen which can be found on our website?here.?     Thanks again for choosing?us?as your health care partner,?   ?  Fabio Stovall MD, MD?

## 2023-02-22 ENCOUNTER — E-VISIT (OUTPATIENT)
Dept: FAMILY MEDICINE | Facility: OTHER | Age: 68
End: 2023-02-22
Payer: MEDICARE

## 2023-02-22 DIAGNOSIS — J01.90 ACUTE SINUSITIS WITH SYMPTOMS > 10 DAYS: Primary | ICD-10-CM

## 2023-02-22 DIAGNOSIS — J44.9 MODERATE CHRONIC OBSTRUCTIVE PULMONARY DISEASE (H): ICD-10-CM

## 2023-02-22 PROCEDURE — 99421 OL DIG E/M SVC 5-10 MIN: CPT | Mod: 95 | Performed by: FAMILY MEDICINE

## 2023-02-22 RX ORDER — PREDNISONE 20 MG/1
40 TABLET ORAL DAILY
Qty: 10 TABLET | Refills: 0 | Status: SHIPPED | OUTPATIENT
Start: 2023-02-22 | End: 2023-02-27

## 2023-02-22 NOTE — PATIENT INSTRUCTIONS
The symptoms you describe suggest a viral cause, which is much more common than a bacterial cause. Antibiotics will treat bacterial infections, but have no effect on viral infections. If possible, especially if improving, start with symptom care for the first 7-10 days, then consider seeking further treatment or taking an antibiotic. Bacterial infections generally are more severe, including symptoms such as pus, fever over 101degrees F, or rapidly worsening.  Dear Keegan Buchanan    After reviewing your responses, I've been able to diagnose you with    Acute sinusitis with symptoms > 10 days  Moderate chronic obstructive pulmonary disease (H).      Based on your responses and diagnosis, I have prescribed   Orders Placed This Encounter     predniSONE (DELTASONE) 20 MG tablet    to treat your symptoms. I have sent this to your pharmacy.?     It is also important to stay well hydrated, get lots of rest and take over-the-counter decongestants,?tylenol?or ibuprofen if you?are able to?take those medications per your primary care provider to help relieve discomfort.?     It is important that you take?all of?your prescribed medication even if your symptoms are improving after a few doses.? Taking?all of?your medicine helps prevent the symptoms from returning.?     If your symptoms worsen, you develop severe headache, vomiting, high fever (>102), or are not improving in 7 days, please contact your primary care provider for an appointment or visit any of our convenient Walk-in Care or Urgent Care Centers to be seen which can be found on our website?here.?     Thanks again for choosing?us?as your health care partner,?   ?  Fabio Stovall MD, MD?

## 2023-04-05 ENCOUNTER — OFFICE VISIT (OUTPATIENT)
Dept: UROLOGY | Facility: CLINIC | Age: 68
End: 2023-04-05
Payer: MEDICARE

## 2023-04-05 VITALS
BODY MASS INDEX: 28.65 KG/M2 | OXYGEN SATURATION: 98 % | TEMPERATURE: 97.5 F | DIASTOLIC BLOOD PRESSURE: 84 MMHG | SYSTOLIC BLOOD PRESSURE: 126 MMHG | WEIGHT: 199 LBS | HEART RATE: 76 BPM

## 2023-04-05 DIAGNOSIS — N40.1 BENIGN PROSTATIC HYPERPLASIA WITH URINARY FREQUENCY: ICD-10-CM

## 2023-04-05 DIAGNOSIS — R35.0 BENIGN PROSTATIC HYPERPLASIA WITH URINARY FREQUENCY: ICD-10-CM

## 2023-04-05 PROCEDURE — 99203 OFFICE O/P NEW LOW 30 MIN: CPT | Performed by: UROLOGY

## 2023-04-05 RX ORDER — TAMSULOSIN HYDROCHLORIDE 0.4 MG/1
CAPSULE ORAL
Qty: 90 CAPSULE | Refills: 3 | Status: SHIPPED | OUTPATIENT
Start: 2023-04-05 | End: 2024-04-25

## 2023-04-05 RX ORDER — TOLTERODINE 4 MG/1
4 CAPSULE, EXTENDED RELEASE ORAL DAILY
Qty: 30 CAPSULE | Refills: 1 | Status: SHIPPED | OUTPATIENT
Start: 2023-04-05 | End: 2023-05-30

## 2023-04-05 ASSESSMENT — PAIN SCALES - GENERAL: PAINLEVEL: NO PAIN (0)

## 2023-04-05 NOTE — PROGRESS NOTES
S: Keegan Buchanan is a pleasant  67 year old male who was requested to be seen by  Fabio Stovall for a consult with regard to patient's urinary complaints.  Patient complains of Hesitancy in starting urinary stream, Sensation of incomplete emptying, Strain to Urinate, Nocturia x 4, Urgency, Frequency, Intermittency and slower stream.  He has no history of elevated PSA.  Symptoms have been on going for   many years(s).  Seems to be worsened over time.  His recent PSA was found to be   PSA   Date Value Ref Range Status   07/29/2020 0.53 0 - 4 ug/L Final     Comment:     Assay Method:  Chemiluminescence using Siemens Vista analyzer     Prostate Specific Antigen Screen   Date Value Ref Range Status   06/29/2022 0.80 0.00 - 4.00 ug/L Final   .  His AUA Symptom Score:  29.  His QOL score:  4.  He has no dysuria or hematuria.  He has been on flomax for many years.  He has tried detrol a number of years ago with some success.    Current Outpatient Medications   Medication Sig Dispense Refill     albuterol (PROAIR HFA/PROVENTIL HFA/VENTOLIN HFA) 108 (90 Base) MCG/ACT inhaler INHALE 2 PUFFS INTO THE LUNGS EVERY 6 HOURS 54 g 1     aspirin 81 MG tablet Take 1 tablet by mouth daily. 90 tablet 3     atorvastatin (LIPITOR) 80 MG tablet TAKE 1 TABLET(80 MG) BY MOUTH DAILY 90 tablet 3     fluticasone (FLONASE) 50 MCG/ACT nasal spray SPRAY 1-2 SPRAYS IN EACH NOSTRIL DAILY 48 g 2     metoprolol succinate ER (TOPROL XL) 50 MG 24 hr tablet TAKE 1 TABLET(50 MG) BY MOUTH TWICE DAILY 180 tablet 1     omeprazole (PRILOSEC) 20 MG DR capsule TAKE 1 CAPSULE BY MOUTH EVERY DAY 90 capsule 1     SYMBICORT 160-4.5 MCG/ACT Inhaler INHALE 2 PUFFS INTO THE LUNGS TWICE DAILY 30.6 g 1     tamsulosin (FLOMAX) 0.4 MG capsule TAKE ONE CAPSULE BY MOUTH ONCE DAILY 90 capsule 3     tolterodine ER (DETROL LA) 4 MG 24 hr capsule Take 1 capsule (4 mg) by mouth daily 30 capsule 1     nitroGLYcerin (NITROSTAT) 0.4 MG sublingual tablet Place 1 tablet (0.4  mg) under the tongue every 5 minutes as needed for chest pain (Patient not taking: Reported on 10/28/2021) 25 tablet 0     Allergies   Allergen Reactions     Augmentin Diarrhea and GI Disturbance     Codeine Nausea     significant     Penicillins      GI upset and diarrhea     Past Medical History:   Diagnosis Date     Hypercholesteremia      Pain in joint, shoulder region     right shoulder separation     Unspecified essential hypertension      Past Surgical History:   Procedure Laterality Date     COLONOSCOPY N/A 2015    Procedure: COMBINED COLONOSCOPY, SINGLE OR MULTIPLE BIOPSY/POLYPECTOMY BY BIOPSY;  Surgeon: Herman Rebollar MD;  Location: [facility]     COLONOSCOPY N/A 2018    Procedure: colonoscopy;  Surgeon: Thiago Robles MD;  Location: [facility]     H STENT PROCED       HC REMOVE TONSILS/ADENOIDS,<11 Y/O      T & A <12y.o.     LAPAROSCOPIC HERNIORRHAPHY INGUINAL Left 8/3/2018    Procedure: LAPAROSCOPIC HERNIORRHAPHY INGUINAL;  Laparoscopic left inguinal hernia repair;  Surgeon: Huber Ireland MD;  Location: [facility]     REMOVAL OF SPERM DUCT(S)  88    Vasectomy      Family History   Problem Relation Age of Onset     Diabetes Mother         adult onset diabetes     Cancer Mother         kidney dx age 75     Cancer Father         prostate cancer/ at age 73     Hypertension Brother      Cancer Paternal Grandfather         prostat ca     Lung Cancer Brother      He does have a family history of prostate cancer.  Social History     Socioeconomic History     Marital status:      Spouse name: None     Number of children: None     Years of education: None     Highest education level: None   Tobacco Use     Smoking status: Former     Packs/day: 1.00     Years: 15.00     Pack years: 15.00     Types: Cigarettes     Smokeless tobacco: Former     Types: Chew     Quit date: 1987     Tobacco comments:     quit 25 years ago.   Vaping Use     Vaping status: Never Used    Substance and Sexual Activity     Alcohol use: Yes     Comment: 1 -2 monthly     Drug use: No     Sexual activity: Yes     Partners: Female     Birth control/protection: Surgical, Male Surgical     Comment: vasectomy        REVIEW OF SYSTEMS  =================  C: NEGATIVE for fever, chills, change in weight  I: NEGATIVE for worrisome rashes, moles or lesions  E/M: NEGATIVE for ear, mouth and throat problems  R: NEGATIVE for significant cough or SHORTNESS OF BREATH  CV:  NEGATIVE for chest pain, palpitations or peripheral edema  GI: NEGATIVE for nausea, abdominal pain, heartburn, or change in bowel habits  NEURO: NEGATIVE numbness/weakness  : see HPI  PSYCH: NEGATIVE depression/anxiety  LYmph: no new enlarged lymph nodes  Ortho: no new trauma/movements           O: Exam:/84 (BP Location: Right arm, Patient Position: Sitting, Cuff Size: Adult Regular)   Pulse 76   Temp 97.5  F (36.4  C)   Wt 90.3 kg (199 lb)   SpO2 98%   BMI 28.65 kg/m     Constitutional: healthy, alert and no distress  Cardiovascular: negative, PMI normal.   Respiratory: negative, no evidence of respiratory distress  Gastrointestinal: Abdomen soft, non-tender. BS normal. No masses, organomegaly  : penis no discharge.  Testis no masses.  No scrotal skin lesion.  Prostate 40 gm plus.  Musculoskeletal: extremities normal- no gross deformities noted, gait normal and normal muscle tone  Skin: no suspicious lesions or rashes  Neurologic: Alert and oriented  Psychiatric: mentation appears normal. and affect normal/bright  Hematologic/Lymphatic/Immunologic: normal ant/post cervical, axillary, supraclavicular and inguinal nodes    Assessment/Plan:   (N40.1,  R35.0) Benign prostatic hyperplasia with urinary frequency  Comment:  ml      Recommendations:   Medical management versus surgical management versus office treatments, were discussed with patient at length. Pros and Cons discussed.      Cont with flomax.   Add detrol.  Patient to  call in several weeks about his symptoms.

## 2023-04-25 DIAGNOSIS — R00.2 PALPITATIONS: ICD-10-CM

## 2023-04-26 ENCOUNTER — PATIENT OUTREACH (OUTPATIENT)
Dept: CARE COORDINATION | Facility: CLINIC | Age: 68
End: 2023-04-26
Payer: MEDICARE

## 2023-04-26 DIAGNOSIS — K21.9 GASTROESOPHAGEAL REFLUX DISEASE WITHOUT ESOPHAGITIS: ICD-10-CM

## 2023-04-27 NOTE — PROGRESS NOTES
~Cardiology Clinic Visit~    Primary Cardiologist: Dr. Marcus  Reason for visit: Weight gain evaluation    History of Present Illness    Keegan uBchanan, a 65-year-old man with coronary artery disease with prior history of CHIO x1 to mid LAD, hypertension, dyslipidemia, and symptomatic premature ventricular contractions.    In summary, patient has been following with Dr. Millan for management of his cardiac concerns.  In report, patient stated he had more frequent symptomatic premature ventricular contractions.  There has been no syncope, sustained tachycardia, chest discomfort, dyspnea, orthopnea or PND.  The patient has been taking metoprolol, but still experiences episodes of symptomatic premature contractions that he finds quite bothersome.    For this, he was recommended a heart monitor, and stress echocardiogram to make sure there is no evidence of ischemia and to check his ejection fraction.  He was also referred to our EP colleagues.    -11/3/2021 exercise stress echo: Stable EF, no ischemic ST-changes on rest/stress EKG.  -11/30/2021 heart monitor: Symptomatic, nonsustained SVT, no PVCs.    When he saw Dr. Marcus, they discussed that the PVC are not considered serious, given that he has normal cardiac structure and function.  They also discussed additional options for PVC management, such as a trial of  sotalol or amiodarone, as ablation success rate could be indeterminate given the burden is less than 1% of the time.  No changes were made when he saw EP, he remained on Toprol-XL 50 mg twice a day.    Overall, he is remained quite well and his CAD has been stable.  For this, he is on aspirin and atorvastatin.  He has an excellent lipid profile from 6/9/2022, with , HDL 46, LDL 76, .  BMP around that time is also stable with normal electrolyte and renal function.    EKG performed in clinic today demonstrated sinus bradycardia, no ischemic changes.    Interval 05/02/23    Patient appears  stable, no distress on initial exam.  On interview, he mentions that he has noticed a slow and steady weight gain over the past year.  He states that he used to be able to stay under 190 pounds, now his weight typically fluctuates from about 192 and 195.  It does stay within this range for the most part.  He also mentions that he notices abdomen, gets somewhat full and distended in the evenings after he has had dinner.  He does drink carbonated beverages during the day, as well as at night, which could be contributing.  He notices no subjective change in his breathing, he also has no swelling.  His appetite has remained the same and he has no nausea.  He denies any exertional or resting angina.  He admits he is not nearly as active as he was prior, and is working on increasing his walking regimen -currently walks about 2 miles a day, where previously he was walking about 4 to 5 miles a day.    He wanted to discuss this weight gain with me today because when he previously experienced acute weight fluctuations (and at that time it was associated with swelling) it was around the time where he had some cardiac issues and received stents.  __________________________________________________________________         Assessment and Impression:     Symptomatic PVCs  Coronary artery disease.  Hx PCI with CHIO to mLAD, 2012.    -Negative nuclear stress test 2013.  -11/3/2021 exercise stress echo: unremarkable, no ischemic ST-changes on rest/stress EKG.  -11/30/2021 heart monitor: Symptomatic, nonsustained SVT, no PVCs.  -On beta-blocker therapy  -Patient states PVCs are nearly resolved in the past year, asymptomatic.    Hypertension, controlled  Dyslipidemia, on atorvastatin  Osteoarthritis  Benign prostatic hypertrophy, on tamsulosin         Recommendations and Plan:     1. We discussed his symptoms of weight gain today, and I do not have strong concerns that his abdominal swelling is truly cardiac in nature.  However, based on  his prior history, I offered to repeat a stress echo to compare to his prior work-up in 2021.  As of today, he would like to hold off on this and continue to monitor symptoms, while incorporating some lifestyle changes.  2. Counseled on red flag signs and symptoms and when to seek emergent care.  3. Nitroglycerin refilled, sent to patient's primary pharmacy.  4. Follow up in October as scheduled with Dr. Marcus.  __________________________________________________________________    Thank you for the opportunity to participate in this pleasant patient's care.    We would be happy to see this patient sooner for any concerns in the meantime.    DAMIAN Aguirre, CNP   Nurse Practitioner  [facility]    Today's clinic visit entailed:  Review of the result(s) of each unique test - Cath, stress test, nuclear stress test, labs  Prescription drug management  The level of medical decision making during this visit was of moderate complexity.   Interpretation of test - EKG in clinic    Orders this Visit:  Orders Placed This Encounter   Procedures     EKG 12-lead complete w/read - Clinics (performed today)     Orders Placed This Encounter   Medications     nitroGLYcerin (NITROSTAT) 0.4 MG sublingual tablet     Sig: Place 1 tablet (0.4 mg) under the tongue every 5 minutes as needed for chest pain     Dispense:  25 tablet     Refill:  0     Medications Discontinued During This Encounter   Medication Reason     nitroGLYcerin (NITROSTAT) 0.4 MG sublingual tablet Reorder (No AVS / No eCancel)     Encounter Diagnoses   Name Primary?     ACS (acute coronary syndrome) (H)      PVC's (premature ventricular contractions)      Coronary artery disease involving native heart without angina pectoris, unspecified vessel or lesion type Yes     Hypertension goal BP (blood pressure) < 140/90      Hyperlipidemia LDL goal <100      Weight gain      CURRENT MEDICATIONS:  Current Outpatient Medications   Medication Sig  Dispense Refill     albuterol (PROAIR HFA/PROVENTIL HFA/VENTOLIN HFA) 108 (90 Base) MCG/ACT inhaler INHALE 2 PUFFS INTO THE LUNGS EVERY 6 HOURS 54 g 1     aspirin 81 MG tablet Take 1 tablet by mouth daily. 90 tablet 3     atorvastatin (LIPITOR) 80 MG tablet TAKE 1 TABLET(80 MG) BY MOUTH DAILY 90 tablet 3     fluticasone (FLONASE) 50 MCG/ACT nasal spray SPRAY 1-2 SPRAYS IN EACH NOSTRIL DAILY 48 g 2     metoprolol succinate ER (TOPROL XL) 50 MG 24 hr tablet TAKE 1 TABLET(50 MG) BY MOUTH TWICE DAILY 180 tablet 3     nitroGLYcerin (NITROSTAT) 0.4 MG sublingual tablet Place 1 tablet (0.4 mg) under the tongue every 5 minutes as needed for chest pain 25 tablet 0     omeprazole (PRILOSEC) 20 MG DR capsule TAKE 1 CAPSULE BY MOUTH EVERY DAY 90 capsule 1     SYMBICORT 160-4.5 MCG/ACT Inhaler INHALE 2 PUFFS INTO THE LUNGS TWICE DAILY 30.6 g 1     tamsulosin (FLOMAX) 0.4 MG capsule TAKE ONE CAPSULE BY MOUTH ONCE DAILY 90 capsule 3     tolterodine ER (DETROL LA) 4 MG 24 hr capsule Take 1 capsule (4 mg) by mouth daily 30 capsule 1     ALLERGIES     Allergies   Allergen Reactions     Amoxicillin-Pot Clavulanate Diarrhea and GI Disturbance     Codeine Nausea     significant     Penicillins      GI upset and diarrhea     PAST MEDICAL, SURGICAL, FAMILY, SOCIAL HISTORY:  History was reviewed and updated as needed, see medical record.    Review of Systems:  A 10-point Review Of Systems is otherwise normal except for that which is noted in the HPI and interval summary.    Physical Exam:    Vitals: /72 (BP Location: Right arm, Patient Position: Sitting, Cuff Size: Adult Regular)   Pulse 73   Wt 92.1 kg (203 lb 1.6 oz)   SpO2 94%   BMI 29.24 kg/m    Constitutional: Appears stated age, well nourished, NAD.  Eyes: Pupils equal, round. Sclerae anicteric.   HEENT: Normocephalic, atraumatic.   Neck: Supple. Carotid pulses full and equal. No carotid bruit.  No JVD appreciated.  Respiratory: Non-labored. Lungs CTAB.  Cardiovascular:  RRR, normal S1 and S2. No M/G/R.  No edema.  GI: Soft, non-distended, non-tender.  Skin: Warm and dry. No rashes, cyanosis, edema.  Musculoskeletal/Extremities: Symmetrical movement to all extremities.  Neurologic: No gross focal deficits. Alert, awake, and oriented to all spheres.  Psychiatric: Affect appropriate. Mentation normal.    Recent Lab Results:  LIPID RESULTS:  Lab Results   Component Value Date    CHOL 151 06/29/2022    CHOL 148 10/02/2020    HDL 46 06/29/2022    HDL 41 10/02/2020    LDL 76 06/29/2022    LDL 75 10/02/2020    TRIG 143 06/29/2022    TRIG 162 (H) 10/02/2020    CHOLHDLRATIO 4.6 11/16/2015     LIVER ENZYME RESULTS:  Lab Results   Component Value Date    AST 33 06/29/2022    AST 24 07/29/2020    ALT 68 06/29/2022    ALT 42 07/29/2020     CBC RESULTS:  Lab Results   Component Value Date    WBC 6.7 07/29/2021    WBC 10.7 12/06/2018    RBC 5.57 07/29/2021    RBC 5.59 12/06/2018    HGB 16.7 07/29/2021    HGB 17.0 04/05/2019    HCT 47.7 07/29/2021    HCT 47.4 12/06/2018    MCV 86 07/29/2021    MCV 85 12/06/2018    MCH 30.0 07/29/2021    MCH 29.0 12/06/2018    MCHC 35.0 07/29/2021    MCHC 34.2 12/06/2018    RDW 13.9 07/29/2021    RDW 13.5 12/06/2018     07/29/2021     12/06/2018     BMP RESULTS:  Lab Results   Component Value Date     06/29/2022     07/29/2020    POTASSIUM 4.4 06/29/2022    POTASSIUM 5.0 07/29/2020    CHLORIDE 108 06/29/2022    CHLORIDE 108 07/29/2020    CO2 30 06/29/2022    CO2 28 07/29/2020    ANIONGAP 3 06/29/2022    ANIONGAP 3 07/29/2020     (H) 06/29/2022     (H) 07/29/2020    BUN 16 06/29/2022    BUN 13 07/29/2020    CR 0.94 06/29/2022    CR 1.00 07/29/2020    GFRESTIMATED 89 06/29/2022    GFRESTIMATED 79 07/29/2020    GFRESTBLACK >90 07/29/2020    ALFREDA 8.8 06/29/2022    ALFREDA 9.1 07/29/2020      A1C RESULTS:  Lab Results   Component Value Date    A1C 5.8 (H) 06/29/2022    A1C 5.6 12/27/2012     INR RESULTS:  Lab Results   Component Value  Date    INR 0.97 11/14/2012

## 2023-04-28 RX ORDER — METOPROLOL SUCCINATE 50 MG/1
TABLET, EXTENDED RELEASE ORAL
Qty: 180 TABLET | Refills: 3 | Status: SHIPPED | OUTPATIENT
Start: 2023-04-28 | End: 2023-07-18

## 2023-04-28 NOTE — TELEPHONE ENCOUNTER
Prescription approved per [facility] Refill Protocol.  Zulma Carmona RN on 4/28/2023 at 7:49 AM

## 2023-05-02 ENCOUNTER — OFFICE VISIT (OUTPATIENT)
Dept: CARDIOLOGY | Facility: CLINIC | Age: 68
End: 2023-05-02
Payer: MEDICARE

## 2023-05-02 VITALS
BODY MASS INDEX: 29.24 KG/M2 | DIASTOLIC BLOOD PRESSURE: 72 MMHG | OXYGEN SATURATION: 94 % | WEIGHT: 203.1 LBS | SYSTOLIC BLOOD PRESSURE: 112 MMHG | HEART RATE: 73 BPM

## 2023-05-02 DIAGNOSIS — I49.3 PVC'S (PREMATURE VENTRICULAR CONTRACTIONS): ICD-10-CM

## 2023-05-02 DIAGNOSIS — I25.10 CORONARY ARTERY DISEASE INVOLVING NATIVE HEART WITHOUT ANGINA PECTORIS, UNSPECIFIED VESSEL OR LESION TYPE: Primary | ICD-10-CM

## 2023-05-02 DIAGNOSIS — E78.5 HYPERLIPIDEMIA LDL GOAL <100: ICD-10-CM

## 2023-05-02 DIAGNOSIS — I24.9 ACS (ACUTE CORONARY SYNDROME) (H): ICD-10-CM

## 2023-05-02 DIAGNOSIS — R63.5 WEIGHT GAIN: ICD-10-CM

## 2023-05-02 DIAGNOSIS — I10 HYPERTENSION GOAL BP (BLOOD PRESSURE) < 140/90: ICD-10-CM

## 2023-05-02 PROCEDURE — 93000 ELECTROCARDIOGRAM COMPLETE: CPT | Performed by: NURSE PRACTITIONER

## 2023-05-02 PROCEDURE — 99214 OFFICE O/P EST MOD 30 MIN: CPT | Mod: 25 | Performed by: NURSE PRACTITIONER

## 2023-05-02 RX ORDER — NITROGLYCERIN 0.4 MG/1
0.4 TABLET SUBLINGUAL EVERY 5 MIN PRN
Qty: 25 TABLET | Refills: 0 | Status: CANCELLED | OUTPATIENT
Start: 2023-05-02

## 2023-05-02 RX ORDER — NITROGLYCERIN 0.4 MG/1
0.4 TABLET SUBLINGUAL EVERY 5 MIN PRN
Qty: 25 TABLET | Refills: 0 | Status: SHIPPED | OUTPATIENT
Start: 2023-05-02 | End: 2024-06-27

## 2023-05-02 NOTE — PATIENT INSTRUCTIONS
Thank you for your visit with the [facility] today.    Follow up with Dr. Marcus in October.    If you have questions or concerns, please do not hesitate to call my nursing support team at [phone].    Scheduling phone number: [phone]  [facility] Clinic Number: [phone]    It was a pleasure seeing you today.     DAMIAN Aguirre, CNP  Nurse Practitioner  [facility] [place]  May 2, 2023  ________________________________________________________

## 2023-05-02 NOTE — LETTER
5/2/2023    Fabio Stovall MD, MD  [address] [facility] [address]    RE: Keegan Buchanan       Dear Colleague,     I had the pleasure of seeing Keegan Buchanan in the [facility].              ~Cardiology Clinic Visit~    Primary Cardiologist: Dr. Marcus  Reason for visit: Weight gain evaluation    History of Present Illness    Keegan Buchanan, a 65-year-old man with coronary artery disease with prior history of CHIO x1 to mid LAD, hypertension, dyslipidemia, and symptomatic premature ventricular contractions.    In summary, patient has been following with Dr. Millan for management of his cardiac concerns.  In report, patient stated he had more frequent symptomatic premature ventricular contractions.  There has been no syncope, sustained tachycardia, chest discomfort, dyspnea, orthopnea or PND.  The patient has been taking metoprolol, but still experiences episodes of symptomatic premature contractions that he finds quite bothersome.    For this, he was recommended a heart monitor, and stress echocardiogram to make sure there is no evidence of ischemia and to check his ejection fraction.  He was also referred to our EP colleagues.    -11/3/2021 exercise stress echo: Stable EF, no ischemic ST-changes on rest/stress EKG.  -11/30/2021 heart monitor: Symptomatic, nonsustained SVT, no PVCs.    When he saw Dr. Marcus, they discussed that the PVC are not considered serious, given that he has normal cardiac structure and function.  They also discussed additional options for PVC management, such as a trial of  sotalol or amiodarone, as ablation success rate could be indeterminate given the burden is less than 1% of the time.  No changes were made when he saw EP, he remained on Toprol-XL 50 mg twice a day.    Overall, he is remained quite well and his CAD has been stable.  For this, he is on aspirin and atorvastatin.  He has an excellent lipid profile from 6/9/2022, with , HDL 46, LDL 76, .   BMP around that time is also stable with normal electrolyte and renal function.    EKG performed in clinic today demonstrated sinus bradycardia, no ischemic changes.    Interval 05/02/23    Patient appears stable, no distress on initial exam.  On interview, he mentions that he has noticed a slow and steady weight gain over the past year.  He states that he used to be able to stay under 190 pounds, now his weight typically fluctuates from about 192 and 195.  It does stay within this range for the most part.  He also mentions that he notices abdomen, gets somewhat full and distended in the evenings after he has had dinner.  He does drink carbonated beverages during the day, as well as at night, which could be contributing.  He notices no subjective change in his breathing, he also has no swelling.  His appetite has remained the same and he has no nausea.  He denies any exertional or resting angina.  He admits he is not nearly as active as he was prior, and is working on increasing his walking regimen -currently walks about 2 miles a day, where previously he was walking about 4 to 5 miles a day.    He wanted to discuss this weight gain with me today because when he previously experienced acute weight fluctuations (and at that time it was associated with swelling) it was around the time where he had some cardiac issues and received stents.  __________________________________________________________________         Assessment and Impression:     Symptomatic PVCs  Coronary artery disease.  Hx PCI with CHIO to mLAD, 2012.    -Negative nuclear stress test 2013.  -11/3/2021 exercise stress echo: unremarkable, no ischemic ST-changes on rest/stress EKG.  -11/30/2021 heart monitor: Symptomatic, nonsustained SVT, no PVCs.  -On beta-blocker therapy  -Patient states PVCs are nearly resolved in the past year, asymptomatic.    Hypertension, controlled  Dyslipidemia, on atorvastatin  Osteoarthritis  Benign prostatic hypertrophy, on  tamsulosin         Recommendations and Plan:     We discussed his symptoms of weight gain today, and I do not have strong concerns that his abdominal swelling is truly cardiac in nature.  However, based on his prior history, I offered to repeat a stress echo to compare to his prior work-up in 2021.  As of today, he would like to hold off on this and continue to monitor symptoms, while incorporating some lifestyle changes.  Counseled on red flag signs and symptoms and when to seek emergent care.  Nitroglycerin refilled, sent to patient's primary pharmacy.  Follow up in October as scheduled with Dr. Marcus.  __________________________________________________________________    Thank you for the opportunity to participate in this pleasant patient's care.    We would be happy to see this patient sooner for any concerns in the meantime.    DAMIAN Aguirre, CNP   Nurse Practitioner  [facility]    Today's clinic visit entailed:  Review of the result(s) of each unique test - Cath, stress test, nuclear stress test, labs  Prescription drug management  The level of medical decision making during this visit was of moderate complexity.   Interpretation of test - EKG in clinic    Orders this Visit:  Orders Placed This Encounter   Procedures    EKG 12-lead complete w/read - Clinics (performed today)     Orders Placed This Encounter   Medications    nitroGLYcerin (NITROSTAT) 0.4 MG sublingual tablet     Sig: Place 1 tablet (0.4 mg) under the tongue every 5 minutes as needed for chest pain     Dispense:  25 tablet     Refill:  0     Medications Discontinued During This Encounter   Medication Reason    nitroGLYcerin (NITROSTAT) 0.4 MG sublingual tablet Reorder (No AVS / No eCancel)     Encounter Diagnoses   Name Primary?    ACS (acute coronary syndrome) (H)     PVC's (premature ventricular contractions)     Coronary artery disease involving native heart without angina pectoris, unspecified vessel or lesion  type Yes    Hypertension goal BP (blood pressure) < 140/90     Hyperlipidemia LDL goal <100     Weight gain      CURRENT MEDICATIONS:  Current Outpatient Medications   Medication Sig Dispense Refill    albuterol (PROAIR HFA/PROVENTIL HFA/VENTOLIN HFA) 108 (90 Base) MCG/ACT inhaler INHALE 2 PUFFS INTO THE LUNGS EVERY 6 HOURS 54 g 1    aspirin 81 MG tablet Take 1 tablet by mouth daily. 90 tablet 3    atorvastatin (LIPITOR) 80 MG tablet TAKE 1 TABLET(80 MG) BY MOUTH DAILY 90 tablet 3    fluticasone (FLONASE) 50 MCG/ACT nasal spray SPRAY 1-2 SPRAYS IN EACH NOSTRIL DAILY 48 g 2    metoprolol succinate ER (TOPROL XL) 50 MG 24 hr tablet TAKE 1 TABLET(50 MG) BY MOUTH TWICE DAILY 180 tablet 3    nitroGLYcerin (NITROSTAT) 0.4 MG sublingual tablet Place 1 tablet (0.4 mg) under the tongue every 5 minutes as needed for chest pain 25 tablet 0    omeprazole (PRILOSEC) 20 MG DR capsule TAKE 1 CAPSULE BY MOUTH EVERY DAY 90 capsule 1    SYMBICORT 160-4.5 MCG/ACT Inhaler INHALE 2 PUFFS INTO THE LUNGS TWICE DAILY 30.6 g 1    tamsulosin (FLOMAX) 0.4 MG capsule TAKE ONE CAPSULE BY MOUTH ONCE DAILY 90 capsule 3    tolterodine ER (DETROL LA) 4 MG 24 hr capsule Take 1 capsule (4 mg) by mouth daily 30 capsule 1     ALLERGIES     Allergies   Allergen Reactions    Amoxicillin-Pot Clavulanate Diarrhea and GI Disturbance    Codeine Nausea     significant    Penicillins      GI upset and diarrhea     PAST MEDICAL, SURGICAL, FAMILY, SOCIAL HISTORY:  History was reviewed and updated as needed, see medical record.    Review of Systems:  A 10-point Review Of Systems is otherwise normal except for that which is noted in the HPI and interval summary.    Physical Exam:    Vitals: /72 (BP Location: Right arm, Patient Position: Sitting, Cuff Size: Adult Regular)   Pulse 73   Wt 92.1 kg (203 lb 1.6 oz)   SpO2 94%   BMI 29.24 kg/m    Constitutional: Appears stated age, well nourished, NAD.  Eyes: Pupils equal, round. Sclerae anicteric.   HEENT:  Normocephalic, atraumatic.   Neck: Supple. Carotid pulses full and equal. No carotid bruit.  No JVD appreciated.  Respiratory: Non-labored. Lungs CTAB.  Cardiovascular: RRR, normal S1 and S2. No M/G/R.  No edema.  GI: Soft, non-distended, non-tender.  Skin: Warm and dry. No rashes, cyanosis, edema.  Musculoskeletal/Extremities: Symmetrical movement to all extremities.  Neurologic: No gross focal deficits. Alert, awake, and oriented to all spheres.  Psychiatric: Affect appropriate. Mentation normal.    Recent Lab Results:  LIPID RESULTS:  Lab Results   Component Value Date    CHOL 151 06/29/2022    CHOL 148 10/02/2020    HDL 46 06/29/2022    HDL 41 10/02/2020    LDL 76 06/29/2022    LDL 75 10/02/2020    TRIG 143 06/29/2022    TRIG 162 (H) 10/02/2020    CHOLHDLRATIO 4.6 11/16/2015     LIVER ENZYME RESULTS:  Lab Results   Component Value Date    AST 33 06/29/2022    AST 24 07/29/2020    ALT 68 06/29/2022    ALT 42 07/29/2020     CBC RESULTS:  Lab Results   Component Value Date    WBC 6.7 07/29/2021    WBC 10.7 12/06/2018    RBC 5.57 07/29/2021    RBC 5.59 12/06/2018    HGB 16.7 07/29/2021    HGB 17.0 04/05/2019    HCT 47.7 07/29/2021    HCT 47.4 12/06/2018    MCV 86 07/29/2021    MCV 85 12/06/2018    MCH 30.0 07/29/2021    MCH 29.0 12/06/2018    MCHC 35.0 07/29/2021    MCHC 34.2 12/06/2018    RDW 13.9 07/29/2021    RDW 13.5 12/06/2018     07/29/2021     12/06/2018     BMP RESULTS:  Lab Results   Component Value Date     06/29/2022     07/29/2020    POTASSIUM 4.4 06/29/2022    POTASSIUM 5.0 07/29/2020    CHLORIDE 108 06/29/2022    CHLORIDE 108 07/29/2020    CO2 30 06/29/2022    CO2 28 07/29/2020    ANIONGAP 3 06/29/2022    ANIONGAP 3 07/29/2020     (H) 06/29/2022     (H) 07/29/2020    BUN 16 06/29/2022    BUN 13 07/29/2020    CR 0.94 06/29/2022    CR 1.00 07/29/2020    GFRESTIMATED 89 06/29/2022    GFRESTIMATED 79 07/29/2020    GFRESTBLACK >90 07/29/2020    ALFREDA 8.8 06/29/2022     ALFREDA 9.1 07/29/2020      A1C RESULTS:  Lab Results   Component Value Date    A1C 5.8 (H) 06/29/2022    A1C 5.6 12/27/2012     INR RESULTS:  Lab Results   Component Value Date    INR 0.97 11/14/2012       Thank you for allowing me to participate in the care of your patient.      Sincerely,     DAMIAN Aguirre [facility]  cc:   No referring provider defined for this encounter.

## 2023-05-08 ENCOUNTER — ALLIED HEALTH/NURSE VISIT (OUTPATIENT)
Dept: FAMILY MEDICINE | Facility: OTHER | Age: 68
End: 2023-05-08
Payer: MEDICARE

## 2023-05-08 VITALS — DIASTOLIC BLOOD PRESSURE: 81 MMHG | OXYGEN SATURATION: 96 % | SYSTOLIC BLOOD PRESSURE: 148 MMHG | TEMPERATURE: 84 F

## 2023-05-08 DIAGNOSIS — Z53.9 DIAGNOSIS FOR ++++ WALK IN CLINIC ++++: Primary | ICD-10-CM

## 2023-05-08 NOTE — PROGRESS NOTES
Patient presents to clinic for a oximeter and BP check. He was recently at [facility] and purchased a home oximeter. His reading was 92% so he was concerned with the reading. He has a history of COPD. His normal readings are around 96-98. Advised the patient to allow for the oximeter to sit on his finger for a minute or two to get the most accurate reading.     Patient is not having any difficult breathing, chest pain, dizziness, or feeling faint.     BP Readings from Last 6 Encounters:   05/08/23 (!) 148/81   05/02/23 112/72   04/05/23 126/84   11/17/22 132/86   11/17/22 132/86   07/21/22 112/68     MONE CasillasN, RN, PHN  [place]  May 8, 2023

## 2023-05-29 ENCOUNTER — MYC MEDICAL ADVICE (OUTPATIENT)
Dept: UROLOGY | Facility: CLINIC | Age: 68
End: 2023-05-29
Payer: MEDICARE

## 2023-05-29 DIAGNOSIS — R35.0 BENIGN PROSTATIC HYPERPLASIA WITH URINARY FREQUENCY: ICD-10-CM

## 2023-05-29 DIAGNOSIS — N40.1 BENIGN PROSTATIC HYPERPLASIA WITH URINARY FREQUENCY: ICD-10-CM

## 2023-05-30 RX ORDER — TOLTERODINE 4 MG/1
4 CAPSULE, EXTENDED RELEASE ORAL DAILY
Qty: 90 CAPSULE | Refills: 3 | Status: SHIPPED | OUTPATIENT
Start: 2023-05-30 | End: 2024-04-25

## 2023-06-02 ENCOUNTER — MYC MEDICAL ADVICE (OUTPATIENT)
Dept: FAMILY MEDICINE | Facility: OTHER | Age: 68
End: 2023-06-02
Payer: MEDICARE

## 2023-06-05 NOTE — TELEPHONE ENCOUNTER
Called patient and scheduled lab appt on 6/19 at 0745 per patient request. Patient will come back to clinic after work for appointment with FRED.

## 2023-06-06 ENCOUNTER — DOCUMENTATION ONLY (OUTPATIENT)
Dept: FAMILY MEDICINE | Facility: OTHER | Age: 68
End: 2023-06-06
Payer: MEDICARE

## 2023-06-06 DIAGNOSIS — R73.03 PREDIABETES: Primary | ICD-10-CM

## 2023-06-06 DIAGNOSIS — I10 HYPERTENSION GOAL BP (BLOOD PRESSURE) < 140/90: ICD-10-CM

## 2023-06-06 DIAGNOSIS — E78.5 HYPERLIPIDEMIA LDL GOAL <100: ICD-10-CM

## 2023-06-06 DIAGNOSIS — I25.10 CORONARY ARTERY DISEASE INVOLVING NATIVE HEART WITHOUT ANGINA PECTORIS, UNSPECIFIED VESSEL OR LESION TYPE: ICD-10-CM

## 2023-06-06 DIAGNOSIS — Z12.5 SCREENING FOR PROSTATE CANCER: ICD-10-CM

## 2023-06-06 NOTE — PROGRESS NOTES
Keegan has a lab appointment on 6.19.23 in the am before his appointment with you that afternoon. Please place any orders needed at this time.    Thanks,    Lc Hammond, [facility]

## 2023-06-07 ENCOUNTER — MYC MEDICAL ADVICE (OUTPATIENT)
Dept: FAMILY MEDICINE | Facility: OTHER | Age: 68
End: 2023-06-07
Payer: MEDICARE

## 2023-06-07 DIAGNOSIS — J44.9 MODERATE CHRONIC OBSTRUCTIVE PULMONARY DISEASE (H): ICD-10-CM

## 2023-06-07 NOTE — TELEPHONE ENCOUNTER
See refill encounter.  Dorian Toscano, MONEN, RN, PHN  [facility] ~ Registered Nurse  Clinic Triage ~ [place] [facility]  June 7, 2023

## 2023-06-07 NOTE — TELEPHONE ENCOUNTER
"Pending Prescriptions:                       Disp   Refills    SYMBICORT 160-4.5 MCG/ACT Inhaler [Pharmac*30.6 g 1        Sig: INHALE 2 PUFFS INTO THE LUNGS TWICE DAILY    Routing refill request to provider for review/approval because:    PHQ-9 score:        11/17/2022    10:02 AM   PHQ   PHQ-9 Total Score 0   Q9: Thoughts of better off dead/self-harm past 2 weeks Not at all       Requested Prescriptions   Pending Prescriptions Disp Refills    SYMBICORT 160-4.5 MCG/ACT Inhaler [Pharmacy Med Name: SYMBICORT 160/4.5MCG (120 ORAL INH)] 30.6 g 1     Sig: INHALE 2 PUFFS INTO THE LUNGS TWICE DAILY       Long-Acting Beta Agonist Inhalers Protocol  Failed - 6/7/2023 10:33 AM        Failed - Order for Serevent, Striverdi, or Foradil and pt has steroid inhaler        Passed - Patient is age 12 or older        Passed - Recent (12 mo) or future (30 days) visit within the authorizing provider's specialty     Patient has had an office visit with the authorizing provider or a provider within the authorizing providers department within the previous 12 mos or has a future within next 30 days. See \"Patient Info\" tab in inbasket, or \"Choose Columns\" in Meds & Orders section of the refill encounter.              Passed - Medication is active on med list       Inhaled Steroids Protocol Passed - 6/7/2023 10:33 AM        Passed - Patient is age 12 or older        Passed - Recent (12 mo) or future (30 days) visit within the authorizing provider's specialty     Patient has had an office visit with the authorizing provider or a provider within the authorizing providers department within the previous 12 mos or has a future within next 30 days. See \"Patient Info\" tab in inbasket, or \"Choose Columns\" in Meds & Orders section of the refill encounter.              Passed - Medication is active on med list                Honey Powell RN on 6/7/2023 at 11:40 AM    "

## 2023-06-08 RX ORDER — BUDESONIDE AND FORMOTEROL FUMARATE DIHYDRATE 160; 4.5 UG/1; UG/1
AEROSOL RESPIRATORY (INHALATION)
Qty: 30.6 G | Refills: 1 | Status: SHIPPED | OUTPATIENT
Start: 2023-06-08 | End: 2023-12-04

## 2023-06-19 ENCOUNTER — LAB (OUTPATIENT)
Dept: LAB | Facility: OTHER | Age: 68
End: 2023-06-19
Payer: MEDICARE

## 2023-06-19 ENCOUNTER — OFFICE VISIT (OUTPATIENT)
Dept: FAMILY MEDICINE | Facility: OTHER | Age: 68
End: 2023-06-19
Payer: MEDICARE

## 2023-06-19 VITALS
HEART RATE: 86 BPM | OXYGEN SATURATION: 95 % | WEIGHT: 195 LBS | SYSTOLIC BLOOD PRESSURE: 138 MMHG | DIASTOLIC BLOOD PRESSURE: 70 MMHG | BODY MASS INDEX: 27.92 KG/M2 | TEMPERATURE: 96.7 F | HEIGHT: 70 IN | RESPIRATION RATE: 16 BRPM

## 2023-06-19 DIAGNOSIS — I10 HYPERTENSION GOAL BP (BLOOD PRESSURE) < 140/90: ICD-10-CM

## 2023-06-19 DIAGNOSIS — J30.89 SEASONAL ALLERGIC RHINITIS DUE TO OTHER ALLERGIC TRIGGER: ICD-10-CM

## 2023-06-19 DIAGNOSIS — R73.03 PREDIABETES: ICD-10-CM

## 2023-06-19 DIAGNOSIS — Z12.11 SPECIAL SCREENING FOR MALIGNANT NEOPLASMS, COLON: ICD-10-CM

## 2023-06-19 DIAGNOSIS — Z12.5 SCREENING FOR PROSTATE CANCER: ICD-10-CM

## 2023-06-19 DIAGNOSIS — H04.123 DRY MOUTH AND EYES: ICD-10-CM

## 2023-06-19 DIAGNOSIS — Z00.00 ENCOUNTER FOR MEDICARE ANNUAL WELLNESS EXAM: Primary | ICD-10-CM

## 2023-06-19 DIAGNOSIS — R31.29 MICROSCOPIC HEMATURIA: ICD-10-CM

## 2023-06-19 DIAGNOSIS — N39.41 URGENCY INCONTINENCE: ICD-10-CM

## 2023-06-19 DIAGNOSIS — G44.209 TENSION HEADACHE: ICD-10-CM

## 2023-06-19 DIAGNOSIS — R68.2 DRY MOUTH AND EYES: ICD-10-CM

## 2023-06-19 DIAGNOSIS — Z86.0100 HISTORY OF COLONIC POLYPS: ICD-10-CM

## 2023-06-19 DIAGNOSIS — I25.10 CORONARY ARTERY DISEASE INVOLVING NATIVE HEART WITHOUT ANGINA PECTORIS, UNSPECIFIED VESSEL OR LESION TYPE: ICD-10-CM

## 2023-06-19 DIAGNOSIS — J44.9 MODERATE CHRONIC OBSTRUCTIVE PULMONARY DISEASE (H): ICD-10-CM

## 2023-06-19 DIAGNOSIS — M18.12 ARTHRITIS OF CARPOMETACARPAL (CMC) JOINT OF LEFT THUMB: ICD-10-CM

## 2023-06-19 DIAGNOSIS — M62.838 TRAPEZIUS MUSCLE SPASM: ICD-10-CM

## 2023-06-19 DIAGNOSIS — E78.5 HYPERLIPIDEMIA LDL GOAL <100: ICD-10-CM

## 2023-06-19 LAB
ALBUMIN SERPL BCG-MCNC: 4.4 G/DL (ref 3.5–5.2)
ALBUMIN UR-MCNC: NEGATIVE MG/DL
ALP SERPL-CCNC: 89 U/L (ref 40–129)
ALT SERPL W P-5'-P-CCNC: 35 U/L (ref 0–70)
ANION GAP SERPL CALCULATED.3IONS-SCNC: 12 MMOL/L (ref 7–15)
APPEARANCE UR: CLEAR
AST SERPL W P-5'-P-CCNC: 26 U/L (ref 0–45)
BILIRUB SERPL-MCNC: 0.7 MG/DL
BILIRUB UR QL STRIP: NEGATIVE
BUN SERPL-MCNC: 18.8 MG/DL (ref 8–23)
CALCIUM SERPL-MCNC: 9.4 MG/DL (ref 8.8–10.2)
CHLORIDE SERPL-SCNC: 104 MMOL/L (ref 98–107)
CHOLEST SERPL-MCNC: 184 MG/DL
CK SERPL-CCNC: 57 U/L (ref 39–308)
COLOR UR AUTO: YELLOW
CREAT SERPL-MCNC: 0.93 MG/DL (ref 0.67–1.17)
DEPRECATED HCO3 PLAS-SCNC: 24 MMOL/L (ref 22–29)
GFR SERPL CREATININE-BSD FRML MDRD: 90 ML/MIN/1.73M2
GLUCOSE SERPL-MCNC: 115 MG/DL (ref 70–99)
GLUCOSE UR STRIP-MCNC: NEGATIVE MG/DL
HBA1C MFR BLD: 5.8 % (ref 0–5.6)
HDLC SERPL-MCNC: 39 MG/DL
HGB UR QL STRIP: ABNORMAL
KETONES UR STRIP-MCNC: NEGATIVE MG/DL
LDLC SERPL CALC-MCNC: 101 MG/DL
LEUKOCYTE ESTERASE UR QL STRIP: NEGATIVE
NITRATE UR QL: NEGATIVE
NONHDLC SERPL-MCNC: 145 MG/DL
PH UR STRIP: 5 [PH] (ref 5–7)
POTASSIUM SERPL-SCNC: 4.2 MMOL/L (ref 3.4–5.3)
PROT SERPL-MCNC: 6.9 G/DL (ref 6.4–8.3)
PSA SERPL DL<=0.01 NG/ML-MCNC: 0.5 NG/ML (ref 0–4.5)
RBC #/AREA URNS AUTO: ABNORMAL /HPF
SODIUM SERPL-SCNC: 140 MMOL/L (ref 136–145)
SP GR UR STRIP: 1.01 (ref 1–1.03)
SQUAMOUS #/AREA URNS AUTO: ABNORMAL /LPF
TRIGL SERPL-MCNC: 219 MG/DL
UROBILINOGEN UR STRIP-ACNC: 0.2 E.U./DL
WBC #/AREA URNS AUTO: ABNORMAL /HPF

## 2023-06-19 PROCEDURE — 36415 COLL VENOUS BLD VENIPUNCTURE: CPT

## 2023-06-19 PROCEDURE — 80061 LIPID PANEL: CPT

## 2023-06-19 PROCEDURE — 82550 ASSAY OF CK (CPK): CPT

## 2023-06-19 PROCEDURE — G0439 PPPS, SUBSEQ VISIT: HCPCS | Performed by: FAMILY MEDICINE

## 2023-06-19 PROCEDURE — 99214 OFFICE O/P EST MOD 30 MIN: CPT | Mod: 25 | Performed by: FAMILY MEDICINE

## 2023-06-19 PROCEDURE — 80053 COMPREHEN METABOLIC PANEL: CPT

## 2023-06-19 PROCEDURE — 83036 HEMOGLOBIN GLYCOSYLATED A1C: CPT

## 2023-06-19 PROCEDURE — G0103 PSA SCREENING: HCPCS

## 2023-06-19 PROCEDURE — 81001 URINALYSIS AUTO W/SCOPE: CPT | Performed by: FAMILY MEDICINE

## 2023-06-19 RX ORDER — ATORVASTATIN CALCIUM 80 MG/1
TABLET, FILM COATED ORAL
Qty: 90 TABLET | Refills: 3 | Status: SHIPPED | OUTPATIENT
Start: 2023-06-19 | End: 2024-07-29

## 2023-06-19 RX ORDER — ALBUTEROL SULFATE 90 UG/1
2 AEROSOL, METERED RESPIRATORY (INHALATION) EVERY 6 HOURS
Qty: 54 G | Refills: 1 | Status: SHIPPED | OUTPATIENT
Start: 2023-06-19 | End: 2023-08-21

## 2023-06-19 RX ORDER — SOLIFENACIN SUCCINATE 5 MG/1
5 TABLET, FILM COATED ORAL DAILY
Qty: 30 TABLET | Refills: 1 | Status: SHIPPED | OUTPATIENT
Start: 2023-06-19 | End: 2023-09-29

## 2023-06-19 RX ORDER — FLUTICASONE PROPIONATE 50 MCG
SPRAY, SUSPENSION (ML) NASAL
Qty: 48 G | Refills: 2 | Status: SHIPPED | OUTPATIENT
Start: 2023-06-19 | End: 2023-09-29

## 2023-06-19 ASSESSMENT — ENCOUNTER SYMPTOMS
EYE PAIN: 0
FEVER: 0
PALPITATIONS: 0
JOINT SWELLING: 0
COUGH: 1
FREQUENCY: 1
PARESTHESIAS: 0
MYALGIAS: 0
WEAKNESS: 0
ABDOMINAL PAIN: 0
CONSTIPATION: 0
SHORTNESS OF BREATH: 1
HEMATURIA: 0
DIARRHEA: 0
NAUSEA: 0
HEARTBURN: 0
HEADACHES: 1
DIZZINESS: 1
HEMATOCHEZIA: 0
DYSURIA: 0
NERVOUS/ANXIOUS: 0
ARTHRALGIAS: 1
SORE THROAT: 0

## 2023-06-19 ASSESSMENT — PATIENT HEALTH QUESTIONNAIRE - PHQ9
SUM OF ALL RESPONSES TO PHQ QUESTIONS 1-9: 0
SUM OF ALL RESPONSES TO PHQ QUESTIONS 1-9: 0
10. IF YOU CHECKED OFF ANY PROBLEMS, HOW DIFFICULT HAVE THESE PROBLEMS MADE IT FOR YOU TO DO YOUR WORK, TAKE CARE OF THINGS AT HOME, OR GET ALONG WITH OTHER PEOPLE: NOT DIFFICULT AT ALL

## 2023-06-19 ASSESSMENT — ACTIVITIES OF DAILY LIVING (ADL): CURRENT_FUNCTION: NO ASSISTANCE NEEDED

## 2023-06-19 ASSESSMENT — PAIN SCALES - GENERAL: PAINLEVEL: NO PAIN (0)

## 2023-06-19 NOTE — RESULT ENCOUNTER NOTE
Keegan,    You continue to have a very trace amount of blood in your urine.  This is probably your normal.  As long as you do not have visible blood, I do not think further interventions are required at this time.    Have a nice day!    Dr. Stovall

## 2023-06-19 NOTE — PATIENT INSTRUCTIONS
Try solfenacin instead of tolterodine to see if that will reduce your dry mouth.  If not improving, we can consider other workup.  Can also try artifical saliva (biotene) and artificial tears otc to help with your symptoms.      Can try glucosamine (1500 mg/day) for any arthritis component of the pain.  Should take for four weeks before deciding it's not helping.    Could also consider a short course of ibuprofen 600 mg three times/day for your hand.  Take for one week, then as needed.      See PT for your neck/headaches.  We can consider trigger point injections or referral if not improving.      Get your colonoscopy before  you leave town.      Contact us or return if questions or concerns.     Have a nice day!    Dr. Stovall       Patient Education   Personalized Prevention Plan  You are due for the preventive services outlined below.  Your care team is available to assist you in scheduling these services.  If you have already completed any of these items, please share that information with your care team to update in your medical record.  Health Maintenance Due   Topic Date Due    COVID-19 Vaccine (5 - Moderna series) 03/17/2023    ANNUAL REVIEW OF HM ORDERS  05/26/2023    Annual Wellness Visit  05/26/2023       Exercise for a Healthier Heart  You may wonder how you can improve the health of your heart. If you re thinking about exercise, you re on the right track. You don t need to become an athlete. But you do need a certain amount of brisk exercise to help strengthen your heart. If you have been diagnosed with a heart condition, your healthcare provider may advise exercise to help your condition. To help make exercise a habit, choose safe, fun activities.      Exercise with a friend. When activity is fun, you're more likely to stick with it.     Before you start  Check with your healthcare provider before starting an exercise program. This is especially important if you haven't been active for a while. It's also  important if you have a long-term (chronic) health problem such as heart disease, diabetes, or obesity. Also check with your provider if you're at high risk for having these problems.   Why exercise?  Exercising regularly offers many healthy rewards. It can help you do all of these:   Improve your blood cholesterol level to help prevent further heart trouble.  Lower your blood pressure to help prevent a stroke or heart attack.  Control diabetes or reduce your risk of getting this disease.  Improve your heart and lung function.  Reach and stay at a healthy weight.  Make your muscles stronger so you can stay active.  Prevent falls and fractures by slowing the loss of bone mass (osteoporosis).  Manage stress better.  Improve your sense of self and your body image.  Exercise tips    Ease into your routine. Set small goals. Then build on them. Talk with your healthcare provider first before starting an exercise routine if you're not sure what your activity level should be.  Exercise on most days. Aim for a total of at least 150 minutes (2 hours and 30 minutes) or more of moderate-intensity aerobic activity each week. You could also do 75 minutes (1 hour and 15 minutes) or more of vigorous-intensity aerobic activity each week. Or try for a combination of both. Moderate activity means that you breathe heavier and your heart rate increases, but you can still talk. Think about doing at least 30 minutes of moderate exercise, 5 times a week. It's OK to work up to the 30-minute period over time. Examples of moderate-intensity activity are brisk walking, gardening, and water aerobics.  Step up your daily activity level.  Along with your exercise program, try being more active the whole day. Walk instead of drive. Or park further away so that you take more steps each day. Do more household tasks or yard work. You may not be able to meet the advised amount of physical activity. But doing some moderate- or vigorous-intensity  aerobic activity can help reduce your risk for heart disease. Your healthcare provider can help you figure out what is best for you.  Choose 1 or more activities you enjoy.  Walking is one of the easiest things you can do. You can also try swimming, riding a bike, dancing, or taking an exercise class.    Call 911  Call 911 right away if any of these occur:   Chest pain that doesn't go away quickly with rest  New burning, tightness, pressure, or heaviness in your chest, neck, shoulders, back, or arms  Abnormal or severe shortness of breath  A very fast or irregular heartbeat (palpitations)  Fainting  When to call your healthcare provider  Call your healthcare provider if you have any of these:   Dizziness or lightheadedness  Mild shortness of breath or chest pain  Increased or new joint or muscle pain    Jonna last reviewed this educational content on 7/1/2022 2000-2022 [employer]. All rights reserved. This information is not intended as a substitute for professional medical care. Always follow your healthcare professional's instructions.          Understanding USDA MyPlate  The USDA has guidelines to help you make healthy food choices. These are called MyPlate. MyPlate shows the food groups that make up healthy meals using the image of a place setting. Before you eat, think about the healthiest choices for what to put on your plate or in your cup or bowl. To learn more about building a healthy plate, visit www.choosemyplate.gov.     The food groups  Fruits. Any fruit or 100% fruit juice counts as part of the Fruit Group. Fruits may be fresh, canned, frozen, or dried, and may be whole, cut-up, or pureed. Make 1/2 of your plate fruits and vegetables.  Vegetables. Any vegetable or 100% vegetable juice counts as a member of the Vegetable Group. Vegetables may be fresh, frozen, canned, or dried. They can be served raw or cooked and may be whole, cut-up, or mashed. Make 1/2 of your plate fruits and  vegetables.  Grains. All foods made from grains are part of the Grains Group. These include wheat, rice, oats, cornmeal, and barley. Grains are often used to make foods such as bread, pasta, oatmeal, cereal, tortillas, and grits. Grains should be no more than 1/4 of your plate. At least half of your grains should be whole grains.  Protein. This group includes meat, poultry, seafood, beans and peas, eggs, processed soy products (such as tofu), nuts (including nut butters), and seeds. Make protein choices no more than 1/4 of your plate. Meat and poultry choices should be lean or low fat.  Dairy. The Dairy Group includes all fluid milk products and foods made from milk that contain calcium, such as yogurt and cheese. (Foods that have little calcium, such as cream, butter, and cream cheese, are not part of this group.) Most dairy choices should be low-fat or fat-free.  Oils. Oils aren't a food group, but they do contain essential nutrients. However it's important to watch your intake of oils. These are fats that are liquid at room temperature. They include canola, corn, olive, soybean, vegetable, and sunflower oil. Foods that are mainly oil include mayonnaise, certain salad dressings, and soft margarines. You likely already get your daily oil allowance from the foods you eat.  Things to limit  Eating healthy also means limiting these things in your diet:  Salt (sodium). Many processed foods have a lot of sodium. To keep sodium intake down, eat fresh vegetables, meats, poultry, and seafood when possible. Purchase low-sodium, reduced-sodium, or no-salt-added food products at the store. And don't add salt to your meals at home. Instead, season them with herbs and spices such as dill, oregano, cumin, and paprika. Or try adding flavor with lemon or lime zest and juice.  Saturated fat. Saturated fats are most often found in animal products such as beef, pork, and chicken. They are often solid at room temperature, such as  butter. To reduce your saturated fat intake, choose leaner cuts of meat and poultry. And try healthier cooking methods such as grilling, broiling, roasting, or baking. For a simple lower-fat swap, use plain nonfat yogurt instead of mayonnaise when making potato salad or macaroni salad.  Added sugars. These are sugars added to foods. They are in foods such as ice cream, candy, soda, fruit drinks, sports drinks, energy drinks, cookies, pastries, jams, and syrups. Cut down on added sugars by sharing sweet treats with a family member or friend. You can also choose fruit for dessert, and drink water or other unsweetened beverages.  [employer] last reviewed this educational content on 6/1/2020 2000-2022 [employer]. All rights reserved. This information is not intended as a substitute for professional medical care. Always follow your healthcare professional's instructions.          Signs of Hearing Loss  Hearing loss is a problem shared by many people. In fact, it's one of the most common health problems, particularly as people age. Most people aged 65 and older have some hearing loss. By age 80, almost everyone does. Hearing loss often occurs slowly over the years. So, you may not realize your hearing has gotten worse.   When sudden hearing loss occurs, it's important to contact your healthcare provider right away. Your provider will do a medical exam and a hearing exam as soon as possible. This is to help find the cause and type of your sudden hearing loss. Based on your diagnosis, your healthcare provider will discuss possible treatments.      Hearing much better with one ear can be a sign of hearing loss.     Have your hearing checked  Call your healthcare provider if you:   Have to strain to hear normal conversation  Have to watch other people s faces very carefully to follow what they re saying  Need to ask people to repeat what they ve said  Often misunderstand what people are saying  Turn the volume  of the television or radio up so high that others complain  Feel that people are mumbling when they re talking to you  Find that the effort to hear leaves you feeling tired and irritated  Notice, when using the phone, that you hear better with one ear than the other  Jonna last reviewed this educational content on 6/1/2022 2000-2022 [employer]. All rights reserved. This information is not intended as a substitute for professional medical care. Always follow your healthcare professional's instructions.

## 2023-06-26 ENCOUNTER — TELEPHONE (OUTPATIENT)
Dept: GASTROENTEROLOGY | Facility: CLINIC | Age: 68
End: 2023-06-26
Payer: MEDICARE

## 2023-06-26 NOTE — TELEPHONE ENCOUNTER
"Endoscopy Scheduling Screen    Have you had a positive Covid test in the last 14 days?  No    Are you active on MyChart?   Yes    What insurance is in the chart?  BC/BS: Schedule in [facility] unless patient meets exclusion criteria.  medicare    Ordering/Referring Provider: Fabio Stovall   (If ordering provider performs procedure, schedule with ordering provider unless otherwise instructed. )    BMI: Estimated body mass index is 28.23 kg/m  as calculated from the following:    Height as of 6/19/23: 1.77 m (5' 9.69\").    Weight as of 6/19/23: 88.5 kg (195 lb).     Sedation Ordered  moderate sedation.   If patient BMI > 50 do not schedule in [facility].    Are you taking any prescription medications for pain?   No    Are you taking methadone or Suboxone?  Yes   Patient must be scheduled with MAC sedation.    Please send In Basket alert to [facility] with MRN.      Do you have a history of malignant hyperthermia or adverse reaction to anesthesia?  No    (Females) Are you currently pregnant?   No     Have you been diagnosed or told you have pulmonary hypertension?   No    Do you have an LVAD?  No    Have you been told you have moderate to severe sleep apnea?  No    Have you been told you have COPD, asthma, or any other lung disease?  Yes     What breathing problems do you have?  COPD     Do you use home oxygen?  No    Have your breathing problems required an ED visit or hospitalization in the last year?  No    Do you have any heart conditions?  Yes     In the past 6 months, have you had any hospitalizations for heart related issues including cardiomyopathy, heart attack, or stent placement?  No    Do you have any implantable devices in your body (pacemaker, ICD)?  No    Do you take nitroglycerine?  No    Have you ever had or are you awaiting a heart or lung transplant?   No    Have you had a stroke or transient ischemic attack (TIA aka \"mini stroke\" in the last 6 months?   No    Have you been diagnosed with or been told you have " "cirrhosis of the liver?   No    Are you currently on dialysis?   No    Do you need assistance transferring?   No    BMI: Estimated body mass index is 28.23 kg/m  as calculated from the following:    Height as of 6/19/23: 1.77 m (5' 9.69\").    Weight as of 6/19/23: 88.5 kg (195 lb).     Is patients BMI > 40 and scheduling location [facility]?  No    Do you take the medication Phentermine, Ozempic or Wegovy?  No    Do you take the medication Naltrexone?  No    Do you take blood thinners?  No      Prep   Are you currently on dialysis or do you have chronic kidney disease?  No    Do you have a diagnosis of diabetes?  No    Do you have a diagnosis of cystic fibrosis (CF)?  No    On a regular basis do you go 3 -5 days between bowel movements?  No    BMI > 40?  No    Preferred Pharmacy:    [facility] - [address]   [address] [place] [address]  Phone: [phone] Fax: [phone]    [id number] - [place], [place] - [address] [facility]  [address] [facility] [address]  Phone: [phone] Fax: [phone]    [facility] - [place], [place] - [address]  [address]  [facility] [address]  Phone: [phone] Fax: [phone]    [id number] - [place], [place] - [address] AT [facility]  [address]  [facility] [address]  Phone: [phone] Fax: [phone]    [id number] - [place], [place] - [address] [facility] [address]  [address] [facility] [address]  Phone: [phone] Fax: [phone]      Final Scheduling Details   Colonoscopy prep sent?  MiraLAX (No Mag Citrate)    Procedure scheduled  Colonoscopy    Surgeon:  vanessa     Date of procedure:  10/16     Schedule PAC:   No    Location  [facility]    Sedation   MAC/Deep Sedation    Patient Reminders:    You will receive a call from a Nurse to review instructions and health history.  This assessment must be completed prior to your procedure.  " Failure to complete the Nurse assessment may result in the procedure being cancelled.       On the day of your procedure, please designate an adult(s) who can drive you home stay with you for the next 24 hours. The medicines used in the exam will make you sleepy. You will not be able to drive.       You cannot take public transportation, ride share services, or non-medical taxi service without a responsible caregiver.  Medical transport services are allowed with the requirement that a responsible caregiver will receive you at your destination.  We require that drivers and caregivers are confirmed prior to your procedure.

## 2023-06-27 ENCOUNTER — THERAPY VISIT (OUTPATIENT)
Dept: PHYSICAL THERAPY | Facility: CLINIC | Age: 68
End: 2023-06-27
Payer: MEDICARE

## 2023-06-27 DIAGNOSIS — M54.2 NECK PAIN: Primary | ICD-10-CM

## 2023-06-27 DIAGNOSIS — G44.209 TENSION HEADACHE: ICD-10-CM

## 2023-06-27 DIAGNOSIS — M62.838 TRAPEZIUS MUSCLE SPASM: ICD-10-CM

## 2023-06-27 PROCEDURE — 97161 PT EVAL LOW COMPLEX 20 MIN: CPT | Mod: GP | Performed by: PHYSICAL THERAPIST

## 2023-06-27 PROCEDURE — 97110 THERAPEUTIC EXERCISES: CPT | Mod: GP | Performed by: PHYSICAL THERAPIST

## 2023-06-27 NOTE — PROGRESS NOTES
PHYSICAL THERAPY EVALUATION  Type of Visit: Evaluation    See electronic medical record for Abuse and Falls Screening details.    Subjective      Presenting condition or subjective complaint: Neck pain and headaches. Pain has been chronic but was well controlled for a long period after PT exercises but has worsened again a few months ago  Date of onset: 03/27/23    Relevant medical history: Arthritis; Bladder or bowel problems; COPD; Hearing problems; Vision problems; High blood pressure   Dates & types of surgery: 2021 stents in heart, hernia    Prior diagnostic imaging/testing results: MRI; X-ray     Prior therapy history for the same diagnosis, illness or injury: Yes 2022, PT for neck and headaches (same issue)    Prior Level of Function   Transfers:   Ambulation:   ADL:   IADL:     Employment: No (retired) Retired  Hobbies/Interests: Horseback riding    Patient goals for therapy: not have headaches and neck pain    Pain assessment: See objective evaluation for additional pain details     Objective   CERVICAL SPINE EVALUATION   PAIN:   Not usually activity related    Pain Level at Best: 4/10  Pain Level with Worst: 8/10   Pain Location: posterior neck, primarily left sided (broad area of pain), suboccipital area most painful  Pain Quality: Aching, Dull and Throbbing (headache)  Pain Frequency: intermittent  Pain is Worst: daytime (usually worse later in the day)  Pain is Exacerbated By: looking down, turning head, prolonged activity  Pain is Relieved By: not much these days  Pain Progression: Worsened  INTEGUMENTARY (edema, incisions):   POSTURE:   GAIT:   Weightbearing Status:   Assistive Device(s):   Gait Deviations:   BALANCE/PROPRIOCEPTION:   WEIGHTBEARING ALIGNMENT:   ROM:   (Degrees) Left AROM Right AROM    Cervical Flexion Chin to chest, pain    Cervical Extension 45    Cervical Side bend 32 30    Cervical Rotation 62 65    Cervical Protrusion     Cervical Retraction Min loss, pain at end range    Thoracic  Flexion     Thoracic Extension     Thoracic Rotation       Left AROM Left PROM Right AROM Right PROM   Shoulder Flexion WNL    WNL      Shoulder Extension       Shoulder Abduction       Shoulder Adduction       Shoulder IR       Shoulder ER       Shoulder Horiz Abduction       Shoulder Horiz Adduction       Pain:   End Feel:     MYOTOMES: WNL  DTR S:   CORD SIGNS:   DERMATOMES: WNL  NEURAL TENSION: Lumbar WNL  FLEXIBILITY:    SPECIAL TESTS:   PALPATION: Suboccipital, SCM, ES, upper trap  SPINAL SEGMENTAL CONCLUSIONS: Hypomobile C2-4, T2-6, (cervical especially with side bending and rotation)      Assessment & Plan   CLINICAL IMPRESSIONS   Medical Diagnosis: Tension headache; Trapezius muscle spasm    Treatment Diagnosis: Neck pain   Impression/Assessment: Patient is a 67 year old male with neck complaints.  The following significant findings have been identified: Pain, Decreased ROM/flexibility, Decreased joint mobility, Decreased strength, Impaired muscle performance, Decreased activity tolerance and Impaired posture. These impairments interfere with their ability to perform self care tasks, work tasks, recreational activities, household chores, driving , household mobility and community mobility as compared to previous level of function.     Clinical Decision Making (Complexity):   Clinical Presentation: Stable/Uncomplicated  Clinical Presentation Rationale: based on medical and personal factors listed in PT evaluation  Clinical Decision Making (Complexity): Low complexity    PLAN OF CARE  Treatment Interventions:  Interventions: Manual Therapy, Neuromuscular Re-education, Therapeutic Activity, Therapeutic Exercise, Self-Care/Home Management    Long Term Goals     PT Goal 1  Goal Identifier: LTG 1  Goal Description: Patient will have no headaches over 2 week period  Rationale: to maximize safety and independence with performance of ADLs and functional tasks;to maximize safety and independence within the home;to  maximize safety and independence within the community;to maximize safety and independence with transportation;to maximize safety and independence with self cares  Target Date: 08/22/23      Frequency of Treatment: 1x/week  Duration of Treatment: 4 weeks then 2x/month for 2 months    Recommended Referrals to Other Professionals: none  Education Assessment:   Learner/Method: No Barriers to Learning;Pictures/Video;Demonstration;Reading;Listening;Patient    Risks and benefits of evaluation/treatment have been explained.   Patient/Family/caregiver agrees with Plan of Care.     Evaluation Time:     PT Eval, Low Complexity Minutes (67584): 22      Signing Clinician: Musa Duncan, PT      [facility]                                                                                   OUTPATIENT PHYSICAL THERAPY      PLAN OF TREATMENT FOR OUTPATIENT REHABILITATION   Patient's Last Name, First Name, LILLIFatumaJOANFatuma  NavjotfranciejennKeegan  CELESTINO YOB: 1955   Provider's Name   [facility]   Medical Record No.  [id number]     Onset Date: 03/27/23  Start of Care Date: 06/27/23     Medical Diagnosis:  Tension headache; Trapezius muscle spasm      PT Treatment Diagnosis:  Neck pain Plan of Treatment  Frequency/Duration: 1x/week/ 4 weeks then 2x/month for 2 months    Certification date from 06/27/23 to 09/19/23         See note for plan of treatment details and functional goals     Musa Duncan, PT                         I CERTIFY THE NEED FOR THESE SERVICES FURNISHED UNDER        THIS PLAN OF TREATMENT AND WHILE UNDER MY CARE     (Physician attestation of this document indicates review and certification of the therapy plan).                  Referring Provider:  Fabio Stovall      Initial Assessment  See Epic Evaluation- Start of Care Date: 06/27/23

## 2023-07-07 ENCOUNTER — THERAPY VISIT (OUTPATIENT)
Dept: PHYSICAL THERAPY | Facility: CLINIC | Age: 68
End: 2023-07-07
Payer: MEDICARE

## 2023-07-07 DIAGNOSIS — M54.2 NECK PAIN: Primary | ICD-10-CM

## 2023-07-07 PROCEDURE — 97012 MECHANICAL TRACTION THERAPY: CPT | Mod: GP | Performed by: PHYSICAL THERAPIST

## 2023-07-07 PROCEDURE — 97140 MANUAL THERAPY 1/> REGIONS: CPT | Mod: GP | Performed by: PHYSICAL THERAPIST

## 2023-07-07 PROCEDURE — 97110 THERAPEUTIC EXERCISES: CPT | Mod: GP | Performed by: PHYSICAL THERAPIST

## 2023-07-12 ENCOUNTER — THERAPY VISIT (OUTPATIENT)
Dept: PHYSICAL THERAPY | Facility: CLINIC | Age: 68
End: 2023-07-12
Payer: MEDICARE

## 2023-07-12 DIAGNOSIS — M54.2 NECK PAIN: Primary | ICD-10-CM

## 2023-07-12 PROCEDURE — 97110 THERAPEUTIC EXERCISES: CPT | Mod: GP | Performed by: PHYSICAL THERAPIST

## 2023-07-12 PROCEDURE — 97012 MECHANICAL TRACTION THERAPY: CPT | Mod: GP | Performed by: PHYSICAL THERAPIST

## 2023-07-12 PROCEDURE — 97140 MANUAL THERAPY 1/> REGIONS: CPT | Mod: GP | Performed by: PHYSICAL THERAPIST

## 2023-07-18 ENCOUNTER — MYC MEDICAL ADVICE (OUTPATIENT)
Dept: CARDIOLOGY | Facility: CLINIC | Age: 68
End: 2023-07-18
Payer: MEDICARE

## 2023-07-18 DIAGNOSIS — R00.2 PALPITATIONS: ICD-10-CM

## 2023-07-18 RX ORDER — METOPROLOL SUCCINATE 50 MG/1
50 TABLET, EXTENDED RELEASE ORAL 2 TIMES DAILY
Qty: 180 TABLET | Refills: 3
Start: 2023-07-18 | End: 2023-07-31

## 2023-07-18 NOTE — TELEPHONE ENCOUNTER
7/18/23 Msg recd from Dr Marcus  He can take extra toprol daily on days when having more pvcs.   Pt updated via Connect[employer]ity , med list updated  Jose 2 pm

## 2023-07-20 ENCOUNTER — TRANSFERRED RECORDS (OUTPATIENT)
Dept: HEALTH INFORMATION MANAGEMENT | Facility: CLINIC | Age: 68
End: 2023-07-20

## 2023-07-24 ENCOUNTER — THERAPY VISIT (OUTPATIENT)
Dept: PHYSICAL THERAPY | Facility: CLINIC | Age: 68
End: 2023-07-24
Payer: MEDICARE

## 2023-07-24 DIAGNOSIS — M54.2 NECK PAIN: Primary | ICD-10-CM

## 2023-07-24 PROCEDURE — 97530 THERAPEUTIC ACTIVITIES: CPT | Mod: GP | Performed by: PHYSICAL THERAPIST

## 2023-07-24 PROCEDURE — 97012 MECHANICAL TRACTION THERAPY: CPT | Mod: GP | Performed by: PHYSICAL THERAPIST

## 2023-07-24 PROCEDURE — 97110 THERAPEUTIC EXERCISES: CPT | Mod: GP | Performed by: PHYSICAL THERAPIST

## 2023-07-31 ENCOUNTER — THERAPY VISIT (OUTPATIENT)
Dept: PHYSICAL THERAPY | Facility: CLINIC | Age: 68
End: 2023-07-31
Payer: MEDICARE

## 2023-07-31 ENCOUNTER — TELEPHONE (OUTPATIENT)
Dept: FAMILY MEDICINE | Facility: OTHER | Age: 68
End: 2023-07-31

## 2023-07-31 DIAGNOSIS — R00.2 PALPITATIONS: ICD-10-CM

## 2023-07-31 DIAGNOSIS — M54.2 NECK PAIN: Primary | ICD-10-CM

## 2023-07-31 PROCEDURE — 97140 MANUAL THERAPY 1/> REGIONS: CPT | Mod: GP | Performed by: PHYSICAL THERAPIST

## 2023-07-31 PROCEDURE — 97012 MECHANICAL TRACTION THERAPY: CPT | Mod: GP | Performed by: PHYSICAL THERAPIST

## 2023-07-31 RX ORDER — METOPROLOL SUCCINATE 50 MG/1
TABLET, EXTENDED RELEASE ORAL
Qty: 270 TABLET | OUTPATIENT
Start: 2023-07-31

## 2023-07-31 RX ORDER — METOPROLOL SUCCINATE 50 MG/1
50 TABLET, EXTENDED RELEASE ORAL 2 TIMES DAILY
Qty: 180 TABLET | Refills: 3 | Status: SHIPPED | OUTPATIENT
Start: 2023-07-31 | End: 2024-05-02

## 2023-07-31 NOTE — TELEPHONE ENCOUNTER
Order/Referral Request    Who is requesting: PATIENT     Orders being requested: METOPROLOL    Reason service is needed/diagnosis: [facility] is unable to make a refill request     When are orders needed by: asap     Has this been discussed with Provider: No    Does patient have a preference on a Group/Provider/Facility? [facility] in [place]     Does patient have an appointment scheduled?: Yes: seeing Dr. Marcus in mid October just saw Dr. Stovall 3 weeks ago     Where to send orders: N/A    Could we send this information to you in [employer] or would you prefer to receive a phone call?:   Patient would prefer a phone call   Okay to leave a detailed message?: Yes at Home number on file [phone] (home)

## 2023-08-21 DIAGNOSIS — J44.9 MODERATE CHRONIC OBSTRUCTIVE PULMONARY DISEASE (H): ICD-10-CM

## 2023-08-21 RX ORDER — ALBUTEROL SULFATE 90 UG/1
2 AEROSOL, METERED RESPIRATORY (INHALATION) EVERY 6 HOURS
Qty: 54 G | Refills: 1 | Status: SHIPPED | OUTPATIENT
Start: 2023-08-21 | End: 2023-12-04

## 2023-09-06 DIAGNOSIS — K21.9 GASTROESOPHAGEAL REFLUX DISEASE WITHOUT ESOPHAGITIS: ICD-10-CM

## 2023-09-25 ENCOUNTER — TELEPHONE (OUTPATIENT)
Dept: FAMILY MEDICINE | Facility: OTHER | Age: 68
End: 2023-09-25
Payer: MEDICARE

## 2023-09-25 ENCOUNTER — MYC MEDICAL ADVICE (OUTPATIENT)
Dept: FAMILY MEDICINE | Facility: OTHER | Age: 68
End: 2023-09-25
Payer: MEDICARE

## 2023-09-25 NOTE — TELEPHONE ENCOUNTER
Reason for Call:  Appointment Request    Patient requesting this type of appt:  VOICE HAS GOTTEN RASPY FOR OVER A YEAR    Requested provider: Fabio Stovall    Reason patient unable to be scheduled: Not within requested timeframe    When does patient want to be seen/preferred time: 1-2 weeks    Comments: willing to see any provider if PCP is not available    Could we send this information to you in [employer] or would you prefer to receive a phone call?:   Patient would like to be contacted via [employer]    Call taken on 9/25/2023 at 3:19 PM by Radha HOFFMAN

## 2023-09-25 NOTE — TELEPHONE ENCOUNTER
Okay to use any open spot in the next 2 weeks including virtual spots to be seen face-to-face or rounding spots.

## 2023-09-27 NOTE — PROGRESS NOTES
07/31/23 0500   Appointment Info   Signing clinician's name / credentials Mague Dumont PT   Total/Authorized Visits 8 (PT POC)   Visits Used 4   Medical Diagnosis Tension headache; Trapezius muscle spasm   PT Tx Diagnosis Neck pain   Other pertinent information MRI 11/28/22: Degenerative changes   Quick Adds Certification   Progress Note/Certification   Start of Care Date 06/27/23   Onset of illness/injury or Date of Surgery 03/27/23   Therapy Frequency 1x/week   Predicted Duration 4 weeks then 2x/month for 2 months   Certification date from 06/27/23   Certification date to 09/19/23   Progress Note Completed Date 09/27/23   PT Goal 1   Goal Identifier LTG 1   Goal Description Patient will have no headaches over 2 week period   Rationale to maximize safety and independence with performance of ADLs and functional tasks;to maximize safety and independence within the home;to maximize safety and independence within the community;to maximize safety and independence with transportation;to maximize safety and independence with self cares   Goal Progress less frequent HAs (2 x week vs daily) with tennis ball SOR   Target Date 08/22/23   Subjective Report   Subjective Report Last time he had HA was Sat and immediately completed SOR and managed HA. The other exercise that helps is head retraction with ext and rotation.   Objective Measures   Objective Measures Objective Measure 1   Objective Measure 1   Objective Measure CROM: SB left produces tightness left base of neck. ROT/FB & SB WNL.   Details Tightness occiput L > R   PT Modalities   PT Modalities Mechanical Traction   Mechanical Traction   Mechanical Traction, Minutes (78044) 77   Treatment Detail [facility]   Traction -Type Home   Position supine   Type Static   Duration 15 min   Max poundage 20#   Patient Response/Progress Good   Treatment Interventions (PT)   Interventions Therapeutic Procedure/Exercise;Therapeutic Activity;Neuromuscular  Re-education;Manual Therapy   Therapeutic Procedure/Exercise   PTRx Ther Proc 1 Upper Trapezius Stretch   PTRx Ther Proc 1 - Details HEP-2 x 10 sec each side   PTRx Ther Proc 3 Cervical Retraction With Patient Overpressure   PTRx Ther Proc 3 - Details Review-10 with gentle OP pain relieving in neck and head   PTRx Ther Proc 4 Seated Cervical Retraction Extension With Overpressure   PTRx Ther Proc 4 - Details Review -10 with gentle OP pain relieving in neck and head   PTRx Ther Proc 5 Thoracic Extension   PTRx Ther Proc 5 - Details 10 pain relieving   PTRx Ther Proc 6 Shoulder Horizontal Abduction/Diagonals With Theraband   PTRx Ther Proc 6 - Details 10-30 to fatigue    PTRx Ther Proc 7 Wall Press (shoulder extension)   PTRx Ther Proc 7 - Details 10 x 5 sec    Therapeutic Activity   PTRx Ther Act 1 Suboccipital Release   PTRx Ther Act 1 - Details 5with tennis balls HEP x 5'   PTRx Ther Act 2 Education Sheet General   PTRx Ther Act 2 - Details No Notes   Manual Therapy   Manual Therapy: Mobilization, MFR, MLD, friction massage minutes (06145) 15   Manual Therapy Manual Therapy 2   Manual Therapy 1 SOR   Manual Therapy 1 - Details 5 mins and self instruct with 2 tennis balls in sock   Manual Therapy 2 Prone PAs mid-thoracic   Manual Therapy 2 - Details Grade 3-4   Education   Learner/Method No Barriers to Learning;Pictures/Video;Demonstration;Reading;Listening;Patient   Plan   Home program PTRX   Plan for next session scapular strengthening   Total Session Time   Timed Code Treatment Minutes 15   Total Treatment Time (sum of timed and untimed services) 30         DISCHARGE  Reason for Discharge: Patient has failed to schedule further appointments.    Equipment Issued: mechanical traction    Discharge Plan: Patient to continue home program.    Referring Provider:  Fabio Stovall

## 2023-09-29 ENCOUNTER — OFFICE VISIT (OUTPATIENT)
Dept: FAMILY MEDICINE | Facility: OTHER | Age: 68
End: 2023-09-29
Payer: MEDICARE

## 2023-09-29 VITALS
DIASTOLIC BLOOD PRESSURE: 78 MMHG | HEART RATE: 72 BPM | TEMPERATURE: 97 F | WEIGHT: 199 LBS | BODY MASS INDEX: 28.49 KG/M2 | RESPIRATION RATE: 16 BRPM | SYSTOLIC BLOOD PRESSURE: 124 MMHG | OXYGEN SATURATION: 94 % | HEIGHT: 70 IN

## 2023-09-29 DIAGNOSIS — K21.9 GASTROESOPHAGEAL REFLUX DISEASE WITHOUT ESOPHAGITIS: ICD-10-CM

## 2023-09-29 DIAGNOSIS — Z23 NEED FOR VACCINATION: ICD-10-CM

## 2023-09-29 DIAGNOSIS — R49.0 HOARSENESS: Primary | ICD-10-CM

## 2023-09-29 DIAGNOSIS — J30.89 SEASONAL ALLERGIC RHINITIS DUE TO OTHER ALLERGIC TRIGGER: ICD-10-CM

## 2023-09-29 PROCEDURE — 99214 OFFICE O/P EST MOD 30 MIN: CPT | Mod: 25 | Performed by: FAMILY MEDICINE

## 2023-09-29 PROCEDURE — G0008 ADMIN INFLUENZA VIRUS VAC: HCPCS | Performed by: FAMILY MEDICINE

## 2023-09-29 PROCEDURE — 90662 IIV NO PRSV INCREASED AG IM: CPT | Performed by: FAMILY MEDICINE

## 2023-09-29 RX ORDER — FLUTICASONE PROPIONATE 50 MCG
SPRAY, SUSPENSION (ML) NASAL
Qty: 48 G | Refills: 2 | Status: SHIPPED | OUTPATIENT
Start: 2023-09-29

## 2023-09-29 RX ORDER — PREDNISONE 20 MG/1
40 TABLET ORAL DAILY
Qty: 10 TABLET | Refills: 0 | Status: SHIPPED | OUTPATIENT
Start: 2023-09-29 | End: 2023-10-04

## 2023-09-29 ASSESSMENT — PAIN SCALES - GENERAL: PAINLEVEL: NO PAIN (0)

## 2023-09-29 NOTE — PROGRESS NOTES
Assessment & Plan       ICD-10-CM    1. Hoarseness  R49.0 Adult ENT  Referral     Speech Therapy Referral     predniSONE (DELTASONE) 20 MG tablet      2. Seasonal allergic rhinitis due to other allergic trigger  J30.89 fluticasone (FLONASE) 50 MCG/ACT nasal spray      3. Gastroesophageal reflux disease without esophagitis  K21.9 omeprazole (PRILOSEC) 20 MG DR capsule      4. Need for vaccination  Z23 INFLUENZA VACCINE 65+ (FLUZONE HD)     CANCELED: COVID-19 12+ (2023-24) (PFIZER)           1.  Unable to identify clear cause for his symptoms.  Based on history and exam, I doubt postnasal drainage or reflux as a likely cause.  Could be related to allergies or inflammation.  No signs or symptoms suspicious for chronic bacterial infection.  Discussed an empiric trial of prednisone with plans to see speech therapy and/or ENT to further evaluate.  Patient can also try a course of oral antihistamines if he would like.  He was in agreement with this plan.  Follow-up as needed.  2.  Clinically controlled.  We will continue current regimen.  Also discussed possible trial of oral antihistamine.  3.  Clinically well controlled.  We will continue current regimen.  4.  Immunized.    Portions of this note were completed using [employer] AI and/or Dragon dictation software.  If [employer] was used, patient gave verbal consent prior to the start of the visit.  Although reviewed, there may be typographical and other inadvertent errors that remain.         Prescription drug management         Patient Instructions   Let's see if the prednisone helps with your hoarseness.  If desired, can try Zyrtec or other antihistamine for a couple of weeks to see if this is related to allergies.  If it resolves, great.    If not, then let's have you see ENT and/or speech therapy to help with this.    Continue your other current medications.      Contact us or return if questions or concerns.    Have a nice day!    Dr. Wilman Mccarthy  Isidro Stovall MD, MD  [facility] SAM Allison is a 67 year old, presenting for the following health issues:  Hoarse        9/29/2023    10:26 AM   Additional Questions   Roomed by alma   Accompanied by alone         9/29/2023    10:26 AM   Patient Reported Additional Medications   Patient reports taking the following new medications none       History of Present Illness       COPD:  He presents for follow up of COPD.   Overall, COPD symptoms are slightly worse since last visit. He has no fatigue or shortness of breath with exertion and no shortness of breath at rest.  He sometimes coughs and does have change in sputum. No recent fever. He can walk greater than 2 miles without stopping to rest. He can walk 3 or more flights of stairs without resting. The patient has had no ED, urgent care, or hospital admissions because of COPD since the last visit.     He eats 0-1 servings of fruits and vegetables daily.He consumes 0 sweetened beverage(s) daily.He exercises with enough effort to increase his heart rate 10 to 19 minutes per day.    He is taking medications regularly.     The patient is a 67-year-old male who presents for evaluation of hoarseness and a raspy voice.    His hoarseness and raspy voice started about long-term through the trip to [place] in 2022. It was sporadic and he never knew when he was going to have it, but late last winter and the snow started melting, it came back and it has been permanent. It has been there for 5 or 6 months. It is at its best in the morning, but as the day goes on, the more he has to talk, the worse it gets. It either gets light and airy, almost like a whispering or it will get gravelly. He has phlegm in his throat all the time, but it is no worse than it was when he was first diagnosed with COPD. He denies any postnasal drainage, heartburn, or reflux symptoms. He takes Prilosec every day. He denies any runny nose. He denies any fevers, chills,  "heart, lungs, stomach, or urine issues. He denies any pain. He drinks decaffeinated coffee and diet Coke. He is leaving for [place] at the end of 11/2023.    Supplemental Information  He has COPD. He is still taking Symbicort 2 pumps in the morning and evening. He rinses his mouth out after that. He uses albuterol occasionally. He uses Flonase in the morning. He is on baby aspirin, Lipitor, metoprolol, and Prilosec. He is not taking Vesicare. He went back to the tolterodine because it did not seem to make any difference. He still gets dry mouth. When he sleeps at night, if he wakes up on his back, his mouth is wide open and it feels like someone fills it with dirt. He has to drink some water right away. During the day, if he does not keep something around to drink, his mouth will get super dried out.          Review of Systems   Constitutional, HEENT, cardiovascular, pulmonary, gi and gu systems are negative, except as otherwise noted.      Objective    /78   Pulse 72   Temp 97  F (36.1  C) (Temporal)   Resp 16   Ht 1.77 m (5' 9.69\")   Wt 90.3 kg (199 lb)   SpO2 94%   BMI 28.81 kg/m    Body mass index is 28.81 kg/m .  Physical Exam   GENERAL: healthy, alert and no distress  EYES: Eyes grossly normal to inspection, PERRL and conjunctivae and sclerae normal  HENT: ear canals and TM's normal, nose and mouth without ulcers or lesions.  Extensive oral evaluation normal by palpation and inspection.  NECK: no adenopathy, no asymmetry, masses, or scars and thyroid normal to palpation  RESP: lungs clear to auscultation - no rales, rhonchi or wheezes  CV: regular rate and rhythm, normal S1 S2, no S3 or S4, no murmur, click or rub, no peripheral edema and peripheral pulses strong  ABDOMEN: soft, nontender, no hepatosplenomegaly, no masses and bowel sounds normal  MS: no gross musculoskeletal defects noted, no edema    Office Visit on 06/19/2023   Component Date Value Ref Range Status    Color Urine 06/19/2023 " Yellow  Colorless, Straw, Light Yellow, Yellow Final    Appearance Urine 06/19/2023 Clear  Clear Final    Glucose Urine 06/19/2023 Negative  Negative mg/dL Final    Bilirubin Urine 06/19/2023 Negative  Negative Final    Ketones Urine 06/19/2023 Negative  Negative mg/dL Final    Specific Gravity Urine 06/19/2023 1.010  1.003 - 1.035 Final    Blood Urine 06/19/2023 Moderate (A)  Negative Final    pH Urine 06/19/2023 5.0  5.0 - 7.0 Final    Protein Albumin Urine 06/19/2023 Negative  Negative mg/dL Final    Urobilinogen Urine 06/19/2023 0.2  0.2, 1.0 E.U./dL Final    Nitrite Urine 06/19/2023 Negative  Negative Final    Leukocyte Esterase Urine 06/19/2023 Negative  Negative Final    RBC Urine 06/19/2023 0-2  0-2 /HPF /HPF Final    WBC Urine 06/19/2023 0-5  0-5 /HPF /HPF Final    Squamous Epithelials Urine 06/19/2023 Few (A)  None Seen /LPF Final     No results found for any visits on 09/29/23.  No results found for this or any previous visit (from the past 24 hour(s)).

## 2023-09-29 NOTE — PATIENT INSTRUCTIONS
Let's see if the prednisone helps with your hoarseness.  If desired, can try Zyrtec or other antihistamine for a couple of weeks to see if this is related to allergies.  If it resolves, great.    If not, then let's have you see ENT and/or speech therapy to help with this.    Continue your other current medications.      Contact us or return if questions or concerns.    Have a nice day!    Dr. Stovall

## 2023-10-10 ENCOUNTER — OFFICE VISIT (OUTPATIENT)
Dept: CARDIOLOGY | Facility: CLINIC | Age: 68
End: 2023-10-10
Attending: INTERNAL MEDICINE
Payer: MEDICARE

## 2023-10-10 VITALS
DIASTOLIC BLOOD PRESSURE: 87 MMHG | BODY MASS INDEX: 27.86 KG/M2 | HEART RATE: 80 BPM | SYSTOLIC BLOOD PRESSURE: 132 MMHG | HEIGHT: 71 IN | WEIGHT: 199 LBS

## 2023-10-10 DIAGNOSIS — I49.3 PVC'S (PREMATURE VENTRICULAR CONTRACTIONS): ICD-10-CM

## 2023-10-10 PROCEDURE — 99214 OFFICE O/P EST MOD 30 MIN: CPT | Performed by: INTERNAL MEDICINE

## 2023-10-10 NOTE — PROGRESS NOTES
"  Electrophysiology Clinic Progress Note    Keegan Buchanan MRN# [id number]   YOB: 1955 Age: 67 year old     Primary cardiologist:          Assessment and Plan   nikko you for allowing me to participate in the care of your delightful patient.  As you know, Keegan is a 67-year-old gentleman with a history of symptomatic PVCs, even though the burden is less than 1% of the time in the background of preserved LV systolic function and coronary disease as he does have a history of a stent implanted in the mid-LAD in the past.     The patient has been on a beta blocker, both for her CAD and the PVCs, and most of the time it worked quite well, but on the other hand around the fall and springtime, the patient would have more PVCs.  He does have seasonal allergies and there may be a correlation between that and the increased burden of PVCs around that time.     Keegan has been doing well since last visit with me. Basically, he can do whatever he wants without limitations such as cp or palpitations. Occasionally, he does have to take an extra dose of Metoprolol for pvc. Reassured  him again that pvcs is considered benign and that it's ok to take additional metoprolol prn. Pt is scheduled to see ENT for persistent hoarseness. Rct to see TIFFANIE as scheduled and see me in a few years.              Review of Systems     12-pt ROS is negative except for as noted in the HPI.          Physical Exam     Vitals: /87   Pulse 80   Ht 1.791 m (5' 10.5\")   Wt 90.3 kg (199 lb)   BMI 28.15 kg/m    Wt Readings from Last 10 Encounters:   10/10/23 90.3 kg (199 lb)   09/29/23 90.3 kg (199 lb)   06/19/23 88.5 kg (195 lb)   05/02/23 92.1 kg (203 lb 1.6 oz)   04/05/23 90.3 kg (199 lb)   11/17/22 90.7 kg (200 lb)   07/21/22 89.5 kg (197 lb 4.8 oz)   05/26/22 87 kg (191 lb 12.8 oz)   10/28/21 90.1 kg (198 lb 9.6 oz)   09/16/21 88.6 kg (195 lb 6.4 oz)       Constitutional:  Patient is pleasant, alert, cooperative, and in NAD.  HEENT: "  NCAT. PERRLA. EOM's intact.   Neck:  CVP appears normal. No carotid bruits.   Pulmonary: Normal respiratory effort. CTAB.   Cardiac: RRR, normal S1/S2, no S3/S4, no murmur or rub.   Abdomen:  Non-tender abdomen, no hepatosplenomegaly appreciated.   Vascular: Pulses in the upper and lower extremities are 2+ and equal bilaterally.  Extremities: No edema, erythema, cyanosis or tenderness appreciated.  Skin:  No rashes or lesions appreciated.   Neurological:  No gross motor or sensory deficits.   Psych: Appropriate affect.          Data   Labs reviewed:  Recent Labs   Lab Test 06/19/23  0752 06/29/22  0823 09/23/21  0828 10/02/20  0807   * 76 72 75   HDL 39* 46 39* 41   NHDL 145* 105 100 107   CHOL 184 151 139 148   TRIG 219* 143 141 162*   TSH  --   --   --  1.44       Lab Results   Component Value Date    WBC 6.7 07/29/2021    WBC 10.7 12/06/2018    RBC 5.57 07/29/2021    RBC 5.59 12/06/2018    HGB 16.7 07/29/2021    HGB 17.0 04/05/2019    HCT 47.7 07/29/2021    HCT 47.4 12/06/2018    MCV 86 07/29/2021    MCV 85 12/06/2018    MCH 30.0 07/29/2021    MCH 29.0 12/06/2018    MCHC 35.0 07/29/2021    MCHC 34.2 12/06/2018    RDW 13.9 07/29/2021    RDW 13.5 12/06/2018     07/29/2021     12/06/2018       Lab Results   Component Value Date     06/19/2023     07/29/2020    POTASSIUM 4.2 06/19/2023    POTASSIUM 4.4 06/29/2022    POTASSIUM 5.0 07/29/2020    CHLORIDE 104 06/19/2023    CHLORIDE 108 06/29/2022    CHLORIDE 108 07/29/2020    CO2 24 06/19/2023    CO2 30 06/29/2022    CO2 28 07/29/2020    ANIONGAP 12 06/19/2023    ANIONGAP 3 06/29/2022    ANIONGAP 3 07/29/2020     (H) 06/19/2023     (H) 06/29/2022     (H) 07/29/2020    BUN 18.8 06/19/2023    BUN 16 06/29/2022    BUN 13 07/29/2020    CR 0.93 06/19/2023    CR 1.00 07/29/2020    GFRESTIMATED 90 06/19/2023    GFRESTIMATED 79 07/29/2020    GFRESTBLACK >90 07/29/2020    ALFREDA 9.4 06/19/2023    ALFREDA 9.1 07/29/2020      Lab  Results   Component Value Date    AST 26 06/19/2023    AST 24 07/29/2020    ALT 35 06/19/2023    ALT 42 07/29/2020       Lab Results   Component Value Date    A1C 5.8 (H) 06/19/2023    A1C 5.6 12/27/2012       Lab Results   Component Value Date    INR 0.97 11/14/2012            Problem List     Patient Active Problem List   Diagnosis    Hypertension goal BP (blood pressure) < 140/90    Trigger finger, acquired    Impotence of organic origin    Hyperlipidemia LDL goal <100    Arthritis of elbow, right    Cubital tunnel syndrome - right    Acute coronary syndrome (H)    CAD (coronary artery disease)    Benign prostatic hyperplasia with urinary obstruction    GERD (gastroesophageal reflux disease)    Moderate chronic obstructive pulmonary disease (H)    Piriformis syndrome of left side    Nonallopathic lesion of sacral region    Nonallopathic lesion of thoracic region    Nonallopathic lesion of cervical region    Cervicalgia    Left inguinal hernia    Congestion of paranasal sinus    PVC's (premature ventricular contractions)    Mild major depression (H)    Neck pain            Medications     Current Outpatient Medications   Medication Sig Dispense Refill    albuterol (PROAIR HFA/PROVENTIL HFA/VENTOLIN HFA) 108 (90 Base) MCG/ACT inhaler INHALE 2 PUFFS INTO THE LUNGS EVERY 6 HOURS 54 g 1    aspirin 81 MG tablet Take 1 tablet by mouth daily. 90 tablet 3    atorvastatin (LIPITOR) 80 MG tablet TAKE 1 TABLET(80 MG) BY MOUTH DAILY 90 tablet 3    fluticasone (FLONASE) 50 MCG/ACT nasal spray SPRAY 1-2 SPRAYS IN EACH NOSTRIL DAILY 48 g 2    metoprolol succinate ER (TOPROL XL) 50 MG 24 hr tablet Take 1 tablet (50 mg) by mouth 2 times daily May take 1 extra 50 mg tablet /day for increased palpitations 180 tablet 3    nitroGLYcerin (NITROSTAT) 0.4 MG sublingual tablet Place 1 tablet (0.4 mg) under the tongue every 5 minutes as needed for chest pain 25 tablet 0    omeprazole (PRILOSEC) 20 MG DR capsule Take 1 capsule (20 mg) by  mouth daily 90 capsule 1    SYMBICORT 160-4.5 MCG/ACT Inhaler INHALE 2 PUFFS INTO THE LUNGS TWICE DAILY 30.6 g 1    tamsulosin (FLOMAX) 0.4 MG capsule TAKE ONE CAPSULE BY MOUTH ONCE DAILY 90 capsule 3    tolterodine ER (DETROL LA) 4 MG 24 hr capsule Take 1 capsule (4 mg) by mouth daily 90 capsule 3            Past Medical History     Past Medical History:   Diagnosis Date    Hypercholesteremia     Pain in joint, shoulder region     right shoulder separation    Unspecified essential hypertension      Past Surgical History:   Procedure Laterality Date    COLONOSCOPY N/A 2015    Procedure: COMBINED COLONOSCOPY, SINGLE OR MULTIPLE BIOPSY/POLYPECTOMY BY BIOPSY;  Surgeon: Herman Rebollar MD;  Location: [facility]    COLONOSCOPY N/A 2018    Procedure: colonoscopy;  Surgeon: Thiago Robles MD;  Location: [facility]    H STENT PROCED      HC REMOVE TONSILS/ADENOIDS,<13 Y/O      T & A <12y.o.    LAPAROSCOPIC HERNIORRHAPHY INGUINAL Left 8/3/2018    Procedure: LAPAROSCOPIC HERNIORRHAPHY INGUINAL;  Laparoscopic left inguinal hernia repair;  Surgeon: Huber Ireland MD;  Location: [facility]    REMOVAL OF SPERM DUCT(S)  88    Vasectomy     Family History   Problem Relation Age of Onset    Diabetes Mother         adult onset diabetes    Cancer Mother         kidney dx age 75    Cancer Father         prostate cancer/ at age 73    Hypertension Brother     Cancer Paternal Grandfather         prostat ca    Lung Cancer Brother      Social History     Socioeconomic History    Marital status:      Spouse name: Not on file    Number of children: Not on file    Years of education: Not on file    Highest education level: Not on file   Occupational History    Not on file   Tobacco Use    Smoking status: Former     Packs/day: 1.00     Years: 15.00     Pack years: 15.00     Types: Cigarettes    Smokeless tobacco: Former     Types: Chew     Quit date: 1987    Tobacco comments:     quit 25 years ago.    Vaping Use    Vaping Use: Never used   Substance and Sexual Activity    Alcohol use: Yes     Comment: 1 -2 times monthly    Drug use: No    Sexual activity: Yes     Partners: Female     Birth control/protection: Surgical, Male Surgical     Comment: vasectomy   Other Topics Concern    Parent/sibling w/ CABG, MI or angioplasty before 65F 55M? Not Asked   Social History Narrative    Not on file     Social Determinants of Health     Financial Resource Strain: Low Risk  (9/29/2023)    Financial Resource Strain     Within the past 12 months, have you or your family members you live with been unable to get utilities (heat, electricity) when it was really needed?: No   Food Insecurity: Low Risk  (9/29/2023)    Food Insecurity     Within the past 12 months, did you worry that your food would run out before you got money to buy more?: No     Within the past 12 months, did the food you bought just not last and you didn t have money to get more?: No   Transportation Needs: Low Risk  (9/29/2023)    Transportation Needs     Within the past 12 months, has lack of transportation kept you from medical appointments, getting your medicines, non-medical meetings or appointments, work, or from getting things that you need?: No   Physical Activity: Not on file   Stress: Not on file   Social Connections: Not on file   Interpersonal Safety: Low Risk  (9/29/2023)    Interpersonal Safety     Do you feel physically and emotionally safe where you currently live?: Yes     Within the past 12 months, have you been hit, slapped, kicked or otherwise physically hurt by someone?: No     Within the past 12 months, have you been humiliated or emotionally abused in other ways by your partner or ex-partner?: No   Housing Stability: Low Risk  (9/29/2023)    Housing Stability     Do you have housing? : Yes     Are you worried about losing your housing?: No            Allergies   Amoxicillin-pot clavulanate, Codeine, and Penicillins    Today's clinic  visit entailed:  The following tests were independently interpreted by me as noted in my documentation: ecg  30 minutes spent by me on the date of the encounter doing chart review, history and exam, documentation and further activities per the note  Provider  Link to MDM Help Grid     The level of medical decision making during this visit was of moderate complexity.

## 2023-10-10 NOTE — LETTER
"10/10/2023    Fabio Stovall MD, MD  [address] [facility] [address]    RE: Keegan Buchanan       Dear Colleague,     I had the pleasure of seeing Ke[facility] in the [facility].    Electrophysiology Clinic Progress Note    Keegan Buchanan MRN# [id number]   YOB: 1955 Age: 67 year old     Primary cardiologist:          Assessment and Plan   nikko you for allowing me to participate in the care of your delightful patient.  As you know, Keegan is a 67-year-old gentleman with a history of symptomatic PVCs, even though the burden is less than 1% of the time in the background of preserved LV systolic function and coronary disease as he does have a history of a stent implanted in the mid-LAD in the past.     The patient has been on a beta blocker, both for her CAD and the PVCs, and most of the time it worked quite well, but on the other hand around the fall and springtime, the patient would have more PVCs.  He does have seasonal allergies and there may be a correlation between that and the increased burden of PVCs around that time.     Keegan has been doing well since last visit with me. Basically, he can do whatever he wants without limitations such as cp or palpitations. Occasionally, he does have to take an extra dose of Metoprolol for pvc. Reassured  him again that pvcs is considered benign and that it's ok to take additional metoprolol prn. Pt is scheduled to see ENT for persistent hoarseness. Rct to see TIFFANIE as scheduled and see me in a few years.              Review of Systems     12-pt ROS is negative except for as noted in the HPI.          Physical Exam     Vitals: /87   Pulse 80   Ht 1.791 m (5' 10.5\")   Wt 90.3 kg (199 lb)   BMI 28.15 kg/m    Wt Readings from Last 10 Encounters:   10/10/23 90.3 kg (199 lb)   09/29/23 90.3 kg (199 lb)   06/19/23 88.5 kg (195 lb)   05/02/23 92.1 kg (203 lb 1.6 oz)   04/05/23 90.3 kg (199 lb)   11/17/22 90.7 kg (200 lb)   07/21/22 " 89.5 kg (197 lb 4.8 oz)   05/26/22 87 kg (191 lb 12.8 oz)   10/28/21 90.1 kg (198 lb 9.6 oz)   09/16/21 88.6 kg (195 lb 6.4 oz)       Constitutional:  Patient is pleasant, alert, cooperative, and in NAD.  HEENT:  NCAT. PERRLA. EOM's intact.   Neck:  CVP appears normal. No carotid bruits.   Pulmonary: Normal respiratory effort. CTAB.   Cardiac: RRR, normal S1/S2, no S3/S4, no murmur or rub.   Abdomen:  Non-tender abdomen, no hepatosplenomegaly appreciated.   Vascular: Pulses in the upper and lower extremities are 2+ and equal bilaterally.  Extremities: No edema, erythema, cyanosis or tenderness appreciated.  Skin:  No rashes or lesions appreciated.   Neurological:  No gross motor or sensory deficits.   Psych: Appropriate affect.          Data   Labs reviewed:  Recent Labs   Lab Test 06/19/23  0752 06/29/22  0823 09/23/21  0828 10/02/20  0807   * 76 72 75   HDL 39* 46 39* 41   NHDL 145* 105 100 107   CHOL 184 151 139 148   TRIG 219* 143 141 162*   TSH  --   --   --  1.44       Lab Results   Component Value Date    WBC 6.7 07/29/2021    WBC 10.7 12/06/2018    RBC 5.57 07/29/2021    RBC 5.59 12/06/2018    HGB 16.7 07/29/2021    HGB 17.0 04/05/2019    HCT 47.7 07/29/2021    HCT 47.4 12/06/2018    MCV 86 07/29/2021    MCV 85 12/06/2018    MCH 30.0 07/29/2021    MCH 29.0 12/06/2018    MCHC 35.0 07/29/2021    MCHC 34.2 12/06/2018    RDW 13.9 07/29/2021    RDW 13.5 12/06/2018     07/29/2021     12/06/2018       Lab Results   Component Value Date     06/19/2023     07/29/2020    POTASSIUM 4.2 06/19/2023    POTASSIUM 4.4 06/29/2022    POTASSIUM 5.0 07/29/2020    CHLORIDE 104 06/19/2023    CHLORIDE 108 06/29/2022    CHLORIDE 108 07/29/2020    CO2 24 06/19/2023    CO2 30 06/29/2022    CO2 28 07/29/2020    ANIONGAP 12 06/19/2023    ANIONGAP 3 06/29/2022    ANIONGAP 3 07/29/2020     (H) 06/19/2023     (H) 06/29/2022     (H) 07/29/2020    BUN 18.8 06/19/2023    BUN 16 06/29/2022     BUN 13 07/29/2020    CR 0.93 06/19/2023    CR 1.00 07/29/2020    GFRESTIMATED 90 06/19/2023    GFRESTIMATED 79 07/29/2020    GFRESTBLACK >90 07/29/2020    ALFREDA 9.4 06/19/2023    ALFREDA 9.1 07/29/2020      Lab Results   Component Value Date    AST 26 06/19/2023    AST 24 07/29/2020    ALT 35 06/19/2023    ALT 42 07/29/2020       Lab Results   Component Value Date    A1C 5.8 (H) 06/19/2023    A1C 5.6 12/27/2012       Lab Results   Component Value Date    INR 0.97 11/14/2012            Problem List     Patient Active Problem List   Diagnosis    Hypertension goal BP (blood pressure) < 140/90    Trigger finger, acquired    Impotence of organic origin    Hyperlipidemia LDL goal <100    Arthritis of elbow, right    Cubital tunnel syndrome - right    Acute coronary syndrome (H)    CAD (coronary artery disease)    Benign prostatic hyperplasia with urinary obstruction    GERD (gastroesophageal reflux disease)    Moderate chronic obstructive pulmonary disease (H)    Piriformis syndrome of left side    Nonallopathic lesion of sacral region    Nonallopathic lesion of thoracic region    Nonallopathic lesion of cervical region    Cervicalgia    Left inguinal hernia    Congestion of paranasal sinus    PVC's (premature ventricular contractions)    Mild major depression (H)    Neck pain            Medications     Current Outpatient Medications   Medication Sig Dispense Refill    albuterol (PROAIR HFA/PROVENTIL HFA/VENTOLIN HFA) 108 (90 Base) MCG/ACT inhaler INHALE 2 PUFFS INTO THE LUNGS EVERY 6 HOURS 54 g 1    aspirin 81 MG tablet Take 1 tablet by mouth daily. 90 tablet 3    atorvastatin (LIPITOR) 80 MG tablet TAKE 1 TABLET(80 MG) BY MOUTH DAILY 90 tablet 3    fluticasone (FLONASE) 50 MCG/ACT nasal spray SPRAY 1-2 SPRAYS IN EACH NOSTRIL DAILY 48 g 2    metoprolol succinate ER (TOPROL XL) 50 MG 24 hr tablet Take 1 tablet (50 mg) by mouth 2 times daily May take 1 extra 50 mg tablet /day for increased palpitations 180 tablet 3     nitroGLYcerin (NITROSTAT) 0.4 MG sublingual tablet Place 1 tablet (0.4 mg) under the tongue every 5 minutes as needed for chest pain 25 tablet 0    omeprazole (PRILOSEC) 20 MG DR capsule Take 1 capsule (20 mg) by mouth daily 90 capsule 1    SYMBICORT 160-4.5 MCG/ACT Inhaler INHALE 2 PUFFS INTO THE LUNGS TWICE DAILY 30.6 g 1    tamsulosin (FLOMAX) 0.4 MG capsule TAKE ONE CAPSULE BY MOUTH ONCE DAILY 90 capsule 3    tolterodine ER (DETROL LA) 4 MG 24 hr capsule Take 1 capsule (4 mg) by mouth daily 90 capsule 3            Past Medical History     Past Medical History:   Diagnosis Date    Hypercholesteremia     Pain in joint, shoulder region     right shoulder separation    Unspecified essential hypertension      Past Surgical History:   Procedure Laterality Date    COLONOSCOPY N/A 2015    Procedure: COMBINED COLONOSCOPY, SINGLE OR MULTIPLE BIOPSY/POLYPECTOMY BY BIOPSY;  Surgeon: Herman Rebollar MD;  [facility]    COLONOSCOPY N/A 2018    Procedure: colonoscopy;  Surgeon: Thiago Robles MD;  Location: [facility]    H STENT PROCED  2012    HC REMOVE TONSILS/ADENOIDS,<13 Y/O      T & A <12y.o.    LAPAROSCOPIC HERNIORRHAPHY INGUINAL Left 8/3/2018    Procedure: LAPAROSCOPIC HERNIORRHAPHY INGUINAL;  Laparoscopic left inguinal hernia repair;  Surgeon: Huber Ireland MD;  Location: [facility]    REMOVAL OF SPERM DUCT(S)  88    Vasectomy     Family History   Problem Relation Age of Onset    Diabetes Mother         adult onset diabetes    Cancer Mother         kidney dx age 75    Cancer Father         prostate cancer/ at age 73    Hypertension Brother     Cancer Paternal Grandfather         prostat ca    Lung Cancer Brother      Social History     Socioeconomic History    Marital status:      Spouse name: Not on file    Number of children: Not on file    Years of education: Not on file    Highest education level: Not on file   Occupational History    Not on file   Tobacco Use    Smoking  status: Former     Packs/day: 1.00     Years: 15.00     Pack years: 15.00     Types: Cigarettes    Smokeless tobacco: Former     Types: Chew     Quit date: 1/20/1987    Tobacco comments:     quit 25 years ago.   Vaping Use    Vaping Use: Never used   Substance and Sexual Activity    Alcohol use: Yes     Comment: 1 -2 times monthly    Drug use: No    Sexual activity: Yes     Partners: Female     Birth control/protection: Surgical, Male Surgical     Comment: vasectomy   Other Topics Concern    Parent/sibling w/ CABG, MI or angioplasty before 65F 55M? Not Asked   Social History Narrative    Not on file     Social Determinants of Health     Financial Resource Strain: Low Risk  (9/29/2023)    Financial Resource Strain     Within the past 12 months, have you or your family members you live with been unable to get utilities (heat, electricity) when it was really needed?: No   Food Insecurity: Low Risk  (9/29/2023)    Food Insecurity     Within the past 12 months, did you worry that your food would run out before you got money to buy more?: No     Within the past 12 months, did the food you bought just not last and you didn t have money to get more?: No   Transportation Needs: Low Risk  (9/29/2023)    Transportation Needs     Within the past 12 months, has lack of transportation kept you from medical appointments, getting your medicines, non-medical meetings or appointments, work, or from getting things that you need?: No   Physical Activity: Not on file   Stress: Not on file   Social Connections: Not on file   Interpersonal Safety: Low Risk  (9/29/2023)    Interpersonal Safety     Do you feel physically and emotionally safe where you currently live?: Yes     Within the past 12 months, have you been hit, slapped, kicked or otherwise physically hurt by someone?: No     Within the past 12 months, have you been humiliated or emotionally abused in other ways by your partner or ex-partner?: No   Housing Stability: Low Risk   (9/29/2023)    Housing Stability     Do you have housing? : Yes     Are you worried about losing your housing?: No            Allergies   Amoxicillin-pot clavulanate, Codeine, and Penicillins    Today's clinic visit entailed:  The following tests were independently interpreted by me as noted in my documentation: ecg  30 minutes spent by me on the date of the encounter doing chart review, history and exam, documentation and further activities per the note  Provider  Link to [facility] Help Grid     The level of medical decision making during this visit was of moderate complexity.   Thank you for allowing me to participate in the care of your patient.    Sincerely,   Cameron Jorge MD   [facility]  cc:   Cameron Jorge MD  [address] [facility]  [place] [address]

## 2023-10-13 ENCOUNTER — MYC MEDICAL ADVICE (OUTPATIENT)
Dept: FAMILY MEDICINE | Facility: OTHER | Age: 68
End: 2023-10-13
Payer: MEDICARE

## 2023-10-15 NOTE — H&P
[facility] History and Physical    Keegan Buchanan MRN# [id number]   Age: 67 year old YOB: 1955     Date of Admission:  (Not on file)    Home clinic: [facility]  Primary care provider: Fabio Stovall          Impression and Plan:   Impression:   History of colonic polyps [Z86.010]  Special screening for malignant neoplasms, colon [Z12.11]  Last colonoscopy 2018-erythema in sigmoid.  No polyps.      Plan:   Proceed to Colonoscopy as planned.  The procedure, risks(bleeding, perforation), benefits and alternatives were discussed and the patient agrees to proceed. Cleared for Anesthesia             Chief Complaint:   History of colonic polyps [Z86.010]  Special screening for malignant neoplasms, colon [Z12.11]    History is obtained from the patient          History of Present Illness:   This 67 year old male is being seen at this time for evaluation for colonoscopy. No complaints.  No family hx.           Past Medical History:     Past Medical History:   Diagnosis Date    Hypercholesteremia     Pain in joint, shoulder region     right shoulder separation    Unspecified essential hypertension             Past Surgical History:     Past Surgical History:   Procedure Laterality Date    COLONOSCOPY N/A 6/12/2015    Procedure: COMBINED COLONOSCOPY, SINGLE OR MULTIPLE BIOPSY/POLYPECTOMY BY BIOPSY;  Surgeon: Herman Rebollar MD;  Location:  GI    COLONOSCOPY N/A 11/12/2018    Procedure: colonoscopy;  Surgeon: Thiago Robles MD;  Location:  GI    H STENT PROCED  2012    HC REMOVE TONSILS/ADENOIDS,<11 Y/O      T & A <12y.o.    LAPAROSCOPIC HERNIORRHAPHY INGUINAL Left 8/3/2018    Procedure: LAPAROSCOPIC HERNIORRHAPHY INGUINAL;  Laparoscopic left inguinal hernia repair;  Surgeon: Huber Ireland MD;  Location:  OR    REMOVAL OF SPERM DUCT(S)  05/06/88    Vasectomy            Social History:     Social History     Tobacco Use    Smoking status: Former      Packs/day: 1.00     Years: 15.00     Additional pack years: 0.00     Total pack years: 15.00     Types: Cigarettes    Smokeless tobacco: Former     Types: Chew     Quit date: 1987    Tobacco comments:     quit 25 years ago.   Substance Use Topics    Alcohol use: Yes     Comment: 1 -2 times monthly            Family History:     Family History   Problem Relation Age of Onset    Diabetes Mother         adult onset diabetes    Cancer Mother         kidney dx age 75    Cancer Father         prostate cancer/ at age 73    Hypertension Brother     Cancer Paternal Grandfather         prostat ca    Lung Cancer Brother             Immunizations:     VACCINE/DOSE   Diptheria   DPT   DTAP   HBIG   Hepatitis A   Hepatitis B   HIB   Influenza   Measles   Meningococcal   MMR   Mumps   Pneumococcal   Polio   Rubella   Small Pox   TDAP   Varicella   Zoster            Allergies:     Allergies   Allergen Reactions    Amoxicillin-Pot Clavulanate Diarrhea and GI Disturbance    Codeine Nausea     significant    Penicillins      GI upset and diarrhea            Medications:     No current facility-administered medications for this encounter.     Current Outpatient Medications   Medication Sig    albuterol (PROAIR HFA/PROVENTIL HFA/VENTOLIN HFA) 108 (90 Base) MCG/ACT inhaler INHALE 2 PUFFS INTO THE LUNGS EVERY 6 HOURS    aspirin 81 MG tablet Take 1 tablet by mouth daily.    atorvastatin (LIPITOR) 80 MG tablet TAKE 1 TABLET(80 MG) BY MOUTH DAILY    fluticasone (FLONASE) 50 MCG/ACT nasal spray SPRAY 1-2 SPRAYS IN EACH NOSTRIL DAILY    metoprolol succinate ER (TOPROL XL) 50 MG 24 hr tablet Take 1 tablet (50 mg) by mouth 2 times daily May take 1 extra 50 mg tablet /day for increased palpitations    nitroGLYcerin (NITROSTAT) 0.4 MG sublingual tablet Place 1 tablet (0.4 mg) under the tongue every 5 minutes as needed for chest pain    omeprazole (PRILOSEC) 20 MG DR capsule Take 1 capsule (20 mg) by mouth daily    SYMBICORT 160-4.5  MCG/ACT Inhaler INHALE 2 PUFFS INTO THE LUNGS TWICE DAILY    tamsulosin (FLOMAX) 0.4 MG capsule TAKE ONE CAPSULE BY MOUTH ONCE DAILY    tolterodine ER (DETROL LA) 4 MG 24 hr capsule Take 1 capsule (4 mg) by mouth daily             Review of Systems:   The review of systems was positive for the following findings.  None.  The remainder of the review of systems was unremarkable.          Physical Exam:   All vitals have been reviewed  There were no vitals taken for this visit.  No intake or output data in the 24 hours ending 10/15/23 1418  SHEENT examination revealed NC/AT, EOMI.  Examination of the chest revealed CTA.  Examination of the heart revealed RRR.  Examination of the abdomen revealed soft, non tender.  The neuromuscular examination was NL.          Data:   All laboratory data reviewed  No results found for any visits on 10/16/23.  -     Huber Ireland MD, FACS

## 2023-10-16 ENCOUNTER — ANESTHESIA (OUTPATIENT)
Dept: GASTROENTEROLOGY | Facility: CLINIC | Age: 68
End: 2023-10-16
Payer: MEDICARE

## 2023-10-16 ENCOUNTER — HOSPITAL ENCOUNTER (OUTPATIENT)
Facility: CLINIC | Age: 68
Discharge: HOME | End: 2023-10-16
Attending: SPECIALIST | Admitting: SPECIALIST
Payer: MEDICARE

## 2023-10-16 ENCOUNTER — ANESTHESIA EVENT (OUTPATIENT)
Dept: GASTROENTEROLOGY | Facility: CLINIC | Age: 68
End: 2023-10-16
Payer: MEDICARE

## 2023-10-16 VITALS
OXYGEN SATURATION: 98 % | TEMPERATURE: 97.4 F | SYSTOLIC BLOOD PRESSURE: 124 MMHG | DIASTOLIC BLOOD PRESSURE: 80 MMHG | HEART RATE: 80 BPM | RESPIRATION RATE: 16 BRPM

## 2023-10-16 LAB — COLONOSCOPY: NORMAL

## 2023-10-16 PROCEDURE — 45380 COLONOSCOPY AND BIOPSY: CPT | Performed by: SPECIALIST

## 2023-10-16 PROCEDURE — 45385 COLONOSCOPY W/LESION REMOVAL: CPT | Mod: PT | Performed by: SPECIALIST

## 2023-10-16 PROCEDURE — 258N000003 HC RX IP 258 OP 636: Performed by: NURSE ANESTHETIST, CERTIFIED REGISTERED

## 2023-10-16 PROCEDURE — 250N000009 HC RX 250: Performed by: NURSE ANESTHETIST, CERTIFIED REGISTERED

## 2023-10-16 PROCEDURE — 88305 TISSUE EXAM BY PATHOLOGIST: CPT | Mod: TC | Performed by: SPECIALIST

## 2023-10-16 PROCEDURE — 250N000011 HC RX IP 250 OP 636: Performed by: NURSE ANESTHETIST, CERTIFIED REGISTERED

## 2023-10-16 PROCEDURE — 370N000017 HC ANESTHESIA TECHNICAL FEE, PER MIN: Performed by: SPECIALIST

## 2023-10-16 RX ORDER — PROPOFOL 10 MG/ML
INJECTION, EMULSION INTRAVENOUS PRN
Status: DISCONTINUED | OUTPATIENT
Start: 2023-10-16 | End: 2023-10-16

## 2023-10-16 RX ORDER — SODIUM CHLORIDE, SODIUM LACTATE, POTASSIUM CHLORIDE, CALCIUM CHLORIDE 600; 310; 30; 20 MG/100ML; MG/100ML; MG/100ML; MG/100ML
INJECTION, SOLUTION INTRAVENOUS CONTINUOUS
Status: DISCONTINUED | OUTPATIENT
Start: 2023-10-16 | End: 2023-10-16 | Stop reason: HOSPADM

## 2023-10-16 RX ORDER — PROPOFOL 10 MG/ML
INJECTION, EMULSION INTRAVENOUS CONTINUOUS PRN
Status: DISCONTINUED | OUTPATIENT
Start: 2023-10-16 | End: 2023-10-16

## 2023-10-16 RX ORDER — LIDOCAINE HYDROCHLORIDE 20 MG/ML
INJECTION, SOLUTION INFILTRATION; PERINEURAL PRN
Status: DISCONTINUED | OUTPATIENT
Start: 2023-10-16 | End: 2023-10-16

## 2023-10-16 RX ORDER — LIDOCAINE 40 MG/G
CREAM TOPICAL
Status: DISCONTINUED | OUTPATIENT
Start: 2023-10-16 | End: 2023-10-16 | Stop reason: HOSPADM

## 2023-10-16 RX ORDER — ONDANSETRON 2 MG/ML
4 INJECTION INTRAMUSCULAR; INTRAVENOUS EVERY 30 MIN PRN
Status: DISCONTINUED | OUTPATIENT
Start: 2023-10-16 | End: 2023-10-16 | Stop reason: HOSPADM

## 2023-10-16 RX ORDER — ACETAMINOPHEN 325 MG/1
975 TABLET ORAL EVERY 6 HOURS PRN
Status: DISCONTINUED | OUTPATIENT
Start: 2023-10-16 | End: 2023-10-16 | Stop reason: HOSPADM

## 2023-10-16 RX ORDER — ONDANSETRON 4 MG/1
4 TABLET, ORALLY DISINTEGRATING ORAL EVERY 30 MIN PRN
Status: DISCONTINUED | OUTPATIENT
Start: 2023-10-16 | End: 2023-10-16 | Stop reason: HOSPADM

## 2023-10-16 RX ADMIN — PROPOFOL 50 MG: 10 INJECTION, EMULSION INTRAVENOUS at 08:27

## 2023-10-16 RX ADMIN — SODIUM CHLORIDE, POTASSIUM CHLORIDE, SODIUM LACTATE AND CALCIUM CHLORIDE: 600; 310; 30; 20 INJECTION, SOLUTION INTRAVENOUS at 07:44

## 2023-10-16 RX ADMIN — PROPOFOL 200 MCG/KG/MIN: 10 INJECTION, EMULSION INTRAVENOUS at 08:27

## 2023-10-16 RX ADMIN — LIDOCAINE HYDROCHLORIDE 50 MG: 20 INJECTION, SOLUTION INFILTRATION; PERINEURAL at 08:27

## 2023-10-16 ASSESSMENT — COPD QUESTIONNAIRES
COPD: 1
CAT_SEVERITY: MILD

## 2023-10-16 ASSESSMENT — ACTIVITIES OF DAILY LIVING (ADL): ADLS_ACUITY_SCORE: 37

## 2023-10-16 NOTE — ANESTHESIA CARE TRANSFER NOTE
Patient: Keegan Buchanan    Procedure: Procedure(s):  COLONOSCOPY, WITH POLYPECTOMY       Diagnosis: History of colonic polyps [Z86.010]  Special screening for malignant neoplasms, colon [Z12.11]  Diagnosis Additional Information: No value filed.    Anesthesia Type:   MAC     Note:    Oropharynx: oropharynx clear of all foreign objects and spontaneously breathing  Level of Consciousness: awake  Oxygen Supplementation: room air    Independent Airway: airway patency satisfactory and stable  Dentition: dentition unchanged  Vital Signs Stable: post-procedure vital signs reviewed and stable  Report to RN Given: handoff report given  Patient transferred to: Phase II    Handoff Report: Identifed the Patient, Identified the Reponsible Provider, Reviewed the pertinent medical history, Discussed the surgical course, Reviewed Intra-OP anesthesia mangement and issues during anesthesia, Set expectations for post-procedure period and Allowed opportunity for questions and acknowledgement of understanding      Vitals:  Vitals Value Taken Time   /80 10/16/23 0920   Temp     Pulse 80 10/16/23 0920   Resp 16 10/16/23 0910   SpO2 98 % 10/16/23 0918   Vitals shown include unfiled device data.    Electronically Signed By: DAMIAN Copeland CRNA  October 16, 2023  9:26 AM

## 2023-10-16 NOTE — ANESTHESIA PREPROCEDURE EVALUATION
Anesthesia Pre-Procedure Evaluation    Patient: Keegan Buchanan   MRN: [id number] : 1955        Procedure : Procedure(s):  Colonoscopy          Past Medical History:   Diagnosis Date    Hypercholesteremia     Pain in joint, shoulder region     right shoulder separation    Unspecified essential hypertension       Past Surgical History:   Procedure Laterality Date    COLONOSCOPY N/A 2015    Procedure: COMBINED COLONOSCOPY, SINGLE OR MULTIPLE BIOPSY/POLYPECTOMY BY BIOPSY;  Surgeon: Herman Rebollar MD;  [facility]: [facility]    COLONOSCOPY N/A 2018    Procedure: colonoscopy;  Surgeon: Thiago Robles MD;  Location: [facility]    H STENT PROCED      HC REMOVE TONSILS/ADENOIDS,<11 Y/O      T & A <12y.o.    LAPAROSCOPIC HERNIORRHAPHY INGUINAL Left 8/3/2018    Procedure: LAPAROSCOPIC HERNIORRHAPHY INGUINAL;  Laparoscopic left inguinal hernia repair;  Surgeon: Huber Ireland MD;  [facility]: [facility]    REMOVAL OF SPERM DUCT(S)  88    Vasectomy      Allergies   Allergen Reactions    Amoxicillin-Pot Clavulanate Diarrhea and GI Disturbance    Codeine Nausea     significant    Penicillins      GI upset and diarrhea      Social History     Tobacco Use    Smoking status: Former     Packs/day: 1.00     Years: 15.00     Additional pack years: 0.00     Total pack years: 15.00     Types: Cigarettes    Smokeless tobacco: Former     Types: Chew     Quit date: 1987    Tobacco comments:     quit 25 years ago.   Substance Use Topics    Alcohol use: Yes     Comment: 1 -2 times monthly      Wt Readings from Last 1 Encounters:   10/10/23 90.3 kg (199 lb)        Anesthesia Evaluation   Pt has had prior anesthetic. Type: General and MAC.    No history of anesthetic complications       ROS/MED HX  ENT/Pulmonary:     (+)                    Mild Persistent, asthma  Treatment: Inhaler prn and Inhaler daily,  mild,  COPD,              Neurologic:  - neg neurologic ROS     Cardiovascular:     (+)  hypertension- -   "CAD -  - -                                 Previous cardiac testing   Echo: Date: Results:    Stress Test:  Date: Results:    ECG Reviewed:  Date: 05/02/23 Results:  - SB  Cath:  Date: Results:      METS/Exercise Tolerance:     Hematologic:  - neg hematologic  ROS     Musculoskeletal:       GI/Hepatic:     (+) GERD, Asymptomatic on medication,      bowel prep,            Renal/Genitourinary:  - neg Renal ROS     Endo:  - neg endo ROS     Psychiatric/Substance Use:  - neg psychiatric ROS     Infectious Disease:  - neg infectious disease ROS     Malignancy:  - neg malignancy ROS     Other:  - neg other ROS    (+)  , H/O Chronic Pain,         Physical Exam    Airway        Mallampati: II   TM distance: > 3 FB   Neck ROM: full     Respiratory Devices and Support         Dental       (+) Minor Abnormalities - some fillings, tiny chips      Cardiovascular             Pulmonary                   OUTSIDE LABS:  CBC:   Lab Results   Component Value Date    WBC 6.7 07/29/2021    WBC 10.7 12/06/2018    HGB 16.7 07/29/2021    HGB 17.0 04/05/2019    HCT 47.7 07/29/2021    HCT 47.4 12/06/2018     07/29/2021     12/06/2018     BMP:   Lab Results   Component Value Date     06/19/2023     06/29/2022    POTASSIUM 4.2 06/19/2023    POTASSIUM 4.4 06/29/2022    CHLORIDE 104 06/19/2023    CHLORIDE 108 06/29/2022    CO2 24 06/19/2023    CO2 30 06/29/2022    BUN 18.8 06/19/2023    BUN 16 06/29/2022    CR 0.93 06/19/2023    CR 0.94 06/29/2022     (H) 06/19/2023     (H) 06/29/2022     COAGS:   Lab Results   Component Value Date    PTT 66 (H) 11/14/2012    INR 0.97 11/14/2012     POC: No results found for: \"BGM\", \"HCG\", \"HCGS\"  HEPATIC:   Lab Results   Component Value Date    ALBUMIN 4.4 06/19/2023    PROTTOTAL 6.9 06/19/2023    ALT 35 06/19/2023    AST 26 06/19/2023    ALKPHOS 89 06/19/2023    BILITOTAL 0.7 06/19/2023     OTHER:   Lab Results   Component Value Date    A1C 5.8 (H) 06/19/2023    ALFREDA 9.4 " 06/19/2023    TSH 1.44 10/02/2020       Anesthesia Plan    ASA Status:  3    NPO Status:  NPO Appropriate    Anesthesia Type: MAC.     - Reason for MAC: immobility needed   Induction: Propofol, Intravenous.   Maintenance: TIVA.        Consents    Anesthesia Plan(s) and associated risks, benefits, and realistic alternatives discussed. Questions answered and patient/representative(s) expressed understanding.     - Discussed: Risks, Benefits and Alternatives for BOTH SEDATION and the PROCEDURE were discussed     - Discussed with:  Patient      - Extended Intubation/Ventilatory Support Discussed: No.      - Patient is DNR/DNI Status: No     Use of blood products discussed: No .     Postoperative Care            Comments:    Other Comments: The risks and benefits of anesthesia, and the alternatives where applicable, have been discussed with the patient, and they wish to proceed.               DAMIAN Copeland CRNA

## 2023-10-16 NOTE — ANESTHESIA POSTPROCEDURE EVALUATION
Patient: Keegan Buchaann    Procedure: Procedure(s):  COLONOSCOPY, WITH POLYPECTOMY       Anesthesia Type:  MAC    Note:  Disposition: Outpatient   Postop Pain Control: Uneventful            Sign Out: Well controlled pain   PONV: No   Neuro/Psych: Uneventful            Sign Out: Acceptable/Baseline neuro status   Airway/Respiratory: Uneventful            Sign Out: Acceptable/Baseline resp. status   CV/Hemodynamics: Uneventful            Sign Out: Acceptable CV status   Other NRE: NONE   DID A NON-ROUTINE EVENT OCCUR? No    Event details/Postop Comments:  Pt was happy with anesthesia care.  No complications.  I will follow up with the pt if needed.           Last vitals:  Vitals Value Taken Time   /80 10/16/23 0920   Temp     Pulse 80 10/16/23 0920   Resp 16 10/16/23 0910   SpO2 98 % 10/16/23 0918   Vitals shown include unfiled device data.    Electronically Signed By: DAMIAN Copeland CRNA  October 16, 2023  9:26 AM

## 2023-10-16 NOTE — DISCHARGE INSTRUCTIONS
[facility]    Home Care Following Endoscopy          Activity:  You have just undergone an endoscopic procedure usually performed with conscious sedation.  Do not work or operate machinery (including a car) for at least 12 hours.    I encourage you to walk and attempt to pass this air as soon as possible.    Diet:  Return to the diet you were on before your procedure but eat lightly for the first 12-24 hours.  Drink plenty of water.  Resume any regular medications unless otherwise advised by your physician.  Please begin any new medication prescribed as a result of your procedure as directed by your physician.   If you had any biopsy or polyp removed please refrain from aspirin or aspirin products for 2 days.  If on Coumadin please restart as instructed by your physician.   Pain:  You may take Tylenol as needed for pain.  Expected Recovery:  You can expect some mild abdominal fullness and/or discomfort due to the air used to inflate your intestinal tract.   Call Your Physician if You Have:    After Colonoscopy:  Worsening persisting abdominal pain which is worse with activity.  Fevers (>101 degrees F), chills or shakes.  Passage of continued blood with bowel movements.     Any questions or concerns about your recovery, please call [phone] or after hours [phone] ([phone][facility] Nurse Advice Line.    Follow-up Care:  You DID have 3 polyps removed.  The polyps will be sent to pathology.    You should receive letter in your My Chart from Dr Ireland with your results within 1-2 weeks. If you do not participate in My Chart a physical letter will come in the mail in 2-3 weeks.  Please call if you have not received a notification of your results.             Call [phone].

## 2023-10-17 LAB
PATH REPORT.COMMENTS IMP SPEC: NORMAL
PATH REPORT.COMMENTS IMP SPEC: NORMAL
PATH REPORT.FINAL DX SPEC: NORMAL
PATH REPORT.GROSS SPEC: NORMAL
PATH REPORT.MICROSCOPIC SPEC OTHER STN: NORMAL
PATH REPORT.RELEVANT HX SPEC: NORMAL
PHOTO IMAGE: NORMAL

## 2023-10-17 PROCEDURE — 88305 TISSUE EXAM BY PATHOLOGIST: CPT | Mod: 26 | Performed by: PATHOLOGY

## 2023-10-26 ENCOUNTER — MYC MEDICAL ADVICE (OUTPATIENT)
Dept: FAMILY MEDICINE | Facility: OTHER | Age: 68
End: 2023-10-26
Payer: MEDICARE

## 2023-10-26 DIAGNOSIS — K21.9 GASTROESOPHAGEAL REFLUX DISEASE WITHOUT ESOPHAGITIS: ICD-10-CM

## 2023-10-26 DIAGNOSIS — K21.9 LARYNGOPHARYNGEAL REFLUX: Primary | ICD-10-CM

## 2023-11-02 ENCOUNTER — PATIENT OUTREACH (OUTPATIENT)
Dept: GASTROENTEROLOGY | Facility: CLINIC | Age: 68
End: 2023-11-02
Payer: MEDICARE

## 2023-12-04 DIAGNOSIS — J44.9 MODERATE CHRONIC OBSTRUCTIVE PULMONARY DISEASE (H): ICD-10-CM

## 2023-12-04 RX ORDER — ALBUTEROL SULFATE 90 UG/1
2 AEROSOL, METERED RESPIRATORY (INHALATION) EVERY 6 HOURS
Qty: 54 G | Refills: 1 | Status: SHIPPED | OUTPATIENT
Start: 2023-12-04

## 2023-12-04 RX ORDER — BUDESONIDE AND FORMOTEROL FUMARATE DIHYDRATE 160; 4.5 UG/1; UG/1
2 AEROSOL RESPIRATORY (INHALATION) 2 TIMES DAILY
Qty: 30.6 G | Refills: 1 | Status: SHIPPED | OUTPATIENT
Start: 2023-12-04 | End: 2024-04-29

## 2023-12-14 ENCOUNTER — MYC MEDICAL ADVICE (OUTPATIENT)
Dept: FAMILY MEDICINE | Facility: OTHER | Age: 68
End: 2023-12-14
Payer: MEDICARE

## 2023-12-14 DIAGNOSIS — K21.9 LARYNGOPHARYNGEAL REFLUX: ICD-10-CM

## 2023-12-14 DIAGNOSIS — R49.0 HOARSENESS: Primary | ICD-10-CM

## 2023-12-14 NOTE — TELEPHONE ENCOUNTER
Patient is wondering if PCP could place an ENT referral for him. He would like to be seen in [facility].

## 2023-12-23 ENCOUNTER — TRANSFERRED RECORDS (OUTPATIENT)
Dept: HEALTH INFORMATION MANAGEMENT | Facility: CLINIC | Age: 68
End: 2023-12-23
Payer: MEDICARE

## 2024-04-05 DIAGNOSIS — K21.9 LARYNGOPHARYNGEAL REFLUX: ICD-10-CM

## 2024-04-05 DIAGNOSIS — K21.9 GASTROESOPHAGEAL REFLUX DISEASE WITHOUT ESOPHAGITIS: ICD-10-CM

## 2024-04-23 ENCOUNTER — MYC MEDICAL ADVICE (OUTPATIENT)
Dept: FAMILY MEDICINE | Facility: CLINIC | Age: 69
End: 2024-04-23
Payer: MEDICARE

## 2024-04-25 ENCOUNTER — OFFICE VISIT (OUTPATIENT)
Dept: FAMILY MEDICINE | Facility: CLINIC | Age: 69
End: 2024-04-25
Payer: MEDICARE

## 2024-04-25 VITALS
WEIGHT: 192 LBS | BODY MASS INDEX: 26.88 KG/M2 | SYSTOLIC BLOOD PRESSURE: 128 MMHG | TEMPERATURE: 98.8 F | HEIGHT: 71 IN | OXYGEN SATURATION: 97 % | DIASTOLIC BLOOD PRESSURE: 78 MMHG | HEART RATE: 70 BPM | RESPIRATION RATE: 18 BRPM

## 2024-04-25 DIAGNOSIS — N40.1 BENIGN PROSTATIC HYPERPLASIA WITH URINARY OBSTRUCTION: ICD-10-CM

## 2024-04-25 DIAGNOSIS — R35.0 BENIGN PROSTATIC HYPERPLASIA WITH URINARY FREQUENCY: ICD-10-CM

## 2024-04-25 DIAGNOSIS — K04.7 TOOTH INFECTION: Primary | ICD-10-CM

## 2024-04-25 DIAGNOSIS — N13.8 BENIGN PROSTATIC HYPERPLASIA WITH URINARY OBSTRUCTION: ICD-10-CM

## 2024-04-25 DIAGNOSIS — K21.9 GASTROESOPHAGEAL REFLUX DISEASE WITHOUT ESOPHAGITIS: ICD-10-CM

## 2024-04-25 DIAGNOSIS — Z29.11 NEED FOR VACCINATION AGAINST RESPIRATORY SYNCYTIAL VIRUS: ICD-10-CM

## 2024-04-25 DIAGNOSIS — K21.9 LARYNGOPHARYNGEAL REFLUX: ICD-10-CM

## 2024-04-25 DIAGNOSIS — N40.1 BENIGN PROSTATIC HYPERPLASIA WITH URINARY FREQUENCY: ICD-10-CM

## 2024-04-25 DIAGNOSIS — J44.9 MODERATE CHRONIC OBSTRUCTIVE PULMONARY DISEASE (H): ICD-10-CM

## 2024-04-25 PROCEDURE — 99214 OFFICE O/P EST MOD 30 MIN: CPT | Performed by: FAMILY MEDICINE

## 2024-04-25 RX ORDER — TOLTERODINE 4 MG/1
4 CAPSULE, EXTENDED RELEASE ORAL DAILY
Qty: 90 CAPSULE | Refills: 3 | Status: SHIPPED | OUTPATIENT
Start: 2024-04-25

## 2024-04-25 RX ORDER — PENICILLIN V POTASSIUM 500 MG/1
500 TABLET, FILM COATED ORAL 3 TIMES DAILY
Qty: 30 TABLET | Refills: 0 | Status: SHIPPED | OUTPATIENT
Start: 2024-04-25 | End: 2024-05-05

## 2024-04-25 RX ORDER — FLUTICASONE PROPIONATE AND SALMETEROL 113; 14 UG/1; UG/1
1 POWDER, METERED RESPIRATORY (INHALATION) 2 TIMES DAILY
Qty: 1 EACH | Refills: 1 | Status: SHIPPED | OUTPATIENT
Start: 2024-04-25 | End: 2024-05-08 | Stop reason: SINTOL

## 2024-04-25 RX ORDER — RESPIRATORY SYNCYTIAL VIRUS VACCINE 120MCG/0.5
0.5 KIT INTRAMUSCULAR ONCE
Qty: 1 EACH | Refills: 0 | Status: CANCELLED | OUTPATIENT
Start: 2024-04-25 | End: 2024-04-25

## 2024-04-25 RX ORDER — TAMSULOSIN HYDROCHLORIDE 0.4 MG/1
CAPSULE ORAL
Qty: 90 CAPSULE | Refills: 3 | Status: SHIPPED | OUTPATIENT
Start: 2024-04-25 | End: 2024-09-25

## 2024-04-25 ASSESSMENT — PAIN SCALES - GENERAL: PAINLEVEL: MILD PAIN (2)

## 2024-04-25 NOTE — TELEPHONE ENCOUNTER
Patient called in and was offered the 12:40 check in for a 1:00 pm appointment, patient has a part time job that he works in the afternoon and he is unable to come that late in the day, he was hoping to be seen earlier. If patient cannot be seen earlier he is okay waiting until his Monday appointment.

## 2024-04-25 NOTE — PROGRESS NOTES
Assessment & Plan       ICD-10-CM    1. Tooth infection  K04.7 penicillin V (VEETID) 500 MG tablet      2. Gastroesophageal reflux disease without esophagitis  K21.9 omeprazole (PRILOSEC) 20 MG DR capsule      3. Laryngopharyngeal reflux  K21.9 omeprazole (PRILOSEC) 20 MG DR capsule      4. Moderate chronic obstructive pulmonary disease (H)  J44.9 fluticasone-salmeterol (AIRDUO RESPICLICK) 113-14 MCG/ACT inhaler      5. Benign prostatic hyperplasia with urinary frequency  N40.1 tolterodine ER (DETROL LA) 4 MG 24 hr capsule    R35.0       6. Benign prostatic hyperplasia with urinary obstruction  N40.1 tamsulosin (FLOMAX) 0.4 MG capsule    N13.8       7. Need for vaccination against respiratory syncytial virus  Z29.11            1.  Much improved.  Reassured patient regarding this.  I anticipate he will do well until his root canal, but I did give a written prescription for penicillin to fill if he has recurrent symptoms.  He did tolerate penicillin well despite a past history of intolerance (this was mostly via GI side effects).  Will further assist as able.  2, 3.  Agree with higher PPI dosing.  We could consider further increase to 40 mg twice daily of omeprazole if needed.  It sounds like some of his symptoms may have started about the time that he started on Symbicort, so we will try changing his inhaler for his COPD.  There was a previous concern about possible thrush, but this is not clearly contributing at this time.  4.  Will try changing to AirDuo from Symbicort.  He is to let us know if any problems with this.  If not improving his breathing or if not covered, we can consider other adjustments as needed.  5, 6.  Patient reports fair control of his symptoms with Flomax and Detrol LA.  He does report significant dry mouth from the tolterodine.  Discussed possible change in this, but patient deferred at this time.  Will consider adjustment in the future if dry mouth is becoming too bothersome.  Encouraged  "him to hydrate his oral mucosa regularly.  7.  Patient declined immunizations today, but will consider getting this done later.      Portions of this note were completed using Dragon dictation software.  Although reviewed, there may be typographical and other inadvertent errors that remain.       Ordering of each unique test  Prescription drug management        BMI  Estimated body mass index is 27.16 kg/m  as calculated from the following:    Height as of this encounter: 1.791 m (5' 10.5\").    Weight as of this encounter: 87.1 kg (192 lb).         Patient Instructions   You should be OK until your root canal.    If you have any increase pain or fever, then fill the antibiotics.      Let's try changing your Symbicort to Airduo.  Let me know if not helpful or problems.  Continue to rinse after use.    We could also consider another course of medication for thrush.      If you want, we could consider changing tolterodine to a similar medication to see if that would cause less dry mouth.      Contact us or return if questions or concerns.    Have a nice day!    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  He exercises with enough effort to increase his heart rate 4 days per week.   He is taking medications regularly.     Pt has had ongoing trouble with an infected tooth.  He wanted to be sure it's OK before he goes for his root canal.    He had a course of " "clindamycin and then a course of amoxicillin.  He did tolerate these both OK.    Feeling much better than it had.  It feels kind of like he's just coming off novocaine.  He does have some tenderness in the gum line above the tooth.  It's his right top incisor.  This is a crowned tooth anyway.        He has had ongoing hoarseness.  2 ENTs have felt this was related to acid reflux.  They have started him on higher dose PPI and have him sleep on an incline.      He still has urinary frequency, but his urgency has decreased.      His mouth is really dry since starting the tolterodine.  Just filled it.      Breathing is good on Symbicort.        Objective    /78 (Cuff Size: Adult Regular)   Pulse 70   Temp 98.8  F (37.1  C) (Temporal)   Resp 18   Ht 1.791 m (5' 10.5\")   Wt 87.1 kg (192 lb)   SpO2 97%   BMI 27.16 kg/m    Body mass index is 27.16 kg/m .  Physical Exam   GENERAL: alert and no distress  HENT: normal cephalic/atraumatic, ear canals and TM's normal, nose and mouth without ulcers or lesions, oropharynx clear, oral mucous membranes moist, and right top incisor is mildly tender with some erythema of his gum above it.    NECK: no adenopathy, no asymmetry, masses, or scars  RESP: lungs clear to auscultation - no rales, rhonchi or wheezes  CV: regular rate and rhythm, normal S1 S2, no S3 or S4, no murmur, click or rub, no peripheral edema  ABDOMEN: soft, nontender, no hepatosplenomegaly, no masses and bowel sounds normal  MS: no gross musculoskeletal defects noted, no edema    Admission on 10/16/2023, Discharged on 10/16/2023   Component Date Value Ref Range Status    Case Report 10/16/2023    Final                    Value:Surgical Pathology Report                         Case: [id number]                                  Authorizing Provider:  Huber Ireland MD       Collected:           10/16/2023 08:34 AM          Ordering Location:     [facility]          Received:            10/16/2023 " 09:37 AM                                 [facility]                                                          Pathologist:           Brandyn Álvarez MD                                                           Specimens:   A) - Large Intestine, Colon, Ascending, Ascending colon Polyp                                       B) - Large Intestine, Colon, Transverse, Transverse colon Poyps                                     C) - Large Intestine, Colon, Sigmoid, Sigmoid colon Polyp                                  Final Diagnosis 10/16/2023    Final                    Value:This result contains rich text formatting which cannot be displayed here.    Clinical Information 10/16/2023    Final                    Value:This result contains rich text formatting which cannot be displayed here.    Gross Description 10/16/2023    Final                    Value:This result contains rich text formatting which cannot be displayed here.    Microscopic Description 10/16/2023    Final                    Value:This result contains rich text formatting which cannot be displayed here.    Performing Labs 10/16/2023    Final                    Value:This result contains rich text formatting which cannot be displayed here.    COLONOSCOPY 10/16/2023    Final                    Value:[facility]  [address] [place] [id number]  _______________________________________________________________________________  Patient Name: Keegan Buchanan           Procedure Date: 10/16/2023 8:15 AM  MRN: [id number]                       Account Number: [id number]  YOB: 1955             Admit Type: Outpatient  Age: 67                               Room: [place] [address]  Gender: Male                          Note Status: Finalized  Attending MD: HANNAH PIPER MD,   Total Sedation Time:   _______________________________________________________________________________     Procedure:              Colonoscopy  Indications:           High risk colon cancer surveillance: Personal history                          of colonic polyps  Providers:             HANNAH PIPER MD  Referring MD:          FRAN THOMAS MD  Medicines:             Propofol per Anesthesia  Complications:         No immediate complications.  ___________________________                          ____________________________________________________  Procedure:             Pre-Anesthesia Assessment:                         - Prior to the procedure, a History and Physical was                          performed, and patient medications, allergies and                          sensitivities were reviewed. The patient's tolerance                          of previous anesthesia was reviewed.                         - Pre-procedure physical examination revealed no                          contraindications to sedation.                         After obtaining informed consent, the colonoscope was                          passed under direct vision. Throughout the procedure,                          the patient's blood pressure, pulse, and oxygen                          saturations were monitored continuously. The                          Colonoscope was introduced through the anus and                          advanced to the cecum, identified by appendiceal                          sophia                          fice and ileocecal valve.                                                                                   Findings:       The perianal and digital rectal examinations were normal.       A few medium-mouthed diverticula were found in the sigmoid colon.       A 3 mm polyp was found in the ascending colon. The polyp was sessile.        The polyp was removed with a cold snare. Resection and retrieval were        complete.       Two sessile polyps were found in the transverse colon. The polyps were 3        to 6 mm in size. These polyps  were removed with a cold snare. Resection        and retrieval were complete.       A 5 mm polyp was found in the sigmoid colon. The polyp was sessile. The        polyp was removed with a cold snare. Resection and retrieval were        complete.       No additional abnormalities were found on retroflexion.                                                                                   Impression:            - Diverticulosis in the sigmoid colon.                                                   - One 3 mm polyp in the ascending colon, removed with                          a cold snare. Resected and retrieved.                         - Two 3 to 6 mm polyps in the transverse colon,                          removed with a cold snare. Resected and retrieved.                         - One 5 mm polyp in the sigmoid colon, removed with a                          cold snare. Resected and retrieved.  Recommendation:        - High fiber diet.                         - Await pathology results.                         - Repeat colonoscopy in 5 years for surveillance.                                                                                   Procedure Code(s):       --- Professional ---       34349, Colonoscopy, flexible; with removal of tumor(s), polyp(s), or        other lesion(s) by snare technique    CPT copyright 2022 [employer]. All rights reserved.    The codes documented in this report are preliminary and upon  review may   be revised                           to meet current compliance requirements.    Huber Piper  ___________________  HUBER PIPER MD  10/16/2023 8:45:43 AM  I was physically present for the entire viewing portion of the exam.HUBER PIPER MD  Number of Addenda: 0    Note Initiated On: 10/16/2023 8:15 AM       No results found for any visits on 04/25/24.  No results found for this or any previous visit (from the past 24 hour(s)).        Signed Electronically  by: Fabio Stovall MD, MD

## 2024-04-25 NOTE — PATIENT INSTRUCTIONS
You should be OK until your root canal.    If you have any increase pain or fever, then fill the antibiotics.      Let's try changing your Symbicort to Airduo.  Let me know if not helpful or problems.  Continue to rinse after use.    We could also consider another course of medication for thrush.      If you want, we could consider changing tolterodine to a similar medication to see if that would cause less dry mouth.      Contact us or return if questions or concerns.    Have a nice day!    Dr. Stovall

## 2024-04-29 DIAGNOSIS — R00.2 PALPITATIONS: ICD-10-CM

## 2024-04-29 DIAGNOSIS — J44.9 MODERATE CHRONIC OBSTRUCTIVE PULMONARY DISEASE (H): ICD-10-CM

## 2024-04-29 RX ORDER — BUDESONIDE AND FORMOTEROL FUMARATE DIHYDRATE 160; 4.5 UG/1; UG/1
AEROSOL RESPIRATORY (INHALATION)
Qty: 30.6 G | Refills: 1 | Status: SHIPPED | OUTPATIENT
Start: 2024-04-29 | End: 2024-06-07

## 2024-05-02 RX ORDER — METOPROLOL SUCCINATE 50 MG/1
TABLET, EXTENDED RELEASE ORAL
Qty: 180 TABLET | Refills: 3 | Status: SHIPPED | OUTPATIENT
Start: 2024-05-02

## 2024-05-08 ENCOUNTER — NURSE TRIAGE (OUTPATIENT)
Dept: FAMILY MEDICINE | Facility: OTHER | Age: 69
End: 2024-05-08
Payer: MEDICARE

## 2024-05-08 ENCOUNTER — MYC MEDICAL ADVICE (OUTPATIENT)
Dept: FAMILY MEDICINE | Facility: CLINIC | Age: 69
End: 2024-05-08
Payer: MEDICARE

## 2024-05-08 DIAGNOSIS — M54.2 CERVICALGIA: ICD-10-CM

## 2024-05-08 DIAGNOSIS — J44.9 MODERATE CHRONIC OBSTRUCTIVE PULMONARY DISEASE (H): ICD-10-CM

## 2024-05-08 DIAGNOSIS — S39.012A BACK STRAIN, INITIAL ENCOUNTER: Primary | ICD-10-CM

## 2024-05-08 DIAGNOSIS — M50.30 DDD (DEGENERATIVE DISC DISEASE), CERVICAL: ICD-10-CM

## 2024-05-08 DIAGNOSIS — M48.02 NEURAL FORAMINAL STENOSIS OF CERVICAL SPINE: ICD-10-CM

## 2024-05-08 NOTE — TELEPHONE ENCOUNTER
Okay to continue Symbicort.  I will also send in a prescription for Dulera as an alternative.    If still not helpful, we could consider Spiriva or something similar.    Would only recommend EGD if his symptoms or not fully controlled with acid blockade (omeprazole).

## 2024-05-08 NOTE — TELEPHONE ENCOUNTER
See nurse triage 5/8/24 regarding back pain. Will ask about inhaler question in triage as well.     Olimpia Meza RN on 5/8/2024 at 12:49 PM

## 2024-05-08 NOTE — TELEPHONE ENCOUNTER
Called patient and relayed providers message. He expressed understanding. Will  Dulera prescription and try that. No further questions or concerns at this time.    Bridget Lu RN on 5/8/2024 at 4:07 PM

## 2024-05-08 NOTE — TELEPHONE ENCOUNTER
PT order entered.      OK to change inhaler back.  To be clear, he'll be on airduo or on Symbicort?

## 2024-05-08 NOTE — TELEPHONE ENCOUNTER
"[facility] (supporting Fabio Stovall MD)5 hours ago (7:17 AM)     Sobia Stovall, I have a question since we switched my inhaler to fluticasone-salmeterol I have had a minor sore throat and I am coughing a lot more than I was before. Do you think it would be smart to go back to the previous inhaler or what do you think and then second question I was wondering if you could give me a referral to a physical therapist. I ve had troubles with my back the last week and a half, and when I get it back to normal, I would like to see if physical therapist and see if there s some exercises I can do to keep it good thank you  Nurse Triage SBAR    Is this a 2nd Level Triage? NO    Situation: Patient said 1.5 weeks ago he \"blew his back out\". He said it happened after his appointment with you on 4/25/24. He says he has had back problems for about 30 years and every once in awhile this will happen. He will feel the twinge in his back when he turns a certain way. He has been going to a chiropractor and that has helped some. He says that sometime when he turns a certain way the pain will go up to 10/10 and it'll be painful for him to take a step.      Background: Had a back injury from work a long time ago and has had issues for 30 years     Assessment: He says the pain is in his lower back on the left side right around his belt line. No numbness or weakness in leg. No loss of bowel or bladder control. Pain does not radiate down the thigh but does endorse that it does radiate around to hip intermittently.     Protocol Recommended Disposition:   See in Office Within 3 Days    Recommendation: Patient is wondering if he can get a referral to physical therapy to learn better body mechanics to try and prevent \"blowing out his back\" or do you recommend he be seen for this?     Patient is also wondering if his new inhaler could be causing his minor sore throat and he has been coughing more. He denies " shortness of breath, cough is mild, no fevers and no other symptoms.    Routed to provider    Does the patient meet one of the following criteria for ADS visit consideration? No    Olimpia Meza RN on 5/8/2024 at 12:59 PM    Reason for Disposition   MODERATE back pain (e.g., interferes with normal activities) and present > 3 days    Additional Information   Negative: Passed out (i.e., fainted, collapsed and was not responding)   Negative: Shock suspected (e.g., cold/pale/clammy skin, too weak to stand, low BP, rapid pulse)   Negative: Sounds like a life-threatening emergency to the triager   Negative: Major injury to the back (e.g., MVA, fall > 10 feet or 3 meters, penetrating injury, etc.)   Negative: Pain in the upper back over the ribs (rib cage) that radiates (travels) into the chest   Negative: Pain in the upper back over the ribs (rib cage) and worsened by coughing (or clearly increases with breathing)   Negative: Back pain during pregnancy   Negative: SEVERE back pain of sudden onset and age > 60 years   Negative: SEVERE abdominal pain (e.g., excruciating)   Negative: Abdominal pain and age > 60 years   Negative: Unable to urinate (or only a few drops) and bladder feels very full   Negative: Loss of bladder or bowel control (urine or bowel incontinence; wetting self, leaking stool) of new-onset   Negative: Numbness (loss of sensation) in groin or rectal area   Negative: Pain radiates into groin, scrotum   Negative: Blood in urine (red, pink, or tea-colored)   Negative: Vomiting and pain over lower ribs of back (i.e., flank - kidney area)   Negative: Weakness of a leg or foot (e.g., unable to bear weight, dragging foot)   Negative: Patient sounds very sick or weak to the triager   Negative: Fever > 100.4 F (38.0 C) and flank pain   Negative: Pain or burning with passing urine (urination)   Negative: SEVERE back pain (e.g., excruciating, unable to do any normal activities) and not improved after pain  medicine and CARE ADVICE   Negative: Numbness in an arm or hand (i.e., loss of sensation) and upper back pain   Negative: Numbness in a leg or foot (i.e., loss of sensation)   Negative: High-risk adult (e.g., history of cancer, history of HIV, or history of IV Drug Use)   Negative: Soft tissue infection (e.g., abscess, cellulitis) or other serious infection (e.g., bacteremia) in last 2 weeks   Negative: Painful rash with multiple small blisters grouped together (i.e., dermatomal distribution or 'band' or 'stripe')   Negative: Pain radiates into the thigh or further down the leg, and in both legs   Negative: Age > 50 and no history of prior similar back pain    Protocols used: Back Pain-A-[place]

## 2024-05-08 NOTE — TELEPHONE ENCOUNTER
"Phoned patient and informed him of documentation per Dr. Wilman MD as stated below.    Patient stated he would like to have the continued inhaler, Symbicort.      He stated since starting the AIRDUO he has had a sore throat, a cough, and keeps having to clear his throat throughout the day.  He stated if there was another alternative to Symbicort that Dr. Wilman HICKEY might thought would be helpful for his \"raspy throat\" per their last conversation, he could try that too.      He stated he had two ENT providers state he had \"angry vocal cords\".  This RN asked if patient ever discussed with ENT about getting an EGD for his inflammed vocal cords.  Patient stated he did not discuss this procedure for EGD, but if it would be helpful in assessing his vocal cords, he would like to have that looked at.      Will forward to PCP for review.    Bethany Muhammad RN        "

## 2024-05-09 ENCOUNTER — MYC MEDICAL ADVICE (OUTPATIENT)
Dept: FAMILY MEDICINE | Facility: CLINIC | Age: 69
End: 2024-05-09
Payer: MEDICARE

## 2024-05-16 ENCOUNTER — THERAPY VISIT (OUTPATIENT)
Dept: PHYSICAL THERAPY | Facility: CLINIC | Age: 69
End: 2024-05-16
Attending: FAMILY MEDICINE
Payer: MEDICARE

## 2024-05-16 DIAGNOSIS — M54.2 CERVICALGIA: ICD-10-CM

## 2024-05-16 DIAGNOSIS — M50.30 DDD (DEGENERATIVE DISC DISEASE), CERVICAL: ICD-10-CM

## 2024-05-16 DIAGNOSIS — S39.012A BACK STRAIN, INITIAL ENCOUNTER: ICD-10-CM

## 2024-05-16 DIAGNOSIS — M48.02 NEURAL FORAMINAL STENOSIS OF CERVICAL SPINE: ICD-10-CM

## 2024-05-16 PROCEDURE — 97161 PT EVAL LOW COMPLEX 20 MIN: CPT | Mod: GP | Performed by: PHYSICAL THERAPIST

## 2024-05-16 PROCEDURE — 97110 THERAPEUTIC EXERCISES: CPT | Mod: GP | Performed by: PHYSICAL THERAPIST

## 2024-05-16 NOTE — PROGRESS NOTES
PHYSICAL THERAPY EVALUATION  Type of Visit: Evaluation    See electronic medical record for Abuse and Falls Screening details.    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       Presenting condition or subjective complaint: Trying to figure out how to manage lower back pain with therapy so it doesn t keep getting aggravated like it does  Date of onset: 03/16/24    Relevant medical history:     Dates & types of surgery: Hernia surgery a long time ago and two stents put in my heart in 2012    Prior diagnostic imaging/testing results: X-ray     Prior therapy history for the same diagnosis, illness or injury: Yes      Prior Level of Function  Transfers: Independent  Ambulation: Independent  ADL: Independent  IADL:  independent , likes to ride horse     Living Environment  Social support: With a significant other or spouse   Type of home:     Stairs to enter the home:         Ramp: No   Stairs inside the home: Yes 14 Is there a railing: Yes   Help at home: None  Equipment owned:       Employment: No    Hobbies/Interests: Horseback riding    Patient goals for therapy: Moving work normally without the fear of my back going out again    Pain assessment:      Objective   LUMBAR SPINE EVALUATION  PAIN: left low back 2-9/10   INTEGUMENTARY (edema, incisions):   POSTURE:   GAIT:   Weightbearing Status:   Assistive Device(s):   Gait Deviations:   BALANCE/PROPRIOCEPTION:   WEIGHTBEARING ALIGNMENT:   NON-WEIGHTBEARING ALIGNMENT:    ROM: very limited AROM flexion , extension , laterial flexion and rotation no bias to flexion or extension ,   PELVIC/SI SCREEN: neg  STRENGTH: 4/5 in B LE able to stand on heels and toes     MYOTOMES:   DTR S:   CORD SIGNS:    DERMATOMES: denies numb or tingling   NEURAL TENSION:   FLEXIBILITY: B 90/90 50   LUMBAR/HIP Special Tests: neg SLR    PELVIS/SI SPECIAL TESTS:   FUNCTIONAL TESTS:   PALPATION:   SPINAL SEGMENTAL CONCLUSIONS:       Assessment & Plan   CLINICAL IMPRESSIONS  Medical Diagnosis: back strain S39.012a    Treatment Diagnosis: left low back   Impression/Assessment: Patient is a 68 year old male with chronic left low back  complaints.  The following significant findings have been identified: Pain, Decreased ROM/flexibility, Decreased strength, Impaired gait, Impaired muscle performance, and Decreased activity tolerance. These impairments interfere with their ability to perform self care tasks, work tasks, recreational activities, household chores, and driving  as compared to previous level of function.     Clinical Decision Making (Complexity):  Clinical Presentation: Stable/Uncomplicated  Clinical Presentation Rationale: based on medical and personal factors listed in PT evaluation  Clinical Decision Making (Complexity): Low complexity    PLAN OF CARE  Treatment Interventions:  Modalities:  modalities as needed   Interventions: Manual Therapy, Neuromuscular Re-education, Therapeutic Activity, Therapeutic Exercise    Long Term Goals     PT Goal 1  Goal Identifier: 1  Goal Description: instruction in HEP and compliant with it 5 of 7 days to improve tolerance to activity  Target Date: 08/13/24  PT Goal 2  Goal Identifier: 2  Goal Description: patient to have reduction in pain level currently  2-9/10 goal is 2-6/10  Target Date: 08/13/24  PT Goal 3  Goal Identifier: 3  Goal Description: patient to have improved tolerance to activity 50% currently ANGELA 13.33  Target Date: 08/13/24      Frequency of Treatment: every other week x 12 weeks  Duration of Treatment:      Recommended Referrals to Other Professionals:   Education Assessment:   Learner/Method: Patient;Listening;Reading;Demonstration;Pictures/Video;No Barriers to  Learning    Risks and benefits of evaluation/treatment have been explained.   Patient/Family/caregiver agrees with Plan of Care.     Evaluation Time:     PT Eval, Low Complexity Minutes (47713): 15       Signing Clinician: Suzan Vences, PT      [facility]                                                                                   OUTPATIENT PHYSICAL THERAPY      PLAN OF TREATMENT FOR OUTPATIENT REHABILITATION   Patient's Last Name, First Name, Keegan Rutherford YOB: 1955   Provider's Name   [facility]   Medical Record No.  [id number]     Onset Date: 03/16/24  Start of Care Date: 05/16/24     Medical Diagnosis:  back strain S39.012a      PT Treatment Diagnosis:  left low back Plan of Treatment  Frequency/Duration: every other week x 12 weeks/      Certification date from 05/16/24 to 08/13/24         See note for plan of treatment details and functional goals     Suzan Vences, PT                         I CERTIFY THE NEED FOR THESE SERVICES FURNISHED UNDER        THIS PLAN OF TREATMENT AND WHILE UNDER MY CARE     (Physician attestation of this document indicates review and certification of the therapy plan).              Referring Provider:  Fabio Stovall    Initial Assessment  See Epic Evaluation- Start of Care Date: 05/16/24

## 2024-05-23 ENCOUNTER — PATIENT OUTREACH (OUTPATIENT)
Dept: CARE COORDINATION | Facility: CLINIC | Age: 69
End: 2024-05-23
Payer: MEDICARE

## 2024-05-30 ENCOUNTER — THERAPY VISIT (OUTPATIENT)
Dept: PHYSICAL THERAPY | Facility: CLINIC | Age: 69
End: 2024-05-30
Attending: FAMILY MEDICINE
Payer: MEDICARE

## 2024-05-30 DIAGNOSIS — S39.012A BACK STRAIN: Primary | ICD-10-CM

## 2024-05-30 PROCEDURE — 97110 THERAPEUTIC EXERCISES: CPT | Mod: GP | Performed by: PHYSICAL THERAPIST

## 2024-06-06 ENCOUNTER — PATIENT OUTREACH (OUTPATIENT)
Dept: CARE COORDINATION | Facility: CLINIC | Age: 69
End: 2024-06-06
Payer: MEDICARE

## 2024-06-07 ENCOUNTER — MYC MEDICAL ADVICE (OUTPATIENT)
Dept: FAMILY MEDICINE | Facility: CLINIC | Age: 69
End: 2024-06-07
Payer: MEDICARE

## 2024-06-07 DIAGNOSIS — J44.9 MODERATE CHRONIC OBSTRUCTIVE PULMONARY DISEASE (H): ICD-10-CM

## 2024-06-07 RX ORDER — BUDESONIDE AND FORMOTEROL FUMARATE DIHYDRATE 160; 4.5 UG/1; UG/1
2 AEROSOL RESPIRATORY (INHALATION)
Qty: 30.6 G | Refills: 1 | Status: SHIPPED | OUTPATIENT
Start: 2024-06-07 | End: 2024-09-25

## 2024-06-20 ENCOUNTER — THERAPY VISIT (OUTPATIENT)
Dept: PHYSICAL THERAPY | Facility: CLINIC | Age: 69
End: 2024-06-20
Attending: FAMILY MEDICINE
Payer: MEDICARE

## 2024-06-20 DIAGNOSIS — S39.012A BACK STRAIN: Primary | ICD-10-CM

## 2024-06-20 PROCEDURE — 97110 THERAPEUTIC EXERCISES: CPT | Mod: GP | Performed by: PHYSICAL THERAPIST

## 2024-06-25 ENCOUNTER — NURSE TRIAGE (OUTPATIENT)
Dept: FAMILY MEDICINE | Facility: OTHER | Age: 69
End: 2024-06-25
Payer: MEDICARE

## 2024-06-25 ENCOUNTER — MYC MEDICAL ADVICE (OUTPATIENT)
Dept: FAMILY MEDICINE | Facility: CLINIC | Age: 69
End: 2024-06-25
Payer: MEDICARE

## 2024-06-25 NOTE — TELEPHONE ENCOUNTER
"  Nurse Triage SBAR    Is this a 2nd Level Triage? NO    Situation: called patient to triage diarrhea from Mychart message    Background: patient started an antibiotic for tooth infection on 4/12/24. Patient started taking a probiotic daily as in the past he has gotten diarrhea in the past with antibiotics. Per patient PCP told him to take it for \"a few months\" . Patient stopped taking the probiotic about 2-3 weeks ago. Now having diarrhea. He states it is happening about an hour or so after meals. He gets some abdominal cramping and then has diarrhea.   No blood noted in stool, loose brown. No new diet.   Assessment: had an episode of diarrhea this morning after breakfast. No fever, abdominal pain not present. No blood or mucous in stool. Attempted to schedule an appointment for the patient but he plans to start taking the probiotic again. He said if the diarrhea does not resolve in \"a few day\" he will call back for an appointment    Protocol Recommended Disposition:   Home Care    Recommendation: reviewed signs and symptoms of dehydration with the patient. Reviewed red alert symptoms and when to call back with concerns.  Patient expressed understanding. No further questions or concerns at this time.          Does the patient meet one of the following criteria for ADS visit consideration? No  Took for  few weeks stopped 2 weeks  Diarrhea after meal bad stomach  Reason for Disposition   MILD-MODERATE diarrhea (e.g., 1-6 times / day more than normal)    Additional Information   Negative: Shock suspected (e.g., cold/pale/clammy skin, too weak to stand, low BP, rapid pulse)   Negative: Difficult to awaken or acting confused (e.g., disoriented, slurred speech)   Negative: Sounds like a life-threatening emergency to the triager   Negative: Vomiting also present and worse than the diarrhea   Negative: Blood in stool and without diarrhea   Negative: SEVERE abdominal pain (e.g., excruciating) and present > 1 hour   " Negative: SEVERE abdominal pain and age > 60 years   Negative: Bloody, black, or tarry bowel movements  (Exception: Chronic-unchanged black-grey bowel movements and is taking iron pills or Pepto-Bismol.)   Negative: SEVERE diarrhea (e.g., 7 or more times / day more than normal) and age > 60 years   Negative: Constant abdominal pain lasting > 2 hours   Negative: Drinking very little and dehydration suspected (e.g., no urine > 12 hours, very dry mouth, very lightheaded)   Negative: Patient sounds very sick or weak to the triager   Negative: SEVERE diarrhea (e.g., 7 or more times / day more than normal) and present > 24 hours (1 day)   Negative: MODERATE diarrhea (e.g., 4-6 times / day more than normal) and present > 48 hours (2 days)   Negative: MODERATE diarrhea (e.g., 4-6 times / day more than normal) and age > 70 years   Negative: Abdominal pain (Exception: Pain clears completely with each passage of diarrhea stool.)   Negative: Fever > 101 F (38.3 C)   Negative: Blood in the stool  (Exception: Only on toilet paper. Reason: Diarrhea can cause rectal irritation with blood on wiping.)   Negative: Mucus or pus in stool has been present > 2 days and diarrhea is more than mild   Negative: Weak immune system (e.g., HIV positive, cancer chemo, splenectomy, organ transplant, chronic steroids)   Negative: Travel to a foreign country in past month   Negative: Recent antibiotic therapy (i.e., within last 2 months) and diarrhea present > 3 days since antibiotic was stopped   Negative: Recent hospitalization and diarrhea present > 3 days   Negative: Tube feedings (e.g., nasogastric, g-tube, j-tube)   Negative: MILD diarrhea (e.g., 1-3 or more stools than normal in past 24 hours) diarrhea without known cause and present > 7 days   Negative: Patient wants to be seen   Negative: Diarrhea is a chronic symptom (recurrent or ongoing AND lasting > 4 weeks)    Protocols used: Diarrhea-A-[place]

## 2024-06-27 ENCOUNTER — MYC REFILL (OUTPATIENT)
Dept: CARDIOLOGY | Facility: CLINIC | Age: 69
End: 2024-06-27
Payer: MEDICARE

## 2024-06-27 DIAGNOSIS — I25.10 CAD (CORONARY ARTERY DISEASE): Primary | ICD-10-CM

## 2024-06-27 DIAGNOSIS — I24.9 ACS (ACUTE CORONARY SYNDROME) (H): ICD-10-CM

## 2024-06-27 RX ORDER — NITROGLYCERIN 0.4 MG/1
0.4 TABLET SUBLINGUAL EVERY 5 MIN PRN
Qty: 25 TABLET | Refills: 0 | Status: SHIPPED | OUTPATIENT
Start: 2024-06-27

## 2024-07-01 NOTE — PROGRESS NOTES
DISCHARGE  Reason for Discharge: Patient has met all goals.    Equipment Issued: none    Discharge Plan: Patient to continue home program.    Referring Provider:  Fabio Stovall     06/20/24 0500   Appointment Info   Signing clinician's name / credentials keith poole PT   Total/Authorized Visits [email or web address]   Medical Diagnosis back strain S39.012a   PT Tx Diagnosis left low back   Quick Adds Certification   Progress Note/Certification   Start of Care Date 05/16/24   Onset of illness/injury or Date of Surgery 03/16/24   Therapy Frequency every other week x 12 weeks   Certification date from 05/16/24   Certification date to 08/13/24   GOALS   PT Goals 2;3   PT Goal 1   Goal Identifier 1   Goal Description instruction in HEP and compliant with it 5 of 7 days to improve tolerance to activity   Target Date 08/13/24   PT Goal 2   Goal Identifier 2   Goal Description patient to have reduction in pain level currently  2-9/10 goal is 2-6/10   Target Date 08/13/24   PT Goal 3   Goal Identifier 3   Goal Description patient to have improved tolerance to activity 50% currently ANGELA 13.33   Target Date 08/13/24   Subjective Report   Subjective Report HEP is going really well and has been able   Objective Measure 1   Objective Measure sergio med ANGELA 13.33 at al   Details currently sergio low ANGELA 4.44   Treatment Interventions (PT)   Interventions Therapeutic Procedure/Exercise   Therapeutic Procedure/Exercise   Therapeutic Procedures: strength, endurance, ROM, flexibility minutes (38650) 35   Ther Proc 1 - Details reviewed  HEP and exercises 90/90 hold 10 x 5 , SKC , supine bent knee trunk rotation and prone knee flexion , added bridges , SLR , hip abduction and hands/knees recipical flexion/extension  30, nustep level 1 x 15, at llbars stood on bosu at home will stand on foam single leg and rhomberg balance 1 minute   Skilled Intervention instruction in HEP and exercises   Patient Response/Progress HEP is going really well    Ther Proc 1 nustep 833   Education   Learner/Method Patient;Listening;Reading;Demonstration;Pictures/Video;No Barriers to Learning   Plan   Home program reviewed HEP and exercises 90/90 hold 10 x 5 , SKC , supine bent knee trunk rotation and prone knee flexion , added bridges , SLR , hip abduction and hands/knees recipical flexion/extension 30, nustep level 1 x 15, at llbars stood on bosu at home will stand on foam single leg and rhomberg balance 1 minute   Plan for next session doing well will continue on HEP and call in 2 weeks and if doing well will discharge   Total Session Time   Timed Code Treatment Minutes 35   Total Treatment Time (sum of timed and untimed services) 35

## 2024-07-02 ENCOUNTER — OFFICE VISIT (OUTPATIENT)
Dept: FAMILY MEDICINE | Facility: OTHER | Age: 69
End: 2024-07-02
Payer: MEDICARE

## 2024-07-02 VITALS
SYSTOLIC BLOOD PRESSURE: 110 MMHG | WEIGHT: 194 LBS | RESPIRATION RATE: 18 BRPM | TEMPERATURE: 97.2 F | OXYGEN SATURATION: 97 % | DIASTOLIC BLOOD PRESSURE: 70 MMHG | HEART RATE: 70 BPM | BODY MASS INDEX: 27.44 KG/M2

## 2024-07-02 DIAGNOSIS — R19.5 LOOSE STOOLS: Primary | ICD-10-CM

## 2024-07-02 DIAGNOSIS — I10 HYPERTENSION GOAL BP (BLOOD PRESSURE) < 140/90: ICD-10-CM

## 2024-07-02 PROBLEM — K40.90 LEFT INGUINAL HERNIA: Status: RESOLVED | Noted: 2018-01-18 | Resolved: 2024-07-02

## 2024-07-02 PROBLEM — R09.81 CONGESTION OF PARANASAL SINUS: Status: RESOLVED | Noted: 2018-11-23 | Resolved: 2024-07-02

## 2024-07-02 PROBLEM — M54.2 NECK PAIN: Status: RESOLVED | Noted: 2023-06-27 | Resolved: 2024-07-02

## 2024-07-02 LAB
ALBUMIN SERPL BCG-MCNC: 4.4 G/DL (ref 3.5–5.2)
ALP SERPL-CCNC: 103 U/L (ref 40–150)
ALT SERPL W P-5'-P-CCNC: 42 U/L (ref 0–70)
ANION GAP SERPL CALCULATED.3IONS-SCNC: 11 MMOL/L (ref 7–15)
AST SERPL W P-5'-P-CCNC: 31 U/L (ref 0–45)
BILIRUB SERPL-MCNC: 0.8 MG/DL
BUN SERPL-MCNC: 9.9 MG/DL (ref 8–23)
CALCIUM SERPL-MCNC: 9.5 MG/DL (ref 8.8–10.2)
CHLORIDE SERPL-SCNC: 104 MMOL/L (ref 98–107)
CREAT SERPL-MCNC: 0.8 MG/DL (ref 0.67–1.17)
DEPRECATED HCO3 PLAS-SCNC: 26 MMOL/L (ref 22–29)
EGFRCR SERPLBLD CKD-EPI 2021: >90 ML/MIN/1.73M2
ERYTHROCYTE [DISTWIDTH] IN BLOOD BY AUTOMATED COUNT: 13.4 % (ref 10–15)
GLUCOSE SERPL-MCNC: 111 MG/DL (ref 70–99)
HCT VFR BLD AUTO: 45.7 % (ref 40–53)
HGB BLD-MCNC: 15.8 G/DL (ref 13.3–17.7)
MCH RBC QN AUTO: 29.6 PG (ref 26.5–33)
MCHC RBC AUTO-ENTMCNC: 34.6 G/DL (ref 31.5–36.5)
MCV RBC AUTO: 86 FL (ref 78–100)
PLATELET # BLD AUTO: 235 10E3/UL (ref 150–450)
POTASSIUM SERPL-SCNC: 4.2 MMOL/L (ref 3.4–5.3)
PROT SERPL-MCNC: 7.4 G/DL (ref 6.4–8.3)
RBC # BLD AUTO: 5.33 10E6/UL (ref 4.4–5.9)
SODIUM SERPL-SCNC: 141 MMOL/L (ref 135–145)
TSH SERPL DL<=0.005 MIU/L-ACNC: 1.43 UIU/ML (ref 0.3–4.2)
WBC # BLD AUTO: 6.1 10E3/UL (ref 4–11)

## 2024-07-02 PROCEDURE — 99213 OFFICE O/P EST LOW 20 MIN: CPT | Performed by: FAMILY MEDICINE

## 2024-07-02 PROCEDURE — 84443 ASSAY THYROID STIM HORMONE: CPT | Performed by: FAMILY MEDICINE

## 2024-07-02 PROCEDURE — 85027 COMPLETE CBC AUTOMATED: CPT | Performed by: FAMILY MEDICINE

## 2024-07-02 PROCEDURE — 36415 COLL VENOUS BLD VENIPUNCTURE: CPT | Performed by: FAMILY MEDICINE

## 2024-07-02 PROCEDURE — 80053 COMPREHEN METABOLIC PANEL: CPT | Performed by: FAMILY MEDICINE

## 2024-07-02 ASSESSMENT — PAIN SCALES - GENERAL: PAINLEVEL: NO PAIN (0)

## 2024-07-02 NOTE — PROGRESS NOTES
Assessment & Plan     Loose stools  He had loose stools after taking penicillin for tooth infection.  He started probiotics and the loose stools have resolved.  He denies watery stools and therefore will not check a C. difficile.  He is wondering if there is anything else that is occurring.  Will check labs and will check other stool studies.  He will continue his probiotics at this time.  He has had a recent colonoscopy.  He currently is not having any abdominal pain and therefore will not do imaging.  He states he has enough probiotics for couple months.  He may consider stopping them when he is out of probiotics and then see what his stools are doing.  He will follow-up with his PCP for his yearly physical.    - COMPREHENSIVE METABOLIC PANEL  - CBC with platelets  - Enteric Bacteria and Virus Panel by [facility] Stool  - TSH with free T4 reflex    Hypertension goal BP (blood pressure) < 140/90  Well-controlled.    - TSH with free T4 reflex      Subjective   Keegan is a 68 year old, presenting for the following health issues:  Gastrointestinal Problem      7/2/2024     8:17 AM   Additional Questions   Roomed by stefan     History of Present Illness       Reason for visit:  Abdominal pain  Symptom onset:  More than a month  Symptoms include:  Cramping, diarrhea  Symptom intensity:  Moderate  Symptom progression:  Improving  Had these symptoms before:  No  What makes it better:  Probiotics    He eats 0-1 servings of fruits and vegetables daily.He consumes 0 sweetened beverage(s) daily.He exercises with enough effort to increase his heart rate 20 to 29 minutes per day.  He exercises with enough effort to increase his heart rate 4 days per week.   He is taking medications regularly.     History of Present Illness    Keegan Buchanan, a 68-year-old male, presented with a history of a tooth infection in the front of his mouth in late April. The tooth was artificial and the infection caused significant swelling on the right  side of his face and lip, likening his appearance to a character from the [place] the [email or web address] movie. He was prescribed an antibiotic, which he believes was amoxicillin, to treat the infection. He had previously believed he was allergic to penicillin and amoxicillin due to stomach issues, but his regular doctor suggested he was merely sensitive to these medications. The antibiotic successfully treated the infection.  He had no respiratory difficulties. He was also told to start probiotics while he was taking the antibiotics.  He stopped the probiotics after 1 month.  He experienced diarrhea after stopping the probiotics, especially after eating.  He got cramping right before having the loose stools.  The loose stools resolved once he resumed taking the probiotics. He also reported that his stools are not consistent, varying from normal to runny to small and hard. He occasionally takes Metamucil, but noted that it caused constipation.  He admits to not drinking much fluids he also reported feeling slightly nauseated in the mornings for the past few years.  He had colonoscopy last October           Objective    /70 (BP Location: Right arm, Patient Position: Sitting, Cuff Size: Adult Regular)   Pulse 70   Temp 97.2  F (36.2  C) (Temporal)   Resp 18   Wt 88 kg (194 lb)   SpO2 97%   BMI 27.44 kg/m    Body mass index is 27.44 kg/m .  Physical Exam   Gen: no apparent distress  Abd: The abdomen is soft without tenderness, guarding, mass, rebound or organomegaly. Bowel sounds are normal.             Signed Electronically by: Becky Ross MD

## 2024-07-03 ENCOUNTER — APPOINTMENT (OUTPATIENT)
Dept: LAB | Facility: OTHER | Age: 69
End: 2024-07-03
Payer: MEDICARE

## 2024-07-03 LAB

## 2024-07-03 PROCEDURE — 87507 IADNA-DNA/RNA PROBE TQ 12-25: CPT | Performed by: FAMILY MEDICINE

## 2024-07-04 ENCOUNTER — MYC MEDICAL ADVICE (OUTPATIENT)
Dept: FAMILY MEDICINE | Facility: OTHER | Age: 69
End: 2024-07-04
Payer: MEDICARE

## 2024-07-04 DIAGNOSIS — A49.8 ESCHERICHIA COLI (E. COLI) INFECTION: Primary | ICD-10-CM

## 2024-07-05 RX ORDER — AZITHROMYCIN 500 MG/1
1000 TABLET, FILM COATED ORAL ONCE
Qty: 2 TABLET | Refills: 0 | Status: SHIPPED | OUTPATIENT
Start: 2024-07-05 | End: 2024-07-05

## 2024-07-29 DIAGNOSIS — E78.5 HYPERLIPIDEMIA LDL GOAL <100: ICD-10-CM

## 2024-07-29 RX ORDER — ATORVASTATIN CALCIUM 80 MG/1
80 TABLET, FILM COATED ORAL DAILY
Qty: 90 TABLET | Refills: 0 | Status: SHIPPED | OUTPATIENT
Start: 2024-07-29 | End: 2024-09-25

## 2024-08-01 ENCOUNTER — MYC REFILL (OUTPATIENT)
Dept: FAMILY MEDICINE | Facility: CLINIC | Age: 69
End: 2024-08-01
Payer: MEDICARE

## 2024-08-01 DIAGNOSIS — K21.9 LARYNGOPHARYNGEAL REFLUX: ICD-10-CM

## 2024-08-01 DIAGNOSIS — K21.9 GASTROESOPHAGEAL REFLUX DISEASE WITHOUT ESOPHAGITIS: ICD-10-CM

## 2024-08-03 ENCOUNTER — HEALTH MAINTENANCE LETTER (OUTPATIENT)
Age: 69
End: 2024-08-03

## 2024-09-02 ENCOUNTER — MYC MEDICAL ADVICE (OUTPATIENT)
Dept: FAMILY MEDICINE | Facility: CLINIC | Age: 69
End: 2024-09-02
Payer: MEDICARE

## 2024-09-24 ASSESSMENT — PATIENT HEALTH QUESTIONNAIRE - PHQ9
SUM OF ALL RESPONSES TO PHQ QUESTIONS 1-9: 1
SUM OF ALL RESPONSES TO PHQ QUESTIONS 1-9: 1
10. IF YOU CHECKED OFF ANY PROBLEMS, HOW DIFFICULT HAVE THESE PROBLEMS MADE IT FOR YOU TO DO YOUR WORK, TAKE CARE OF THINGS AT HOME, OR GET ALONG WITH OTHER PEOPLE: NOT DIFFICULT AT ALL

## 2024-09-25 ENCOUNTER — OFFICE VISIT (OUTPATIENT)
Dept: FAMILY MEDICINE | Facility: OTHER | Age: 69
End: 2024-09-25
Payer: MEDICARE

## 2024-09-25 VITALS
TEMPERATURE: 97.4 F | HEART RATE: 56 BPM | DIASTOLIC BLOOD PRESSURE: 82 MMHG | RESPIRATION RATE: 19 BRPM | WEIGHT: 191.5 LBS | SYSTOLIC BLOOD PRESSURE: 132 MMHG | BODY MASS INDEX: 27.41 KG/M2 | OXYGEN SATURATION: 97 % | HEIGHT: 70 IN

## 2024-09-25 DIAGNOSIS — Z12.5 SCREENING FOR PROSTATE CANCER: ICD-10-CM

## 2024-09-25 DIAGNOSIS — N40.1 BENIGN PROSTATIC HYPERPLASIA WITH URINARY OBSTRUCTION: ICD-10-CM

## 2024-09-25 DIAGNOSIS — B37.0 THRUSH: ICD-10-CM

## 2024-09-25 DIAGNOSIS — N13.8 BENIGN PROSTATIC HYPERPLASIA WITH URINARY OBSTRUCTION: ICD-10-CM

## 2024-09-25 DIAGNOSIS — Z00.00 ENCOUNTER FOR MEDICARE ANNUAL WELLNESS EXAM: Primary | ICD-10-CM

## 2024-09-25 DIAGNOSIS — I25.10 CORONARY ARTERY DISEASE INVOLVING NATIVE CORONARY ARTERY OF NATIVE HEART WITHOUT ANGINA PECTORIS: ICD-10-CM

## 2024-09-25 DIAGNOSIS — J44.9 MODERATE CHRONIC OBSTRUCTIVE PULMONARY DISEASE (H): ICD-10-CM

## 2024-09-25 DIAGNOSIS — L57.0 AK (ACTINIC KERATOSIS): ICD-10-CM

## 2024-09-25 DIAGNOSIS — E78.5 HYPERLIPIDEMIA LDL GOAL <100: ICD-10-CM

## 2024-09-25 LAB
ALBUMIN SERPL BCG-MCNC: 4.5 G/DL (ref 3.5–5.2)
ALP SERPL-CCNC: 107 U/L (ref 40–150)
ALT SERPL W P-5'-P-CCNC: 62 U/L (ref 0–70)
ANION GAP SERPL CALCULATED.3IONS-SCNC: 10 MMOL/L (ref 7–15)
AST SERPL W P-5'-P-CCNC: 36 U/L (ref 0–45)
BILIRUB SERPL-MCNC: 0.9 MG/DL
BUN SERPL-MCNC: 11.3 MG/DL (ref 8–23)
CALCIUM SERPL-MCNC: 9.6 MG/DL (ref 8.8–10.4)
CHLORIDE SERPL-SCNC: 103 MMOL/L (ref 98–107)
CHOLEST SERPL-MCNC: 165 MG/DL
CREAT SERPL-MCNC: 0.85 MG/DL (ref 0.67–1.17)
EGFRCR SERPLBLD CKD-EPI 2021: >90 ML/MIN/1.73M2
FASTING STATUS PATIENT QL REPORTED: NO
FASTING STATUS PATIENT QL REPORTED: NO
GLUCOSE SERPL-MCNC: 107 MG/DL (ref 70–99)
HCO3 SERPL-SCNC: 26 MMOL/L (ref 22–29)
HDLC SERPL-MCNC: 42 MG/DL
LDLC SERPL CALC-MCNC: 88 MG/DL
NONHDLC SERPL-MCNC: 123 MG/DL
POTASSIUM SERPL-SCNC: 4.4 MMOL/L (ref 3.4–5.3)
PROT SERPL-MCNC: 7.5 G/DL (ref 6.4–8.3)
PSA SERPL DL<=0.01 NG/ML-MCNC: 0.53 NG/ML (ref 0–4.5)
SODIUM SERPL-SCNC: 139 MMOL/L (ref 135–145)
TRIGL SERPL-MCNC: 177 MG/DL

## 2024-09-25 PROCEDURE — 17003 DESTRUCT PREMALG LES 2-14: CPT | Performed by: FAMILY MEDICINE

## 2024-09-25 PROCEDURE — G0008 ADMIN INFLUENZA VIRUS VAC: HCPCS | Performed by: FAMILY MEDICINE

## 2024-09-25 PROCEDURE — 99214 OFFICE O/P EST MOD 30 MIN: CPT | Mod: 25 | Performed by: FAMILY MEDICINE

## 2024-09-25 PROCEDURE — 17000 DESTRUCT PREMALG LESION: CPT | Performed by: FAMILY MEDICINE

## 2024-09-25 PROCEDURE — 90662 IIV NO PRSV INCREASED AG IM: CPT | Performed by: FAMILY MEDICINE

## 2024-09-25 PROCEDURE — G0103 PSA SCREENING: HCPCS | Performed by: FAMILY MEDICINE

## 2024-09-25 PROCEDURE — 80053 COMPREHEN METABOLIC PANEL: CPT | Performed by: FAMILY MEDICINE

## 2024-09-25 PROCEDURE — G0439 PPPS, SUBSEQ VISIT: HCPCS | Performed by: FAMILY MEDICINE

## 2024-09-25 PROCEDURE — 36415 COLL VENOUS BLD VENIPUNCTURE: CPT | Performed by: FAMILY MEDICINE

## 2024-09-25 PROCEDURE — 80061 LIPID PANEL: CPT | Performed by: FAMILY MEDICINE

## 2024-09-25 RX ORDER — TAMSULOSIN HYDROCHLORIDE 0.4 MG/1
0.8 CAPSULE ORAL DAILY
Qty: 90 CAPSULE | Refills: 3 | Status: SHIPPED | OUTPATIENT
Start: 2024-09-25 | End: 2024-09-26

## 2024-09-25 RX ORDER — BUDESONIDE AND FORMOTEROL FUMARATE DIHYDRATE 160; 4.5 UG/1; UG/1
2 AEROSOL RESPIRATORY (INHALATION)
Qty: 30.6 G | Refills: 1 | Status: SHIPPED | OUTPATIENT
Start: 2024-09-25

## 2024-09-25 RX ORDER — ATORVASTATIN CALCIUM 80 MG/1
80 TABLET, FILM COATED ORAL DAILY
Qty: 90 TABLET | Refills: 3 | Status: SHIPPED | OUTPATIENT
Start: 2024-09-25

## 2024-09-25 RX ORDER — NYSTATIN 100000/ML
500000 SUSPENSION, ORAL (FINAL DOSE FORM) ORAL 4 TIMES DAILY
Qty: 200 ML | Refills: 0 | Status: SHIPPED | OUTPATIENT
Start: 2024-09-25 | End: 2024-10-05

## 2024-09-25 ASSESSMENT — PAIN SCALES - GENERAL: PAINLEVEL: MILD PAIN (2)

## 2024-09-25 NOTE — PROGRESS NOTES
Preventive Care Visit  [facility]  Fabio Stovall MD, MD, Family Medicine  Sep 25, 2024      Assessment & Plan       ICD-10-CM    1. Encounter for Medicare annual wellness exam  Z00.00       2. Coronary artery disease involving native coronary artery of native heart without angina pectoris  I25.10 Lipid panel reflex to direct LDL Fasting     Comprehensive metabolic panel (BMP + Alb, Alk Phos, ALT, AST, Total. Bili, TP)     Lipid panel reflex to direct LDL Fasting     Comprehensive metabolic panel (BMP + Alb, Alk Phos, ALT, AST, Total. Bili, TP)      3. Benign prostatic hyperplasia with urinary obstruction  N40.1 tamsulosin (FLOMAX) 0.4 MG capsule    N13.8       4. Hyperlipidemia LDL goal <100  E78.5 atorvastatin (LIPITOR) 80 MG tablet     Comprehensive metabolic panel (BMP + Alb, Alk Phos, ALT, AST, Total. Bili, TP)     Comprehensive metabolic panel (BMP + Alb, Alk Phos, ALT, AST, Total. Bili, TP)      5. Moderate chronic obstructive pulmonary disease (H)  J44.9 budesonide-formoterol (SYMBICORT) 160-4.5 MCG/ACT Inhaler      6. Thrush  B37.0 nystatin (MYCOSTATIN) 605388 UNIT/ML suspension      7. AK (actinic keratosis)  L57.0 DESTRUCT PREMALIGNANT LESION, FIRST     DESTRUCT PREMALIGNANT LESION, 2-14      8. Screening for prostate cancer  Z12.5 PROSTATE SPEC ANTIGEN SCREEN     PROSTATE SPEC ANTIGEN SCREEN           Reviewed recommended screenings and ordered appropriate testing for pt's risks and per pt's request(s).   Currently asymptomatic.  Continue efforts at risk factor reduction.  Check monitoring labs and continue current regimen otherwise.  Encouraged increased activity.  Not fully controlled.  Discussed trial of increased Flomax to help with this.  Also discussed that the dry mouth he is complaining about may be related to his tolterodine.  Recommended he trial off of this and if no worsening of his urinary symptoms, he should remain off of it.  Clinically controlled.  Will  continue current regimen and check monitoring labs.  Improved symptoms with current regimen.  Reviewed importance of rinsing his mouth.  Continue current prescription.  Clinically suspicious for this.  Will treat with nystatin.  See #5 above.  Both on his scalp/forehead.  Discussed possible cryotherapy.  Patient wished to proceed with this.  Lesions were frozen with 2 cycles of liquid nitrogen.  Patient tolerated procedure well.  Follow-up and biopsy if needed.  Screening test ordered.        Portions of this note were completed using Dragon dictation software.  Although reviewed, there may be typographical and other inadvertent errors that remain.           Counseling  Appropriate preventive services were addressed with this patient via screening, questionnaire, or discussion as appropriate for fall prevention, nutrition, physical activity, Tobacco-use cessation, social engagement, weight loss and cognition.  Checklist reviewing preventive services available has been given to the patient.  Reviewed patient's diet, addressing concerns and/or questions.   The patient was provided with written information regarding signs of hearing loss.   Information on urinary incontinence and treatment options given to patient.       See Patient Instructions  Try stopping tolterodine.    If your urination doesn't get worse, then stay off it.  Your dry mouth should get better with this.      If doing OK, then try increasing your Flomax to 0.8 mg nightly.  If not helping (or if side effects), then resume the lower 0.4 mg dose.      If still having lots of urinary symptoms, we should consider starting something like Proscar or have you see urology.      Let's try another course of nystatin for your probable thrush.  Let me know if your voice doesn't get better.      The spots we froze may blister up, but then should fall off in 1-2 weeks.      If any of these recur, we should biopsy them.     We will let you know your results as soon  as we can.  If you have MyChart, you will be able to see your lab and imaging results shortly after they become available.  I will review these and let you know my interpretation, but this usually takes additional time.  If you have multiple labs, I usually wait until they are all back before sending a note about them unless something is worrisome.  If you do not have MyChart, we will let you know your lab results as soon as we can.  If they are normal, this can take up to a week.         Polina Allison is a 68 year old, presenting for the following:  Physical        9/25/2024     9:42 AM   Additional Questions   Roomed by Julianna REEDER         9/25/2024     9:42 AM   Patient Reported Additional Medications   Patient reports taking the following new medications MSM, vit c, glucosamine       Health Care Directive  Patient has a Health Care Directive on file  Advance care planning document is on file but is outdated.  Patient encouraged to update.    HPI    He continues to have a hoarse voice.  Workup showed probable laryngoesophageal reflux.  Continues on PPI, sleeps on a wedge.      He has lots of dry mouth.  He does drink 8-10 cups of water/day.      He typically has 3-5 episodes of nocturia.  Tolterodine does seem to help with his urgency.              9/24/2024   General Health   How would you rate your overall physical health? Good   Feel stress (tense, anxious, or unable to sleep) Not at all            9/24/2024   Nutrition   Diet: Low salt            9/24/2024   Exercise   Days per week of moderate/strenous exercise 0 days   Average minutes spent exercising at this level Patient declined      (!) EXERCISE CONCERN      9/24/2024   Social Factors   Frequency of gathering with friends or relatives Once a week   Worry food won't last until get money to buy more No   Food not last or not have enough money for food? No   Do you have housing? (Housing is defined as stable permanent housing and does not include staying  ouside in a car, in a tent, in an abandoned building, in an overnight shelter, or couch-surfing.) Yes   Are you worried about losing your housing? No   Lack of transportation? No   Unable to get utilities (heat,electricity)? No            9/24/2024   Fall Risk   Fallen 2 or more times in the past year? No   Trouble with walking or balance? No             9/24/2024   Activities of Daily Living- Home Safety   Needs help with the following daily activites None of the above   Safety concerns in the home None of the above            9/24/2024   Dental   Dentist two times every year? Yes            9/24/2024   Hearing Screening   Hearing concerns? (!) I FEEL THAT PEOPLE ARE MUMBLING OR NOT SPEAKING CLEARLY.    (!) I NEED TO ASK PEOPLE TO SPEAK UP OR REPEAT THEMSELVES.    (!) IT'S HARDER TO UNDERSTAND WOMEN'S VOICES THAN MEN'S VOICES.    (!) IT'S HARD TO FOLLOW A CONVERSATION IN A NOISY RESTAURANT OR CROWDED ROOM.    (!) TROUBLE UNDERSTANDING SOFT OR WHISPERED SPEECH.       Multiple values from one day are sorted in reverse-chronological order         9/24/2024   Driving Risk Screening   Patient/family members have concerns about driving No            9/24/2024   General Alertness/Fatigue Screening   Have you been more tired than usual lately? No            9/24/2024   Urinary Incontinence Screening   Bothered by leaking urine in past 6 months Yes            9/24/2024   TB Screening   Were you born outside of the [place]? No          Today's PHQ-9 Score:       9/24/2024     1:04 PM   PHQ-9 SCORE   PHQ-9 Total Score MyChart 1 (Minimal depression)   PHQ-9 Total Score 1         9/24/2024   Substance Use   Alcohol more than 3/day or more than 7/wk No   Do you have a current opioid prescription? No   How severe/bad is pain from 1 to 10? 3/10   Do you use any other substances recreationally? No        Social History     Tobacco Use    Smoking status: Former     Current packs/day: 1.00     Average packs/day: 1 pack/day for 15.0 years  (15.0 ttl pk-yrs)     Types: Cigarettes     Passive exposure: Never    Smokeless tobacco: Former     Types: Chew     Quit date: 1/20/1987    Tobacco comments:     quit 25 years ago.   Vaping Use    Vaping status: Never Used   Substance Use Topics    Alcohol use: Yes     Comment: 1 -2 times monthly    Drug use: No       Last PSA:   PSA   Date Value Ref Range Status   07/29/2020 0.53 0 - 4 ug/L Final     Comment:     Assay Method:  Chemiluminescence using Siemens Vista analyzer     Prostate Specific Antigen Screen   Date Value Ref Range Status   06/19/2023 0.50 0.00 - 4.50 ng/mL Final   06/29/2022 0.80 0.00 - 4.00 ug/L Final     ASCVD Risk   The 10-year ASCVD risk score (Arlene KENNEY, et al., 2019) is: 20.6%    Values used to calculate the score:      Age: 68 years      Sex: Male      Is Non- : No      Diabetic: No      Tobacco smoker: No      Systolic Blood Pressure: 132 mmHg      Is BP treated: Yes      HDL Cholesterol: 39 mg/dL      Total Cholesterol: 184 mg/dL            Reviewed and updated as needed this visit by Provider                    BP Readings from Last 3 Encounters:   09/25/24 132/82   07/02/24 110/70   04/25/24 128/78    Wt Readings from Last 3 Encounters:   09/25/24 86.9 kg (191 lb 8 oz)   07/02/24 88 kg (194 lb)   04/25/24 87.1 kg (192 lb)                  Patient Active Problem List   Diagnosis    Hypertension goal BP (blood pressure) < 140/90    Trigger finger, acquired    Impotence of organic origin    Hyperlipidemia LDL goal <100    Arthritis of elbow, right    Cubital tunnel syndrome - right    CAD (coronary artery disease)    Benign prostatic hyperplasia with urinary obstruction    GERD (gastroesophageal reflux disease)    Moderate chronic obstructive pulmonary disease (H)    Piriformis syndrome of left side    Cervicalgia    PVC's (premature ventricular contractions)    Mild major depression (H)    Back strain     Past Surgical History:   Procedure Laterality  Date    COLONOSCOPY N/A 2015    Procedure: COMBINED COLONOSCOPY, SINGLE OR MULTIPLE BIOPSY/POLYPECTOMY BY BIOPSY;  Surgeon: Herman Rebollar MD;  Location: [facility]    COLONOSCOPY N/A 2018    Procedure: colonoscopy;  Surgeon: Thiago Robles MD;  Location: [facility]    COLONOSCOPY N/A 10/16/2023    Procedure: COLONOSCOPY, WITH POLYPECTOMY;  Surgeon: Huber Ireland MD;  Location: [facility]    H STENT PROCED  2012    HC REMOVE TONSILS/ADENOIDS,<13 Y/O      T & A <12y.o.    LAPAROSCOPIC HERNIORRHAPHY INGUINAL Left 8/3/2018    Procedure: LAPAROSCOPIC HERNIORRHAPHY INGUINAL;  Laparoscopic left inguinal hernia repair;  Surgeon: Huber Ireland MD;  Location: [facility]    REMOVAL OF SPERM DUCT(S)  88    Vasectomy       Social History     Tobacco Use    Smoking status: Former     Current packs/day: 1.00     Average packs/day: 1 pack/day for 15.0 years (15.0 ttl pk-yrs)     Types: Cigarettes     Passive exposure: Never    Smokeless tobacco: Former     Types: Chew     Quit date: 1987    Tobacco comments:     quit 25 years ago.   Substance Use Topics    Alcohol use: Yes     Comment: 1 -2 times monthly     Family History   Problem Relation Age of Onset    Diabetes Mother         adult onset diabetes    Cancer Mother         kidney dx age 75    Cancer Father         prostate cancer/ at age 73    Hypertension Brother     Cancer Paternal Grandfather         prostat ca    Lung Cancer Brother          Current Outpatient Medications   Medication Sig Dispense Refill    albuterol (PROAIR HFA/PROVENTIL HFA/VENTOLIN HFA) 108 (90 Base) MCG/ACT inhaler INHALE 2 PUFFS INTO THE LUNGS EVERY 6 HOURS 54 g 1    aspirin 81 MG tablet Take 1 tablet by mouth daily. 90 tablet 3    atorvastatin (LIPITOR) 80 MG tablet Take 1 tablet (80 mg) by mouth daily - DUE FOR ANNUAL CHECKUP 90 tablet 0    budesonide-formoterol (SYMBICORT) 160-4.5 MCG/ACT Inhaler Inhale 2 puffs into the lungs two times daily 30.6 g 1    fluticasone  (FLONASE) 50 MCG/ACT nasal spray SPRAY 1-2 SPRAYS IN EACH NOSTRIL DAILY 48 g 2    metoprolol succinate ER (TOPROL XL) 50 MG 24 hr tablet TAKE 1 TABLET(50 MG) BY MOUTH TWICE DAILY. MAY TAKE 1 EXTRA 50 MG TABLET DAILY FOR INCREASE DPALPITATIONS 180 tablet 3    nitroGLYcerin (NITROSTAT) 0.4 MG sublingual tablet Place 1 tablet (0.4 mg) under the tongue every 5 minutes as needed for chest pain 25 tablet 0    omeprazole (PRILOSEC) 20 MG DR capsule Take 2 capsules (40 mg) by mouth at bedtime 180 capsule 3    tamsulosin (FLOMAX) 0.4 MG capsule TAKE ONE CAPSULE BY MOUTH ONCE DAILY 90 capsule 3    tolterodine ER (DETROL LA) 4 MG 24 hr capsule Take 1 capsule (4 mg) by mouth daily 90 capsule 3     Allergies   Allergen Reactions    Codeine Nausea     significant     Recent Labs   Lab Test 07/02/24  0847 06/19/23  0752 06/29/22  0823 09/23/21  0828 07/29/21  1110 10/02/20  0807 07/29/20  1126 10/24/19  0828 04/05/19  0759   A1C  --  5.8* 5.8*  --   --   --   --   --   --    LDL  --  101* 76 72  --  75  --    < > 73   HDL  --  39* 46 39*  --  41  --    < > 43   TRIG  --  219* 143 141  --  162*  --    < > 134   ALT 42 35 68  --    < >  --  42  --  35   CR 0.80 0.93 0.94 0.95   < >  --  1.00  --  0.90   GFRESTIMATED >90 90 89 84   < >  --  79  --  90   GFRESTBLACK  --   --   --   --   --   --  >90  --  >90   POTASSIUM 4.2 4.2 4.4 3.9   < >  --  5.0  --  4.4   TSH 1.43  --   --   --   --  1.44  --   --   --     < > = values in this interval not displayed.      Current providers sharing in care for this patient include:  Patient Care Team:  Fabio Stovall MD as PCP - General (Family Practice)  Fabio Stovall MD as Assigned PCP  MarcusCameron barrett MD as Assigned Heart and Vascular Provider  Constantino Simon [facility] as Pharmacist (Pharmacist Clinician- Clinical Pharmacy Specialist)  Rasta Milan MD as MD (Urology)  Rasta Milan MD as Assigned Surgical Provider    The following health maintenance items are  "reviewed in Epic and correct as of today:  Health Maintenance   Topic Date Due    MEDICARE ANNUAL WELLNESS VISIT  06/19/2024    LIPID  06/19/2024    PSA  06/19/2024    ANNUAL REVIEW OF HM ORDERS  06/19/2024    INFLUENZA VACCINE (1) 09/01/2024    COVID-19 Vaccine (6 - 2024-25 season) 09/01/2024    PHQ-9  03/25/2025    BMP  07/02/2025    CMP  07/02/2025    FALL RISK ASSESSMENT  09/25/2025    GLUCOSE  07/02/2027    ADVANCE CARE PLANNING  06/21/2028    COLORECTAL CANCER SCREENING  10/16/2028    DTAP/TDAP/TD IMMUNIZATION (3 - Td or Tdap) 06/18/2031    SPIROMETRY  Completed    HEPATITIS C SCREENING  Completed    COPD ACTION PLAN  Completed    DEPRESSION ACTION PLAN  Completed    Pneumococcal Vaccine: 65+ Years  Completed    ZOSTER IMMUNIZATION  Completed    RSV VACCINE  Completed    AORTIC ANEURYSM SCREENING (SYSTEM ASSIGNED)  Completed    HPV IMMUNIZATION  Aged Out    MENINGITIS IMMUNIZATION  Aged Out    RSV MONOCLONAL ANTIBODY  Aged Out    LUNG CANCER SCREENING  Discontinued            Objective    Exam  /82 (Cuff Size: Adult Regular)   Pulse 56   Temp 97.4  F (36.3  C)   Resp 19   Ht 1.778 m (5' 10\")   Wt 86.9 kg (191 lb 8 oz)   SpO2 97%   BMI 27.48 kg/m     Estimated body mass index is 27.48 kg/m  as calculated from the following:    Height as of this encounter: 1.778 m (5' 10\").    Weight as of this encounter: 86.9 kg (191 lb 8 oz).    Physical Exam  GENERAL: alert and no distress  EYES: Eyes grossly normal to inspection, PERRL and conjunctivae and sclerae normal  HENT: normal cephalic/atraumatic, ear canals and TM's normal, nose and mouth without ulcers or lesions, oral mucous membranes moist, and slight thrush in OP/uvula  NECK: no adenopathy, no asymmetry, masses, or scars  RESP: lungs clear to auscultation - no rales, rhonchi or wheezes  CV: regular rate and rhythm, normal S1 S2, no S3 or S4, no murmur, click or rub, no peripheral edema  ABDOMEN: soft, nontender, no hepatosplenomegaly, no masses and " bowel sounds normal  MS: no gross musculoskeletal defects noted, no edema  SKIN: no suspicious lesions or rashes  NEURO: Normal strength and tone, mentation intact and speech normal  PSYCH: mentation appears normal, affect normal/bright         9/25/2024   Mini Cog   Clock Draw Score 2 Normal   3 Item Recall 3 objects recalled   Mini Cog Total Score 5                 Signed Electronically by: Fabio Stovall MD, MD

## 2024-09-25 NOTE — PATIENT INSTRUCTIONS
Try stopping tolterodine.    If your urination doesn't get worse, then stay off it.  Your dry mouth should get better with this.      If doing OK, then try increasing your Flomax to 0.8 mg nightly.  If not helping (or if side effects), then resume the lower 0.4 mg dose.      If still having lots of urinary symptoms, we should consider starting something like Proscar or have you see urology.      Let's try another course of nystatin for your probable thrush.  Let me know if your voice doesn't get better.      The spots we froze may blister up, but then should fall off in 1-2 weeks.      If any of these recur, we should biopsy them.     We will let you know your results as soon as we can.  If you have MyChart, you will be able to see your lab and imaging results shortly after they become available.  I will review these and let you know my interpretation, but this usually takes additional time.  If you have multiple labs, I usually wait until they are all back before sending a note about them unless something is worrisome.  If you do not have MyChart, we will let you know your lab results as soon as we can.  If they are normal, this can take up to a week.     Contact us or return if questions or concerns.    Have a nice day!    Dr. Stovall   Patient Education   Preventive Care Advice   This is general advice given by our system to help you stay healthy. However, your care team may have specific advice just for you. Please talk to your care team about your preventive care needs.  Nutrition  Eat 5 or more servings of fruits and vegetables each day.  Try wheat bread, brown rice and whole grain pasta (instead of white bread, rice, and pasta).  Get enough calcium and vitamin D. Check the label on foods and aim for 100% of the RDA (recommended daily allowance).  Lifestyle  Exercise at least 150 minutes each week  (30 minutes a day, 5 days a week).  Do muscle strengthening activities 2 days a week. These help control your  weight and prevent disease.  No smoking.  Wear sunscreen to prevent skin cancer.  Have a dental exam and cleaning every 6 months.  Yearly exams  See your health care team every year to talk about:  Any changes in your health.  Any medicines your care team has prescribed.  Preventive care, family planning, and ways to prevent chronic diseases.  Shots (vaccines)   HPV shots (up to age 26), if you've never had them before.  Hepatitis B shots (up to age 59), if you've never had them before.  COVID-19 shot: Get this shot when it's due.  Flu shot: Get a flu shot every year.  Tetanus shot: Get a tetanus shot every 10 years.  Pneumococcal, hepatitis A, and RSV shots: Ask your care team if you need these based on your risk.  Shingles shot (for age 50 and up)  General health tests  Diabetes screening:  Starting at age 35, Get screened for diabetes at least every 3 years.  If you are younger than age 35, ask your care team if you should be screened for diabetes.  Cholesterol test: At age 39, start having a cholesterol test every 5 years, or more often if advised.  Bone density scan (DEXA): At age 50, ask your care team if you should have this scan for osteoporosis (brittle bones).  Hepatitis C: Get tested at least once in your life.  STIs (sexually transmitted infections)  Before age 24: Ask your care team if you should be screened for STIs.  After age 24: Get screened for STIs if you're at risk. You are at risk for STIs (including HIV) if:  You are sexually active with more than one person.  You don't use condoms every time.  You or a partner was diagnosed with a sexually transmitted infection.  If you are at risk for HIV, ask about PrEP medicine to prevent HIV.  Get tested for HIV at least once in your life, whether you are at risk for HIV or not.  Cancer screening tests  Cervical cancer screening: If you have a cervix, begin getting regular cervical cancer screening tests starting at age 21.  Breast cancer scan (mammogram):  If you've ever had breasts, begin having regular mammograms starting at age 40. This is a scan to check for breast cancer.  Colon cancer screening: It is important to start screening for colon cancer at age 45.  Have a colonoscopy test every 10 years (or more often if you're at risk) Or, ask your provider about stool tests like a FIT test every year or Cologuard test every 3 years.  To learn more about your testing options, visit:   .  For help making a decision, visit:   https://bit.ly/xl30385.  Prostate cancer screening test: If you have a prostate, ask your care team if a prostate cancer screening test (PSA) at age 55 is right for you.  Lung cancer screening: If you are a current or former smoker ages 50 to 80, ask your care team if ongoing lung cancer screenings are right for you.  For informational purposes only. Not to replace the advice of your health care provider. Copyright   2023 [employer]. All rights reserved. Clinically reviewed by the [employer] [facility]. [employer] 597796 - REV 01/24.  Hearing Loss: Care Instructions  Overview     Hearing loss is a sudden or slow decrease in how well you hear. It can range from slight to profound. Permanent hearing loss can occur with aging. It also can happen when you are exposed long-term to loud noise. Examples include listening to loud music, riding motorcycles, or being around other loud machines.  Hearing loss can affect your work and home life. It can make you feel lonely or depressed. You may feel that you have lost your independence. But hearing aids and other devices can help you hear better and feel connected to others.  Follow-up care is a key part of your treatment and safety. Be sure to make and go to all appointments, and call your doctor if you are having problems. It's also a good idea to know your test results and keep a list of the medicines you take.  How can you care for yourself at home?  Avoid loud noises whenever  possible. This helps keep your hearing from getting worse.  Always wear hearing protection around loud noises.  Wear a hearing aid as directed.  A professional can help you pick a hearing aid that will work best for you.  You can also get hearing aids over the counter for mild to moderate hearing loss.  Have hearing tests as your doctor suggests. They can show whether your hearing has changed. Your hearing aid may need to be adjusted.  Use other devices as needed. These may include:  Telephone amplifiers and hearing aids that can connect to a television, stereo, radio, or microphone.  Devices that use lights or vibrations. These alert you to the doorbell, a ringing telephone, or a baby monitor.  Television closed-captioning. This shows the words at the bottom of the screen. Most new TVs can do this.  TTY (text telephone). This lets you type messages back and forth on the telephone instead of talking or listening. These devices are also called TDD. When messages are typed on the keyboard, they are sent over the phone line to a receiving TTY. The message is shown on a monitor.  Use text messaging, social media, and email if it is hard for you to communicate by telephone.  Try to learn a listening technique called speechreading. It is not lipreading. You pay attention to people's gestures, expressions, posture, and tone of voice. These clues can help you understand what a person is saying. Face the person you are talking to, and have them face you. Make sure the lighting is good. You need to see the other person's face clearly.  Think about counseling if you need help to adjust to your hearing loss.  When should you call for help?  Watch closely for changes in your health, and be sure to contact your doctor if:    You think your hearing is getting worse.     You have new symptoms, such as dizziness or nausea.   Where can you learn more?  Go to https://www.healthwise.net/patiented  Enter R798 in the search box to learn  "more about \"Hearing Loss: Care Instructions.\"  Current as of: September 27, 2023               Content Version: 14.0    7898-8261 [employer].   Care instructions adapted under license by your healthcare professional. If you have questions about a medical condition or this instruction, always ask your healthcare professional. [employer] disclaims any warranty or liability for your use of this information.      Bladder Training: Care Instructions  Your Care Instructions     Bladder training is used to treat urge incontinence and stress incontinence. Urge incontinence means that the need to urinate comes on so fast that you can't get to a toilet in time. Stress incontinence means that you leak urine because of pressure on your bladder. For example, it may happen when you laugh, cough, or lift something heavy.  Bladder training can increase how long you can wait before you have to urinate. It can also help your bladder hold more urine. And it can give you better control over the urge to urinate.  It is important to remember that bladder training takes a few weeks to a few months to make a difference. You may not see results right away, but don't give up.  Follow-up care is a key part of your treatment and safety. Be sure to make and go to all appointments, and call your doctor if you are having problems. It's also a good idea to know your test results and keep a list of the medicines you take.  How can you care for yourself at home?  Work with your doctor to come up with a bladder training program that is right for you. You may use one or more of the following methods.  Delayed urination  In the beginning, try to keep from urinating for 5 minutes after you first feel the need to go.  While you wait, take deep, slow breaths to relax. Kegel exercises can also help you delay the need to go to the bathroom.  After some practice, when you can easily wait 5 minutes to urinate, try to wait 10 " "minutes before you urinate.  Slowly increase the waiting period until you are able to control when you have to urinate.  Scheduled urination  Empty your bladder when you first wake up in the morning.  Schedule times throughout the day when you will urinate.  Start by going to the bathroom every hour, even if you don't need to go.  Slowly increase the time between trips to the bathroom.  When you have found a schedule that works well for you, keep doing it.  If you wake up during the night and have to urinate, do it. Apply your schedule to waking hours only.  Kegel exercises  These tighten and strengthen pelvic muscles, which can help you control the flow of urine. (If doing these exercises causes pain, stop doing them and talk with your doctor.) To do Kegel exercises:  Squeeze your muscles as if you were trying not to pass gas. Or squeeze your muscles as if you were stopping the flow of urine. Your belly, legs, and buttocks shouldn't move.  Hold the squeeze for 3 seconds, then relax for 5 to 10 seconds.  Start with 3 seconds, then add 1 second each week until you are able to squeeze for 10 seconds.  Repeat the exercise 10 times a session. Do 3 to 8 sessions a day.  When should you call for help?  Watch closely for changes in your health, and be sure to contact your doctor if:    Your incontinence is getting worse.     You do not get better as expected.   Where can you learn more?  Go to https://www.[employer].net/patiented  Enter V684 in the search box to learn more about \"Bladder Training: Care Instructions.\"  Current as of: November 15, 2023               Content Version: 14.0    3823-7093 [employer].   Care instructions adapted under license by your healthcare professional. If you have questions about a medical condition or this instruction, always ask your healthcare professional. [employer] disclaims any warranty or liability for your use of this information.         "

## 2024-09-25 NOTE — RESULT ENCOUNTER NOTE
Keegan,    All of your labs were normal for you except your blood sugar remains in the prediabetic range.  Continue to work on eating healthy, staying active, and keeping your weight under control.  We should recheck this at least every 6 months.    Have a nice day!    Dr. Stovall

## 2024-09-26 ENCOUNTER — TELEPHONE (OUTPATIENT)
Dept: FAMILY MEDICINE | Facility: OTHER | Age: 69
End: 2024-09-26
Payer: MEDICARE

## 2024-09-26 DIAGNOSIS — N13.8 BENIGN PROSTATIC HYPERPLASIA WITH URINARY OBSTRUCTION: ICD-10-CM

## 2024-09-26 DIAGNOSIS — N40.1 BENIGN PROSTATIC HYPERPLASIA WITH URINARY OBSTRUCTION: ICD-10-CM

## 2024-09-26 RX ORDER — TAMSULOSIN HYDROCHLORIDE 0.4 MG/1
0.8 CAPSULE ORAL DAILY
Qty: 180 CAPSULE | Refills: 3 | Status: SHIPPED | OUTPATIENT
Start: 2024-09-26

## 2024-10-02 ENCOUNTER — MYC MEDICAL ADVICE (OUTPATIENT)
Dept: FAMILY MEDICINE | Facility: OTHER | Age: 69
End: 2024-10-02
Payer: MEDICARE

## 2024-10-03 NOTE — TELEPHONE ENCOUNTER
Dr. Stovall has a couple med checks back to back on 10/9 AM. Asked patient if that date would work for him to double book.

## 2024-10-03 NOTE — TELEPHONE ENCOUNTER
"OK to double book in most \"straightforward\" visits if pt would like during the next few weeks.  "

## 2024-10-09 ENCOUNTER — MYC MEDICAL ADVICE (OUTPATIENT)
Dept: FAMILY MEDICINE | Facility: OTHER | Age: 69
End: 2024-10-09

## 2024-10-09 ENCOUNTER — OFFICE VISIT (OUTPATIENT)
Dept: FAMILY MEDICINE | Facility: OTHER | Age: 69
End: 2024-10-09
Payer: MEDICARE

## 2024-10-09 VITALS
WEIGHT: 196 LBS | HEIGHT: 73 IN | HEART RATE: 73 BPM | SYSTOLIC BLOOD PRESSURE: 134 MMHG | OXYGEN SATURATION: 94 % | DIASTOLIC BLOOD PRESSURE: 79 MMHG | RESPIRATION RATE: 15 BRPM | BODY MASS INDEX: 25.98 KG/M2 | TEMPERATURE: 96.9 F

## 2024-10-09 DIAGNOSIS — K21.9 LARYNGOPHARYNGEAL REFLUX: ICD-10-CM

## 2024-10-09 DIAGNOSIS — R00.2 PALPITATIONS: ICD-10-CM

## 2024-10-09 DIAGNOSIS — L57.0 AK (ACTINIC KERATOSIS): Primary | ICD-10-CM

## 2024-10-09 DIAGNOSIS — K21.9 GASTROESOPHAGEAL REFLUX DISEASE WITHOUT ESOPHAGITIS: ICD-10-CM

## 2024-10-09 PROCEDURE — 17003 DESTRUCT PREMALG LES 2-14: CPT | Performed by: FAMILY MEDICINE

## 2024-10-09 PROCEDURE — 17000 DESTRUCT PREMALG LESION: CPT | Performed by: FAMILY MEDICINE

## 2024-10-09 PROCEDURE — 99214 OFFICE O/P EST MOD 30 MIN: CPT | Mod: 25 | Performed by: FAMILY MEDICINE

## 2024-10-09 RX ORDER — OMEPRAZOLE 40 MG/1
40 CAPSULE, DELAYED RELEASE ORAL AT BEDTIME
Qty: 90 CAPSULE | Refills: 3 | Status: SHIPPED | OUTPATIENT
Start: 2024-10-09

## 2024-10-09 RX ORDER — METOPROLOL SUCCINATE 50 MG/1
50 TABLET, EXTENDED RELEASE ORAL DAILY
Qty: 180 TABLET | Refills: 3 | Status: SHIPPED | OUTPATIENT
Start: 2024-10-09 | End: 2024-10-10

## 2024-10-09 ASSESSMENT — PAIN SCALES - GENERAL: PAINLEVEL: NO PAIN (0)

## 2024-10-09 NOTE — PATIENT INSTRUCTIONS
The spots we froze may blister up, but then should fall off in 1-2 weeks.      If any of these recur, we should biopsy them.     You can take the bladder spasm medication if you want, but it will cause dry mouth.      Try to drink water frequently.    Contact us or return if questions or concerns.    Have a nice day!    Dr. Stovall

## 2024-10-10 RX ORDER — METOPROLOL SUCCINATE 50 MG/1
50 TABLET, EXTENDED RELEASE ORAL 2 TIMES DAILY
Qty: 180 TABLET | Refills: 3 | Status: SHIPPED | OUTPATIENT
Start: 2024-10-10

## 2024-10-23 ENCOUNTER — DOCUMENTATION ONLY (OUTPATIENT)
Dept: OTHER | Facility: CLINIC | Age: 69
End: 2024-10-23
Payer: MEDICARE

## 2024-10-28 ENCOUNTER — DOCUMENTATION ONLY (OUTPATIENT)
Dept: OTHER | Facility: CLINIC | Age: 69
End: 2024-10-28
Payer: MEDICARE

## 2024-10-30 ENCOUNTER — MYC MEDICAL ADVICE (OUTPATIENT)
Dept: FAMILY MEDICINE | Facility: OTHER | Age: 69
End: 2024-10-30
Payer: MEDICARE

## 2024-11-11 ENCOUNTER — OFFICE VISIT (OUTPATIENT)
Dept: FAMILY MEDICINE | Facility: OTHER | Age: 69
End: 2024-11-11
Payer: MEDICARE

## 2024-11-11 VITALS
RESPIRATION RATE: 15 BRPM | TEMPERATURE: 96.9 F | DIASTOLIC BLOOD PRESSURE: 86 MMHG | WEIGHT: 199 LBS | BODY MASS INDEX: 26.37 KG/M2 | HEART RATE: 63 BPM | SYSTOLIC BLOOD PRESSURE: 138 MMHG | OXYGEN SATURATION: 96 %

## 2024-11-11 DIAGNOSIS — L98.9 SKIN LESIONS: Primary | ICD-10-CM

## 2024-11-11 DIAGNOSIS — L57.0 AK (ACTINIC KERATOSIS): ICD-10-CM

## 2024-11-11 DIAGNOSIS — Z53.9 DIAGNOSIS NOT YET DEFINED: ICD-10-CM

## 2024-11-11 PROCEDURE — 17000 DESTRUCT PREMALG LESION: CPT | Mod: 59 | Performed by: FAMILY MEDICINE

## 2024-11-11 PROCEDURE — 11440 EXC FACE-MM B9+MARG 0.5 CM/<: CPT | Performed by: FAMILY MEDICINE

## 2024-11-11 PROCEDURE — 88305 TISSUE EXAM BY PATHOLOGIST: CPT | Performed by: PATHOLOGY

## 2024-11-11 PROCEDURE — 11420 EXC H-F-NK-SP B9+MARG 0.5/<: CPT | Performed by: FAMILY MEDICINE

## 2024-11-11 NOTE — PROGRESS NOTES
"  Assessment & Plan       ICD-10-CM    1. Skin lesions  L98.9 Dermatological Path Order and Indications     Dermatological Path Order and Indications     scalp, neck, hands, feet, genitalia, other     face, ears, eyelids, nose, lips, mucous membranes      2. AK (actinic keratosis)  L57.0 DESTRUCT PREMALIGNANT LESION, FIRST      3. DIAGNOSIS NOT YET DEFINED  Z53.9          1, 3.  See procedure note below.  Will await pathology testing.  2.  Lesion destroyed with 2 cycles of liquid nitrogen.  Patient tolerated procedure well.      Portions of this note were completed using Dragon dictation software.  Although reviewed, there may be typographical and other inadvertent errors that remain.         BMI  Estimated body mass index is 26.37 kg/m  as calculated from the following:    Height as of 10/9/24: 1.85 m (6' 0.84\").    Weight as of this encounter: 90.3 kg (199 lb).         See Patient Instructions    Subjective   Keegan is a 69 year old, presenting for the following health issues:  Derm Problem (Recheck spots on head)      11/11/2024     7:30 AM   Additional Questions   Roomed by stefan     History of Present Illness       Reason for visit:  Skin spots    He eats 0-1 servings of fruits and vegetables daily.He consumes 0 sweetened beverage(s) daily.He exercises with enough effort to increase his heart rate 9 or less minutes per day.  He exercises with enough effort to increase his heart rate 3 or less days per week.   He is taking medications regularly.     Pt had 2 lesions on his forehead frozen last month.  These have both recurred.  Also has a new spot on his head.  Here to have these re-evaluated.      These don't look any smaller than before.      Patient will be leaving town later this week for several months.            Objective    /86 (BP Location: Left arm, Patient Position: Sitting, Cuff Size: Adult Regular)   Pulse 63   Temp 96.9  F (36.1  C) (Temporal)   Resp 15   Wt 90.3 kg (199 lb)   SpO2 96%  "  BMI 26.37 kg/m    Body mass index is 26.37 kg/m .  Physical Exam   GENERAL: alert and no distress  SKIN: Lesion on right temple appears irritated, suspicious for actinic keratosis.  Does not appear decreased in size from cryotherapy last month.  Lesion on forehead that was frozen appears similar to a seborrheic keratosis and also is not any smaller than last month.  Additionally, there is a new slightly painful lesion on his left forehead that appears irritated and consistent with an actinic keratosis.    Office Visit on 09/25/2024   Component Date Value Ref Range Status    Cholesterol 09/25/2024 165  <200 mg/dL Final    Triglycerides 09/25/2024 177 (H)  <150 mg/dL Final    Direct Measure HDL 09/25/2024 42  >=40 mg/dL Final    LDL Cholesterol Calculated 09/25/2024 88  <100 mg/dL Final    Non HDL Cholesterol 09/25/2024 123  <130 mg/dL Final    Patient Fasting > 8hrs? 09/25/2024 No   Final    Prostate Specific Antigen Screen 09/25/2024 0.53  0.00 - 4.50 ng/mL Final    Sodium 09/25/2024 139  135 - 145 mmol/L Final    Potassium 09/25/2024 4.4  3.4 - 5.3 mmol/L Final    Carbon Dioxide (CO2) 09/25/2024 26  22 - 29 mmol/L Final    Anion Gap 09/25/2024 10  7 - 15 mmol/L Final    Urea Nitrogen 09/25/2024 11.3  8.0 - 23.0 mg/dL Final    Creatinine 09/25/2024 0.85  0.67 - 1.17 mg/dL Final    GFR Estimate 09/25/2024 >90  >60 mL/min/1.73m2 Final    eGFR calculated using 2021 CKD-EPI equation.    Calcium 09/25/2024 9.6  8.8 - 10.4 mg/dL Final    Reference intervals for this test were updated on 7/16/2024 to reflect our healthy population more accurately. There may be differences in the flagging of prior results with similar values performed with this method. Those prior results can be interpreted in the context of the updated reference intervals.    Chloride 09/25/2024 103  98 - 107 mmol/L Final    Glucose 09/25/2024 107 (H)  70 - 99 mg/dL Final    Alkaline Phosphatase 09/25/2024 107  40 - 150 U/L Final    AST 09/25/2024 36  0  - 45 U/L Final    ALT 09/25/2024 62  0 - 70 U/L Final    Protein Total 09/25/2024 7.5  6.4 - 8.3 g/dL Final    Albumin 09/25/2024 4.5  3.5 - 5.2 g/dL Final    Bilirubin Total 09/25/2024 0.9  <=1.2 mg/dL Final    Patient Fasting > 8hrs? 09/25/2024 No   Final     No results found for any visits on 11/11/24.  No results found for this or any previous visit (from the past 24 hours).        Signed Electronically by: Fabio Stovall MD, MD      Subjective: Keegan Buchanan a 69 year old male who presents today for lesion removal. The lesion is located on the right temple,  and measures 4 mm.  Another lesion on his forehead also measures 4 mm.  He denies other significant symptoms on ROS. Medications reviewed.    Objective: The area was prepped and appropriately anesthetized. Using the usual technique, shave excision was performed on each site. The total area excised, including lesion and margins was 0.6cm at each site. An appropriate  dressing was applied.  The procedure was well tolerated and without complications. Specimen was sent.    Assessment: actinic keratosis, seborrheic keratosis, rule out basal cell carcinoma, rule out squamous cell carcinoma    Plan: Follow up: The specimen is labelled and sent to pathology for evaluation., The patient may return prn. Wound care instructions provided.  Patient was instructed to be alert for any signs of cutaneous infection.

## 2024-11-11 NOTE — PATIENT INSTRUCTIONS
Wound Care Instructions    1.  Keep Vaseline or antibiotic ointment on wound for a few days until the skin edges have come together.  Do not let it dry out and form a scab as this will make the resulting scar more noticeable.    2.  After 24 hours, may wash gently with soap and water.  After 48 hours, you can soak it, if needed.    3.  If desired, vitamin E oil for 2 weeks after antibiotic ointment may help to decrease scarring.    4.  Protect the area from sun for up to one year afterward as the scar is continuing to remodel.  Sun exposure will also make the resulting scar more noticeable.    5.  Call if the area is very red, tender, has a discharge or is very itchy while healing, or if you have any other questions.  These may be signs of early infection or allergy.

## 2024-11-13 LAB
PATH REPORT.COMMENTS IMP SPEC: NORMAL
PATH REPORT.COMMENTS IMP SPEC: NORMAL
PATH REPORT.FINAL DX SPEC: NORMAL
PATH REPORT.GROSS SPEC: NORMAL
PATH REPORT.MICROSCOPIC SPEC OTHER STN: NORMAL
PATH REPORT.RELEVANT HX SPEC: NORMAL

## 2024-11-13 NOTE — RESULT ENCOUNTER NOTE
Keegan,    The lesion on your right temple was an actinic keratosis.  This is a precancerous lesion that should be frozen again if it recurs.    The other lesion removed was a seborrheic keratosis which is not dangerous.  It can be frozen if it recurs and is bothersome.    Have a nice day!    Dr. Stovall

## 2024-12-16 ENCOUNTER — NURSE TRIAGE (OUTPATIENT)
Dept: FAMILY MEDICINE | Facility: OTHER | Age: 69
End: 2024-12-16
Payer: MEDICARE

## 2024-12-16 NOTE — TELEPHONE ENCOUNTER
Is this a 2nd Level Triage? NO    S: Covid    B: Patient called in earlier wanting Paxlovid treatment. Patient reports symptom's started 12/14.  Chills, fever (no thermometer). Cough, muscle aches, headache, congestion and diarrhea.  History COPD, not really feeling shortness of breath more than normal.  Denies chest pain. Patient was in [place] when symptom's started. He is now in [place] when he lives for the winter.      A: Advised patient that we are unable to do the Paxlovid treatment over state lines. Advised that because of legal reasons, we are unable to do this or have him seen virtually. Advised if he would like treatment that he should seen out an urgent care nearest to him.     R: Patient verbalized understanding and is agreeable. He will find an Urgent Care closest to him and be seen in [place].        Reason for Disposition   HIGH RISK patient (e.g., weak immune system, age > 64 years, obesity with BMI of 30 or higher, pregnant, chronic lung disease or other chronic medical condition) and COVID symptoms (e.g., cough, fever)  (Exceptions: Already seen by doctor or NP/PA and no new or worsening symptoms.)    Additional Information   Negative: SEVERE difficulty breathing (e.g., struggling for each breath, speaks in single words)   Negative: Difficult to awaken or acting confused (e.g., disoriented, slurred speech)   Negative: Bluish (or gray) lips or face now   Negative: Shock suspected (e.g., cold/pale/clammy skin, too weak to stand, low BP, rapid pulse)   Negative: Sounds like a life-threatening emergency to the triager   Negative: Diagnosed or suspected COVID-19 and symptoms lasting 3 or more weeks   Negative: COVID-19 exposure and no symptoms   Negative: COVID-19 vaccine reaction suspected (e.g., fever, headache, muscle aches) occurring 1 to 3 days after getting vaccine   Negative: COVID-19 vaccine, questions about   Negative: Lives with someone known to have influenza (flu test positive) and flu-like  symptoms (e.g., cough, runny nose, sore throat, SOB; with or without fever)   Negative: Possible COVID-19 symptoms and triager concerned about severity of symptoms or other causes   Negative: COVID-19 and breastfeeding, questions about   Negative: SEVERE or constant chest pain or pressure  (Exception: Mild central chest pain, present only when coughing.)   Negative: MODERATE difficulty breathing (e.g., speaks in phrases, SOB even at rest, pulse 100-120)   Negative: Headache and stiff neck (can't touch chin to chest)   Negative: Oxygen level (e.g., pulse oximetry) 90% or lower   Negative: Chest pain or pressure  (Exception: MILD central chest pain, present only when coughing.)   Negative: Drinking very little and dehydration suspected (e.g., no urine > 12 hours, very dry mouth, very lightheaded)   Negative: Patient sounds very sick or weak to the triager   Negative: MILD difficulty breathing (e.g., minimal/no SOB at rest, SOB with walking, pulse <100)   Negative: Fever > 103 F (39.4 C)   Negative: Fever > 101 F (38.3 C) and over 60 years of age   Negative: Fever > 100.0 F (37.8 C) and bedridden (e.g., CVA, chronic illness, recovering from surgery)    Protocols used: Coronavirus (COVID-19) Diagnosed or [id number]     Post-Care Instructions: I reviewed with the patient in detail post-care instructions. Patient is to avoid sunlight for the next 2 days, and wear sun protection. Patients may expect sunburn like redness, discomfort and scabbing.

## 2024-12-16 NOTE — TELEPHONE ENCOUNTER
Additional Information   Negative: SEVERE difficulty breathing (e.g., struggling for each breath, speaks in single words)   Negative: Difficult to awaken or acting confused (e.g., disoriented, slurred speech)   Negative: Bluish (or gray) lips or face now   Negative: Shock suspected (e.g., cold/pale/clammy skin, too weak to stand, low BP, rapid pulse)   Negative: Sounds like a life-threatening emergency to the triager   Negative: Diagnosed or suspected COVID-19 and symptoms lasting 3 or more weeks   Negative: COVID-19 exposure and no symptoms   Negative: COVID-19 vaccine reaction suspected (e.g., fever, headache, muscle aches) occurring 1 to 3 days after getting vaccine   Negative: COVID-19 vaccine, questions about   Negative: Lives with someone known to have influenza (flu test positive) and flu-like symptoms (e.g., cough, runny nose, sore throat, SOB; with or without fever)   Negative: Possible COVID-19 symptoms and triager concerned about severity of symptoms or other causes   Negative: COVID-19 and breastfeeding, questions about   Negative: SEVERE or constant chest pain or pressure  (Exception: Mild central chest pain, present only when coughing.)   Negative: MODERATE difficulty breathing (e.g., speaks in phrases, SOB even at rest, pulse 100-120)   Negative: Headache and stiff neck (can't touch chin to chest)   Negative: Oxygen level (e.g., pulse oximetry) 90% or lower    Protocols used: Coronavirus (COVID-19) Diagnosed or [id number]

## 2024-12-16 NOTE — TELEPHONE ENCOUNTER
COVID Positive/Requesting COVID treatment    Patient is positive for COVID and requesting treatment options.    Date of positive COVID test (PCR or at home)? 12/16/24  Current COVID symptoms: fever or chills, cough, muscle or body aches, headache, sore throat, congestion or runny nose, and diarrhea  Date COVID symptoms began: 12/14/24    Message should be routed to clinic RN pool. Best phone number to use for call back: [phone]

## 2025-01-04 DIAGNOSIS — J30.89 SEASONAL ALLERGIC RHINITIS DUE TO OTHER ALLERGIC TRIGGER: ICD-10-CM

## 2025-01-04 DIAGNOSIS — J44.9 MODERATE CHRONIC OBSTRUCTIVE PULMONARY DISEASE (H): ICD-10-CM

## 2025-01-06 RX ORDER — ALBUTEROL SULFATE 90 UG/1
2 INHALANT RESPIRATORY (INHALATION) EVERY 6 HOURS
Qty: 54 G | Refills: 1 | Status: SHIPPED | OUTPATIENT
Start: 2025-01-06

## 2025-01-06 RX ORDER — FLUTICASONE PROPIONATE 50 MCG
SPRAY, SUSPENSION (ML) NASAL
Qty: 48 G | Refills: 2 | Status: SHIPPED | OUTPATIENT
Start: 2025-01-06

## 2025-03-22 NOTE — PATIENT INSTRUCTIONS
Thank you for visiting [facility]    Let's see if another round of prednisone and staring QVAR will help with your cough.    If fevers or just not improvement over the weekend, let me know, and we'll add Zithromax.      Continue QVAR until you're back to normal with cough and breathing.  Can resume this if you get another cold.    Can use albuterol with both of these.    Please see me in March  for follow up.       If you had imaging scheduled please refer to your radiology prep sheet.    Appointment    Date_______________     Time_____________    Day:   M TU W TH F    With____________________________    Location_________________________    If you need medication refills, please contact your pharmacy 3 days before your prescriptions runs out. If you are out of refills, your pharmacy will contact contact the clinic.    Contact us or return if questions or concerns.     -Your Care Team:  MD Margie Dewey PA-C Folake Falaki, MD Anoshirvan Mazhari, MD Kelly White, [facility]    General information about your clinic      Clinic hours:     Lab hours:  Phone [phone]  Monday 7:30 am-7 pm    Monday 8:30 am-6:30 pm  Tuesday-Friday 7:30 am-5 pm   Tuesday-Friday 8:30 am-4:30 pm    Pharmacy hours:  Phone [phone]  Monday 8:30 am-7pm  Tuesday-Friday 8:30am-6 pm                                       Mychart assistance [phone]        We would like to hear from you, how was your visit today?    Aleah Chapin  Patient Information Supervisor   Patient Care Supervisor  [facility], and [facility], and [facility]  [phone]     
No
Strong peripheral pulses

## 2025-05-13 ENCOUNTER — MYC MEDICAL ADVICE (OUTPATIENT)
Dept: PHYSICAL THERAPY | Facility: CLINIC | Age: 70
End: 2025-05-13
Payer: MEDICARE

## 2025-05-13 NOTE — CONFIDENTIAL NOTE
PT called and spoke with patient he is going to stop the 90/90 stretch and see if his pain resolves and if not he is going to follow up with his doctor or call with futher questions   Suzan Vences PT

## 2025-05-21 ENCOUNTER — NURSE TRIAGE (OUTPATIENT)
Dept: FAMILY MEDICINE | Facility: OTHER | Age: 70
End: 2025-05-21
Payer: MEDICARE

## 2025-05-21 NOTE — TELEPHONE ENCOUNTER
Nurse Triage SBAR    Is this a 2nd Level Triage? NO    Situation/Background: RN called to triage patient based on Chatous message. Has had what he thinks is a wart on the back of his head for approximately 1 year.     Assessment: See assessment info below.     Protocol Recommended Disposition:   See in Office Within 3 Days    Recommendation: Assisted with scheduling per disposition.        Reason for Disposition   Patient wants to be seen    Additional Information   Negative: Sounds like a life-threatening emergency to the triager   Negative: Followed a burn   Negative: Followed an injury to the skin (such as a cut or scrape)   Negative: Boil (abscess) suspected (painful red lump)   Negative: Widespread rash or redness   Negative: Cold sore suspected (i.e., fever blister sore on the outer lip)   Negative: Insect bite(s) suspected   Negative: Poison ivy, oak, or sumac rash suspected (e.g., itchy rash after contact with poison ivy)   Negative: Sore(s) on female genital area  (e.g., labia, vagina, vulva)   Negative: Sore(s) on male genital area (e.g., penis, scrotum)   Negative: Wound infection suspected in surgical incision or traumatic wound   Negative: Looks infected (e.g., spreading redness, pus) and fever   Negative: Looks infected and large red area (> 2 inches or 5 cm) or streak   Negative: Ulcer with black scab that is spreading, painless and cause unknown   Negative: Looks infected (e.g., spreading redness, pus) and no fever   Negative: Unexplained sores and 3 or more   Negative: Large sore (> 1 inch or 2.5 cm across)   Negative: Looks like a boil, deep ulcer or is very painful   Negative: Multiple small blisters grouped together in one area of body (i.e., dermatomal distribution or 'band' or 'stripe') and painful   Negative: Sore on foot (e.g., ulcer, wound, blister, red area) AND new-onset or getting worse AND has diabetes   Negative: Sores and crusts are around openings to nose, or anywhere else on face    "Negative: Any other family members with similar sores   Negative: New pressure ulcer suspected   Negative: Using antibiotic ointment > 24 hours and new sore occurs   Negative: Using antibiotic ointment > 48 hours and sore increases in size   Negative: Using antibiotic ointment > 1 week and sore not completely healed    Answer Assessment - Initial Assessment Questions  1. APPEARANCE of SORES: \"What do the sores look like?\"      Skin colored, bump on posterior, middle part of head    2. NUMBER: \"How many sores are there?\"      1    3. SIZE: \"How big is the largest sore?\"      Small, pencil eraser sized    4. LOCATION: \"Where are the sores located?\"      Posterior, central; \"where hat sits in back of head\"    5. ONSET: \"When did the sores begin?\"      Approx 1 year ago    6. TENDER: \"Does it hurt when you touch it?\"  (Scale 1-10; or mild, moderate, severe)       Only when picked at or irritated    7. CAUSE: \"What do you think is causing the sores?\"      Wart    8. OTHER SYMPTOMS: \"Do you have any other symptoms?\" (e.g., fever, new weakness)      no    Protocols used: Sores-A-OH    "

## 2025-06-26 ENCOUNTER — NURSE TRIAGE (OUTPATIENT)
Dept: CARDIOLOGY | Facility: CLINIC | Age: 70
End: 2025-06-26
Payer: MEDICARE

## 2025-06-26 NOTE — CONFIDENTIAL NOTE
"1. REASON FOR CALL or QUESTION: \"What is your reason for calling today?\" or \"How can I best help you?\" or \"What question do you have that I can help answer?\"     Received call transferred to Triage for patient regarding symptoms.    Patient reports yesterday he did an intense workout at the gym. Afterwards, he had felt some pain in his upper back and between his shoulder blades. He also says he sweated quite a bit. This lasted for 20 minutes then went away. Patient states he drank Gatorade after and had felt better. Since then, there has been no re-occurrence of symptoms. Currently he is *asymptomatic.*   Patient is scheduled to see Dr. Millan on August 20th but questions if he can be seen sooner.     Advised this will be sent to patient's cardiac care team with Dr. Marcus for further follow-up. He verbalized understanding.   "

## 2025-06-26 NOTE — TELEPHONE ENCOUNTER
PCP triage call had been sent to [place] nurse team.  After attempting to contact pt Cardiology was messaged. This writer will step out of the conversation. Saundra

## 2025-06-30 ENCOUNTER — OFFICE VISIT (OUTPATIENT)
Dept: PEDIATRICS | Facility: CLINIC | Age: 70
End: 2025-06-30
Payer: MEDICARE

## 2025-06-30 VITALS
HEART RATE: 81 BPM | DIASTOLIC BLOOD PRESSURE: 72 MMHG | SYSTOLIC BLOOD PRESSURE: 132 MMHG | OXYGEN SATURATION: 96 % | RESPIRATION RATE: 18 BRPM | TEMPERATURE: 97.5 F

## 2025-06-30 DIAGNOSIS — R07.9 CHEST PAIN, UNSPECIFIED TYPE: ICD-10-CM

## 2025-06-30 DIAGNOSIS — M54.9 UPPER BACK PAIN: Primary | ICD-10-CM

## 2025-06-30 LAB
ALBUMIN SERPL BCG-MCNC: 4.4 G/DL (ref 3.5–5.2)
ALP SERPL-CCNC: 93 U/L (ref 40–150)
ALT SERPL W P-5'-P-CCNC: 48 U/L (ref 0–70)
ANION GAP SERPL CALCULATED.3IONS-SCNC: 14 MMOL/L (ref 7–15)
AST SERPL W P-5'-P-CCNC: 40 U/L (ref 0–45)
BILIRUB SERPL-MCNC: 0.9 MG/DL
BUN SERPL-MCNC: 11 MG/DL (ref 8–23)
CALCIUM SERPL-MCNC: 9.4 MG/DL (ref 8.8–10.4)
CHLORIDE SERPL-SCNC: 106 MMOL/L (ref 98–107)
CREAT SERPL-MCNC: 0.78 MG/DL (ref 0.67–1.17)
EGFRCR SERPLBLD CKD-EPI 2021: >90 ML/MIN/1.73M2
ERYTHROCYTE [DISTWIDTH] IN BLOOD BY AUTOMATED COUNT: 12.9 % (ref 10–15)
GLUCOSE SERPL-MCNC: 150 MG/DL (ref 70–99)
HCO3 SERPL-SCNC: 20 MMOL/L (ref 22–29)
HCT VFR BLD AUTO: 45.3 % (ref 40–53)
HGB BLD-MCNC: 16 G/DL (ref 13.3–17.7)
MCH RBC QN AUTO: 30.6 PG (ref 26.5–33)
MCHC RBC AUTO-ENTMCNC: 35.3 G/DL (ref 31.5–36.5)
MCV RBC AUTO: 87 FL (ref 78–100)
PLATELET # BLD AUTO: 201 10E3/UL (ref 150–450)
POTASSIUM SERPL-SCNC: 4.1 MMOL/L (ref 3.4–5.3)
PROT SERPL-MCNC: 7 G/DL (ref 6.4–8.3)
RBC # BLD AUTO: 5.23 10E6/UL (ref 4.4–5.9)
SODIUM SERPL-SCNC: 140 MMOL/L (ref 135–145)
TROPONIN T SERPL HS-MCNC: 6 NG/L
WBC # BLD AUTO: 5 10E3/UL (ref 4–11)

## 2025-06-30 PROCEDURE — 85027 COMPLETE CBC AUTOMATED: CPT | Performed by: EMERGENCY MEDICINE

## 2025-06-30 PROCEDURE — 93005 ELECTROCARDIOGRAM TRACING: CPT | Performed by: EMERGENCY MEDICINE

## 2025-06-30 PROCEDURE — 80053 COMPREHEN METABOLIC PANEL: CPT | Performed by: EMERGENCY MEDICINE

## 2025-06-30 PROCEDURE — 84484 ASSAY OF TROPONIN QUANT: CPT | Performed by: EMERGENCY MEDICINE

## 2025-06-30 PROCEDURE — 36415 COLL VENOUS BLD VENIPUNCTURE: CPT | Performed by: EMERGENCY MEDICINE

## 2025-06-30 PROCEDURE — 99214 OFFICE O/P EST MOD 30 MIN: CPT | Performed by: EMERGENCY MEDICINE

## 2025-06-30 PROCEDURE — 3075F SYST BP GE 130 - 139MM HG: CPT | Performed by: EMERGENCY MEDICINE

## 2025-06-30 PROCEDURE — 3078F DIAST BP <80 MM HG: CPT | Performed by: EMERGENCY MEDICINE

## 2025-06-30 NOTE — PATIENT INSTRUCTIONS
Continue your current medications.    Avoid workouts until you see your cardiologist.     Consider increasing your daily water intake.     Seek medical attention if worsening symptoms (chest pain, back pain, shortness of breath, worsening fatigue)

## 2025-06-30 NOTE — PROGRESS NOTES
"Acute and Diagnostic Services Clinic Visit    Assessment & Plan     Upper back pain with Chest pain, unspecified type  Hx of CAD with stents and had an episode of back and chest pain with sweating that occurred while vigorously lifting weights.  Resolved after 20 minutes with rest and hydration.  He says he doesn't drink much water at baseline. He states he usually doesn't lift this aggressively but he was short on time and so was going harder than usual. He has not had any more pain but hasn't worked out since. His troponin is normal today. Ecg unchanged.  Cbc, comp met without acute concerns except elevated blood sugar at 150. He says his blood sugars run a bit higher than they should.  He was suppose to see his cardiologist last fall but missed the appointment.  He scheduled an appointment but can't get in to see them until August. This could all be due to dehydration or muscular strain, but I would like him to be seen by cardiology before he resumes working out.  I placed a cardiology referral for 1-2 week follow up and asked that he doesn't workout until he is seen by cardiology.  Precautions given is symptoms worsen.    - CBC with platelets  - Comprehensive metabolic panel  - Troponin T, High Sensitivity  - EKG 12-lead, tracing only        BMI  Estimated body mass index is 27.75 kg/m  as calculated from the following:    Height as of 5/23/25: 1.774 m (5' 9.84\").    Weight as of 5/23/25: 87.3 kg (192 lb 8 oz).         38 minutes spent by me on the date of the encounter doing chart review, review of test results, interpretation of tests, patient visit, and documentation       Subjective   Keegan is a 69 year old, presenting for the following health issues:  Chest Pain (Back pain that radiated into the front post workout)    70 yo male with hx of hyperlipidemia, hypertension, CAD, GERD who presents to the [facility] for an episode of back and chest discomfort that lasted about 20 minutes and resolves with rest and " drinking Gatorade. He joined a gym 1.5 months ago and has been doing treadmill and weightlifting without issues.  He was in a rush this past Tuesday so was lifting weights more vigorously than usual.  Right after finishing lifting he felt sweaty and this odd chest and back discomfort. He stopped furthering working out and left. He bought Gatorade and then felt better.  He called a friend who works out and they thought he might not be hydrating enough.  The patient says he usually doesn't drink water until around noon and then has about 2 large cups a day.  He has not had any further issues since that time but hasn't worked out since. He is active with caring for horses and hasn't noticed any further pains.  He has hx of stents placed in 2012. He is medication compliant. He smoked when he was younger but quite at the age of 31.  He was suppose to see his cardiologist last fall but missed that appointment. He wasn't able to see them until August 2025.  Denies coughing or sob.               Chest Pain  Onset/Duration: Last Tuesday   Description:   Location: Pain started in middle of the back and radiated to entire chest   Character: sharp  Radiation: Back into chest   Duration: 30 minutes   Intensity: moderate  Progression of Symptoms: improving  Accompanying Signs & Symptoms:  Shortness of breath: No  Sweating: No  Nausea/vomiting: No  Lightheadedness: YES- intermittently   Palpitations: PVCs since stent placement - pt is currently medication for to this   Fever/Chills: No  Cough: No           Heartburn: No  History:   Family history of heart disease: No  Tobacco use: No  Previous similar symptoms: no   Precipitating factors:   Worse with exertion: YES  Worse with deep breaths: No           Related to eating: No           Better with burping: No  Alleviating factors: was working out, stopped and got a gatorade and felt better  Therapies tried and outcome: nothing            Objective    There were no vitals taken for  this visit.  There is no height or weight on file to calculate BMI.  Physical Exam  Vitals and nursing note reviewed.   Constitutional:       General: He is not in acute distress.     Appearance: Normal appearance. He is not ill-appearing, toxic-appearing or diaphoretic.   HENT:      Head: Normocephalic and atraumatic.      Nose: Nose normal.   Eyes:      General: No scleral icterus.     Extraocular Movements: Extraocular movements intact.   Cardiovascular:      Rate and Rhythm: Normal rate and regular rhythm.      Heart sounds: Normal heart sounds.   Pulmonary:      Effort: Pulmonary effort is normal.      Breath sounds: Normal breath sounds.   Abdominal:      Palpations: Abdomen is soft.      Tenderness: There is no abdominal tenderness.   Musculoskeletal:         General: Normal range of motion.      Cervical back: Normal range of motion.   Skin:     General: Skin is warm and dry.   Neurological:      General: No focal deficit present.      Mental Status: He is alert and oriented to person, place, and time.   Psychiatric:         Mood and Affect: Mood normal.            Recent Results (from the past 24 hours)   CBC with platelets   Result Value Ref Range    WBC Count 5.0 4.0 - 11.0 10e3/uL    RBC Count 5.23 4.40 - 5.90 10e6/uL    Hemoglobin 16.0 13.3 - 17.7 g/dL    Hematocrit 45.3 40.0 - 53.0 %    MCV 87 78 - 100 fL    MCH 30.6 26.5 - 33.0 pg    MCHC 35.3 31.5 - 36.5 g/dL    RDW 12.9 10.0 - 15.0 %    Platelet Count 201 150 - 450 10e3/uL   Comprehensive metabolic panel   Result Value Ref Range    Sodium 140 135 - 145 mmol/L    Potassium 4.1 3.4 - 5.3 mmol/L    Carbon Dioxide (CO2) 20 (L) 22 - 29 mmol/L    Anion Gap 14 7 - 15 mmol/L    Urea Nitrogen 11.0 8.0 - 23.0 mg/dL    Creatinine 0.78 0.67 - 1.17 mg/dL    GFR Estimate >90 >60 mL/min/1.73m2    Calcium 9.4 8.8 - 10.4 mg/dL    Chloride 106 98 - 107 mmol/L    Glucose 150 (H) 70 - 99 mg/dL    Alkaline Phosphatase 93 40 - 150 U/L    AST 40 0 - 45 U/L    ALT 48 0 -  70 U/L    Protein Total 7.0 6.4 - 8.3 g/dL    Albumin 4.4 3.5 - 5.2 g/dL    Bilirubin Total 0.9 <=1.2 mg/dL   Troponin T, High Sensitivity   Result Value Ref Range    Troponin T, High Sensitivity 6 <=22 ng/L           Signed Electronically by: Lily Palomo MD

## 2025-07-07 NOTE — PROGRESS NOTES
Keegan is a 69 year old who is being evaluated via a billable video visit.    How would you like to obtain your AVS? MyChart  If the video visit is dropped, the invitation should be resent by: Text to cell phone: [phone]  Will anyone else be joining your video visit? No    Video-Visit Details    Type of service:  Video Visit   Video End Time:12:37 PM  Originating Location (pt. Location): Home    Distant Location (provider location):  On-site  Platform used for Video Visit: [employer]     A total of 30 minutes was spent with clinic visit, including chart review, including if available echo and cath images, and ECG, Holter, Event and coordinating care    General:  no apparent distress, normal body habitus, sitting upright.  ENT/Mouth:  membranes moist, no nasal discharge.  Normal head shape, no apparent injury or laceration.  Eyes:  no scleral icterus, normal conjunctivae.  No observed jaundice.  Neck:  no apparent neck swelling.   Chest/Lungs:  No breathing difficulty while speaking.  No audible wheezing.  No cough during conversation.  Cardiovascular:  No obviously elevated jugular venous pressure.  No apparent edema bilaterally in LE.   Abdomen:  no obvious abdominal distention.   Extremities:  no apparent cyanosis.  Skin:  no xanthelasma.  No facial lacerations.  Neurologic:  Normal arm motion bilateral, no tremors.    Psychiatric:  Alert, calm demeanor    Delightful  69-year-old gentleman with a history of symptomatic PVCs, even though the burden is less than 1% of the time in the background of preserved LV systolic function and coronary disease with mid-LAD stent in the past.     The patient has been on a beta blocker, both for CAD and the PVCs, and most of the time it worked quite well, but on the other hand around the fall and springtime, the patient would have more PVCs.  I last saw him 2 years ago. He has been doing quite well. Joined a gym several weeks ago doing wt bearing exercises and treadmill 4-5 times  a week. A week before July 4th he was lifting heavier than usual. After lifting he went to another machine for stretching his legs when felt sudden onset of diffuse chest and back tightness. He drove home and noted diaphoresis. Stopped by a gas station to buy a gatorade. Pain stopped after drinking it. Different than chest pain before PCI more than 10 yrs ago.  Normal ECG and labs done 3 days later. No similar sxs since. Not back to the gym yet. Helped granddaughter moved recently.    Isolated episode of diffuse chest tightness followed by diffuse sweating later on after wt lifting heavier than normal. Need to exclude coronary ischemia as a cause. Advised refraining from exercising until stress test.        Cameron Marcus MD

## 2025-07-10 ENCOUNTER — VIRTUAL VISIT (OUTPATIENT)
Dept: CARDIOLOGY | Facility: CLINIC | Age: 70
End: 2025-07-10
Payer: MEDICARE

## 2025-07-10 VITALS
WEIGHT: 196.5 LBS | HEART RATE: 74 BPM | BODY MASS INDEX: 28.13 KG/M2 | OXYGEN SATURATION: 94 % | SYSTOLIC BLOOD PRESSURE: 122 MMHG | HEIGHT: 70 IN | DIASTOLIC BLOOD PRESSURE: 70 MMHG

## 2025-07-10 DIAGNOSIS — R07.9 CHEST PAIN, UNSPECIFIED TYPE: ICD-10-CM

## 2025-07-10 DIAGNOSIS — M54.9 UPPER BACK PAIN: ICD-10-CM

## 2025-07-10 DIAGNOSIS — I25.110 CORONARY ARTERY DISEASE INVOLVING NATIVE CORONARY ARTERY OF NATIVE HEART WITH UNSTABLE ANGINA PECTORIS (H): Primary | ICD-10-CM

## 2025-07-10 NOTE — LETTER
7/10/2025    Fabio Stovall MD, MD  [address] [facility] [address]    RE: Keegan Buchanan       Dear Colleague,     I had the pleasure of seeing Keegan Buchanan in the [facility].  Keegan is a 69 year old who is being evaluated via a billable video visit.    How would you like to obtain your AVS? MyChart  If the video visit is dropped, the invitation should be resent by: Text to cell phone: [phone]  Will anyone else be joining your video visit? No    Video-Visit Details    Type of service:  Video Visit   Video End Time:12:37 PM  Originating Location (pt. Location): Home    Distant Location (provider location):  On-site  Platform used for Video Visit: [employer]     A total of 30 minutes was spent with clinic visit, including chart review, including if available echo and cath images, and ECG, Holter, Event and coordinating care    General:  no apparent distress, normal body habitus, sitting upright.  ENT/Mouth:  membranes moist, no nasal discharge.  Normal head shape, no apparent injury or laceration.  Eyes:  no scleral icterus, normal conjunctivae.  No observed jaundice.  Neck:  no apparent neck swelling.   Chest/Lungs:  No breathing difficulty while speaking.  No audible wheezing.  No cough during conversation.  Cardiovascular:  No obviously elevated jugular venous pressure.  No apparent edema bilaterally in LE.   Abdomen:  no obvious abdominal distention.   Extremities:  no apparent cyanosis.  Skin:  no xanthelasma.  No facial lacerations.  Neurologic:  Normal arm motion bilateral, no tremors.    Psychiatric:  Alert, calm demeanor    Delightful  69-year-old gentleman with a history of symptomatic PVCs, even though the burden is less than 1% of the time in the background of preserved LV systolic function and coronary disease with mid-LAD stent in the past.     The patient has been on a beta blocker, both for CAD and the PVCs, and most of the time it worked quite well, but on the other  hand around the fall and springtime, the patient would have more PVCs.  I last saw him 2 years ago. He has been doing quite well. Joined a gym several weeks ago doing wt bearing exercises and treadmill 4-5 times a week. A week before July 4th he was lifting heavier than usual. After lifting he went to another machine for stretching his legs when felt sudden onset of diffuse chest and back tightness. He drove home and noted diaphoresis. Stopped by a gas station to buy a gatorade. Pain stopped after drinking it. Different than chest pain before PCI more than 10 yrs ago.  Normal ECG and labs done 3 days later. No similar sxs since. Not back to the gym yet. Helped granddaughter moved recently.    Isolated episode of diffuse chest tightness followed by diffuse sweating later on after wt lifting heavier than normal. Need to exclude coronary ischemia as a cause. Advised refraining from exercising until stress test.        Cameron Marcus MD     Thank you for allowing me to participate in the care of your patient.      Sincerely,     Cameron Jorge MD     [facility]  cc:   Lily Palomo MD  [address] [place] [address]

## 2025-07-15 ENCOUNTER — HOSPITAL ENCOUNTER (OUTPATIENT)
Dept: NUCLEAR MEDICINE | Facility: CLINIC | Age: 70
Setting detail: NUCLEAR MEDICINE
Discharge: HOME | End: 2025-07-15
Attending: INTERNAL MEDICINE
Payer: MEDICARE

## 2025-07-15 ENCOUNTER — HOSPITAL ENCOUNTER (OUTPATIENT)
Dept: CARDIOLOGY | Facility: CLINIC | Age: 70
Setting detail: NUCLEAR MEDICINE
Discharge: HOME | End: 2025-07-15
Attending: INTERNAL MEDICINE
Payer: MEDICARE

## 2025-07-15 DIAGNOSIS — I25.110 CORONARY ARTERY DISEASE INVOLVING NATIVE CORONARY ARTERY OF NATIVE HEART WITH UNSTABLE ANGINA PECTORIS (H): ICD-10-CM

## 2025-07-15 LAB
CV BLOOD PRESSURE: 55 MMHG
CV STRESS MAX HR HE: 139
NUC STRESS EJECTION FRACTION: 64 %
RATE PRESSURE PRODUCT: NORMAL
STRESS ANGINA INDEX: 0
STRESS ECHO BASELINE DIASTOLIC HE: 88
STRESS ECHO BASELINE HR: 80 BPM
STRESS ECHO BASELINE SYSTOLIC BP: 152
STRESS ECHO CALCULATED PERCENT HR: 92 %
STRESS ECHO LAST STRESS DIASTOLIC BP: 88
STRESS ECHO LAST STRESS SYSTOLIC BP: 194
STRESS ECHO POST ESTIMATED WORKLOAD: 7 METS
STRESS ECHO POST EXERCISE DUR MIN: 6 MIN
STRESS ECHO POST EXERCISE DUR SEC: 0 SEC
STRESS ECHO TARGET HR: 151

## 2025-07-15 PROCEDURE — 93017 CV STRESS TEST TRACING ONLY: CPT

## 2025-07-15 PROCEDURE — A9502 TC99M TETROFOSMIN: HCPCS | Performed by: INTERNAL MEDICINE

## 2025-07-15 PROCEDURE — 343N000001 HC RX 343 MED OP 636: Performed by: INTERNAL MEDICINE

## 2025-07-15 RX ADMIN — TETROFOSMIN 10.2 MILLICURIE: 1.38 INJECTION, POWDER, LYOPHILIZED, FOR SOLUTION INTRAVENOUS at 07:40

## 2025-07-15 RX ADMIN — TETROFOSMIN 31.2 MILLICURIE: 1.38 INJECTION, POWDER, LYOPHILIZED, FOR SOLUTION INTRAVENOUS at 09:15

## 2025-07-22 ENCOUNTER — OFFICE VISIT (OUTPATIENT)
Dept: CARDIOLOGY | Facility: CLINIC | Age: 70
End: 2025-07-22
Payer: MEDICARE

## 2025-07-22 VITALS
SYSTOLIC BLOOD PRESSURE: 130 MMHG | RESPIRATION RATE: 18 BRPM | DIASTOLIC BLOOD PRESSURE: 76 MMHG | HEART RATE: 77 BPM | BODY MASS INDEX: 29.03 KG/M2 | HEIGHT: 69 IN | WEIGHT: 196 LBS

## 2025-07-22 DIAGNOSIS — R94.39 ABNORMAL STRESS TEST: ICD-10-CM

## 2025-07-22 PROCEDURE — 3075F SYST BP GE 130 - 139MM HG: CPT | Performed by: PHYSICIAN ASSISTANT

## 2025-07-22 PROCEDURE — 99215 OFFICE O/P EST HI 40 MIN: CPT | Performed by: PHYSICIAN ASSISTANT

## 2025-07-22 PROCEDURE — G2211 COMPLEX E/M VISIT ADD ON: HCPCS | Performed by: PHYSICIAN ASSISTANT

## 2025-07-22 PROCEDURE — 3078F DIAST BP <80 MM HG: CPT | Performed by: PHYSICIAN ASSISTANT

## 2025-07-22 NOTE — LETTER
"7/22/2025    Fabio Stovall MD, MD  [address]  [facility] [address]    RE: Keegan Buchanan       Dear Colleague,     I had the pleasure of seeing Keegan Buchanan in the [facility].      Electrophysiology Clinic Progress Note  Keegan Buchanan MRN# [id number]   YOB: 1955 Age: 69 year old     Primary electrophysiologist/cardiologist: Dr. Marcus    Reason for visit: H&P    Assessment:  CAD S/P CHIO x1 to mid LAD  NM MPI Treadmill 7/15/25 - small perfusion defect of mild severity involving the mid to basal inferior wall and the basal inferoseptal wall which is partially reversible and may be consistent with mild ischemia in the RCA distribution, LVEF 55%  At virtual visit w/ Dr. Marcus on 7/10/25, he noted an episode of diffuse chest and back tightness after physical exercise, accompanied by diaphoresis, which subsided after rest  Normal labs and EKG 3 days after this event  S/P CHIO x1 to mid LAD on 11/15/2012  Toprol XL 50 mg daily  Atorvastatin 80 mg daily  ASA 81 mg  Symptomatic PVC's  Per Dr. Marcus - \"burden is less than 1% of the time in the background of preserved LV systolic function and coronary disease as he does have a history of a stent implanted in the mid-LAD in the past\".    Toprol XL 50 mg daily  HTN  /76 today,  Toprol XL 50 mg daily  HLD  Atorvastatin 80 mg daily  Lipid panel 9/25/24 - LDL 88,     Plan:  Proceed with coronary angiogram  Risks and benefits of left heart catheterization and coronary angiogram were discussed with the patient in detail. Including, but not limited to, 0.1-0.3% (for diagnostic angio) and 1-2% (for PCI)  risk of stroke, MI, death, emergent bypass for diagnostic angio, risk of contrast induced allergic reaction, renal dysfunction, vascular complications were discussed.   No history of bleeding problems or current bleeding, and no scheduled surgeries or procedures in the next year. Patient understands and wishes to proceed with " it.   Nothing to eat or drink 8 hours prior to procedure  Can take medications as prescribed, nursing will discuss what to do with ASA before procedure.  Follow up on 8/20/25 with Dr. Millan in [place] as scheduled, earlier if experiencing new or worsening cardiac symptoms.     HPI:  Keegan Buchanan is a delightful 69 year old patient with history of CAD S/P CHIO x1 to the mid LAD, HTN, HLD, and symptomatic PVC's who presents today for H&P. At virtual visit w/ Dr. Marcus on 7/10/25, he noted an episode of diffuse chest and back tightness after physical exercise, accompanied by diaphoresis, which subsided after rest. A NM Exercise test was ordered which returned as abnormal, and he set to undergo coronary angiogram on 7/30/25 with Dr. Chen.    Today, he is doing well overall. He denies angina, shortness of breath, lightheadedness, dizziness or syncope since his episode at the end of June for which he saw Dr. Marcus for.         Yunier Bui PA-C   Physician Assistant   [facility]     PAST SURGICAL HISTORY:   Past Surgical History:   Procedure Laterality Date     COLONOSCOPY N/A 6/12/2015    Procedure: COMBINED COLONOSCOPY, SINGLE OR MULTIPLE BIOPSY/POLYPECTOMY BY BIOPSY;  Surgeon: Herman Rebollar MD;  Location:  GI     COLONOSCOPY N/A 11/12/2018    Procedure: colonoscopy;  Surgeon: Thiago Robles MD;  Location:  GI     COLONOSCOPY N/A 10/16/2023    Procedure: COLONOSCOPY, WITH POLYPECTOMY;  Surgeon: Huber Ireland MD;  Location:  GI     H STENT PROCED  2012     HC REMOVE TONSILS/ADENOIDS,<13 Y/O      T & A <12y.o.     LAPAROSCOPIC HERNIORRHAPHY INGUINAL Left 8/3/2018    Procedure: LAPAROSCOPIC HERNIORRHAPHY INGUINAL;  Laparoscopic left inguinal hernia repair;  Surgeon: Huber Ireland MD;  Location:  OR     REMOVAL OF SPERM DUCT(S)  05/06/88    Vasectomy       FAMILY HISTORY:   Family History   Problem Relation Age of Onset     Diabetes Mother         adult onset  "diabetes     Cancer Mother         kidney dx age 75     Cancer Father         prostate cancer/ at age 73     Hypertension Brother      Cancer Paternal Grandfather         prostat ca     Lung Cancer Brother        SOCIAL HISTORY:   Social History     Tobacco Use     Smoking status: Former     Current packs/day: 1.00     Average packs/day: 1 pack/day for 15.0 years (15.0 ttl pk-yrs)     Types: Cigarettes     Passive exposure: Never     Smokeless tobacco: Former     Types: Chew     Quit date: 1987     Tobacco comments:     quit 25 years ago.   Substance Use Topics     Alcohol use: Yes     Comment: 1 -2 times monthly           Objective:    Vitals: /76   Pulse 77   Resp 18   Ht 1.753 m (5' 9\")   Wt 88.9 kg (196 lb)   BMI 28.94 kg/m      Physical Exam:  General: Awake and alert. No acute distress.  Skin: Warm and dry. Appropriate color. No lesions or rashes noted.  HEENT: Normocephalic and atraumatic. EOM intact. PERRL. Trachea midline. Right-sided JVD: none.  Cardiac: Normal S1 and S2, no murmurs, rubs, or gallops noted. Regular rate and rhythm .  Lung: Regular work of breathing without accessory muscle use. Clear to auscultation bilaterally.  Abdomen: No distension.  Extremities: Bilateral lower extremity edema: none.  Neuro: A & O. No focal deficits noted. Moves all extremities appropriately.      Imaging/tests:    NM Exercise stress test 7/15/25:     The nuclear stress test is abnormal.     There is a small perfusion defect of mild severity involving the mid to basal inferior wall and the basal inferoseptal wall which is partially reversible and may be consistent with mild ischemia in the right coronary artery distribution.     Left ventricular function is normal.     The left ventricular ejection fraction at rest is 55%.  The left ventricular ejection fraction at stress is 64%.     TID is absent.     The patient's exercise capacity is average.     There is no prior study for " comparison.    ECG 6/30/25:  Sinus bradycardia (55 BPM), no significant ST-changes    CURRENT MEDICATIONS:  Current Outpatient Medications   Medication Sig Dispense Refill     albuterol (PROAIR HFA/PROVENTIL HFA/VENTOLIN HFA) 108 (90 Base) MCG/ACT inhaler INHALE 2 PUFFS INTO THE LUNGS EVERY 6 HOURS 54 g 1     aspirin 81 MG tablet Take 1 tablet by mouth daily. 90 tablet 3     atorvastatin (LIPITOR) 80 MG tablet Take 1 tablet (80 mg) by mouth daily. 90 tablet 3     budesonide-formoterol (SYMBICORT/BREYNA) 160-4.5 MCG/ACT inhaler Inhale 2 puffs into the lungs two times daily. 30.6 g 1     fluticasone (FLONASE) 50 MCG/ACT nasal spray SHAKE LIQUID AND USE 1 TO 2 SPRAYS IN EACH NOSTRIL DAILY 48 g 2     metoprolol succinate ER (TOPROL XL) 50 MG 24 hr tablet Take 1 tablet (50 mg) by mouth 2 times daily. 180 tablet 3     nitroGLYcerin (NITROSTAT) 0.4 MG sublingual tablet Place 1 tablet (0.4 mg) under the tongue every 5 minutes as needed for chest pain 25 tablet 0     omeprazole (PRILOSEC) 40 MG DR capsule Take 1 capsule (40 mg) by mouth at bedtime. 90 capsule 3     tamsulosin (FLOMAX) 0.4 MG capsule Take 2 capsules (0.8 mg) by mouth daily. 180 capsule 3       ALLERGIES:  Allergies   Allergen Reactions     Codeine Nausea     significant         PAST MEDICAL HISTORY:  Past Medical History:   Diagnosis Date     Hypercholesteremia      Pain in joint, shoulder region     right shoulder separation     Unspecified essential hypertension        PAST SURGICAL HISTORY:  Past Surgical History:   Procedure Laterality Date     COLONOSCOPY N/A 6/12/2015    Procedure: COMBINED COLONOSCOPY, SINGLE OR MULTIPLE BIOPSY/POLYPECTOMY BY BIOPSY;  Surgeon: Herman Rebollar MD;  Location:  GI     COLONOSCOPY N/A 11/12/2018    Procedure: colonoscopy;  Surgeon: Thiago Robles MD;  Location:  GI     COLONOSCOPY N/A 10/16/2023    Procedure: COLONOSCOPY, WITH POLYPECTOMY;  Surgeon: Huber Ireland MD;  Location:  GI     H STENT  PROCED       HC REMOVE TONSILS/ADENOIDS,<11 Y/O      T & A <12y.o.     LAPAROSCOPIC HERNIORRHAPHY INGUINAL Left 8/3/2018    Procedure: LAPAROSCOPIC HERNIORRHAPHY INGUINAL;  Laparoscopic left inguinal hernia repair;  Surgeon: Huber Ireland MD;  Location: [facility] OR     REMOVAL OF SPERM DUCT(S)  88    Vasectomy       FAMILY HISTORY:  Family History   Problem Relation Age of Onset     Diabetes Mother         adult onset diabetes     Cancer Mother         kidney dx age 75     Cancer Father         prostate cancer/ at age 73     Hypertension Brother      Cancer Paternal Grandfather         prostat ca     Lung Cancer Brother        SOCIAL HISTORY:  Social History     Socioeconomic History     Marital status:    Tobacco Use     Smoking status: Former     Current packs/day: 1.00     Average packs/day: 1 pack/day for 15.0 years (15.0 ttl pk-yrs)     Types: Cigarettes     Passive exposure: Never     Smokeless tobacco: Former     Types: Chew     Quit date: 1987     Tobacco comments:     quit 25 years ago.   Vaping Use     Vaping status: Never Used   Substance and Sexual Activity     Alcohol use: Yes     Comment: 1 -2 times monthly     Drug use: No     Sexual activity: Yes     Partners: Female     Birth control/protection: Surgical, Male Surgical     Comment: vasectomy     Social Drivers of Health     Financial Resource Strain: Low Risk  (2024)    Financial Resource Strain      Within the past 12 months, have you or your family members you live with been unable to get utilities (heat, electricity) when it was really needed?: No   Food Insecurity: Low Risk  (2024)    Food Insecurity      Within the past 12 months, did you worry that your food would run out before you got money to buy more?: No      Within the past 12 months, did the food you bought just not last and you didn t have money to get more?: No   Transportation Needs: Low Risk  (2024)    Transportation Needs      Within the  past 12 months, has lack of transportation kept you from medical appointments, getting your medicines, non-medical meetings or appointments, work, or from getting things that you need?: No   Physical Activity: Unknown (9/24/2024)    Exercise Vital Sign      Days of Exercise per Week: 0 days      Minutes of Exercise per Session: Patient declined   Stress: No Stress Concern Present (9/24/2024)    [employer] - Occupational Stress Questionnaire      Feeling of Stress : Not at all   Social Connections: Unknown (9/24/2024)    Social Connection and Isolation Panel [NHANES]      Frequency of Social Gatherings with Friends and Family: Once a week   Interpersonal Safety: Low Risk  (5/23/2025)    Interpersonal Safety      Do you feel physically and emotionally safe where you currently live?: Yes      Within the past 12 months, have you been hit, slapped, kicked or otherwise physically hurt by someone?: No      Within the past 12 months, have you been humiliated or emotionally abused in other ways by your partner or ex-partner?: No   Housing Stability: Low Risk  (9/24/2024)    Housing Stability      Do you have housing? : Yes      Are you worried about losing your housing?: No       Today's clinic visit entailed:  45 minutes spent by me on the date of the encounter doing chart review, history and exam, documentation and further activities per the note.  The level of medical decision making during this visit was of moderate complexity.     The longitudinal plan of care for the diagnosis(es)/condition(s) as documented were addressed during this visit. Due to the added complexity in care, I will continue to support Keegan Buchanan in the subsequent management and with ongoing continuity of care.         Review of Systems:     Review of Systems:  Skin:        Eyes:       ENT:       Respiratory:       Cardiovascular:       Gastroenterology:      Genitourinary:       Musculoskeletal:       Neurologic:        Psychiatric:       Heme/Lymph/Imm:       Endocrine:              Thank you for allowing me to participate in the care of your patient.      Sincerely,     Yunier Bui PA-C     [facility]  cc:   Cameron Jorge MD  [address]  [place] [address]

## 2025-07-22 NOTE — PROGRESS NOTES
"    Electrophysiology Clinic Progress Note  Keegan Buchanan MRN# [id number]   YOB: 1955 Age: 69 year old     Primary electrophysiologist/cardiologist: Dr. Marcus    Reason for visit: H&P    Assessment:  CAD S/P CHIO x1 to mid LAD  NM MPI Treadmill 7/15/25 - small perfusion defect of mild severity involving the mid to basal inferior wall and the basal inferoseptal wall which is partially reversible and may be consistent with mild ischemia in the RCA distribution, LVEF 55%  At virtual visit w/ Dr. Marcus on 7/10/25, he noted an episode of diffuse chest and back tightness after physical exercise, accompanied by diaphoresis, which subsided after rest  Normal labs and EKG 3 days after this event  S/P CHIO x1 to mid LAD on 11/15/2012  Toprol XL 50 mg daily  Atorvastatin 80 mg daily  ASA 81 mg  Symptomatic PVC's  Per Dr. Marcus - \"burden is less than 1% of the time in the background of preserved LV systolic function and coronary disease as he does have a history of a stent implanted in the mid-LAD in the past\".    Toprol XL 50 mg daily  HTN  /76 today,  Toprol XL 50 mg daily  HLD  Atorvastatin 80 mg daily  Lipid panel 9/25/24 - LDL 88,     Plan:  Proceed with coronary angiogram  Risks and benefits of left heart catheterization and coronary angiogram were discussed with the patient in detail. Including, but not limited to, 0.1-0.3% (for diagnostic angio) and 1-2% (for PCI)  risk of stroke, MI, death, emergent bypass for diagnostic angio, risk of contrast induced allergic reaction, renal dysfunction, vascular complications were discussed.   No history of bleeding problems or current bleeding, and no scheduled surgeries or procedures in the next year. Patient understands and wishes to proceed with it.   Nothing to eat or drink 8 hours prior to procedure  Can take medications as prescribed, nursing will discuss what to do with ASA before procedure.  Follow up on 8/20/25 with Dr. Millan in [place] as " scheduled, earlier if experiencing new or worsening cardiac symptoms.     HPI:  Keegan Buchanan is a delightful 69 year old patient with history of CAD S/P CHIO x1 to the mid LAD, HTN, HLD, and symptomatic PVC's who presents today for H&P. At virtual visit w/ Dr. Marcus on 7/10/25, he noted an episode of diffuse chest and back tightness after physical exercise, accompanied by diaphoresis, which subsided after rest. A NM Exercise test was ordered which returned as abnormal, and he set to undergo coronary angiogram on 25 with Dr. Chen.    Today, he is doing well overall. He denies angina, shortness of breath, lightheadedness, dizziness or syncope since his episode at the end of  for which he saw Dr. Marcus for.         Yunier Bui PA-C   Physician Assistant   [facility]     PAST SURGICAL HISTORY:   Past Surgical History:   Procedure Laterality Date    COLONOSCOPY N/A 2015    Procedure: COMBINED COLONOSCOPY, SINGLE OR MULTIPLE BIOPSY/POLYPECTOMY BY BIOPSY;  Surgeon: Herman Rebollar MD;  Location:  GI    COLONOSCOPY N/A 2018    Procedure: colonoscopy;  Surgeon: Thiago Robles MD;  Location:  GI    COLONOSCOPY N/A 10/16/2023    Procedure: COLONOSCOPY, WITH POLYPECTOMY;  Surgeon: Huber Ireland MD;  Location:  GI    H STENT PROCED      HC REMOVE TONSILS/ADENOIDS,<13 Y/O      T & A <12y.o.    LAPAROSCOPIC HERNIORRHAPHY INGUINAL Left 8/3/2018    Procedure: LAPAROSCOPIC HERNIORRHAPHY INGUINAL;  Laparoscopic left inguinal hernia repair;  Surgeon: Huber Ireland MD;  Location:  OR    REMOVAL OF SPERM DUCT(S)  88    Vasectomy       FAMILY HISTORY:   Family History   Problem Relation Age of Onset    Diabetes Mother         adult onset diabetes    Cancer Mother         kidney dx age 75    Cancer Father         prostate cancer/ at age 73    Hypertension Brother     Cancer Paternal Grandfather         prostat ca    Lung Cancer Brother   "      SOCIAL HISTORY:   Social History     Tobacco Use    Smoking status: Former     Current packs/day: 1.00     Average packs/day: 1 pack/day for 15.0 years (15.0 ttl pk-yrs)     Types: Cigarettes     Passive exposure: Never    Smokeless tobacco: Former     Types: Chew     Quit date: 1/20/1987    Tobacco comments:     quit 25 years ago.   Substance Use Topics    Alcohol use: Yes     Comment: 1 -2 times monthly           Objective:    Vitals: /76   Pulse 77   Resp 18   Ht 1.753 m (5' 9\")   Wt 88.9 kg (196 lb)   BMI 28.94 kg/m      Physical Exam:  General: Awake and alert. No acute distress.  Skin: Warm and dry. Appropriate color. No lesions or rashes noted.  HEENT: Normocephalic and atraumatic. EOM intact. PERRL. Trachea midline. Right-sided JVD: none.  Cardiac: Normal S1 and S2, no murmurs, rubs, or gallops noted. Regular rate and rhythm .  Lung: Regular work of breathing without accessory muscle use. Clear to auscultation bilaterally.  Abdomen: No distension.  Extremities: Bilateral lower extremity edema: none.  Neuro: A & O. No focal deficits noted. Moves all extremities appropriately.      Imaging/tests:    NM Exercise stress test 7/15/25:    The nuclear stress test is abnormal.    There is a small perfusion defect of mild severity involving the mid to basal inferior wall and the basal inferoseptal wall which is partially reversible and may be consistent with mild ischemia in the right coronary artery distribution.    Left ventricular function is normal.    The left ventricular ejection fraction at rest is 55%.  The left ventricular ejection fraction at stress is 64%.    TID is absent.    The patient's exercise capacity is average.    There is no prior study for comparison.    ECG 6/30/25:  Sinus bradycardia (55 BPM), no significant ST-changes    CURRENT MEDICATIONS:  Current Outpatient Medications   Medication Sig Dispense Refill    albuterol (PROAIR HFA/PROVENTIL HFA/VENTOLIN HFA) 108 (90 Base) " MCG/ACT inhaler INHALE 2 PUFFS INTO THE LUNGS EVERY 6 HOURS 54 g 1    aspirin 81 MG tablet Take 1 tablet by mouth daily. 90 tablet 3    atorvastatin (LIPITOR) 80 MG tablet Take 1 tablet (80 mg) by mouth daily. 90 tablet 3    budesonide-formoterol (SYMBICORT/BREYNA) 160-4.5 MCG/ACT inhaler Inhale 2 puffs into the lungs two times daily. 30.6 g 1    fluticasone (FLONASE) 50 MCG/ACT nasal spray SHAKE LIQUID AND USE 1 TO 2 SPRAYS IN EACH NOSTRIL DAILY 48 g 2    metoprolol succinate ER (TOPROL XL) 50 MG 24 hr tablet Take 1 tablet (50 mg) by mouth 2 times daily. 180 tablet 3    nitroGLYcerin (NITROSTAT) 0.4 MG sublingual tablet Place 1 tablet (0.4 mg) under the tongue every 5 minutes as needed for chest pain 25 tablet 0    omeprazole (PRILOSEC) 40 MG DR capsule Take 1 capsule (40 mg) by mouth at bedtime. 90 capsule 3    tamsulosin (FLOMAX) 0.4 MG capsule Take 2 capsules (0.8 mg) by mouth daily. 180 capsule 3       ALLERGIES:  Allergies   Allergen Reactions    Codeine Nausea     significant         PAST MEDICAL HISTORY:  Past Medical History:   Diagnosis Date    Hypercholesteremia     Pain in joint, shoulder region     right shoulder separation    Unspecified essential hypertension        PAST SURGICAL HISTORY:  Past Surgical History:   Procedure Laterality Date    COLONOSCOPY N/A 6/12/2015    Procedure: COMBINED COLONOSCOPY, SINGLE OR MULTIPLE BIOPSY/POLYPECTOMY BY BIOPSY;  Surgeon: Herman Rebollar MD;  Location:  GI    COLONOSCOPY N/A 11/12/2018    Procedure: colonoscopy;  Surgeon: Thiago Robles MD;  Location:  GI    COLONOSCOPY N/A 10/16/2023    Procedure: COLONOSCOPY, WITH POLYPECTOMY;  Surgeon: Huber Ireland MD;  Location:  GI    H STENT PROCED  2012    HC REMOVE TONSILS/ADENOIDS,<13 Y/O      T & A <12y.o.    LAPAROSCOPIC HERNIORRHAPHY INGUINAL Left 8/3/2018    Procedure: LAPAROSCOPIC HERNIORRHAPHY INGUINAL;  Laparoscopic left inguinal hernia repair;  Surgeon: Huber Ireland MD;  Location:  [facility] OR    REMOVAL OF SPERM DUCT(S)  88    Vasectomy       FAMILY HISTORY:  Family History   Problem Relation Age of Onset    Diabetes Mother         adult onset diabetes    Cancer Mother         kidney dx age 75    Cancer Father         prostate cancer/ at age 73    Hypertension Brother     Cancer Paternal Grandfather         prostat ca    Lung Cancer Brother        SOCIAL HISTORY:  Social History     Socioeconomic History    Marital status:    Tobacco Use    Smoking status: Former     Current packs/day: 1.00     Average packs/day: 1 pack/day for 15.0 years (15.0 ttl pk-yrs)     Types: Cigarettes     Passive exposure: Never    Smokeless tobacco: Former     Types: Chew     Quit date: 1987    Tobacco comments:     quit 25 years ago.   Vaping Use    Vaping status: Never Used   Substance and Sexual Activity    Alcohol use: Yes     Comment: 1 -2 times monthly    Drug use: No    Sexual activity: Yes     Partners: Female     Birth control/protection: Surgical, Male Surgical     Comment: vasectomy     Social Drivers of Health     Financial Resource Strain: Low Risk  (2024)    Financial Resource Strain     Within the past 12 months, have you or your family members you live with been unable to get utilities (heat, electricity) when it was really needed?: No   Food Insecurity: Low Risk  (2024)    Food Insecurity     Within the past 12 months, did you worry that your food would run out before you got money to buy more?: No     Within the past 12 months, did the food you bought just not last and you didn t have money to get more?: No   Transportation Needs: Low Risk  (2024)    Transportation Needs     Within the past 12 months, has lack of transportation kept you from medical appointments, getting your medicines, non-medical meetings or appointments, work, or from getting things that you need?: No   Physical Activity: Unknown (2024)    Exercise Vital Sign     Days of Exercise per  Week: 0 days     Minutes of Exercise per Session: Patient declined   Stress: No Stress Concern Present (9/24/2024)    [employer] - Occupational Stress Questionnaire     Feeling of Stress : Not at all   Social Connections: Unknown (9/24/2024)    Social Connection and Isolation Panel [NHANES]     Frequency of Social Gatherings with Friends and Family: Once a week   Interpersonal Safety: Low Risk  (5/23/2025)    Interpersonal Safety     Do you feel physically and emotionally safe where you currently live?: Yes     Within the past 12 months, have you been hit, slapped, kicked or otherwise physically hurt by someone?: No     Within the past 12 months, have you been humiliated or emotionally abused in other ways by your partner or ex-partner?: No   Housing Stability: Low Risk  (9/24/2024)    Housing Stability     Do you have housing? : Yes     Are you worried about losing your housing?: No       Today's clinic visit entailed:  45 minutes spent by me on the date of the encounter doing chart review, history and exam, documentation and further activities per the note.  The level of medical decision making during this visit was of moderate complexity.     The longitudinal plan of care for the diagnosis(es)/condition(s) as documented were addressed during this visit. Due to the added complexity in care, I will continue to support Keegan Buchanan in the subsequent management and with ongoing continuity of care.         Review of Systems:     Review of Systems:  Skin:        Eyes:       ENT:       Respiratory:       Cardiovascular:       Gastroenterology:      Genitourinary:       Musculoskeletal:       Neurologic:       Psychiatric:       Heme/Lymph/Imm:       Endocrine:

## 2025-07-22 NOTE — PATIENT INSTRUCTIONS
It was a pleasure seeing you today!    Today's Recommendations    Proceed with angiogram as planned  Nothing to eat or drink 8 hours prior to procedure  You will need a  for the trip home  Take your other medications as prescribed, our nurse line will call you with instructions regarding your aspirin  Follow up with Dr. Millan in [place] 8/20/25, earlier if experiencing worsening or new cardiac symptoms.     Please send a [employer] message or call our team with any questions or concerns.     ARASH GalloC     Electrophysiology (EP) Nurse: [phone]  General scheduling and after hours: [phone]  Electrophysiology (EP) scheduling [phone]

## 2025-07-28 ENCOUNTER — MYC MEDICAL ADVICE (OUTPATIENT)
Dept: CARDIOLOGY | Facility: CLINIC | Age: 70
End: 2025-07-28
Payer: MEDICARE

## 2025-07-28 DIAGNOSIS — I20.0 UNSTABLE ANGINA (H): ICD-10-CM

## 2025-07-28 DIAGNOSIS — R94.39 ABNORMAL STRESS TEST: Primary | ICD-10-CM

## 2025-07-28 RX ORDER — ASPIRIN 81 MG/1
243 TABLET, CHEWABLE ORAL ONCE
Status: CANCELLED | OUTPATIENT
Start: 2025-07-28

## 2025-07-28 RX ORDER — LIDOCAINE 40 MG/G
CREAM TOPICAL
Status: CANCELLED | OUTPATIENT
Start: 2025-07-28

## 2025-07-28 RX ORDER — ASPIRIN 325 MG
325 TABLET ORAL ONCE
Status: CANCELLED | OUTPATIENT
Start: 2025-07-28 | End: 2025-07-28

## 2025-07-28 RX ORDER — SODIUM CHLORIDE 9 MG/ML
INJECTION, SOLUTION INTRAVENOUS CONTINUOUS
Status: CANCELLED | OUTPATIENT
Start: 2025-07-28

## 2025-07-28 RX ORDER — POTASSIUM CHLORIDE 1500 MG/1
20 TABLET, EXTENDED RELEASE ORAL
Status: CANCELLED | OUTPATIENT
Start: 2025-07-28

## 2025-07-29 NOTE — PROGRESS NOTES
CSE for scheduled PCI tomorrow. No pertinent allergies, orders in epic, pre-call completed by heart clinic RN.

## 2025-07-30 ENCOUNTER — HOSPITAL ENCOUNTER (OUTPATIENT)
Facility: CLINIC | Age: 70
Discharge: HOME | End: 2025-07-30
Attending: INTERNAL MEDICINE | Admitting: INTERNAL MEDICINE
Payer: MEDICARE

## 2025-07-30 VITALS
BODY MASS INDEX: 28.12 KG/M2 | DIASTOLIC BLOOD PRESSURE: 70 MMHG | RESPIRATION RATE: 16 BRPM | SYSTOLIC BLOOD PRESSURE: 133 MMHG | OXYGEN SATURATION: 96 % | HEART RATE: 66 BPM | HEIGHT: 70 IN | TEMPERATURE: 97.7 F

## 2025-07-30 DIAGNOSIS — R94.39 ABNORMAL STRESS TEST: ICD-10-CM

## 2025-07-30 DIAGNOSIS — I20.0 UNSTABLE ANGINA (H): ICD-10-CM

## 2025-07-30 LAB
ANION GAP SERPL CALCULATED.3IONS-SCNC: 12 MMOL/L (ref 7–15)
APTT PPP: 29 SECONDS (ref 22–38)
ATRIAL RATE - MUSE: 58 BPM
BUN SERPL-MCNC: 11.2 MG/DL (ref 8–23)
CALCIUM SERPL-MCNC: 9.4 MG/DL (ref 8.8–10.4)
CHLORIDE SERPL-SCNC: 106 MMOL/L (ref 98–107)
CREAT SERPL-MCNC: 0.83 MG/DL (ref 0.67–1.17)
DIASTOLIC BLOOD PRESSURE - MUSE: NORMAL MMHG
EGFRCR SERPLBLD CKD-EPI 2021: >90 ML/MIN/1.73M2
ERYTHROCYTE [DISTWIDTH] IN BLOOD BY AUTOMATED COUNT: 13.2 % (ref 10–15)
GLUCOSE SERPL-MCNC: 109 MG/DL (ref 70–99)
HCO3 SERPL-SCNC: 24 MMOL/L (ref 22–29)
HCT VFR BLD AUTO: 44.4 % (ref 40–53)
HGB BLD-MCNC: 15.7 G/DL (ref 13.3–17.7)
INR PPP: 1.02 (ref 0.85–1.15)
INTERPRETATION ECG - MUSE: NORMAL
MCH RBC QN AUTO: 30.5 PG (ref 26.5–33)
MCHC RBC AUTO-ENTMCNC: 35.4 G/DL (ref 31.5–36.5)
MCV RBC AUTO: 86 FL (ref 78–100)
P AXIS - MUSE: 53 DEGREES
PLATELET # BLD AUTO: 195 10E3/UL (ref 150–450)
POTASSIUM SERPL-SCNC: 4 MMOL/L (ref 3.4–5.3)
PR INTERVAL - MUSE: 182 MS
PROTHROMBIN TIME: 13.2 SECONDS (ref 11.8–14.8)
QRS DURATION - MUSE: 108 MS
QT - MUSE: 420 MS
QTC - MUSE: 412 MS
R AXIS - MUSE: 15 DEGREES
RBC # BLD AUTO: 5.15 10E6/UL (ref 4.4–5.9)
SODIUM SERPL-SCNC: 142 MMOL/L (ref 135–145)
SYSTOLIC BLOOD PRESSURE - MUSE: NORMAL MMHG
T AXIS - MUSE: 63 DEGREES
VENTRICULAR RATE- MUSE: 58 BPM
WBC # BLD AUTO: 5.9 10E3/UL (ref 4–11)

## 2025-07-30 PROCEDURE — 99152 MOD SED SAME PHYS/QHP 5/>YRS: CPT | Performed by: INTERNAL MEDICINE

## 2025-07-30 PROCEDURE — 93571 IV DOP VEL&/PRESS C FLO 1ST: CPT | Mod: 26 | Performed by: INTERNAL MEDICINE

## 2025-07-30 PROCEDURE — 272N000001 HC OR GENERAL SUPPLY STERILE: Performed by: INTERNAL MEDICINE

## 2025-07-30 PROCEDURE — 250N000011 HC RX IP 250 OP 636: Performed by: INTERNAL MEDICINE

## 2025-07-30 PROCEDURE — 36415 COLL VENOUS BLD VENIPUNCTURE: CPT | Performed by: INTERNAL MEDICINE

## 2025-07-30 PROCEDURE — 85610 PROTHROMBIN TIME: CPT | Performed by: INTERNAL MEDICINE

## 2025-07-30 PROCEDURE — 80048 BASIC METABOLIC PNL TOTAL CA: CPT | Performed by: INTERNAL MEDICINE

## 2025-07-30 PROCEDURE — 93005 ELECTROCARDIOGRAM TRACING: CPT

## 2025-07-30 PROCEDURE — 250N000009 HC RX 250: Performed by: INTERNAL MEDICINE

## 2025-07-30 PROCEDURE — 258N000003 HC RX IP 258 OP 636: Performed by: INTERNAL MEDICINE

## 2025-07-30 PROCEDURE — 85730 THROMBOPLASTIN TIME PARTIAL: CPT | Performed by: INTERNAL MEDICINE

## 2025-07-30 PROCEDURE — 999N000184 HC STATISTIC TELEMETRY

## 2025-07-30 PROCEDURE — 93454 CORONARY ARTERY ANGIO S&I: CPT | Mod: 26 | Performed by: INTERNAL MEDICINE

## 2025-07-30 PROCEDURE — 93454 CORONARY ARTERY ANGIO S&I: CPT | Performed by: INTERNAL MEDICINE

## 2025-07-30 PROCEDURE — 82310 ASSAY OF CALCIUM: CPT | Performed by: INTERNAL MEDICINE

## 2025-07-30 PROCEDURE — 93799 UNLISTED CV SVC/PROCEDURE: CPT | Performed by: INTERNAL MEDICINE

## 2025-07-30 PROCEDURE — C1887 CATHETER, GUIDING: HCPCS | Performed by: INTERNAL MEDICINE

## 2025-07-30 PROCEDURE — 85018 HEMOGLOBIN: CPT | Performed by: INTERNAL MEDICINE

## 2025-07-30 PROCEDURE — C1894 INTRO/SHEATH, NON-LASER: HCPCS | Performed by: INTERNAL MEDICINE

## 2025-07-30 PROCEDURE — C1769 GUIDE WIRE: HCPCS | Performed by: INTERNAL MEDICINE

## 2025-07-30 PROCEDURE — 999N000071 HC STATISTIC HEART CATH LAB OR EP LAB

## 2025-07-30 RX ORDER — MULTIVIT-MIN/IRON/FOLIC ACID/K 18-600-40
CAPSULE ORAL
COMMUNITY

## 2025-07-30 RX ORDER — LIDOCAINE 40 MG/G
CREAM TOPICAL
Status: DISCONTINUED | OUTPATIENT
Start: 2025-07-30 | End: 2025-07-30 | Stop reason: HOSPADM

## 2025-07-30 RX ORDER — OMEGA-3/DHA/EPA/FISH OIL 60 MG-90MG
CAPSULE ORAL
COMMUNITY

## 2025-07-30 RX ORDER — ASPIRIN 325 MG
325 TABLET ORAL ONCE
Status: COMPLETED | OUTPATIENT
Start: 2025-07-30 | End: 2025-07-30

## 2025-07-30 RX ORDER — NALOXONE HYDROCHLORIDE 0.4 MG/ML
0.2 INJECTION, SOLUTION INTRAMUSCULAR; INTRAVENOUS; SUBCUTANEOUS
Status: DISCONTINUED | OUTPATIENT
Start: 2025-07-30 | End: 2025-07-30 | Stop reason: HOSPADM

## 2025-07-30 RX ORDER — NITROGLYCERIN 5 MG/ML
VIAL (ML) INTRAVENOUS
Status: DISCONTINUED | OUTPATIENT
Start: 2025-07-30 | End: 2025-07-30 | Stop reason: HOSPADM

## 2025-07-30 RX ORDER — FLUMAZENIL 0.1 MG/ML
0.2 INJECTION, SOLUTION INTRAVENOUS
Status: DISCONTINUED | OUTPATIENT
Start: 2025-07-30 | End: 2025-07-30 | Stop reason: HOSPADM

## 2025-07-30 RX ORDER — OXYCODONE HYDROCHLORIDE 5 MG/1
10 TABLET ORAL EVERY 4 HOURS PRN
Status: DISCONTINUED | OUTPATIENT
Start: 2025-07-30 | End: 2025-07-30 | Stop reason: HOSPADM

## 2025-07-30 RX ORDER — SODIUM CHLORIDE 9 MG/ML
INJECTION, SOLUTION INTRAVENOUS CONTINUOUS
Status: DISCONTINUED | OUTPATIENT
Start: 2025-07-30 | End: 2025-07-30 | Stop reason: HOSPADM

## 2025-07-30 RX ORDER — ASPIRIN 81 MG/1
243 TABLET, CHEWABLE ORAL ONCE
Status: COMPLETED | OUTPATIENT
Start: 2025-07-30 | End: 2025-07-30

## 2025-07-30 RX ORDER — OXYCODONE HYDROCHLORIDE 5 MG/1
5 TABLET ORAL EVERY 4 HOURS PRN
Status: DISCONTINUED | OUTPATIENT
Start: 2025-07-30 | End: 2025-07-30 | Stop reason: HOSPADM

## 2025-07-30 RX ORDER — NALOXONE HYDROCHLORIDE 0.4 MG/ML
0.4 INJECTION, SOLUTION INTRAMUSCULAR; INTRAVENOUS; SUBCUTANEOUS
Status: DISCONTINUED | OUTPATIENT
Start: 2025-07-30 | End: 2025-07-30 | Stop reason: HOSPADM

## 2025-07-30 RX ORDER — HEPARIN SODIUM 1000 [USP'U]/ML
INJECTION, SOLUTION INTRAVENOUS; SUBCUTANEOUS
Status: DISCONTINUED | OUTPATIENT
Start: 2025-07-30 | End: 2025-07-30 | Stop reason: HOSPADM

## 2025-07-30 RX ORDER — IOPAMIDOL 755 MG/ML
INJECTION, SOLUTION INTRAVASCULAR
Status: DISCONTINUED | OUTPATIENT
Start: 2025-07-30 | End: 2025-07-30 | Stop reason: HOSPADM

## 2025-07-30 RX ORDER — VERAPAMIL HYDROCHLORIDE 2.5 MG/ML
INJECTION INTRAVENOUS
Status: DISCONTINUED | OUTPATIENT
Start: 2025-07-30 | End: 2025-07-30 | Stop reason: HOSPADM

## 2025-07-30 RX ORDER — FENTANYL CITRATE 50 UG/ML
INJECTION, SOLUTION INTRAMUSCULAR; INTRAVENOUS
Status: DISCONTINUED | OUTPATIENT
Start: 2025-07-30 | End: 2025-07-30 | Stop reason: HOSPADM

## 2025-07-30 RX ORDER — ACETAMINOPHEN 325 MG/1
650 TABLET ORAL EVERY 4 HOURS PRN
Status: DISCONTINUED | OUTPATIENT
Start: 2025-07-30 | End: 2025-07-30 | Stop reason: HOSPADM

## 2025-07-30 RX ORDER — FENTANYL CITRATE 50 UG/ML
25 INJECTION, SOLUTION INTRAMUSCULAR; INTRAVENOUS
Refills: 0 | Status: DISCONTINUED | OUTPATIENT
Start: 2025-07-30 | End: 2025-07-30 | Stop reason: HOSPADM

## 2025-07-30 RX ORDER — ATROPINE SULFATE 0.1 MG/ML
0.5 INJECTION INTRAVENOUS
Status: DISCONTINUED | OUTPATIENT
Start: 2025-07-30 | End: 2025-07-30 | Stop reason: HOSPADM

## 2025-07-30 RX ORDER — POTASSIUM CHLORIDE 1500 MG/1
20 TABLET, EXTENDED RELEASE ORAL
Status: DISCONTINUED | OUTPATIENT
Start: 2025-07-30 | End: 2025-07-30 | Stop reason: HOSPADM

## 2025-07-30 RX ADMIN — SODIUM CHLORIDE: 0.9 INJECTION, SOLUTION INTRAVENOUS at 07:00

## 2025-07-30 ASSESSMENT — ACTIVITIES OF DAILY LIVING (ADL)
ADLS_ACUITY_SCORE: 41

## 2025-07-30 NOTE — PROGRESS NOTES
Care Suites Discharge Nursing Note    Patient Information  Name: Keegan Buchanan  Age: 69 year old    Discharge Education:  Discharge instructions reviewed: Yes  Additional education/resources provided: none  Patient/patient representative verbalizes understanding: Yes  Patient discharging on new medications: No  Medication education completed: N/A    Discharge Plans:   Discharge location: home  Discharge ride contacted: Yes-wife is here.  Approximate discharge time: 1530    Discharge Criteria:  Discharge criteria met and vital signs stable: Yes  Ambulated in hallway without diff.  Reports no diff voiding.    Patient Belongs:  Patient belongings returned to patient: N/A    Nasrin Linares RN

## 2025-07-30 NOTE — PROGRESS NOTES
Care Suites Admission Nursing Note    Patient Information  Name: Keegan Buchanan  Age: 69 year old  Reason for admission: Heart Cath  Care Suites arrival time: 0630    Visitor Information  Name: Shilpa     Patient Admission/Assessment   Pre-procedure assessment complete: Yes  If abnormal assessment/labs, provider notified: N/A  NPO: Yes  Medications held per instructions/orders: N/A  Consent: deferred  If applicable, pregnancy test status: deferred  Patient oriented to room: Yes  Education/questions answered: Yes  Plan/other: proceed with orders as planned    Discharge Planning  Discharge name/phone number: Shilpa [phone]  Overnight post sedation caregiver: Shilpa  Discharge location: home    Nasrin Linares RN

## 2025-07-30 NOTE — PRE-PROCEDURE
GENERAL PRE-PROCEDURE:   Procedure:  Coronary angiogram with possible PCI  Date/Time:  7/30/2025 8:29 AM    Verbal consent obtained?: Yes    Written consent obtained?: Yes    Risks and benefits: Risks, benefits and alternatives were discussed    Consent given by:  Patient  Patient states understanding of procedure being performed: Yes    Patient's understanding of procedure matches consent: Yes    Procedure consent matches procedure scheduled: Yes    Expected level of sedation:  Moderate  Appropriately NPO:  Yes  ASA Class:  2  Mallampati  :  Grade 2- soft palate, base of uvula, tonsillar pillars, and portion of posterior pharyngeal wall visible  History & Physical reviewed:  History and physical reviewed and no updates needed  Statement of review:  I have reviewed the lab findings, diagnostic data, medications, and the plan for sedation

## 2025-07-30 NOTE — PROGRESS NOTES
1220 Report received from Liyah Linares RN.  1230 Pt A/O. TR band intact to right wrist.  No oozing or hematoma noted. Area soft & flat. Armboard intact. Right upper forearm soft. right arm elevated on pillows. Pt denies pain. Pt instructed on activity restrictions with right wrist/arm. Verbal understanding received from pt. Pt's wife at bedside. Detailed update given.   1245 Pt taking diet & flds well. No complaints.  1300 Air released from TR band per protocol. Delayed bleeding at site - air replaced per protocol.   1333 Report given to Liyah Linares RN.

## 2025-07-30 NOTE — PROGRESS NOTES
Care Suites Post Procedure Note    Patient Information  Name: Keegan Buchanan  Age: 69 year old    Post Procedure  Time patient returned to Care Suites: 1145  Concerns/abnormal assessment: none  If abnormal assessment, provider notified: N/A  Plan/Other: TR band WDL, 10 ml, no pain, hand warm.    Dr Chen in room talking with pt and family.    Nasrin Linares RN

## 2025-07-30 NOTE — DISCHARGE INSTRUCTIONS
Cardiac Angiogram Discharge Instructions - Radial    After you go home:    Have an adult stay with you until tomorrow.  Drink extra fluids for 2 days.  You may resume your normal diet.  No smoking       For 24 hours - due to the sedation you received:  Relax and take it easy.  Do NOT make any important or legal decisions.  Do NOT drive or operate machines at home or at work.  Do NOT drink alcohol.    Care of Wrist Puncture Site:    For the first 24 hrs - check the puncture site every 1-2 hours while awake.  It is normal to have soreness at the puncture site and mild tingling in your hand for up to 3 days.  Remove the bandaid after 24 hours. If there is minor oozing, apply another bandaid and remove it after 12 hours.  You may shower tomorrow.  Do NOT take a bath, or use a hot tub or pool for at least 3 days. Do NOT scrub the site. Do not use lotion or powder near the puncture site.           Activity:        For 2 days:   do not use your hand or arm to support your weight (such as rising from a chair)   do not bend your wrist (such as lifting a garage door).  do not lift more than 5 pounds or exercise your arm (such as tennis, golf or bowling).  Do NOT do any heavy activity such as exercise, lifting, or straining.     Bleeding:    If you start bleeding from the site in your wrist, sit down and press firmly on/above the site for 10 minutes.   Once bleeding stops, keep arm still for 2 hours.   Call [facility] as soon as you can.       Call 911 right away if you have heavy bleeding or bleeding that does not stop.      Medicines:    Take your medications, including blood thinners, unless your provider tells you not to.    If you have stopped any medicines, check with your provider about when to restart them.    Follow Up Appointments:    Follow up with [facility] Nurse Practitioner at [facility] of patient preference in 7-10 days.    Call the clinic if:    You have a large or growing hard lump around the site.  The  site is red, swollen, hot or tender.  Blood or fluid is draining from the site.  You have chills or a fever greater than 101 F (38 C).  Your arm feels numb, cool or changes color.  You have hives, a rash or unusual itching.  Any questions or concerns.          [facility] [place]:    [phone] [facility] (7 days a week)    Or you may contact your provider via My Chart

## 2025-08-03 LAB
ATRIAL RATE - MUSE: 58 BPM
DIASTOLIC BLOOD PRESSURE - MUSE: NORMAL MMHG
INTERPRETATION ECG - MUSE: NORMAL
P AXIS - MUSE: 53 DEGREES
PR INTERVAL - MUSE: 182 MS
QRS DURATION - MUSE: 108 MS
QT - MUSE: 420 MS
QTC - MUSE: 412 MS
R AXIS - MUSE: 15 DEGREES
SYSTOLIC BLOOD PRESSURE - MUSE: NORMAL MMHG
T AXIS - MUSE: 63 DEGREES
VENTRICULAR RATE- MUSE: 58 BPM

## 2025-08-11 ENCOUNTER — PATIENT OUTREACH (OUTPATIENT)
Dept: CARE COORDINATION | Facility: CLINIC | Age: 70
End: 2025-08-11

## 2025-08-11 ENCOUNTER — OFFICE VISIT (OUTPATIENT)
Dept: CARDIOLOGY | Facility: CLINIC | Age: 70
End: 2025-08-11
Payer: MEDICARE

## 2025-08-11 VITALS
WEIGHT: 196.6 LBS | SYSTOLIC BLOOD PRESSURE: 146 MMHG | OXYGEN SATURATION: 97 % | DIASTOLIC BLOOD PRESSURE: 79 MMHG | RESPIRATION RATE: 16 BRPM | HEART RATE: 55 BPM | HEIGHT: 70 IN | BODY MASS INDEX: 28.15 KG/M2

## 2025-08-11 DIAGNOSIS — R94.39 ABNORMAL STRESS TEST: ICD-10-CM

## 2025-08-11 DIAGNOSIS — I49.3 PVC'S (PREMATURE VENTRICULAR CONTRACTIONS): ICD-10-CM

## 2025-08-11 DIAGNOSIS — I25.110 CORONARY ARTERY DISEASE INVOLVING NATIVE CORONARY ARTERY OF NATIVE HEART WITH UNSTABLE ANGINA PECTORIS (H): Primary | ICD-10-CM

## 2025-08-11 DIAGNOSIS — E78.5 HYPERLIPIDEMIA LDL GOAL <100: ICD-10-CM

## 2025-08-11 PROCEDURE — 3077F SYST BP >= 140 MM HG: CPT | Performed by: NURSE PRACTITIONER

## 2025-08-11 PROCEDURE — 99214 OFFICE O/P EST MOD 30 MIN: CPT | Performed by: NURSE PRACTITIONER

## 2025-08-11 PROCEDURE — 3078F DIAST BP <80 MM HG: CPT | Performed by: NURSE PRACTITIONER

## 2025-08-13 ENCOUNTER — PATIENT OUTREACH (OUTPATIENT)
Dept: CARE COORDINATION | Facility: CLINIC | Age: 70
End: 2025-08-13
Payer: MEDICARE

## 2025-08-19 ENCOUNTER — ALLIED HEALTH/NURSE VISIT (OUTPATIENT)
Dept: FAMILY MEDICINE | Facility: OTHER | Age: 70
End: 2025-08-19
Payer: MEDICARE

## 2025-08-19 ENCOUNTER — MYC MEDICAL ADVICE (OUTPATIENT)
Dept: CARDIOLOGY | Facility: CLINIC | Age: 70
End: 2025-08-19

## 2025-08-19 ENCOUNTER — LAB (OUTPATIENT)
Dept: LAB | Facility: OTHER | Age: 70
End: 2025-08-19
Payer: MEDICARE

## 2025-08-19 VITALS — SYSTOLIC BLOOD PRESSURE: 145 MMHG | HEART RATE: 65 BPM | DIASTOLIC BLOOD PRESSURE: 79 MMHG

## 2025-08-19 DIAGNOSIS — I10 HYPERTENSION GOAL BP (BLOOD PRESSURE) < 140/90: Primary | ICD-10-CM

## 2025-08-19 DIAGNOSIS — I10 HTN (HYPERTENSION): Primary | ICD-10-CM

## 2025-08-19 DIAGNOSIS — I25.110 CORONARY ARTERY DISEASE INVOLVING NATIVE CORONARY ARTERY OF NATIVE HEART WITH UNSTABLE ANGINA PECTORIS (H): ICD-10-CM

## 2025-08-19 DIAGNOSIS — E78.5 HYPERLIPIDEMIA LDL GOAL <100: ICD-10-CM

## 2025-08-19 LAB
CHOLEST SERPL-MCNC: 131 MG/DL
FASTING STATUS PATIENT QL REPORTED: YES
HDLC SERPL-MCNC: 38 MG/DL
LDLC SERPL CALC-MCNC: 71 MG/DL
NONHDLC SERPL-MCNC: 93 MG/DL
TRIGL SERPL-MCNC: 109 MG/DL

## 2025-08-19 PROCEDURE — 36415 COLL VENOUS BLD VENIPUNCTURE: CPT

## 2025-08-19 PROCEDURE — 99207 PR NO CHARGE NURSE ONLY: CPT | Performed by: FAMILY MEDICINE

## 2025-08-19 PROCEDURE — 80061 LIPID PANEL: CPT

## 2025-08-19 PROCEDURE — 3077F SYST BP >= 140 MM HG: CPT | Performed by: FAMILY MEDICINE

## 2025-08-19 PROCEDURE — 3078F DIAST BP <80 MM HG: CPT | Performed by: FAMILY MEDICINE

## 2025-08-22 ENCOUNTER — HOSPITAL ENCOUNTER (EMERGENCY)
Facility: CLINIC | Age: 70
Discharge: HOME | End: 2025-08-22
Attending: STUDENT IN AN ORGANIZED HEALTH CARE EDUCATION/TRAINING PROGRAM | Admitting: STUDENT IN AN ORGANIZED HEALTH CARE EDUCATION/TRAINING PROGRAM
Payer: MEDICARE

## 2025-08-22 ENCOUNTER — APPOINTMENT (OUTPATIENT)
Dept: GENERAL RADIOLOGY | Facility: CLINIC | Age: 70
End: 2025-08-22
Attending: STUDENT IN AN ORGANIZED HEALTH CARE EDUCATION/TRAINING PROGRAM
Payer: MEDICARE

## 2025-08-22 PROCEDURE — 73110 X-RAY EXAM OF WRIST: CPT | Mod: RT

## 2025-08-22 ASSESSMENT — ACTIVITIES OF DAILY LIVING (ADL)
ADLS_ACUITY_SCORE: 41
ADLS_ACUITY_SCORE: 41

## 2025-08-22 ASSESSMENT — ENCOUNTER SYMPTOMS: WOUND: 1

## 2025-08-22 ASSESSMENT — COLUMBIA-SUICIDE SEVERITY RATING SCALE - C-SSRS
2. HAVE YOU ACTUALLY HAD ANY THOUGHTS OF KILLING YOURSELF IN THE PAST MONTH?: NO
6. HAVE YOU EVER DONE ANYTHING, STARTED TO DO ANYTHING, OR PREPARED TO DO ANYTHING TO END YOUR LIFE?: NO
1. IN THE PAST MONTH, HAVE YOU WISHED YOU WERE DEAD OR WISHED YOU COULD GO TO SLEEP AND NOT WAKE UP?: NO

## 2025-08-27 ENCOUNTER — OFFICE VISIT (OUTPATIENT)
Dept: FAMILY MEDICINE | Facility: OTHER | Age: 70
End: 2025-08-27
Payer: MEDICARE

## 2025-08-27 VITALS
TEMPERATURE: 97.4 F | DIASTOLIC BLOOD PRESSURE: 82 MMHG | RESPIRATION RATE: 20 BRPM | BODY MASS INDEX: 28.19 KG/M2 | WEIGHT: 196.5 LBS | OXYGEN SATURATION: 98 % | HEART RATE: 62 BPM | SYSTOLIC BLOOD PRESSURE: 147 MMHG

## 2025-08-27 DIAGNOSIS — L03.113 CELLULITIS OF RIGHT WRIST: ICD-10-CM

## 2025-08-27 DIAGNOSIS — L57.0 AK (ACTINIC KERATOSIS): ICD-10-CM

## 2025-08-27 DIAGNOSIS — I10 HYPERTENSION GOAL BP (BLOOD PRESSURE) < 140/90: Primary | ICD-10-CM

## 2025-08-27 DIAGNOSIS — J44.9 MODERATE CHRONIC OBSTRUCTIVE PULMONARY DISEASE (H): ICD-10-CM

## 2025-08-27 RX ORDER — LOSARTAN POTASSIUM 25 MG/1
25 TABLET ORAL DAILY
Qty: 90 TABLET | Refills: 1 | Status: SHIPPED | OUTPATIENT
Start: 2025-08-27

## 2025-08-27 RX ORDER — CARVEDILOL 6.25 MG/1
6.25 TABLET ORAL 2 TIMES DAILY WITH MEALS
Status: CANCELLED | OUTPATIENT
Start: 2025-08-27

## 2025-08-27 RX ORDER — CEPHALEXIN 500 MG/1
500 CAPSULE ORAL 2 TIMES DAILY
Qty: 10 CAPSULE | Refills: 0 | Status: SHIPPED | OUTPATIENT
Start: 2025-08-27 | End: 2025-09-01

## 2025-08-27 ASSESSMENT — PAIN SCALES - GENERAL: PAINLEVEL_OUTOF10: NO PAIN (0)

## (undated) DEVICE — KIT ENDO TURNOVER/PROCEDURE CARRY-ON 101822

## (undated) DEVICE — LUBRICATING JELLY 4.25OZ

## (undated) DEVICE — ESU HOOK TIP 5MM CONMED

## (undated) DEVICE — ESU PENCIL W/HOLSTER

## (undated) DEVICE — GLIDEWIRE TERUMO .035X180CM 1.5,, J-TIP GR3525

## (undated) DEVICE — TOTE ANGIO CORP PC15AT SAN32CC83O

## (undated) DEVICE — DEVICE FIXATION ABSORBLE TACK 5MM SINGLE ABSTACK30X

## (undated) DEVICE — GLOVE PROTEXIS W/NEU-THERA 7.5  2D73TE75

## (undated) DEVICE — GW VASC OMNIWIRE J L185CM PRESSURE 89185J

## (undated) DEVICE — SOL WATER IRRIG 1000ML BOTTLE 2F7114

## (undated) DEVICE — MANIFOLD KIT ANGIO AUTOMATED 014613

## (undated) DEVICE — DEFIB PRO-PADZ LVP LQD GEL ADULT 8900-2105-01

## (undated) DEVICE — SNARE CAPIVATOR ROUND COLD SNR BX10 M00561101

## (undated) DEVICE — VALVE HEMOSTASIS GUARDIAN II OD8 FR GUIDEWIRE 8215

## (undated) DEVICE — PACK GENERAL LAPAOSCOPY

## (undated) DEVICE — KIT HAND CONTROL ANGIOTOUCH ACIST 65CM AT-P65

## (undated) DEVICE — TUBING SUCTION 6"X3/16" N56A

## (undated) DEVICE — ESU GROUND PAD UNIVERSAL W/O CORD

## (undated) DEVICE — CATH DIAGNOSTIC RADIAL 5FR TIG 4.0

## (undated) DEVICE — STOCKING SLEEVE COMPRESSION CALF MED

## (undated) DEVICE — RAD G/W INQWIRE .035X260CM J-TIP EXCHANGE IQ35F260J1O5RS

## (undated) DEVICE — ESU SUCTION/IRRIGATION SYSTEM PISTOL GRIP

## (undated) DEVICE — ENDO TRAP POLYP E-TRAP 00711099

## (undated) DEVICE — SHIELD STERADIAN ATTENUATION PAD

## (undated) DEVICE — DISSECTOR BALLOON SPACEMAKER PRO BTT & OVAL SMBTTOVLX

## (undated) DEVICE — INTRO GLIDESHEATH SLENDER 6FR 10X45CM 60-1060

## (undated) DEVICE — DRSG PRIMAPORE 04X8"

## (undated) DEVICE — SLEEVE TR BAND RADIAL COMPRESSION DEVICE 24CM TRB24-REG

## (undated) DEVICE — CATH GD VISTA BRITE BLU YLW 100CM 6FR XB3.5 6700540E

## (undated) DEVICE — ENDO TROCAR SHIELDED BLADED KII Z-THRD 05X100MM CTB03

## (undated) RX ORDER — VERAPAMIL HYDROCHLORIDE 2.5 MG/ML
INJECTION INTRAVENOUS
Status: DISPENSED
Start: 2025-07-30

## (undated) RX ORDER — FENTANYL CITRATE 50 UG/ML
INJECTION, SOLUTION INTRAMUSCULAR; INTRAVENOUS
Status: DISPENSED
Start: 2018-08-03

## (undated) RX ORDER — NITROGLYCERIN 5 MG/ML
VIAL (ML) INTRAVENOUS
Status: DISPENSED
Start: 2025-07-30

## (undated) RX ORDER — PROPOFOL 10 MG/ML
INJECTION, EMULSION INTRAVENOUS
Status: DISPENSED
Start: 2018-08-03

## (undated) RX ORDER — BUPIVACAINE HYDROCHLORIDE 2.5 MG/ML
INJECTION, SOLUTION EPIDURAL; INFILTRATION; INTRACAUDAL
Status: DISPENSED
Start: 2018-08-03

## (undated) RX ORDER — HEPARIN SODIUM 200 [USP'U]/100ML
INJECTION, SOLUTION INTRAVENOUS
Status: DISPENSED
Start: 2025-07-30

## (undated) RX ORDER — FENTANYL CITRATE 50 UG/ML
INJECTION, SOLUTION INTRAMUSCULAR; INTRAVENOUS
Status: DISPENSED
Start: 2025-07-30

## (undated) RX ORDER — KETOROLAC TROMETHAMINE 30 MG/ML
INJECTION, SOLUTION INTRAMUSCULAR; INTRAVENOUS
Status: DISPENSED
Start: 2018-08-03

## (undated) RX ORDER — HEPARIN SODIUM 1000 [USP'U]/ML
INJECTION, SOLUTION INTRAVENOUS; SUBCUTANEOUS
Status: DISPENSED
Start: 2025-07-30

## (undated) RX ORDER — LIDOCAINE HYDROCHLORIDE 10 MG/ML
INJECTION, SOLUTION EPIDURAL; INFILTRATION; INTRACAUDAL; PERINEURAL
Status: DISPENSED
Start: 2025-07-30

## (undated) RX ORDER — LIDOCAINE HYDROCHLORIDE 20 MG/ML
INJECTION, SOLUTION EPIDURAL; INFILTRATION; INTRACAUDAL; PERINEURAL
Status: DISPENSED
Start: 2018-08-03

## (undated) RX ORDER — EPINEPHRINE 1 MG/ML
INJECTION, SOLUTION, CONCENTRATE INTRAVENOUS
Status: DISPENSED
Start: 2018-08-03

## (undated) RX ORDER — LIDOCAINE HYDROCHLORIDE 10 MG/ML
INJECTION, SOLUTION EPIDURAL; INFILTRATION; INTRACAUDAL; PERINEURAL
Status: DISPENSED
Start: 2018-08-03